# Patient Record
Sex: FEMALE | Race: WHITE | NOT HISPANIC OR LATINO | Employment: OTHER | URBAN - METROPOLITAN AREA
[De-identification: names, ages, dates, MRNs, and addresses within clinical notes are randomized per-mention and may not be internally consistent; named-entity substitution may affect disease eponyms.]

---

## 2018-08-21 ENCOUNTER — OFFICE VISIT (OUTPATIENT)
Dept: URGENT CARE | Facility: MEDICAL CENTER | Age: 73
End: 2018-08-21
Payer: MEDICARE

## 2018-08-21 VITALS
OXYGEN SATURATION: 98 % | SYSTOLIC BLOOD PRESSURE: 126 MMHG | HEIGHT: 66 IN | TEMPERATURE: 96.4 F | RESPIRATION RATE: 16 BRPM | WEIGHT: 142.6 LBS | HEART RATE: 84 BPM | DIASTOLIC BLOOD PRESSURE: 66 MMHG | BODY MASS INDEX: 22.92 KG/M2

## 2018-08-21 DIAGNOSIS — H11.32 NON-TRAUMATIC SUBCONJUNCTIVAL HEMORRHAGE OF LEFT EYE: Primary | ICD-10-CM

## 2018-08-21 PROCEDURE — 99204 OFFICE O/P NEW MOD 45 MIN: CPT | Performed by: FAMILY MEDICINE

## 2018-08-21 PROCEDURE — G0463 HOSPITAL OUTPT CLINIC VISIT: HCPCS | Performed by: FAMILY MEDICINE

## 2018-08-21 RX ORDER — CHOLECALCIFEROL (VITAMIN D3) 125 MCG
CAPSULE ORAL
COMMUNITY

## 2018-08-21 RX ORDER — FERROUS SULFATE 325(65) MG
325 TABLET ORAL
COMMUNITY

## 2018-08-21 RX ORDER — CYANOCOBALAMIN (VITAMIN B-12) 500 MCG
LOZENGE ORAL
COMMUNITY
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
## 2018-08-21 NOTE — PATIENT INSTRUCTIONS
Visual acuity bilaterally-20/20  There is no visual disturbance or eye pain  Patient given reassurance  However, I advised patient not to rub her left thigh  If there is any blood in the anterior chamber, she is to go to her ophthalmologist or eye care specialist   She expressed understanding  Subconjunctival Hemorrhage   WHAT YOU NEED TO KNOW:   A subconjunctival hemorrhage is when blood collects under the conjunctiva in your eye  The conjunctiva is the clear lining that covers the white part of your eye  The blood comes from broken blood vessels under the conjunctiva  DISCHARGE INSTRUCTIONS:   Care for your eye:   · Cold or warm compress:  Use a cold pack during the first 24 hours  Ask how often to apply it and for how long each time  After the first 24 hours, apply a warm pack on your eye  Do this 3 times each day for about 10 to 15 minutes each time  · Eyedrops: You may need artificial tears to keep your eye moist  Use the drops as directed  Follow up with your healthcare provider or eye specialist as directed:  Write down your questions so you remember to ask them during your visits  Contact your healthcare provider or eye specialist if:   · The redness in your eye has not gone away after 3 weeks  · You have another subconjunctival hemorrhage  · You have subconjunctival hemorrhages in both eyes  · You have questions or concerns about your condition or care  Return to the emergency department if:   · You have eye pain or sensitivity to light  · Your vision changes  · You have white or yellow discharge from your eye  © 2017 [address]  Information is for End User's use only and may not be sold, redistributed or otherwise used for commercial purposes  All illustrations and images included in CareNotes® are the copyrighted property of A D A M , Inc  or Leonard lOeary  The above information is an  only   It is not intended as medical advice for individual conditions or treatments  Talk to your doctor, nurse or pharmacist before following any medical regimen to see if it is safe and effective for you

## 2019-08-17 ENCOUNTER — OFFICE VISIT (OUTPATIENT)
Dept: URGENT CARE | Facility: CLINIC | Age: 74
End: 2019-08-17
Payer: MEDICARE

## 2019-08-17 VITALS
HEIGHT: 66 IN | BODY MASS INDEX: 23.3 KG/M2 | DIASTOLIC BLOOD PRESSURE: 64 MMHG | RESPIRATION RATE: 20 BRPM | WEIGHT: 145 LBS | OXYGEN SATURATION: 97 % | SYSTOLIC BLOOD PRESSURE: 128 MMHG | HEART RATE: 78 BPM | TEMPERATURE: 96.6 F

## 2019-08-17 DIAGNOSIS — S70.362A TICK BITE OF LEFT THIGH, INITIAL ENCOUNTER: Primary | ICD-10-CM
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
## 2019-08-17 PROCEDURE — G0463 HOSPITAL OUTPT CLINIC VISIT: HCPCS | Performed by: NURSE PRACTITIONER

## 2019-08-17 PROCEDURE — 99213 OFFICE O/P EST LOW 20 MIN: CPT | Performed by: NURSE PRACTITIONER

## 2019-08-17 RX ORDER — DOXYCYCLINE 100 MG/1
100 TABLET ORAL 2 TIMES DAILY
Qty: 28 TABLET | Refills: 0 | Status: SHIPPED | OUTPATIENT
Start: 2019-08-17 | End: 2019-08-18

## 2019-08-17 NOTE — PATIENT INSTRUCTIONS
Take meds as directed   Follow up with pcp  Take antibiotic one tablet twice a day as directed for 14 days  Bullseye rash to the left inner thigh present  We offer over-the-counter meds that can be purchased here at the office for your convenience   Cough and cold meds:   Mucinex for $14 00   Mucinex DM for $14 00   Delsym for $14 dollars   Cepacol Extra strength for $4 00,     Allergy medicine  Bendaryl for $4 00  Cetrizine (Zyrtec) for $4 00,     Pain relief  Ibuprofen (motrin) for $4 00  Acetaminophen (tylenol) for $4 00  Childrens tylenol and motrin for $4 00    Itch and Rash Relief  % Hydrocortisone Cream for $4 00          Tick Bite   WHAT YOU NEED TO KNOW:   Most tick bites are not dangerous, but ticks can pass disease or infection when they bite  Ticks need to be removed quickly  You may have redness, pain, itching, and swelling near the bite  Blisters may also develop  DISCHARGE INSTRUCTIONS:   Return to the emergency department if:   · You have trouble walking or moving your legs  · You have joint pain, muscle pain, or muscle weakness within 1 month of a tick bite  · You have a fever, chills, headache, or rash  Contact your healthcare provider if:   · You cannot remove the tick  · The tick's head is stuck in your skin  · You have questions or concerns about your condition or care  Medicines:   · Medicines  help decrease pain, redness, itching, and swelling  You may also need medicine to prevent or fight a bacterial infection  These medicines may be given as a cream, lotion, or pill  · Take your medicine as directed  Contact your healthcare provider if you think your medicine is not helping or if you have side effects  Tell him of her if you are allergic to any medicine  Keep a list of the medicines, vitamins, and herbs you take  Include the amounts, and when and why you take them  Bring the list or the pill bottles to follow-up visits   Carry your medicine list with you in case of an emergency  How to remove a tick:  Remove the tick as soon as possible to help prevent disease or infection  You are less likely to get sick from a tick bite if you remove the tick within 24 hours  Do not use petroleum jelly, nail polish, rubbing alcohol, or heat  These do not work and may be dangerous  Do the following to remove a tick:  · First, try a soapy cotton ball  Soak a cotton ball in liquid soap  Cover the tick with the cotton ball for 30 seconds  The tick may come off with the cotton ball when you pull it away  · Use tweezers if the soapy cotton ball does not work  Grasp the tick as close to your skin as possible  Pull the tick straight up and out  Do not touch the tick with your bare hands  · Do not twist or jerk the tick suddenly, because this may break off the tick's head or mouth parts  Do not leave any part of the tick in your skin  · Do not crush or squeeze the tick since its body may be infected with germs  Flush the tick down the toilet  · After the tick is removed, clean the area of the bite with rubbing alcohol  Then wash your hands with soap and water  Apply ice  on your bite for 15 to 20 minutes every hour or as directed  Use an ice pack, or put crushed ice in a plastic bag  Cover it with a towel before you apply it to your skin  Ice helps prevent tissue damage and decreases swelling and pain  Prevent a tick bite:  Ticks live in areas covered by brush and grass  They may even be found in your lawn if you live in certain areas  Outdoor pets can carry ticks inside the house  Ticks can grab onto you or your clothes when you walk by grass or brush  If you go into areas that contain many trees, tall grasses, and underbrush, do the following:  · Wear light colored pants and a long-sleeved shirt  Tuck your pants into your socks or boots  Tuck in your shirt  Wear sleeves that fit close to the skin at your wrists and neck   This will help prevent ticks from crawling through gaps in your clothing and onto your skin  Wear a hat in areas with trees  · Apply insect repellant on your skin  The insect repellant should contain DEET  Do not put insect repellant on skin that is cut, scratched, or irritated  Always use soap and water to wash the insect repellant off as soon as possible once you are indoors  Do not apply insect repellant on your child's face or hands  · Spray insect repellant onto your clothes  Use permethrin spray  This spray kills ticks that crawl on your clothing  Be sure to spray the tops of your boots, bottom of pant legs, and sleeve cuffs  As soon as possible, wash and dry clothing in hot water and high heat  · Check your clothing, hair, and skin for ticks  Shower within 2 hours of coming indoors  Carefully check the hairline, armpits, neck, and waist  Check your pets and children for ticks  Remove ticks from pets the same way as you remove them from people  · Decrease the risk for ticks in your yard  Ticks like to live in shady, moist areas  Sigmund Lawrence your lawn regularly to keep the grass short  Trim the grass around birdbaths and fences  Cut branches that are overgrown and take them out of the yard  Clear out leaf piles  Kavon Pummel firewood in a dry, terrell area  Follow up with your healthcare provider as directed:  Write down your questions so you remember to ask them during your visits  © 2017 [address] [employer] Information is for End User's use only and may not be sold, redistributed or otherwise used for commercial purposes  All illustrations and images included in CareNotes® are the copyrighted property of A D A M , [employer]  or Leonard Oleary  The above information is an  only  It is not intended as medical advice for individual conditions or treatments  Talk to your doctor, nurse or pharmacist before following any medical regimen to see if it is safe and effective for you

## 2019-08-17 NOTE — PROGRESS NOTES
NAME: Ana Laura Barbour is a 69 y o  female  : 1945    MRN: [id number]      Assessment and Plan   Tick bite of left thigh, initial encounter [S70 362A, W57  XXXA]  1  Tick bite of left thigh, initial encounter  amoxicillin (AMOXIL) 500 MG tablet    DISCONTINUED: doxycycline (ADOXA) 100 MG tablet       Elisa Gonzalez was seen today for insect bite  Diagnoses and all orders for this visit:    Tick bite of left thigh, initial encounter  -     Discontinue: doxycycline (ADOXA) 100 MG tablet; Take 1 tablet (100 mg total) by mouth 2 (two) times a day for 14 days  -     amoxicillin (AMOXIL) 500 MG tablet; Take 1 tablet (500 mg total) by mouth 3 (three) times a day for 14 days    medication changed due to pt unable to afford it  Patient Instructions   Patient Instructions   Take meds as directed   Follow up with pcp  Take antibiotic one tablet twice a day as directed for 14 days  Bullseye rash to the left inner thigh present  We offer over-the-counter meds that can be purchased here at the office for your convenience   Cough and cold meds:   Mucinex for $14 00   Mucinex DM for $14 00   Delsym for $14 dollars   Cepacol Extra strength for $4 00,     Allergy medicine  Bendaryl for $4 00  Cetrizine (Zyrtec) for $4 00,     Pain relief  Ibuprofen (motrin) for $4 00  Acetaminophen (tylenol) for $4 00  Childrens tylenol and motrin for $4 00    Itch and Rash Relief  % Hydrocortisone Cream for $4 00          Tick Bite   WHAT YOU NEED TO KNOW:   Most tick bites are not dangerous, but ticks can pass disease or infection when they bite  Ticks need to be removed quickly  You may have redness, pain, itching, and swelling near the bite  Blisters may also develop  DISCHARGE INSTRUCTIONS:   Return to the emergency department if:   · You have trouble walking or moving your legs  · You have joint pain, muscle pain, or muscle weakness within 1 month of a tick bite  · You have a fever, chills, headache, or rash    Contact your healthcare provider if:   · You cannot remove the tick  · The tick's head is stuck in your skin  · You have questions or concerns about your condition or care  Medicines:   · Medicines  help decrease pain, redness, itching, and swelling  You may also need medicine to prevent or fight a bacterial infection  These medicines may be given as a cream, lotion, or pill  · Take your medicine as directed  Contact your healthcare provider if you think your medicine is not helping or if you have side effects  Tell him of her if you are allergic to any medicine  Keep a list of the medicines, vitamins, and herbs you take  Include the amounts, and when and why you take them  Bring the list or the pill bottles to follow-up visits  Carry your medicine list with you in case of an emergency  How to remove a tick:  Remove the tick as soon as possible to help prevent disease or infection  You are less likely to get sick from a tick bite if you remove the tick within 24 hours  Do not use petroleum jelly, nail polish, rubbing alcohol, or heat  These do not work and may be dangerous  Do the following to remove a tick:  · First, try a soapy cotton ball  Soak a cotton ball in liquid soap  Cover the tick with the cotton ball for 30 seconds  The tick may come off with the cotton ball when you pull it away  · Use tweezers if the soapy cotton ball does not work  Grasp the tick as close to your skin as possible  Pull the tick straight up and out  Do not touch the tick with your bare hands  · Do not twist or jerk the tick suddenly, because this may break off the tick's head or mouth parts  Do not leave any part of the tick in your skin  · Do not crush or squeeze the tick since its body may be infected with germs  Flush the tick down the toilet  · After the tick is removed, clean the area of the bite with rubbing alcohol  Then wash your hands with soap and water    Apply ice  on your bite for 15 to 20 minutes every hour or as directed  Use an ice pack, or put crushed ice in a plastic bag  Cover it with a towel before you apply it to your skin  Ice helps prevent tissue damage and decreases swelling and pain  Prevent a tick bite:  Ticks live in areas covered by brush and grass  They may even be found in your lawn if you live in certain areas  Outdoor pets can carry ticks inside the house  Ticks can grab onto you or your clothes when you walk by grass or brush  If you go into areas that contain many trees, tall grasses, and underbrush, do the following:  · Wear light colored pants and a long-sleeved shirt  Tuck your pants into your socks or boots  Tuck in your shirt  Wear sleeves that fit close to the skin at your wrists and neck  This will help prevent ticks from crawling through gaps in your clothing and onto your skin  Wear a hat in areas with trees  · Apply insect repellant on your skin  The insect repellant should contain DEET  Do not put insect repellant on skin that is cut, scratched, or irritated  Always use soap and water to wash the insect repellant off as soon as possible once you are indoors  Do not apply insect repellant on your child's face or hands  · Spray insect repellant onto your clothes  Use permethrin spray  This spray kills ticks that crawl on your clothing  Be sure to spray the tops of your boots, bottom of pant legs, and sleeve cuffs  As soon as possible, wash and dry clothing in hot water and high heat  · Check your clothing, hair, and skin for ticks  Shower within 2 hours of coming indoors  Carefully check the hairline, armpits, neck, and waist  Check your pets and children for ticks  Remove ticks from pets the same way as you remove them from people  · Decrease the risk for ticks in your yard  Ticks like to live in shady, moist areas  Dashawn Apley your lawn regularly to keep the grass short  Trim the grass around birdbaths and fences  Cut branches that are overgrown and take them out of the yard   Clear out leaf piles  Myke gooden in a dry, terrell area  Follow up with your healthcare provider as directed:  Write down your questions so you remember to ask them during your visits  © 2017 [address] Information is for End User's use only and may not be sold, redistributed or otherwise used for commercial purposes  All illustrations and images included in CareNotes® are the copyrighted property of A D A M , Inc  or Leonard Oleary  The above information is an  only  It is not intended as medical advice for individual conditions or treatments  Talk to your doctor, nurse or pharmacist before following any medical regimen to see if it is safe and effective for you  Proceed to ER if symptoms worsen  Chief Complaint     Chief Complaint   Patient presents with    Insect Bite     Patient was bit by tick  She removed the tick but now has a bullseye with surrounding redness  History of Present Illness     Pt has had bullseye rash on the left inner thigh, present for 2 weeks, area is red and warm, itchy in nature  She has no symptoms of lyme disease at this time  She has no fever, no body aches, chills, headaches, change of vision or fatigue  She has no fevers  Doesn't recall being bit by a tick in the past      2 weeks ago she removed the tick ,fully engorged and has been caring for the area herself  Review of Systems   Review of Systems   Skin: Positive for wound (left inner thigh)          Bullseye rash present           Current Medications       Current Outpatient Medications:     amoxicillin (AMOXIL) 500 MG tablet, Take 1 tablet (500 mg total) by mouth 3 (three) times a day for 14 days, Disp: 42 tablet, Rfl: 0    Cholecalciferol (VITAMIN D3) 1000 units CAPS, Take by mouth, Disp: , Rfl:     ferrous sulfate 325 (65 Fe) mg tablet, Take 325 mg by mouth daily with breakfast, Disp: , Rfl:     COLLADO SEAL ROOT PO, Take 1 tablet by mouth daily, Disp: , Rfl:    Vitamin E 400 units TABS, Take by mouth, Disp: , Rfl:     Current Allergies     Allergies as of 08/17/2019    (No Known Allergies)              History reviewed  No pertinent past medical history  History reviewed  No pertinent surgical history  History reviewed  No pertinent family history  Medications have been verified  The following portions of the patient's history were reviewed and updated as appropriate: allergies, current medications, past family history, past medical history, past social history, past surgical history and problem list     Objective   /64   Pulse 78   Temp (!) 96 6 °F (35 9 °C) (Tympanic)   Resp 20   Ht 5' 6" (1 676 m)   Wt 65 8 kg (145 lb)   SpO2 97%   BMI 23 40 kg/m²      Physical Exam     Physical Exam   Constitutional: She appears well-developed and well-nourished  Cardiovascular: Normal rate, regular rhythm and normal heart sounds  Pulmonary/Chest: Effort normal and breath sounds normal    Skin: Skin is warm  Capillary refill takes less than 2 seconds  Rash noted              Demetrice Booty, CRNP

## 2019-08-18 ENCOUNTER — TELEPHONE (OUTPATIENT)
Dept: URGENT CARE | Facility: CLINIC | Age: 74
End: 2019-08-18

## 2019-08-18 RX ORDER — AMOXICILLIN 500 MG/1
500 TABLET, FILM COATED ORAL 3 TIMES DAILY
Qty: 42 TABLET | Refills: 0 | Status: SHIPPED | OUTPATIENT
Start: 2019-08-18 | End: 2019-09-01

## 2019-08-18 NOTE — TELEPHONE ENCOUNTER
Spoke with pharmacist about pt unable to afford doxycycline 100mg bid for 14 days and was changed to amoxicillin 500mg TID for 14 days     PRESTON Weber

## 2020-10-30 ENCOUNTER — OFFICE VISIT (OUTPATIENT)
Dept: URGENT CARE | Facility: CLINIC | Age: 75
End: 2020-10-30
Payer: COMMERCIAL

## 2020-10-30 VITALS
SYSTOLIC BLOOD PRESSURE: 132 MMHG | TEMPERATURE: 96.7 F | RESPIRATION RATE: 20 BRPM | DIASTOLIC BLOOD PRESSURE: 70 MMHG | HEART RATE: 89 BPM | OXYGEN SATURATION: 98 %

## 2020-10-30 DIAGNOSIS — Z20.822 EXPOSURE TO COVID-19 VIRUS: Primary | ICD-10-CM

## 2020-10-30 PROCEDURE — U0003 INFECTIOUS AGENT DETECTION BY NUCLEIC ACID (DNA OR RNA); SEVERE ACUTE RESPIRATORY SYNDROME CORONAVIRUS 2 (SARS-COV-2) (CORONAVIRUS DISEASE [COVID-19]), AMPLIFIED PROBE TECHNIQUE, MAKING USE OF HIGH THROUGHPUT TECHNOLOGIES AS DESCRIBED BY CMS-2020-01-R: HCPCS | Performed by: PHYSICIAN ASSISTANT

## 2020-10-30 PROCEDURE — G0463 HOSPITAL OUTPT CLINIC VISIT: HCPCS | Performed by: PHYSICIAN ASSISTANT

## 2020-10-30 PROCEDURE — 99212 OFFICE O/P EST SF 10 MIN: CPT | Performed by: PHYSICIAN ASSISTANT

## 2020-10-31 LAB — SARS-COV-2 RNA SPEC QL NAA+PROBE: NOT DETECTED

## 2022-01-01 RX ORDER — SODIUM CHLORIDE 9 MG/ML
20 INJECTION, SOLUTION INTRAVENOUS ONCE
Status: CANCELLED | OUTPATIENT
Start: 2022-09-20

## 2022-01-01 RX ORDER — SODIUM CHLORIDE 9 MG/ML
20 INJECTION, SOLUTION INTRAVENOUS ONCE
Status: CANCELLED | OUTPATIENT
Start: 2022-01-01

## 2022-03-29 ENCOUNTER — APPOINTMENT (EMERGENCY)
Dept: CT IMAGING | Facility: HOSPITAL | Age: 77
End: 2022-03-29
Payer: COMMERCIAL

## 2022-03-29 ENCOUNTER — APPOINTMENT (EMERGENCY)
Dept: RADIOLOGY | Facility: HOSPITAL | Age: 77
End: 2022-03-29
Payer: COMMERCIAL

## 2022-03-29 ENCOUNTER — HOSPITAL ENCOUNTER (OUTPATIENT)
Facility: HOSPITAL | Age: 77
Setting detail: OBSERVATION
Discharge: HOME | End: 2022-03-31
Attending: EMERGENCY MEDICINE | Admitting: INTERNAL MEDICINE
Payer: COMMERCIAL

## 2022-03-29 DIAGNOSIS — R79.89 ELEVATED D-DIMER: ICD-10-CM

## 2022-03-29 DIAGNOSIS — R79.89 ELEVATED BRAIN NATRIURETIC PEPTIDE (BNP) LEVEL: ICD-10-CM

## 2022-03-29 DIAGNOSIS — R07.89 OTHER CHEST PAIN: Primary | ICD-10-CM

## 2022-03-29 DIAGNOSIS — I31.3 PERICARDIAL EFFUSION: ICD-10-CM

## 2022-03-29 DIAGNOSIS — N39.0 UTI (URINARY TRACT INFECTION): ICD-10-CM

## 2022-03-29 DIAGNOSIS — R79.89 ELEVATED TSH: ICD-10-CM

## 2022-03-29 LAB
2HR DELTA HS TROPONIN: -1 NG/L
ALBUMIN SERPL BCP-MCNC: 3.3 G/DL (ref 3.5–5)
ALP SERPL-CCNC: 113 U/L (ref 46–116)
ALT SERPL W P-5'-P-CCNC: 33 U/L (ref 12–78)
ANION GAP SERPL CALCULATED.3IONS-SCNC: 10 MMOL/L (ref 4–13)
AST SERPL W P-5'-P-CCNC: 22 U/L (ref 5–45)
ATRIAL RATE: 67 BPM
ATRIAL RATE: 68 BPM
BACTERIA UR QL AUTO: ABNORMAL /HPF
BASOPHILS # BLD AUTO: 0.08 THOUSANDS/ΜL (ref 0–0.1)
BASOPHILS NFR BLD AUTO: 1 % (ref 0–1)
BILIRUB SERPL-MCNC: 0.2 MG/DL (ref 0.2–1)
BILIRUB UR QL STRIP: NEGATIVE
BUN SERPL-MCNC: 16 MG/DL (ref 5–25)
CALCIUM ALBUM COR SERPL-MCNC: 9.1 MG/DL (ref 8.3–10.1)
CALCIUM SERPL-MCNC: 8.5 MG/DL (ref 8.3–10.1)
CARDIAC TROPONIN I PNL SERPL HS: 3 NG/L
CARDIAC TROPONIN I PNL SERPL HS: 4 NG/L
CHLORIDE SERPL-SCNC: 108 MMOL/L (ref 100–108)
CHOLEST SERPL-MCNC: 119 MG/DL
CLARITY UR: ABNORMAL
CO2 SERPL-SCNC: 22 MMOL/L (ref 21–32)
COLOR UR: ABNORMAL
CREAT SERPL-MCNC: 0.97 MG/DL (ref 0.6–1.3)
D DIMER PPP FEU-MCNC: 0.93 UG/ML FEU
EOSINOPHIL # BLD AUTO: 0.59 THOUSAND/ΜL (ref 0–0.61)
EOSINOPHIL NFR BLD AUTO: 7 % (ref 0–6)
ERYTHROCYTE [DISTWIDTH] IN BLOOD BY AUTOMATED COUNT: 14.8 % (ref 11.6–15.1)
GFR SERPL CREATININE-BSD FRML MDRD: 57 ML/MIN/1.73SQ M
GLUCOSE SERPL-MCNC: 95 MG/DL (ref 65–140)
GLUCOSE UR STRIP-MCNC: NEGATIVE MG/DL
HCT VFR BLD AUTO: 30.8 % (ref 34.8–46.1)
HDLC SERPL-MCNC: 46 MG/DL
HGB BLD-MCNC: 10.2 G/DL (ref 11.5–15.4)
HGB UR QL STRIP.AUTO: NEGATIVE
IMM GRANULOCYTES # BLD AUTO: 0.03 THOUSAND/UL (ref 0–0.2)
IMM GRANULOCYTES NFR BLD AUTO: 0 % (ref 0–2)
KETONES UR STRIP-MCNC: NEGATIVE MG/DL
LDLC SERPL CALC-MCNC: 58 MG/DL (ref 0–100)
LEUKOCYTE ESTERASE UR QL STRIP: ABNORMAL
LYMPHOCYTES # BLD AUTO: 1.67 THOUSANDS/ΜL (ref 0.6–4.47)
LYMPHOCYTES NFR BLD AUTO: 21 % (ref 14–44)
MCH RBC QN AUTO: 31.5 PG (ref 26.8–34.3)
MCHC RBC AUTO-ENTMCNC: 33.1 G/DL (ref 31.4–37.4)
MCV RBC AUTO: 95 FL (ref 82–98)
MONOCYTES # BLD AUTO: 0.43 THOUSAND/ΜL (ref 0.17–1.22)
MONOCYTES NFR BLD AUTO: 5 % (ref 4–12)
NEUTROPHILS # BLD AUTO: 5.24 THOUSANDS/ΜL (ref 1.85–7.62)
NEUTS SEG NFR BLD AUTO: 66 % (ref 43–75)
NITRITE UR QL STRIP: POSITIVE
NON-SQ EPI CELLS URNS QL MICRO: ABNORMAL /HPF
NONHDLC SERPL-MCNC: 73 MG/DL
NRBC BLD AUTO-RTO: 0 /100 WBCS
NT-PROBNP SERPL-MCNC: 489 PG/ML
P AXIS: 49 DEGREES
P AXIS: 55 DEGREES
PH UR STRIP.AUTO: 6 [PH]
PLATELET # BLD AUTO: 305 THOUSANDS/UL (ref 149–390)
PMV BLD AUTO: 10.7 FL (ref 8.9–12.7)
POTASSIUM SERPL-SCNC: 3.4 MMOL/L (ref 3.5–5.3)
PR INTERVAL: 170 MS
PR INTERVAL: 184 MS
PROT SERPL-MCNC: 7.4 G/DL (ref 6.4–8.2)
PROT UR STRIP-MCNC: NEGATIVE MG/DL
QRS AXIS: 44 DEGREES
QRS AXIS: 59 DEGREES
QRSD INTERVAL: 80 MS
QRSD INTERVAL: 82 MS
QT INTERVAL: 382 MS
QT INTERVAL: 384 MS
QTC INTERVAL: 405 MS
QTC INTERVAL: 406 MS
RBC # BLD AUTO: 3.24 MILLION/UL (ref 3.81–5.12)
RBC #/AREA URNS AUTO: ABNORMAL /HPF
SODIUM SERPL-SCNC: 140 MMOL/L (ref 136–145)
SP GR UR STRIP.AUTO: 1.02 (ref 1–1.03)
T WAVE AXIS: 32 DEGREES
T WAVE AXIS: 34 DEGREES
TRIGL SERPL-MCNC: 74 MG/DL
TSH SERPL DL<=0.05 MIU/L-ACNC: 6.48 UIU/ML (ref 0.36–3.74)
UROBILINOGEN UR QL STRIP.AUTO: 0.2 E.U./DL
VENTRICULAR RATE: 67 BPM
VENTRICULAR RATE: 68 BPM
WBC # BLD AUTO: 8.04 THOUSAND/UL (ref 4.31–10.16)
WBC #/AREA URNS AUTO: ABNORMAL /HPF

## 2022-03-29 PROCEDURE — 80053 COMPREHEN METABOLIC PANEL: CPT | Performed by: EMERGENCY MEDICINE

## 2022-03-29 PROCEDURE — 93005 ELECTROCARDIOGRAM TRACING: CPT

## 2022-03-29 PROCEDURE — 85025 COMPLETE CBC W/AUTO DIFF WBC: CPT | Performed by: EMERGENCY MEDICINE

## 2022-03-29 PROCEDURE — 99285 EMERGENCY DEPT VISIT HI MDM: CPT

## 2022-03-29 PROCEDURE — 84439 ASSAY OF FREE THYROXINE: CPT | Performed by: PHYSICIAN ASSISTANT

## 2022-03-29 PROCEDURE — 80061 LIPID PANEL: CPT | Performed by: STUDENT IN AN ORGANIZED HEALTH CARE EDUCATION/TRAINING PROGRAM

## 2022-03-29 PROCEDURE — 81001 URINALYSIS AUTO W/SCOPE: CPT | Performed by: STUDENT IN AN ORGANIZED HEALTH CARE EDUCATION/TRAINING PROGRAM

## 2022-03-29 PROCEDURE — 99285 EMERGENCY DEPT VISIT HI MDM: CPT | Performed by: EMERGENCY MEDICINE

## 2022-03-29 PROCEDURE — 83880 ASSAY OF NATRIURETIC PEPTIDE: CPT | Performed by: STUDENT IN AN ORGANIZED HEALTH CARE EDUCATION/TRAINING PROGRAM

## 2022-03-29 PROCEDURE — G1004 CDSM NDSC: HCPCS

## 2022-03-29 PROCEDURE — 36415 COLL VENOUS BLD VENIPUNCTURE: CPT

## 2022-03-29 PROCEDURE — 71275 CT ANGIOGRAPHY CHEST: CPT

## 2022-03-29 PROCEDURE — 96365 THER/PROPH/DIAG IV INF INIT: CPT

## 2022-03-29 PROCEDURE — 84484 ASSAY OF TROPONIN QUANT: CPT | Performed by: EMERGENCY MEDICINE

## 2022-03-29 PROCEDURE — 85379 FIBRIN DEGRADATION QUANT: CPT | Performed by: STUDENT IN AN ORGANIZED HEALTH CARE EDUCATION/TRAINING PROGRAM

## 2022-03-29 PROCEDURE — 71046 X-RAY EXAM CHEST 2 VIEWS: CPT

## 2022-03-29 PROCEDURE — 84443 ASSAY THYROID STIM HORMONE: CPT | Performed by: STUDENT IN AN ORGANIZED HEALTH CARE EDUCATION/TRAINING PROGRAM

## 2022-03-29 PROCEDURE — 93010 ELECTROCARDIOGRAM REPORT: CPT | Performed by: INTERNAL MEDICINE

## 2022-03-29 RX ADMIN — IOHEXOL 85 ML: 350 INJECTION, SOLUTION INTRAVENOUS at 22:26

## 2022-03-29 RX ADMIN — CEFTRIAXONE SODIUM 1000 MG: 10 INJECTION, POWDER, FOR SOLUTION INTRAVENOUS at 22:19

## 2022-03-29 NOTE — LETTER
[address]  Dept: [phone]    April 1, 2022     Patient: Haris Reyes   YOB: 1945   Date of Visit: 3/29/2022       To Whom it May Concern:    Haris Reyes is under my professional care  She was seen in the hospital from 3/29/2022   to 04/01/22  She may return to work on 4/1/2022 without limitations  If you have any questions or concerns, please don't hesitate to call           Sincerely,          Grupo Foy MD

## 2022-03-29 NOTE — LETTER
[address] [place] [address] [address] [id number]  Dept: [phone]    March 31, 2022     Patient: Ritesh Teixeira   YOB: 1945   Date of Visit: 3/29/2022       To Whom it May Concern:    Ritesh Teixeira is under my professional care  She was seen in the hospital from 3/29/2022   to 03/31/22  She may return to work on 4/4/2022 without limitations  If you have any questions or concerns, please don't hesitate to call           Sincerely,          Kavin Madrigal MD

## 2022-03-29 NOTE — ED PROVIDER NOTES
History  Chief Complaint   Patient presents with    Chest Pain     pt c/o diffuse chest pain that feels as if an elephant is sitting on her chest   ongoing for 10 days but getting worse today  also c/o diarrhe and UTI sx but started taking azo and they are starting to resolve  does not follow regularly with a doctor  Tone Me is a very pleasant 78 y o  female who presents today for pressure like chest pain  No significant PMH but no follow up with PCP for general check ups or screening  She reports exertional pressure like chest pain with SOB that is improved with rest  8/10 severity  May last for hours  Unrelated to food intake, denies N/V, diaphoresis, headache, back pain or syncopal episode  Patient now reports having urinary frequency, urgency and dysuria x 10 days associated with diarrhea which has improved since using OTC daily AZO medications  Denies any fever  Reports history of flu like symptoms in January 2022 with covid contacts, not vaccinated and has not felt like herself since  Lost 20lbs in past 3 months  History provided by:  Patient   used: No    Chest Pain  Pain location:  Substernal area, L chest and R chest  Pain quality: pressure    Pain radiates to:  Does not radiate  Pain radiates to the back: no    Pain severity:  Severe  Onset quality:  Sudden  Timing:  Intermittent  Progression:  Worsening  Chronicity:  New  Context: stress    Context: not at rest    Relieved by: Tylenol    Worsened by:  Exertion  Associated symptoms: abdominal pain, fatigue, palpitations and shortness of breath    Associated symptoms: no altered mental status, no cough, no diaphoresis, no dizziness, no fever, no headache, no lower extremity edema, no nausea, no orthopnea, no syncope, not vomiting and no weakness    Abdominal pain:     Location:  Generalized    Duration:  10 days    Chronicity:  New  Risk factors: no diabetes mellitus, no hypertension, not obese, no prior DVT/PE and no smoking        Prior to Admission Medications   Prescriptions Last Dose Informant Patient Reported? Taking? Cholecalciferol (VITAMIN D3) 1000 units CAPS   Yes Yes   Sig: Take by mouth   COLLADO SEAL ROOT PO   Yes Yes   Sig: Take 1 tablet by mouth daily   Pyridoxine HCl (VITAMIN B-6 PO)  Self Yes Yes   Sig: Take 1 tablet by mouth   Vitamin E 400 units TABS   Yes Yes   Sig: Take by mouth   ferrous sulfate 325 (65 Fe) mg tablet   Yes Yes   Sig: Take 325 mg by mouth daily with breakfast      Facility-Administered Medications: None       History reviewed  No pertinent past medical history  History reviewed  No pertinent surgical history  History reviewed  No pertinent family history  I have reviewed and agree with the history as documented  E-Cigarette/Vaping     E-Cigarette/Vaping Substances     Social History     Tobacco Use    Smoking status: Never Smoker    Smokeless tobacco: Never Used   Substance Use Topics    Alcohol use: Not on file    Drug use: Not on file        Review of Systems   Constitutional: Positive for fatigue and unexpected weight change  Negative for activity change, appetite change, diaphoresis and fever  HENT: Positive for postnasal drip  Negative for congestion, ear pain, rhinorrhea, sneezing and sore throat  Eyes: Negative for visual disturbance (bilateral cataract)  Respiratory: Positive for shortness of breath  Negative for cough, chest tightness and wheezing  Cardiovascular: Positive for chest pain and palpitations  Negative for orthopnea, leg swelling and syncope  Gastrointestinal: Positive for abdominal pain and diarrhea  Negative for blood in stool, constipation, nausea and vomiting  Genitourinary: Positive for dysuria, frequency and urgency  Negative for hematuria  Musculoskeletal: Positive for arthralgias (left ankle related to fracture in younger age with persistent mild swelling to left foot)  Skin: Positive for pallor     Neurological: Negative for dizziness, syncope, weakness and headaches  Psychiatric/Behavioral: Positive for sleep disturbance  Negative for behavioral problems and confusion  All other systems reviewed and are negative  Physical Exam  ED Triage Vitals   Temperature Pulse Respirations Blood Pressure SpO2   03/29/22 1849 03/29/22 1849 03/29/22 1849 03/29/22 1849 03/29/22 1849   97 5 °F (36 4 °C) 71 16 123/57 99 %      Temp Source Heart Rate Source Patient Position - Orthostatic VS BP Location FiO2 (%)   03/29/22 1849 03/29/22 2103 -- 03/29/22 2103 --   Oral Monitor  Right arm       Pain Score       03/29/22 1849       8             Orthostatic Vital Signs  Vitals:    03/29/22 1849 03/29/22 2103   BP: 123/57 133/64   Pulse: 71 68       Physical Exam  Vitals reviewed  Constitutional:       General: She is not in acute distress  Appearance: She is well-developed and normal weight  She is not diaphoretic  HENT:      Head: Normocephalic and atraumatic  Eyes:      Conjunctiva/sclera: Conjunctivae normal    Neck:      Thyroid: No thyromegaly  Vascular: No JVD  Cardiovascular:      Rate and Rhythm: Normal rate and regular rhythm  Pulses:           Radial pulses are 2+ on the right side  Heart sounds: Normal heart sounds  No murmur heard  No gallop  No S3 or S4 sounds  Pulmonary:      Effort: Pulmonary effort is normal  No respiratory distress  Breath sounds: Normal breath sounds  No decreased breath sounds, wheezing, rhonchi or rales  Chest:      Chest wall: No tenderness  Abdominal:      Palpations: Abdomen is soft  There is no hepatomegaly or splenomegaly  Tenderness: There is abdominal tenderness (SPR)  There is no guarding  Musculoskeletal:      Cervical back: Neck supple  Right lower leg: No tenderness  No edema  Left lower leg: Tenderness (at ankle joint) present  No edema  Lymphadenopathy:      Cervical: No cervical adenopathy  Skin:     General: Skin is warm and dry  Coloration: Skin is pale  Neurological:      General: No focal deficit present  Mental Status: She is alert  Cranial Nerves: No cranial nerve deficit  ED Medications  Medications   ceftriaxone (ROCEPHIN) 1 g/50 mL in dextrose IVPB (1,000 mg Intravenous New Bag 3/29/22 2219)   iohexol (OMNIPAQUE) 350 MG/ML injection (SINGLE-DOSE) 85 mL (85 mL Intravenous Given 3/29/22 2226)       Diagnostic Studies  Results Reviewed     Procedure Component Value Units Date/Time    Lipid panel [[id number]]  (Abnormal) Collected: 03/29/22 1854    Lab Status: Final result Specimen: Blood from Arm, Left Updated: 03/29/22 2211     Cholesterol 119 mg/dL      Triglycerides 74 mg/dL      HDL, Direct 46 mg/dL      LDL Calculated 58 mg/dL      Non-HDL-Chol (CHOL-HDL) 73 mg/dl     NT-BNP PRO [[id number]]  (Abnormal) Collected: 03/29/22 1854    Lab Status: Final result Specimen: Blood from Arm, Left Updated: 03/29/22 2211     NT-proBNP 489 pg/mL     TSH [[id number]]  (Abnormal) Collected: 03/29/22 1854    Lab Status: Final result Specimen: Blood from Arm, Left Updated: 03/29/22 2211     TSH 3RD GENERATON 6 480 uIU/mL     Narrative:      Patients undergoing fluorescein dye angiography may retain small amounts of fluorescein in the body for 48-72 hours post procedure  Samples containing fluorescein can produce falsely depressed TSH values  If the patient had this procedure,a specimen should be resubmitted post fluorescein clearance        Urine Microscopic [[id number]]  (Abnormal) Collected: 03/29/22 2135    Lab Status: Final result Specimen: Urine, Clean Catch Updated: 03/29/22 2202     RBC, UA 0-1 /hpf      WBC, UA 4-10 /hpf      Epithelial Cells Occasional /hpf      Bacteria, UA Innumerable /hpf     HS Troponin I 2hr [[id number]]  (Normal) Collected: 03/29/22 2101    Lab Status: Final result Specimen: Blood from Arm, Left Updated: 03/29/22 2151     hs TnI 2hr 3 ng/L      Delta 2hr hsTnI -1 ng/L     UA w Reflex to Microscopic w Reflex to Culture [[id number]]  (Abnormal) Collected: 03/29/22 2135    Lab Status: Final result Specimen: Urine, Clean Catch Updated: 03/29/22 2147     Color, UA Light Yellow     Clarity, UA Slightly Cloudy     Specific [place], UA 1 020     pH, UA 6 0     Leukocytes, UA Small     Nitrite, UA Positive     Protein, UA Negative mg/dl      Glucose, UA Negative mg/dl      Ketones, UA Negative mg/dl      Urobilinogen, UA 0 2 E U /dl      Bilirubin, UA Negative     Blood, UA Negative    D-dimer, quantitative [[id number]]  (Abnormal) Collected: 03/29/22 2106    Lab Status: Final result Specimen: Blood from Arm, Right Updated: 03/29/22 2142     D-Dimer, Quant 0 93 ug/ml FEU     Narrative: In the evaluation for possible pulmonary embolism, in the appropriate (Well's Score of 4 or less) patient, the age adjusted d-dimer cutoff for this patient can be calculated as:    Age x 0 01 (in ug/mL) for Age-adjusted D-dimer exclusion threshold for a patient over 50 years      HS Troponin 0hr (reflex protocol) [[id number]]  (Normal) Collected: 03/29/22 1854    Lab Status: Final result Specimen: Blood from Arm, Left Updated: 03/29/22 1943     hs TnI 0hr 4 ng/L     Comprehensive metabolic panel [[id number]]  (Abnormal) Collected: 03/29/22 1854    Lab Status: Final result Specimen: Blood from Arm, Left Updated: 03/29/22 1937     Sodium 140 mmol/L      Potassium 3 4 mmol/L      Chloride 108 mmol/L      CO2 22 mmol/L      ANION GAP 10 mmol/L      BUN 16 mg/dL      Creatinine 0 97 mg/dL      Glucose 95 mg/dL      Calcium 8 5 mg/dL      Corrected Calcium 9 1 mg/dL      AST 22 U/L      ALT 33 U/L      Alkaline Phosphatase 113 U/L      Total Protein 7 4 g/dL      Albumin 3 3 g/dL      Total Bilirubin 0 20 mg/dL      eGFR 57 ml/min/1 73sq m     Narrative:      [place] guidelines for Chronic Kidney Disease (CKD):     Stage 1 with normal or high GFR (GFR > 90 mL/min/1 73 square meters)    Stage 2 Mild CKD (GFR = 60-89 mL/min/1 73 square meters)    Stage 3A Moderate CKD (GFR = 45-59 mL/min/1 73 square meters)    Stage 3B Moderate CKD (GFR = 30-44 mL/min/1 73 square meters)    Stage 4 Severe CKD (GFR = 15-29 mL/min/1 73 square meters)    Stage 5 End Stage CKD (GFR <15 mL/min/1 73 square meters)  Note: GFR calculation is accurate only with a steady state creatinine    CBC and differential [[id number]]  (Abnormal) Collected: 03/29/22 1854    Lab Status: Final result Specimen: Blood from Arm, Left Updated: 03/29/22 1919     WBC 8 04 Thousand/uL      RBC 3 24 Million/uL      Hemoglobin 10 2 g/dL      Hematocrit 30 8 %      MCV 95 fL      MCH 31 5 pg      MCHC 33 1 g/dL      RDW 14 8 %      MPV 10 7 fL      Platelets 792 Thousands/uL      nRBC 0 /100 WBCs      Neutrophils Relative 66 %      Immat GRANS % 0 %      Lymphocytes Relative 21 %      Monocytes Relative 5 %      Eosinophils Relative 7 %      Basophils Relative 1 %      Neutrophils Absolute 5 24 Thousands/µL      Immature Grans Absolute 0 03 Thousand/uL      Lymphocytes Absolute 1 67 Thousands/µL      Monocytes Absolute 0 43 Thousand/µL      Eosinophils Absolute 0 59 Thousand/µL      Basophils Absolute 0 08 Thousands/µL                  XR chest 2 views   ED Interpretation by Nikki Dillon MD (03/29 2004)   No infiltrate  No acute disease         CTA ED chest PE study    (Results Pending)         Procedures  ECG 12 Lead Documentation Only    Date/Time: 3/29/2022 7:42 PM  Performed by: Pham Mehta MD  Authorized by: Nikki Dillon MD     Patient location:  ED  Previous ECG:     Previous ECG:  Unavailable  Interpretation:     Interpretation: normal    Rate:     ECG rate:  67    ECG rate assessment: normal    Rhythm:     Rhythm: sinus rhythm    Ectopy:     Ectopy: none    QRS:     QRS axis:  Normal    QRS intervals:  Normal (QTc 405)  Conduction:     Conduction: normal    ST segments:     ST segments:  Normal  T waves:     T waves: normal ED Course  ED Course as of 22 2255   Tue Mar 29, 2022   2145 Son reports his GF who works with mother confirmed to have C diff infection in the past 2 weeks with diarrheal episodes while at work  Patient had contact with this person however diarrheal episodes stopped 2 days ago and no fever reported therefore suspected related to UTI however family informed if diarrheal episodes recur then testing may be necessary for C diff at that time, depending on presentation   Mother reports she takes daily 65mg iron supplements when informed of anemia  Therefore RBC indices in  normal limits may be skewed and patient will warrant further work up for anemia   EKG NSR x 2  Trop 4 with 2hr Delta Trop -1  HEART Score 3  Mild anemia Hb 10 (patient reports taking daily iron 65mg supplements) and mildly elevated Pro-  Plan for close Cardiology Follow up at the time of discharge  Ddimer elevated  Stat CT PE study ordered   UA positive for nitrites and WBC with innumerable bacteria noted on microscopy  1g IV Ceftriaxone ordered  Patient updated on findings and agreed with plan of management  August and Son (on telephone) updated simultaneously with patient               HEART Risk Score      Most Recent Value   Heart Score Risk Calculator    History 1 Filed at: 2022   ECG 0 Filed at: 2022   Age 2 Filed at: 2022   Risk Factors 1  [Brother  of Heart Disease in 50's] Filed at: 2022   Troponin 0 Filed at: 2022   HEART Score 4 Filed at: 2022              PERC Rule for PE      Most Recent Value   PERC Rule for PE    Age >=50 1 Filed at: 2022   HR >=100 0 Filed at: 2022   O2 Sat on room air < 95% 0 Filed at: 2022   History of PE or DVT 0 Filed at: 2022   Recent trauma or surgery 0 Filed at: 2022   Hemoptysis 0 Filed at: 2022   Exogenous estrogen 0 Filed at: 03/29/2022 2152   Unilateral leg swelling 0 Filed at: 03/29/2022 2152   PERC Rule for PE Results 1 Filed at: 03/29/2022 2152              SBIRT 22yo+      Most Recent Value   SBIRT (23 yo +)    In order to provide better care to our patients, we are screening all of our patients for alcohol and drug use  Would it be okay to ask you these screening questions? Yes Filed at: 03/29/2022 9766   Initial Alcohol Screen: US AUDIT-C     1  How often do you have a drink containing alcohol? 0 Filed at: 03/29/2022 1852   2  How many drinks containing alcohol do you have on a typical day you are drinking? 0 Filed at: 03/29/2022 1852   3b  FEMALE Any Age, or MALE 65+: How often do you have 4 or more drinks on one occassion? 0 Filed at: 03/29/2022 1852   Audit-C Score 0 Filed at: 03/29/2022 8361   JORDANA: How many times in the past year have you    Used an illegal drug or used a prescription medication for non-medical reasons?  Never Filed at: 03/29/2022 1852        NATHALY Risk Score      Most Recent Value   Age >= 72 1 Filed at: 03/29/2022 1958   Known CAD (stenosis >= 50%) 0 Filed at: 03/29/2022 1958   Recent (<=24 hrs) Service Angina 1 Filed at: 03/29/2022 1958   ST Deviation >= 0 5 mm 0 Filed at: 03/29/2022 1958   3+ CAD Risk Factors (FHx, HTN, HLP, DM, Smoker) 0 Filed at: 03/29/2022 1958   Aspirin Use Past 7 Days 0 Filed at: 03/29/2022 1958   Elevated Cardiac Markers 0 Filed at: 03/29/2022 1958   NATHALY Risk Score (Calculated) 2 Filed at: 03/29/2022 1958        Wells' Criteria for PE      Most Recent Value   Wells' Criteria for PE    Clinical signs and symptoms of DVT 0 Filed at: 03/29/2022 2153   PE is primary diagnosis or equally likely 0 Filed at: 03/29/2022 2153   HR >100 0 Filed at: 03/29/2022 2153   Immobilization at least 3 days or Surgery in the previous 4 weeks 0 Filed at: 03/29/2022 2153   Previous, objectively diagnosed PE or DVT 0 Filed at: 03/29/2022 2153   Hemoptysis 0 Filed at: 03/29/2022 2153   Malignancy with treatment within 6 months or palliative 0 Filed at: 03/29/2022 2153   Wells' Criteria Total 0 Filed at: 03/29/2022 2153            MDM  Number of Diagnoses or Management Options  Elevated brain natriuretic peptide (BNP) level: new and requires workup  Elevated d-dimer: new and requires workup  Elevated TSH: new and does not require workup  Other chest pain: new and requires workup  UTI (urinary tract infection): new and requires workup  Diagnosis management comments: 79yo female with history of pressure like chest pain and SOB x 10 days on exertion  Also complains of diarrhea and urinary frequency, urgency and dysuria x 10 days  EKG Normal Sinus Rhythm with no ST/T wave abnormalities  HEART Score of 4 with Trop 4 - Delta  -1  Elevated D-dimer  CT PE demonstrated ordered         Amount and/or Complexity of Data Reviewed  Clinical lab tests: ordered  Tests in the radiology section of CPT®: ordered  Independent visualization of images, tracings, or specimens: yes    Risk of Complications, Morbidity, and/or Mortality  Presenting problems: high  Diagnostic procedures: high  Management options: high    Patient Progress  Patient progress: stable      Disposition  Final diagnoses:   Other chest pain   Elevated d-dimer   UTI (urinary tract infection)   Elevated TSH   Elevated brain natriuretic peptide (BNP) level     Time reflects when diagnosis was documented in both MDM as applicable and the Disposition within this note     Time User Action Codes Description Comment    3/29/2022 10:23 PM Nadja Crawford Add [R07 89] Other chest pain     3/29/2022 10:23 PM Nadja Crawford Add [R79 89] Elevated d-dimer     3/29/2022 10:23 PM Nadja Crawford Add [N39 0] UTI (urinary tract infection)     3/29/2022 10:23 PM Nadja Crawford Add [R79 89] Elevated TSH     3/29/2022 10:24 PM Nadja Crawford Add [R79 89] Elevated brain natriuretic peptide (BNP) level       ED Disposition     None      Follow-up Information Follow up With Specialties Details Why Contact Info Additional [address] In 1 week for further evaluation of your abnormal thyroid studies  [address], [address] [facility], [address], [address] [place], [place], [address]          Patient's Medications   Discharge Prescriptions    No medications on file         PDMP Review     None           ED Provider  Attending physically available and evaluated Marybethjanis Alvarez  GUILLERMO managed the patient along with the ED Attending      Electronically Signed by         Ry Buckner MD  03/29/22 0352

## 2022-03-29 NOTE — LETTER
[address] [place] [address]  Dept: [phone]    March 31, 2022     Patient: Shanita Lugo   YOB: 1945   Date of Visit: 3/29/2022       To Whom it May Concern:    Shanita Lugo is under my professional care  She was seen in the hospital from 3/29/2022   to 03/31/22  She may return to work on 4/4/2022 without limitations  If you have any questions or concerns, please don't hesitate to call           Sincerely,          April Stone MD

## 2022-03-29 NOTE — ED ATTENDING ATTESTATION
3/29/2022  IErnie MD, saw and evaluated the patient  I have discussed the patient with the resident/non-physician practitioner and agree with the resident's/non-physician practitioner's findings, Plan of Care, and MDM as documented in the resident's/non-physician practitioner's note, except where noted  All available labs and Radiology studies were reviewed  I was present for key portions of any procedure(s) performed by the resident/non-physician practitioner and I was immediately available to provide assistance  At this point I agree with the current assessment done in the Emergency Department  I have conducted an independent evaluation of this patient a history and physical is as follows: Patient had URI in January  Did not test for covid but all of family had covid at that time  Has had some SOB since then  Over past 10 days patient has had increasing SOB and chest pressure  States that she is fine in the morning and then once she gets the pressure it is constant throughout the day but increases in intensity with exertion  Today with symptoms since 11 am   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

## 2022-03-30 ENCOUNTER — APPOINTMENT (OUTPATIENT)
Dept: NON INVASIVE DIAGNOSTICS | Facility: HOSPITAL | Age: 77
End: 2022-03-30
Payer: COMMERCIAL

## 2022-03-30 PROBLEM — D50.9 IRON DEFICIENCY ANEMIA: Status: ACTIVE | Noted: 2022-01-01

## 2022-03-30 LAB
ALBUMIN SERPL BCP-MCNC: 2.8 G/DL (ref 3.5–5)
ALP SERPL-CCNC: 105 U/L (ref 46–116)
ALT SERPL W P-5'-P-CCNC: 26 U/L (ref 12–78)
ANION GAP SERPL CALCULATED.3IONS-SCNC: 8 MMOL/L (ref 4–13)
AORTIC ROOT: 3 CM
AORTIC VALVE MEAN VELOCITY: 11.6 M/S
APICAL FOUR CHAMBER EJECTION FRACTION: 73 %
ASCENDING AORTA: 2.7 CM (ref 1.81–2.71)
AST SERPL W P-5'-P-CCNC: 19 U/L (ref 5–45)
ATRIAL RATE: 72 BPM
AV LVOT MEAN GRADIENT: 4 MMHG
AV LVOT PEAK GRADIENT: 7 MMHG
AV MEAN GRADIENT: 6 MMHG
AV PEAK GRADIENT: 12 MMHG
AV REGURGITATION PRESSURE HALF TIME: 463 MS
AV VELOCITY RATIO: 0.78
BILIRUB SERPL-MCNC: 0.22 MG/DL (ref 0.2–1)
BUN SERPL-MCNC: 12 MG/DL (ref 5–25)
CALCIUM ALBUM COR SERPL-MCNC: 9 MG/DL (ref 8.3–10.1)
CALCIUM SERPL-MCNC: 8 MG/DL (ref 8.3–10.1)
CHLORIDE SERPL-SCNC: 110 MMOL/L (ref 100–108)
CO2 SERPL-SCNC: 23 MMOL/L (ref 21–32)
CREAT SERPL-MCNC: 0.97 MG/DL (ref 0.6–1.3)
DOP CALC AO PEAK VEL: 1.71 M/S
DOP CALC AO VTI: 34.96 CM
DOP CALC LVOT PEAK VEL VTI: 26.86 CM
DOP CALC LVOT PEAK VEL: 1.34 M/S
E WAVE DECELERATION TIME: 195 MS
FLUAV RNA RESP QL NAA+PROBE: NEGATIVE
FLUBV RNA RESP QL NAA+PROBE: NEGATIVE
FRACTIONAL SHORTENING: 48 % (ref 28–44)
GFR SERPL CREATININE-BSD FRML MDRD: 57 ML/MIN/1.73SQ M
GLUCOSE SERPL-MCNC: 192 MG/DL (ref 65–140)
INTERVENTRICULAR SEPTUM IN DIASTOLE (PARASTERNAL SHORT AXIS VIEW): 1 CM
INTERVENTRICULAR SEPTUM: 1 CM (ref 0.49–0.91)
LAAS-AP4: 15.6 CM2
LEFT ATRIUM SIZE: 3.5 CM
LEFT INTERNAL DIMENSION IN SYSTOLE: 2.2 CM (ref 2.29–3.46)
LEFT VENTRICULAR INTERNAL DIMENSION IN DIASTOLE: 4.2 CM (ref 3.72–5.54)
LEFT VENTRICULAR POSTERIOR WALL IN END DIASTOLE: 1 CM (ref 0.47–0.9)
LEFT VENTRICULAR STROKE VOLUME: 61 ML
LVSV (TEICH): 61 ML
MV E'TISSUE VEL-SEP: 9 CM/S
MV PEAK A VEL: 0.71 M/S
MV PEAK E VEL: 72 CM/S
MV STENOSIS PRESSURE HALF TIME: 56 MS
MV VALVE AREA P 1/2 METHOD: 3.93 CM2
P AXIS: 57 DEGREES
PLATELET # BLD AUTO: 289 THOUSANDS/UL (ref 149–390)
PMV BLD AUTO: 10.1 FL (ref 8.9–12.7)
POTASSIUM SERPL-SCNC: 3.1 MMOL/L (ref 3.5–5.3)
PR INTERVAL: 174 MS
PROT SERPL-MCNC: 6.6 G/DL (ref 6.4–8.2)
QRS AXIS: 38 DEGREES
QRSD INTERVAL: 80 MS
QT INTERVAL: 390 MS
QTC INTERVAL: 427 MS
RIGHT VENTRICLE ID DIMENSION: 3.1 CM
RSV RNA RESP QL NAA+PROBE: NEGATIVE
SARS-COV-2 RNA RESP QL NAA+PROBE: NEGATIVE
SL CV AV DECELERATION TIME RETROGRADE: 1596 MS
SL CV AV PEAK GRADIENT RETROGRADE: 41 MMHG
SL CV PED ECHO LEFT VENTRICLE DIASTOLIC VOLUME (MOD BIPLANE) 2D: 77 ML
SL CV PED ECHO LEFT VENTRICLE SYSTOLIC VOLUME (MOD BIPLANE) 2D: 16 ML
SODIUM SERPL-SCNC: 141 MMOL/L (ref 136–145)
T WAVE AXIS: 42 DEGREES
T4 FREE SERPL-MCNC: 0.96 NG/DL (ref 0.76–1.46)
VENTRICULAR RATE: 72 BPM
Z-SCORE OF ASCENDING AORTA: 1.95
Z-SCORE OF INTERVENTRICULAR SEPTUM IN END DIASTOLE: 2.79
Z-SCORE OF LEFT VENTRICULAR DIMENSION IN END DIASTOLE: -0.78
Z-SCORE OF LEFT VENTRICULAR DIMENSION IN END SYSTOLE: -1.95
Z-SCORE OF LEFT VENTRICULAR POSTERIOR WALL IN END DIASTOLE: 2.9

## 2022-03-30 PROCEDURE — 80053 COMPREHEN METABOLIC PANEL: CPT | Performed by: NURSE PRACTITIONER

## 2022-03-30 PROCEDURE — 85049 AUTOMATED PLATELET COUNT: CPT | Performed by: PHYSICIAN ASSISTANT

## 2022-03-30 PROCEDURE — 93306 TTE W/DOPPLER COMPLETE: CPT | Performed by: INTERNAL MEDICINE

## 2022-03-30 PROCEDURE — 93458 L HRT ARTERY/VENTRICLE ANGIO: CPT | Performed by: INTERNAL MEDICINE

## 2022-03-30 PROCEDURE — 99152 MOD SED SAME PHYS/QHP 5/>YRS: CPT | Performed by: INTERNAL MEDICINE

## 2022-03-30 PROCEDURE — 93005 ELECTROCARDIOGRAM TRACING: CPT

## 2022-03-30 PROCEDURE — 99220 PR INITIAL OBSERVATION CARE/DAY 70 MINUTES: CPT | Performed by: INTERNAL MEDICINE

## 2022-03-30 PROCEDURE — 93306 TTE W/DOPPLER COMPLETE: CPT

## 2022-03-30 PROCEDURE — C1894 INTRO/SHEATH, NON-LASER: HCPCS | Performed by: INTERNAL MEDICINE

## 2022-03-30 PROCEDURE — C1769 GUIDE WIRE: HCPCS | Performed by: INTERNAL MEDICINE

## 2022-03-30 PROCEDURE — 93454 CORONARY ARTERY ANGIO S&I: CPT | Performed by: INTERNAL MEDICINE

## 2022-03-30 PROCEDURE — 99202 OFFICE O/P NEW SF 15 MIN: CPT | Performed by: INTERNAL MEDICINE

## 2022-03-30 PROCEDURE — 0241U HB NFCT DS VIR RESP RNA 4 TRGT: CPT | Performed by: PHYSICIAN ASSISTANT

## 2022-03-30 PROCEDURE — 93010 ELECTROCARDIOGRAM REPORT: CPT | Performed by: INTERNAL MEDICINE

## 2022-03-30 RX ORDER — SODIUM CHLORIDE 9 MG/ML
125 INJECTION, SOLUTION INTRAVENOUS CONTINUOUS
Status: DISPENSED | OUTPATIENT
Start: 2022-03-30 | End: 2022-03-30

## 2022-03-30 RX ORDER — SODIUM CHLORIDE 9 MG/ML
75 INJECTION, SOLUTION INTRAVENOUS CONTINUOUS
Status: DISCONTINUED | OUTPATIENT
Start: 2022-03-30 | End: 2022-03-30

## 2022-03-30 RX ORDER — FENTANYL CITRATE 50 UG/ML
INJECTION, SOLUTION INTRAMUSCULAR; INTRAVENOUS AS NEEDED
Status: DISCONTINUED | OUTPATIENT
Start: 2022-03-30 | End: 2022-03-31 | Stop reason: HOSPADM

## 2022-03-30 RX ORDER — MAGNESIUM HYDROXIDE/ALUMINUM HYDROXICE/SIMETHICONE 120; 1200; 1200 MG/30ML; MG/30ML; MG/30ML
30 SUSPENSION ORAL EVERY 6 HOURS PRN
Status: DISCONTINUED | OUTPATIENT
Start: 2022-03-30 | End: 2022-03-31 | Stop reason: HOSPADM

## 2022-03-30 RX ORDER — HEPARIN SODIUM 1000 [USP'U]/ML
INJECTION, SOLUTION INTRAVENOUS; SUBCUTANEOUS AS NEEDED
Status: DISCONTINUED | OUTPATIENT
Start: 2022-03-30 | End: 2022-03-31 | Stop reason: HOSPADM

## 2022-03-30 RX ORDER — AMOXICILLIN 500 MG/1
500 CAPSULE ORAL EVERY 8 HOURS SCHEDULED
Status: DISCONTINUED | OUTPATIENT
Start: 2022-03-30 | End: 2022-03-31 | Stop reason: HOSPADM

## 2022-03-30 RX ORDER — FERROUS SULFATE 325(65) MG
325 TABLET ORAL
Status: DISCONTINUED | OUTPATIENT
Start: 2022-03-30 | End: 2022-03-31 | Stop reason: HOSPADM

## 2022-03-30 RX ORDER — LIDOCAINE HYDROCHLORIDE 10 MG/ML
INJECTION, SOLUTION EPIDURAL; INFILTRATION; INTRACAUDAL; PERINEURAL AS NEEDED
Status: DISCONTINUED | OUTPATIENT
Start: 2022-03-30 | End: 2022-03-31 | Stop reason: HOSPADM

## 2022-03-30 RX ORDER — ONDANSETRON 2 MG/ML
4 INJECTION INTRAMUSCULAR; INTRAVENOUS EVERY 6 HOURS PRN
Status: DISCONTINUED | OUTPATIENT
Start: 2022-03-30 | End: 2022-03-31 | Stop reason: HOSPADM

## 2022-03-30 RX ORDER — NITROGLYCERIN 20 MG/100ML
INJECTION INTRAVENOUS AS NEEDED
Status: DISCONTINUED | OUTPATIENT
Start: 2022-03-30 | End: 2022-03-31 | Stop reason: HOSPADM

## 2022-03-30 RX ORDER — ASPIRIN 81 MG/1
324 TABLET, CHEWABLE ORAL ONCE
Status: COMPLETED | OUTPATIENT
Start: 2022-03-30 | End: 2022-03-30

## 2022-03-30 RX ORDER — VERAPAMIL HCL 2.5 MG/ML
AMPUL (ML) INTRAVENOUS AS NEEDED
Status: DISCONTINUED | OUTPATIENT
Start: 2022-03-30 | End: 2022-03-31 | Stop reason: HOSPADM

## 2022-03-30 RX ORDER — ASPIRIN 81 MG/1
81 TABLET, CHEWABLE ORAL DAILY
Status: DISCONTINUED | OUTPATIENT
Start: 2022-03-30 | End: 2022-03-31 | Stop reason: HOSPADM

## 2022-03-30 RX ORDER — ACETAMINOPHEN 325 MG/1
650 TABLET ORAL EVERY 6 HOURS PRN
Status: DISCONTINUED | OUTPATIENT
Start: 2022-03-30 | End: 2022-03-31 | Stop reason: HOSPADM

## 2022-03-30 RX ORDER — MIDAZOLAM HYDROCHLORIDE 2 MG/2ML
INJECTION, SOLUTION INTRAMUSCULAR; INTRAVENOUS AS NEEDED
Status: DISCONTINUED | OUTPATIENT
Start: 2022-03-30 | End: 2022-03-31 | Stop reason: HOSPADM

## 2022-03-30 RX ADMIN — Medication 325 MG: at 08:19

## 2022-03-30 RX ADMIN — ASPIRIN 81 MG CHEWABLE TABLET 324 MG: 81 TABLET CHEWABLE at 10:34

## 2022-03-30 RX ADMIN — SODIUM CHLORIDE 125 ML/HR: 0.9 INJECTION, SOLUTION INTRAVENOUS at 15:02

## 2022-03-30 RX ADMIN — AMOXICILLIN 500 MG: 500 CAPSULE ORAL at 15:02

## 2022-03-30 RX ADMIN — AMOXICILLIN 500 MG: 500 CAPSULE ORAL at 22:49

## 2022-03-30 RX ADMIN — SODIUM CHLORIDE 75 ML/HR: 0.9 INJECTION, SOLUTION INTRAVENOUS at 10:33

## 2022-03-30 RX ADMIN — ENOXAPARIN SODIUM 40 MG: 40 INJECTION SUBCUTANEOUS at 08:19

## 2022-03-30 NOTE — CONSULTS
Consult - Cardiology   Edson Tee 78 y o  female MRN: [id number]  Unit/Bed#: [place] 2 - Encounter: [id number]        Reason For Consult:  Precordial chest pain                ASSESSMENT:  Precordial chest pain   Palpitations   Urinary tract infection  Elevated D-dimer  Elevated TSH  Mild hypokalemia    Familial history of premature CAD   - Father  at 67 from MI, brother  at 61 from MI  PLAN/ DISCUSSION:     · Given precordial chest pain concerning for unstable angina in addition to familial history of premature CAD, cardiac catheterization is recommended  · Will provide pre and post IVF hydration  · Will provide aspirin 324 mg x 1 now  · Negative cardiac enzymes with high sensitivity troponin of 4 and 3    · In light of elevated D-dimer, CTA revealed no pulmonary embolus  · NT proBNP 489, patient examines euvolemic  · CTA revealed small pericardial effusion  · Echocardiogram ordered to assess cardiac structure and function  · TSH elevated, free T4 pending  · Monitor volume status with strict intake/output, daily weight  · Maintain potassium > 4, magnesium > 2  Repeat lab studies ordered  History Of Present Illness:  61-year-old female presented to [facility] with complaints of precordial chest pain described as a pressure as if an elephant was sitting on her chest  She endorsed associated shortness of breath  She denied dizziness, lightheadedness, syncope, pre- syncope, diaphoresis  She denied radiation of the pain  Per patient, this pain began yesterday at approximately 10 in the morning while she was at work  She had taken tylenol with some relief, but the pain continued through the day thus prompting her to seek medical attention  Per patient, this chest pain occurs mainly with moderate exertion such as going up stairs  This pain had been going on for approximately two weeks, however more recently, the pain occurred with minimal exertion such as walking  Patient endorsed associated shortness of breath as noted above  She denied lightheadedness, dizziness, diaphoresis  The pain subsides after approximately 15-20 minutes of rest   Patient endorses decreased exercise tolerance as she becomes more fatigued  She endorses intermittent palpitations at rest  She notes that she has been 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  Patient also reported urinary frequency, urgency and dysuria over the last 10 days as well  She has had associated diarrhea which has improved  She reports history of flu-like symptoms in January 2022 with [place] contacts  She is not vaccinated  Since this time, she has not felt like herself and has also lost 20 lbs  Upon assessment and evaluation, patient resting comfortably in bed  At this time, she denies chest pain, shortness of breath, dizziness, lightheadedness, syncope, pre-syncope  The above plan was reviewed with the patient  She is agreeable to the plan and all questions had been answered  Please see significant cardiac history and problems enumerated above  Patient does not routinely follow with a primary care physician or cardiologist       Past Medical History:        History reviewed  No pertinent past medical history  History reviewed  No pertinent surgical history  Allergy:        Allergies   Allergen Reactions    Codeine Other (See Comments)     Was told allergy       Medications:       Prior to Admission medications    Medication Sig Start Date End Date Taking?  Authorizing Provider   Cholecalciferol (VITAMIN D3) 1000 units CAPS Take by mouth   Yes Historical Provider, MD   ferrous sulfate 325 (65 Fe) mg tablet Take 325 mg by mouth daily with breakfast   Yes Historical Provider, MD COLLADO SEAL ROOT PO Take 1 tablet by mouth daily   Yes Historical Provider, MD   Pyridoxine HCl (VITAMIN B-6 PO) Take 1 tablet by mouth   Yes Historical Provider, MD   Vitamin E 400 units TABS Take by mouth Yes Historical Provider, MD       Family History:     History reviewed  No pertinent family history  Social History:       Social History     Socioeconomic History    Marital status: /Civil Union     Spouse name: None    Number of children: None    Years of education: None    Highest education level: None   Occupational History    None   Tobacco Use    Smoking status: Never Smoker    Smokeless tobacco: Never Used   Substance and Sexual Activity    Alcohol use: None    Drug use: None    Sexual activity: None   Other Topics Concern    None   Social History Narrative    None     Social Determinants of Health     Financial Resource Strain: Not on file   Food Insecurity: Not on file   Transportation Needs: Not on file   Physical Activity: Not on file   Stress: Not on file   Social Connections: Not on file   Intimate Partner Violence: Not on file   Housing Stability: Not on file       ROS:  Negative except otherwise aforementioned above  Remainder review of systems is negative    Exam:  General:  alert, oriented and in no distress, cooperative  Head: Normocephalic, atraumatic  Eyes:  EOMI  Pupils - equal, round, reactive to accomodation  No icterus  Normal Conjunctiva     Oropharynx: moist and normal-appearing mucosa  Neck: supple, symmetrical, trachea midline and no JVD  Heart: regular rate, regular rhythm, S1, S2   Respiratory effort / Chest Inspection: unlabored  Lungs:  normal air entry, lungs clear to auscultation and no rales, rhonchi or wheezing   Abdomen: flat, normal findings: bowel sounds normal and soft, non-tender  Lower Limbs:  no pitting edema  Pulses[de-identified]  RLE - DP: present 2+                 LLE - DP: present 2+  Musculoskeletal: ROM grossly normal    DATA:      ECG:                 Normal sinus rhythm  Normal ECG  When compared with ECG of 29-MAR-2022 18:48, (unconfirmed)  No significant change was found  Confirmed by Bhakti Costa ([id number]) on 3/29/2022 9:06:19 PM      Telemetry: NSR upon exam           Weights: Wt Readings from Last 3 Encounters:   03/29/22 54 4 kg (120 lb)   08/17/19 65 8 kg (145 lb)   08/21/18 64 7 kg (142 lb 9 6 oz)   , Body mass index is 19 37 kg/m²           Lab Studies:           Results from last 7 days   Lab Units 03/29/22  1854   TRIGLYCERIDES mg/dL 74   HDL mg/dL 46*     Results from last 7 days   Lab Units 03/30/22  0542 03/29/22  1854   WBC Thousand/uL  --  8 04   HEMOGLOBIN g/dL  --  10 2*   HEMATOCRIT %  --  30 8*   PLATELETS Thousands/uL 289 305   ,   Results from last 7 days   Lab Units 03/29/22  1854   POTASSIUM mmol/L 3 4*   CHLORIDE mmol/L 108   CO2 mmol/L 22   BUN mg/dL 16   CREATININE mg/dL 0 97   CALCIUM mg/dL 8 5   ALK PHOS U/L 113   ALT U/L 33   AST U/L 22

## 2022-03-30 NOTE — QUICK NOTE
Vitals:    03/30/22 1430 03/30/22 1500 03/30/22 1600 03/30/22 1700   BP: 112/55 127/60 106/55 116/59   BP Location: Left arm      Pulse: 66 72 83 65   Resp: 21      Temp: (!) 97 4 °F (36 3 °C)      TempSrc: Temporal      SpO2: 98% 96% 97%    Weight:       Height:         Patient seen and examined in the evening  She has had no recurrence of chest pain or other significant symptoms  Examination is benign  TR band pressure release is in process  Reviewed patient's echocardiogram which is significant for normal left ventricular and right ventricular size and function, no significant valvular stenosis or regurgitation, presence of trace to small circumferential pericardial effusion  Diagnosis:  1  Suspected noncardiac chest pain   2  Trace to small, hemodynamically insignificant pericardial effusion  3  Subclinical hypothyroidism       -- patient stable for discharge from cardiac perspective if labs are stable in the morning   -- encouraged to drink plenty of water for next few days  -- will need follow-up blood work to reassess thyroid function in a few months  -- may follow-up with primary care physician  Cardiology follow-up can be arranged as needed in the future  Cardiology will sign off and be available to follow as needed

## 2022-03-30 NOTE — PLAN OF CARE
Problem: Nutrition/Hydration-ADULT  Goal: Nutrient/Hydration intake appropriate for improving, restoring or maintaining nutritional needs  Description: Monitor and assess patient's nutrition/hydration status for malnutrition  Collaborate with interdisciplinary team and initiate plan and interventions as ordered  Monitor patient's weight and dietary intake as ordered or per policy  Utilize nutrition screening tool and intervene as necessary  Determine patient's food preferences and provide high-protein, high-caloric foods as appropriate       INTERVENTIONS:  - Monitor oral intake, urinary output, labs, and treatment plans  - Assess nutrition and hydration status and recommend course of action  - Evaluate amount of meals eaten  - Assist patient with eating if necessary   - Allow adequate time for meals  - Recommend/ encourage appropriate diets, oral nutritional supplements, and vitamin/mineral supplements  - Order, calculate, and assess calorie counts as needed  - Recommend, monitor, and adjust tube feedings and TPN/PPN based on assessed needs  - Assess need for intravenous fluids  - Provide specific nutrition/hydration education as appropriate  - Include patient/family/caregiver in decisions related to nutrition  Outcome: Progressing     Problem: DISCHARGE PLANNING - CARE MANAGEMENT  Goal: Discharge to post-acute care or home with appropriate resources  Description: INTERVENTIONS:  - Conduct assessment to determine patient/family and health care team treatment goals, and need for post-acute services based on payer coverage, community resources, and patient preferences, and barriers to discharge  - Address psychosocial, clinical, and financial barriers to discharge as identified in assessment in conjunction with the patient/family and health care team  - Arrange appropriate level of post-acute services according to patients   needs and preference and payer coverage in collaboration with the physician and health care team  - Communicate with and update the patient/family, physician, and health care team regarding progress on the discharge plan  - Arrange appropriate transportation to post-acute venues  Outcome: Progressing     Problem: CARDIOVASCULAR - ADULT  Goal: Maintains optimal cardiac output and hemodynamic stability  Description: INTERVENTIONS:  - Monitor I/O, vital signs and rhythm  - Monitor for S/S and trends of decreased cardiac output  - Administer and titrate ordered vasoactive medications to optimize hemodynamic stability  - Assess quality of pulses, skin color and temperature  - Assess for signs of decreased coronary artery perfusion  - Instruct patient to report change in severity of symptoms  Outcome: Progressing  Goal: Absence of cardiac dysrhythmias or at baseline rhythm  Description: INTERVENTIONS:  - Continuous cardiac monitoring, vital signs, obtain 12 lead EKG if ordered  - Administer antiarrhythmic and heart rate control medications as ordered  - Monitor electrolytes and administer replacement therapy as ordered  Outcome: Progressing     Problem: RESPIRATORY - ADULT  Goal: Achieves optimal ventilation and oxygenation  Description: INTERVENTIONS:  - Assess for changes in respiratory status  - Assess for changes in mentation and behavior  - Position to facilitate oxygenation and minimize respiratory effort  - Oxygen administered by appropriate delivery if ordered  - Initiate smoking cessation education as indicated  - Encourage broncho-pulmonary hygiene including cough, deep breathe, Incentive Spirometry  - Assess the need for suctioning and aspirate as needed  - Assess and instruct to report SOB or any respiratory difficulty  - Respiratory Therapy support as indicated  Outcome: Progressing     Problem: GENITOURINARY - ADULT  Goal: Maintains or returns to baseline urinary function  Description: INTERVENTIONS:  - Assess urinary function  - Encourage oral fluids to ensure adequate hydration if ordered  - Administer IV fluids as ordered to ensure adequate hydration  - Administer ordered medications as needed  - Offer frequent toileting  - Follow urinary retention protocol if ordered  Outcome: Progressing  Goal: Absence of urinary retention  Description: INTERVENTIONS:  - Assess patients ability to void and empty bladder  - Monitor I/O  - Bladder scan as needed  - Discuss with physician/AP medications to alleviate retention as needed  - Discuss catheterization for long term situations as appropriate  Outcome: Progressing     Problem: PAIN - ADULT  Goal: Verbalizes/displays adequate comfort level or baseline comfort level  Description: Interventions:  - Encourage patient to monitor pain and request assistance  - Assess pain using appropriate pain scale  - Administer analgesics based on type and severity of pain and evaluate response  - Implement non-pharmacological measures as appropriate and evaluate response  - Consider cultural and social influences on pain and pain management  - Notify physician/advanced practitioner if interventions unsuccessful or patient reports new pain  Outcome: Progressing     Problem: INFECTION - ADULT  Goal: Absence or prevention of progression during hospitalization  Description: INTERVENTIONS:  - Assess and monitor for signs and symptoms of infection  - Monitor lab/diagnostic results  - Monitor all insertion sites, i e  indwelling lines, tubes, and drains  - Monitor endotracheal if appropriate and nasal secretions for changes in amount and color  - [place] appropriate cooling/warming therapies per order  - Administer medications as ordered  - Instruct and encourage patient and family to use good hand hygiene technique  - Identify and instruct in appropriate isolation precautions for identified infection/condition  Outcome: Progressing

## 2022-03-30 NOTE — H&P
[facility]  H&P- Nuno Lacey 1945, 78 y o  female MRN: [id number]  Unit/Bed#: ED 09 Encounter: [id number]  Primary Care Provider: No primary care provider on file  Date and time admitted to hospital: 3/29/2022  6:40 PM    * Other chest pain  Assessment & Plan  Presents with chest pressure described as elephant sitting on my chest mainly on moderate exertion (going up stairs) x 10 days  Recently, with minimal exertion (walking)  Associated SOB, no nausea, lightheadedness, diaphoresis  Relieved by 15-20mins of rest  Also took Tylenol which helped  Very energetic and active at baseline, still working  · Vital signs stable  · Troponins negative  · BNP mildly elevated at 500, no overt signs of CHF/fluid overload  · ECG shows normal sinus rhythm, nonischemic  · D-dimer mildly elevated  · CTA shows no pulmonary embolism, ground-glass opacities bilateral bases, small pericardial effusion    Suspect possible stable angina  May also be related to pericardial effusion  Plan:  - trend troponins and ECGs  - obtain echocardiogram  - cardiology consult  Appreciate recs    Elevated d-dimer  Assessment & Plan  CTA negative for PE    Elevated TSH  Assessment & Plan  TSH 6 5  Will order free T4    UTI (urinary tract infection)  Assessment & Plan  UA positive for UTI  Patient with dysuria and lower abdominal pain  Started on ceftriaxone  Follow urine cultures    Iron deficiency anemia  Assessment & Plan  Continue iron supplementation      VTE Prophylaxis: Enoxaparin (Lovenox)  / sequential compression device   Code Status:  Level 1 full code  POLST: There is no POLST form on file for this patient (pre-hospital)  Discussion with family:  Patient and daughter at bedside    Anticipated Length of Stay:  Patient will be admitted on an Observation basis with an anticipated length of stay of  less 2 midnights     Justification for Hospital Stay:  ACS rule out    Total Time for Visit, including Counseling / Coordination of Care: 30 minutes  Greater than 50% of this total time spent on direct patient counseling and coordination of care  Chief Complaint:   Chest pain    History of Present Illness:    Raul Daniel is a 78 y o  female with PMH iron deficiency anemia who presents with chest pain x 10 days  Patient reports having a viral URI at the end of January, family tested positive for COVID however patient herself was not tested  Since then, she has not felt the same  As of 10 days ago, she developed severe crushing chest pressure on exertion described as elephant on my chest, with shortness of breath associated  She denies lightheadedness, nausea, diaphoresis  This is typically relieved by 15-20 minutes of rest   Patient does also report taking 2 Tylenol switch helped alleviate the pain  Her symptoms have worsened over the last few days prompting her to come into the ED  She does not regularly see doctors  On arrival, her vital signs are stable  Her troponin was negative, her ECG was nonischemic, she did have a mild elevation proBNP to 500  She also had an elevated D-dimer to 0 8 for which is CTA was ordered and demonstrated no pulmonary embolism, however did demonstrate a small pericardial effusion  She will be admitted for an echocardiogram and further workup of her chest pain and shortness of breath  Review of Systems:    Review of Systems   Constitutional: Negative for appetite change, chills, fatigue and fever  HENT: Negative for ear pain, sore throat and trouble swallowing  Eyes: Negative for visual disturbance  Respiratory: Positive for chest tightness (Pressure) and shortness of breath  Negative for cough and wheezing  Cardiovascular: Negative for chest pain, palpitations and leg swelling  Gastrointestinal: Negative for abdominal distention, abdominal pain, diarrhea, nausea and vomiting  Endocrine: Negative      Genitourinary: Negative for dysuria, flank pain and hematuria  Musculoskeletal: Negative for arthralgias, gait problem and myalgias  Skin: Negative for pallor  Allergic/Immunologic: Negative for immunocompromised state  Neurological: Negative for dizziness, syncope, light-headedness, numbness and headaches  Past Medical and Surgical History:     History reviewed  No pertinent past medical history  History reviewed  No pertinent surgical history  Meds/Allergies:    Prior to Admission medications    Medication Sig Start Date End Date Taking? Authorizing Provider   Cholecalciferol (VITAMIN D3) 1000 units CAPS Take by mouth   Yes Historical Provider, MD   ferrous sulfate 325 (65 Fe) mg tablet Take 325 mg by mouth daily with breakfast   Yes Historical Provider, MD   COLLADO SEAL ROOT PO Take 1 tablet by mouth daily   Yes Historical Provider, MD   Pyridoxine HCl (VITAMIN B-6 PO) Take 1 tablet by mouth   Yes Historical Provider, MD   Vitamin E 400 units TABS Take by mouth   Yes Historical Provider, MD     I have reviewed home medications with patient personally  Allergies:    Allergies   Allergen Reactions    Codeine Other (See Comments)     Was told allergy       Social History:     Marital Status: /Civil Union   Occupation:  Works in a [employer]  Patient Pre-hospital Living Situation:  Home  Patient Pre-hospital Level of Mobility:  Independent  Patient Pre-hospital Diet Restrictions:  None  Substance Use History:   Social History     Substance and Sexual Activity   Alcohol Use None     Social History     Tobacco Use   Smoking Status Never Smoker   Smokeless Tobacco Never Used     Social History     Substance and Sexual Activity   Drug Use Not on file       Family History:    non-contributory    Physical Exam:     Vitals:   Blood Pressure: 129/65 (03/29/22 2351)  Pulse: 72 (03/29/22 2351)  Temperature: 97 5 °F (36 4 °C) (03/29/22 1849)  Temp Source: Oral (03/29/22 1849)  Respirations: 17 (03/29/22 2351)  Weight - Scale: 54 4 kg (120 lb) (03/29/22 1849)  SpO2: 97 % (03/29/22 2351)    Physical Exam  Vitals and nursing note reviewed  Constitutional:       Appearance: Normal appearance  HENT:      Head: Normocephalic and atraumatic  Mouth/Throat:      Mouth: Mucous membranes are moist       Pharynx: Oropharynx is clear  No oropharyngeal exudate  Eyes:      Extraocular Movements: Extraocular movements intact  Cardiovascular:      Rate and Rhythm: Normal rate and regular rhythm  Pulses: Normal pulses  Heart sounds: Normal heart sounds  No murmur heard  No friction rub  No gallop  Pulmonary:      Effort: Pulmonary effort is normal  No respiratory distress  Breath sounds: Normal breath sounds  No stridor  No wheezing or rales  Abdominal:      General: Abdomen is flat  Bowel sounds are normal  There is no distension  Palpations: Abdomen is soft  Tenderness: There is no abdominal tenderness  Musculoskeletal:      Right lower leg: No edema  Left lower leg: No edema  Skin:     General: Skin is warm and dry  Neurological:      General: No focal deficit present  Mental Status: She is alert and oriented to person, place, and time  Additional Data:     Lab Results: I have personally reviewed pertinent reports  Results from last 7 days   Lab Units 03/29/22  1854   WBC Thousand/uL 8 04   HEMOGLOBIN g/dL 10 2*   HEMATOCRIT % 30 8*   PLATELETS Thousands/uL 305   NEUTROS PCT % 66   LYMPHS PCT % 21   MONOS PCT % 5   EOS PCT % 7*     Results from last 7 days   Lab Units 03/29/22  1854   SODIUM mmol/L 140   POTASSIUM mmol/L 3 4*   CHLORIDE mmol/L 108   CO2 mmol/L 22   BUN mg/dL 16   CREATININE mg/dL 0 97   ANION GAP mmol/L 10   CALCIUM mg/dL 8 5   ALBUMIN g/dL 3 3*   TOTAL BILIRUBIN mg/dL 0 20   ALK PHOS U/L 113   ALT U/L 33   AST U/L 22   GLUCOSE RANDOM mg/dL 95                       Imaging: I have personally reviewed pertinent reports        CTA ED chest PE study   Final Result by [place] Xiomy Brooks MD (03/29 3176)      No pulmonary embolus  Measured RV/LV ratio is within normal limits at less than 0 9  Groundglass changes in the lung bases bilaterally may be secondary to poor inspiration  Small pericardial effusion  Workstation performed: [id number]         XR chest 2 views   ED Interpretation by Blade Us MD (03/29 2004)   No infiltrate  No acute disease  EKG, Pathology, and Other Studies Reviewed on Admission:   · EKG:  Normal sinus rhythm, nonischemic    Allscripts / Epic Records Reviewed: Yes     ** Please Note: This note has been constructed using a voice recognition system   **

## 2022-03-30 NOTE — ASSESSMENT & PLAN NOTE
Presents with chest pressure described as elephant sitting on my chest mainly on moderate exertion (going up stairs) x 10 days  Recently, with minimal exertion (walking)  Associated SOB, no nausea, lightheadedness, diaphoresis  Relieved by 15-20mins of rest  Also took Tylenol which helped  Very energetic and active at baseline, still working  · Vital signs stable  · Troponins negative  · BNP mildly elevated at 500, no overt signs of CHF/fluid overload  · ECG shows normal sinus rhythm, nonischemic  · D-dimer mildly elevated  · CTA shows no pulmonary embolism, ground-glass opacities bilateral bases, small pericardial effusion    Suspect possible stable angina  May also be related to pericardial effusion  Plan:  - trend troponins and ECGs  - obtain echocardiogram  - cardiology consult    Appreciate recs

## 2022-03-30 NOTE — PROGRESS NOTES
Report given at bedside to Tri Boss  TR band intact to right radial access site  No bleeding or swellling  Hand warm with good cap refill  Pain free

## 2022-03-30 NOTE — ASSESSMENT & PLAN NOTE
UA positive for UTI  Patient with dysuria and lower abdominal pain  Started on ceftriaxone  Follow urine cultures

## 2022-03-30 NOTE — PLAN OF CARE
Problem: Nutrition/Hydration-ADULT  Goal: Nutrient/Hydration intake appropriate for improving, restoring or maintaining nutritional needs  Description: Monitor and assess patient's nutrition/hydration status for malnutrition  Collaborate with interdisciplinary team and initiate plan and interventions as ordered  Monitor patient's weight and dietary intake as ordered or per policy  Utilize nutrition screening tool and intervene as necessary  Determine patient's food preferences and provide high-protein, high-caloric foods as appropriate       INTERVENTIONS:  - Monitor oral intake, urinary output, labs, and treatment plans  - Assess nutrition and hydration status and recommend course of action  - Evaluate amount of meals eaten  - Assist patient with eating if necessary   - Allow adequate time for meals  - Recommend/ encourage appropriate diets, oral nutritional supplements, and vitamin/mineral supplements  - Order, calculate, and assess calorie counts as needed  - Recommend, monitor, and adjust tube feedings and TPN/PPN based on assessed needs  - Assess need for intravenous fluids  - Provide specific nutrition/hydration education as appropriate  - Include patient/family/caregiver in decisions related to nutrition  Outcome: Progressing     Problem: DISCHARGE PLANNING - CARE MANAGEMENT  Goal: Discharge to post-acute care or home with appropriate resources  Description: INTERVENTIONS:  - Conduct assessment to determine patient/family and health care team treatment goals, and need for post-acute services based on payer coverage, community resources, and patient preferences, and barriers to discharge  - Address psychosocial, clinical, and financial barriers to discharge as identified in assessment in conjunction with the patient/family and health care team  - Arrange appropriate level of post-acute services according to patients   needs and preference and payer coverage in collaboration with the physician and health care team  - Communicate with and update the patient/family, physician, and health care team regarding progress on the discharge plan  - Arrange appropriate transportation to post-acute venues  Outcome: Progressing     Problem: CARDIOVASCULAR - ADULT  Goal: Maintains optimal cardiac output and hemodynamic stability  Description: INTERVENTIONS:  - Monitor I/O, vital signs and rhythm  - Monitor for S/S and trends of decreased cardiac output  - Administer and titrate ordered vasoactive medications to optimize hemodynamic stability  - Assess quality of pulses, skin color and temperature  - Assess for signs of decreased coronary artery perfusion  - Instruct patient to report change in severity of symptoms  Outcome: Progressing  Goal: Absence of cardiac dysrhythmias or at baseline rhythm  Description: INTERVENTIONS:  - Continuous cardiac monitoring, vital signs, obtain 12 lead EKG if ordered  - Administer antiarrhythmic and heart rate control medications as ordered  - Monitor electrolytes and administer replacement therapy as ordered  Outcome: Progressing     Problem: RESPIRATORY - ADULT  Goal: Achieves optimal ventilation and oxygenation  Description: INTERVENTIONS:  - Assess for changes in respiratory status  - Assess for changes in mentation and behavior  - Position to facilitate oxygenation and minimize respiratory effort  - Oxygen administered by appropriate delivery if ordered  - Initiate smoking cessation education as indicated  - Encourage broncho-pulmonary hygiene including cough, deep breathe, Incentive Spirometry  - Assess the need for suctioning and aspirate as needed  - Assess and instruct to report SOB or any respiratory difficulty  - Respiratory Therapy support as indicated  Outcome: Progressing     Problem: PAIN - ADULT  Goal: Verbalizes/displays adequate comfort level or baseline comfort level  Description: Interventions:  - Encourage patient to monitor pain and request assistance  - Assess pain using appropriate pain scale  - Administer analgesics based on type and severity of pain and evaluate response  - Implement non-pharmacological measures as appropriate and evaluate response  - Consider cultural and social influences on pain and pain management  - Notify physician/advanced practitioner if interventions unsuccessful or patient reports new pain  Outcome: Progressing     Problem: PAIN - ADULT  Goal: Verbalizes/displays adequate comfort level or baseline comfort level  Description: Interventions:  - Encourage patient to monitor pain and request assistance  - Assess pain using appropriate pain scale  - Administer analgesics based on type and severity of pain and evaluate response  - Implement non-pharmacological measures as appropriate and evaluate response  - Consider cultural and social influences on pain and pain management  - Notify physician/advanced practitioner if interventions unsuccessful or patient reports new pain  Outcome: Progressing

## 2022-03-31 ENCOUNTER — TELEPHONE (OUTPATIENT)
Dept: FAMILY MEDICINE CLINIC | Facility: CLINIC | Age: 77
End: 2022-03-31

## 2022-03-31 VITALS
RESPIRATION RATE: 20 BRPM | HEIGHT: 66 IN | BODY MASS INDEX: 19.29 KG/M2 | OXYGEN SATURATION: 96 % | TEMPERATURE: 97.5 F | SYSTOLIC BLOOD PRESSURE: 117 MMHG | HEART RATE: 72 BPM | DIASTOLIC BLOOD PRESSURE: 60 MMHG | WEIGHT: 120 LBS

## 2022-03-31 PROCEDURE — 99217 PR OBSERVATION CARE DISCHARGE MANAGEMENT: CPT | Performed by: INTERNAL MEDICINE

## 2022-03-31 RX ADMIN — AMOXICILLIN 500 MG: 500 CAPSULE ORAL at 06:35

## 2022-03-31 RX ADMIN — ASPIRIN 81 MG CHEWABLE TABLET 81 MG: 81 TABLET CHEWABLE at 09:43

## 2022-03-31 RX ADMIN — ENOXAPARIN SODIUM 40 MG: 40 INJECTION SUBCUTANEOUS at 09:43

## 2022-03-31 RX ADMIN — Medication 325 MG: at 09:43

## 2022-03-31 NOTE — UTILIZATION REVIEW
Initial Clinical Review    Admission: Date/Time/Statement:   Admission Orders (From admission, onward)     Ordered        03/30/22 0225  Place in Observation  Once                      Orders Placed This Encounter   Procedures    Place in Observation     Standing Status:   Standing     Number of Occurrences:   1     Order Specific Question:   Level of Care     Answer:   Med Surg [16]     ED Arrival Information     Expected Arrival Acuity    - 3/29/2022 18:33 Emergent         Means of arrival Escorted by Service Admission type    [facility]         Arrival complaint    Chest pain, SOB, possible uti        Chief Complaint   Patient presents with    Chest Pain     pt c/o diffuse chest pain that feels as if an elephant is sitting on her chest   ongoing for 10 days but getting worse today  also c/o diarrhe and UTI sx but started taking azo and they are starting to resolve  does not follow regularly with a doctor  Initial Presentation: 78 y o  female presented to ED with c/o chest pain x 10 days  She developed severe crushing chest pressure on exertion described as elephant on my chest, with shortness of breath associated  Typically relieved by 15-20 minutes of rest  CTA shows no pulmonary embolism, ground-glass opacities bilateral bases, small pericardial effusion  UA positive for UTI  Plan: Observation for chest pain: trend troponins and ECGs, Echo, consult Cardiology, antibiotics for UTI  Cardiology: echocardiogram which is significant for normal left ventricular and right ventricular size and function, no significant valvular stenosis or regurgitation, presence of trace to small circumferential pericardial effusion  Suspected noncardiac chest pain         ED Triage Vitals   Temperature Pulse Respirations Blood Pressure SpO2   03/29/22 1849 03/29/22 1849 03/29/22 1849 03/29/22 1849 03/29/22 1849   97 5 °F (36 4 °C) 71 16 123/57 99 %      Temp Source Heart Rate Source Patient Position - Orthostatic VS BP Location FiO2 (%)   03/29/22 1849 03/29/22 2103 03/30/22 0416 03/29/22 2103 --   Oral Monitor Lying Right arm       Pain Score       03/29/22 1849       8          Wt Readings from Last 1 Encounters:   03/30/22 54 4 kg (120 lb)     Additional Vital Signs:   Date/Time Temp Pulse Resp BP MAP (mmHg) SpO2 Calculated FIO2 (%) - Nasal Cannula Nasal Cannula O2 Flow Rate (L/min) O2 Device   03/31/22 0700 97 5 °F (36 4 °C) 68 20 113/55 -- 95 % -- -- None (Room air)   03/31/22 0300 97 6 °F (36 4 °C) 70 20 120/58 84 96 % -- -- None (Room air)   03/30/22 2300 97 8 °F (36 6 °C) 67 20 116/56 79 97 % -- -- None (Room air)   03/30/22 2100 97 4 °F (36 3 °C) Abnormal  60 20 120/59 85 97 % -- -- None (Room air)   03/30/22 1900 97 8 °F (36 6 °C) 72 20 119/56 81 97 % -- -- None (Room air)   03/30/22 1800 -- 68 -- 117/58 -- 96 % -- -- --   03/30/22 1700 -- 65 -- 116/59 -- -- -- -- --   03/30/22 1600 -- 83 -- 106/55 -- 97 % -- -- --   03/30/22 1500 -- 72 -- 127/60 86 96 % -- -- --   03/30/22 1430 97 4 °F (36 3 °C) Abnormal  66 21 112/55 77 98 % -- -- None (Room air)   03/30/22 1417 98 °F (36 7 °C) 70 21 94/54 67 98 % -- -- --   03/30/22 1402 -- 66 21 97/56 74 97 % -- -- --   03/30/22 1347 -- 58 19 99/53 72 96 % -- -- None (Room air)   03/30/22 1345 -- 58 20 -- -- 96 % -- -- None (Room air)   03/30/22 1330 -- 62 19 -- -- 96 % -- -- None (Room air)   03/30/22 1314 98 2 °F (36 8 °C) 66 18 -- -- 97 % -- -- None (Room air)   03/30/22 12:28:08 -- -- -- -- -- -- 28 2 L/min Nasal cannula   03/30/22 11:29:05 -- 85 -- 118/64 82 97 % -- -- --   03/30/22 0930 -- 85 -- 118/64 -- -- -- -- --   03/30/22 0800 -- -- -- -- -- 98 % -- -- None (Room air)   03/30/22 07:20:03 98 7 °F (37 1 °C) 71 16 121/70 87 95 % -- -- None (Room air)   03/30/22 05:30:45 97 8 °F (36 6 °C) 78 18 124/71 89 97 % -- -- None (Room air)   03/30/22 0416 -- 66 -- 131/60 86 98 % -- -- None (Room air)   03/29/22 2351 -- 72 17 129/65 -- 97 % -- -- None (Room air)   03/29/22 2230 -- 72 18 146/62 89 97 % -- -- None (Room air)   03/29/22 2103 -- 68 18 133/64 91 97 % -- -- None (Room air)       Pertinent Labs/Diagnostic Test Results:     3/30Echo:  Interpretation Summary       Technically adequate transthoracic echocardiogram study      1  Normal left ventricular size and systolic and diastolic function  Ejection fraction is estimated as around 65%  2  Normal right ventricular size and systolic function  3  Normal left and right atrial cavity size  4  Slightly aneurysmal interatrial septum  No color-flow Doppler evidence of interatrial shunts  5  Aortic valve sclerosis, mild aortic valve regurgitation  6  Mitral valve sclerosis, trace mitral valve regurgitation  7  Trace tricuspid valve regurgitation  8  No obvious pulmonary hypertension  9  Trace to small, hemodynamically insignificant circumferential pericardial effusion  3/30 Cardiac Cath:  Impression    · Normal appearing epicardial vessels angiographically  · Patients chest discomfort not due to obstructive epicardial CAD      CTA ED chest PE study   Final Result by Elbert Bhatti MD (03/29 2255)      No pulmonary embolus  Measured RV/LV ratio is within normal limits at less than 0 9  Groundglass changes in the lung bases bilaterally may be secondary to poor inspiration  Small pericardial effusion  Workstation performed: [id number]         XR chest 2 views   ED Interpretation by Maikol Villalobos MD (03/29 2004)   No infiltrate  No acute disease  Final Result by Afsaneh Martin MD (40/85 6310)      No acute cardiopulmonary disease                    Workstation performed: [id number]         3/30 Repeat EKG:  Normal sinus rhythm  Low voltage QRS  Borderline ECG  No previous ECGs available    3/29 EKG:  Normal sinus rhythm  Low limb lead voltage  Otherwise normal ECG  No previous ECGs available  Results from last 7 days   Lab Units 03/30/22  0327   SARS-COV-2  Negative Results from last 7 days   Lab Units 03/30/22  0542 03/29/22  1854   WBC Thousand/uL  --  8 04   HEMOGLOBIN g/dL  --  10 2*   HEMATOCRIT %  --  30 8*   PLATELETS Thousands/uL 289 305   NEUTROS ABS Thousands/µL  --  5 24         Results from last 7 days   Lab Units 03/30/22  1044 03/29/22  1854   SODIUM mmol/L 141 140   POTASSIUM mmol/L 3 1* 3 4*   CHLORIDE mmol/L 110* 108   CO2 mmol/L 23 22   ANION GAP mmol/L 8 10   BUN mg/dL 12 16   CREATININE mg/dL 0 97 0 97   EGFR ml/min/1 73sq m 57 57   CALCIUM mg/dL 8 0* 8 5     Results from last 7 days   Lab Units 03/30/22  1044 03/29/22  1854   AST U/L 19 22   ALT U/L 26 33   ALK PHOS U/L 105 113   TOTAL PROTEIN g/dL 6 6 7 4   ALBUMIN g/dL 2 8* 3 3*   TOTAL BILIRUBIN mg/dL 0 22 0 20         Results from last 7 days   Lab Units 03/30/22  1044 03/29/22  1854   GLUCOSE RANDOM mg/dL 192* 95         Results from last 7 days   Lab Units 03/29/22  2101 03/29/22  1854   HS TNI 0HR ng/L  --  4   HS TNI 2HR ng/L 3  --    HSTNI D2 ng/L -1  --      Results from last 7 days   Lab Units 03/29/22  2106   D-DIMER QUANTITATIVE ug/ml FEU 0 93*         Results from last 7 days   Lab Units 03/29/22  1854   TSH 3RD GENERATON uIU/mL 6 480*         Results from last 7 days   Lab Units 03/29/22  1854   NT-PRO BNP pg/mL 489*         Results from last 7 days   Lab Units 03/29/22  2135   CLARITY UA  Slightly Cloudy   COLOR UA  Light Yellow   SPEC GRAV UA  1 020   PH UA  6 0   GLUCOSE UA mg/dl Negative   KETONES UA mg/dl Negative   BLOOD UA  Negative   PROTEIN UA mg/dl Negative   NITRITE UA  Positive*   BILIRUBIN UA  Negative   UROBILINOGEN UA E U /dl 0 2   LEUKOCYTES UA  Small*   WBC UA /hpf 4-10*   RBC UA /hpf 0-1*   BACTERIA UA /hpf Innumerable*   EPITHELIAL CELLS WET PREP /hpf Occasional     Results from last 7 days   Lab Units 03/30/22  0327   INFLUENZA A PCR  Negative   INFLUENZA B PCR  Negative   RSV PCR  Negative         ED Treatment:   Medication Administration from 03/29/2022 1832 to 03/30/2022 0525       Date/Time Order Dose Route Action Action by Comments     03/29/2022 2300 ceftriaxone (ROCEPHIN) 1 g/50 mL in dextrose IVPB 0 mg Intravenous Stopped Kenji Suárez RN      03/29/2022 2219 ceftriaxone (ROCEPHIN) 1 g/50 mL in dextrose IVPB 1,000 mg Intravenous New Bag Kenji Suárez RN      03/29/2022 2226 iohexol (OMNIPAQUE) 350 MG/ML injection (SINGLE-DOSE) 85 mL 85 mL Intravenous Given [employer]         History reviewed  No pertinent past medical history  Present on Admission:   UTI (urinary tract infection)   Other chest pain   Elevated TSH   Elevated brain natriuretic peptide (BNP) level   Elevated d-dimer      Admitting Diagnosis: Other chest pain [R07 89]  Pericardial effusion [I31 3]  UTI (urinary tract infection) [N39 0]  Chest pain [R07 9]  Elevated TSH [R79 89]  Elevated brain natriuretic peptide (BNP) level [R79 89]  Elevated d-dimer [R79 89]  Age/Sex: 78 y o  female  Admission Orders:  Scheduled Medications:  amoxicillin, 500 mg, Oral, Q8H [address]  aspirin, 81 mg, Oral, Daily  enoxaparin, 40 mg, Subcutaneous, Daily  ferrous sulfate, 325 mg, Oral, Daily With Breakfast      Continuous IV Infusions:     PRN Meds:  acetaminophen, 650 mg, Oral, Q6H PRN  aluminum-magnesium hydroxide-simethicone, 30 mL, Oral, Q6H PRN  ondansetron, 4 mg, Intravenous, Q6H PRN        IP CONSULT TO CARDIOLOGY    Network Utilization Review Department  ATTENTION: Please call with any questions or concerns to [phone] and carefully listen to the prompts so that you are directed to the right person  All voicemails are confidential   Agnes Bryan all requests for admission clinical reviews, approved or denied determinations and any other requests to dedicated fax number below belonging to the campus where the patient is receiving treatment   List of dedicated fax numbers for the Facilities:  [address] [place] [address] DENIALS (Administrative/Medical Necessity) [phone]   [address] [place] [address] (Maternity/NICU/Pediatrics) 261 [facility] [facility] 40 125 [facility]  [phone]   Chase Marcos [address] 150 [facility] [place] [address] [address] [place] [address] Remi Rosalia [address] [facility] [place] [address] [phone]

## 2022-03-31 NOTE — PLAN OF CARE
Problem: Nutrition/Hydration-ADULT  Goal: Nutrient/Hydration intake appropriate for improving, restoring or maintaining nutritional needs  Description: Monitor and assess patient's nutrition/hydration status for malnutrition  Collaborate with interdisciplinary team and initiate plan and interventions as ordered  Monitor patient's weight and dietary intake as ordered or per policy  Utilize nutrition screening tool and intervene as necessary  Determine patient's food preferences and provide high-protein, high-caloric foods as appropriate       INTERVENTIONS:  - Monitor oral intake, urinary output, labs, and treatment plans  - Assess nutrition and hydration status and recommend course of action  - Evaluate amount of meals eaten  - Assist patient with eating if necessary   - Allow adequate time for meals  - Recommend/ encourage appropriate diets, oral nutritional supplements, and vitamin/mineral supplements  - Order, calculate, and assess calorie counts as needed  - Recommend, monitor, and adjust tube feedings and TPN/PPN based on assessed needs  - Assess need for intravenous fluids  - Provide specific nutrition/hydration education as appropriate  - Include patient/family/caregiver in decisions related to nutrition  Outcome: Progressing     Problem: DISCHARGE PLANNING - CARE MANAGEMENT  Goal: Discharge to post-acute care or home with appropriate resources  Description: INTERVENTIONS:  - Conduct assessment to determine patient/family and health care team treatment goals, and need for post-acute services based on payer coverage, community resources, and patient preferences, and barriers to discharge  - Address psychosocial, clinical, and financial barriers to discharge as identified in assessment in conjunction with the patient/family and health care team  - Arrange appropriate level of post-acute services according to patients   needs and preference and payer coverage in collaboration with the physician and health care team  - Communicate with and update the patient/family, physician, and health care team regarding progress on the discharge plan  - Arrange appropriate transportation to post-acute venues  Outcome: Progressing     Problem: CARDIOVASCULAR - ADULT  Goal: Maintains optimal cardiac output and hemodynamic stability  Description: INTERVENTIONS:  - Monitor I/O, vital signs and rhythm  - Monitor for S/S and trends of decreased cardiac output  - Administer and titrate ordered vasoactive medications to optimize hemodynamic stability  - Assess quality of pulses, skin color and temperature  - Assess for signs of decreased coronary artery perfusion  - Instruct patient to report change in severity of symptoms  Outcome: Progressing  Goal: Absence of cardiac dysrhythmias or at baseline rhythm  Description: INTERVENTIONS:  - Continuous cardiac monitoring, vital signs, obtain 12 lead EKG if ordered  - Administer antiarrhythmic and heart rate control medications as ordered  - Monitor electrolytes and administer replacement therapy as ordered  Outcome: Progressing     Problem: RESPIRATORY - ADULT  Goal: Achieves optimal ventilation and oxygenation  Description: INTERVENTIONS:  - Assess for changes in respiratory status  - Assess for changes in mentation and behavior  - Position to facilitate oxygenation and minimize respiratory effort  - Oxygen administered by appropriate delivery if ordered  - Initiate smoking cessation education as indicated  - Encourage broncho-pulmonary hygiene including cough, deep breathe, Incentive Spirometry  - Assess the need for suctioning and aspirate as needed  - Assess and instruct to report SOB or any respiratory difficulty  - Respiratory Therapy support as indicated  Outcome: Progressing     Problem: GENITOURINARY - ADULT  Goal: Maintains or returns to baseline urinary function  Description: INTERVENTIONS:  - Assess urinary function  - Encourage oral fluids to ensure adequate hydration if ordered  - Administer IV fluids as ordered to ensure adequate hydration  - Administer ordered medications as needed  - Offer frequent toileting  - Follow urinary retention protocol if ordered  Outcome: Progressing  Goal: Absence of urinary retention  Description: INTERVENTIONS:  - Assess patients ability to void and empty bladder  - Monitor I/O  - Bladder scan as needed  - Discuss with physician/AP medications to alleviate retention as needed  - Discuss catheterization for long term situations as appropriate  Outcome: Progressing     Problem: PAIN - ADULT  Goal: Verbalizes/displays adequate comfort level or baseline comfort level  Description: Interventions:  - Encourage patient to monitor pain and request assistance  - Assess pain using appropriate pain scale  - Administer analgesics based on type and severity of pain and evaluate response  - Implement non-pharmacological measures as appropriate and evaluate response  - Consider cultural and social influences on pain and pain management  - Notify physician/advanced practitioner if interventions unsuccessful or patient reports new pain  Outcome: Progressing     Problem: INFECTION - ADULT  Goal: Absence or prevention of progression during hospitalization  Description: INTERVENTIONS:  - Assess and monitor for signs and symptoms of infection  - Monitor lab/diagnostic results  - Monitor all insertion sites, i e  indwelling lines, tubes, and drains  - Monitor endotracheal if appropriate and nasal secretions for changes in amount and color  - [place] appropriate cooling/warming therapies per order  - Administer medications as ordered  - Instruct and encourage patient and family to use good hand hygiene technique  - Identify and instruct in appropriate isolation precautions for identified infection/condition  Outcome: Progressing

## 2022-03-31 NOTE — CASE MANAGEMENT
Case Management Discharge Planning Note    Patient name Ritesh Teixeira  Location [address] [place] [address] /E4 MS [phone] MRN [id number]  : 1945 Date 3/31/2022       Current Admission Date: 3/29/2022  Current Admission Diagnosis:Other chest pain   Patient Active Problem List    Diagnosis Date Noted    Iron deficiency anemia 2022    UTI (urinary tract infection) 2022    Other chest pain 2022    Elevated TSH 2022    Elevated brain natriuretic peptide (BNP) level 2022    Elevated d-dimer 2022      LOS (days): 0  Geometric Mean LOS (GMLOS) (days):   Days to GMLOS:     OBJECTIVE:            Current admission status: Observation   Preferred Pharmacy:   [facility] [address]  Phone: [phone] Fax: [phone]    Primary Care Provider: No primary care provider on file  Primary Insurance: [facility] REP  Secondary Insurance:     DISCHARGE DETAILS:  Comments - Freedom of Choice: Per the medicare system the pt has a replacement plan with [employer]  rep eff 22, they usually do not have 2ndy insurance with a replacement plan

## 2022-03-31 NOTE — CASE MANAGEMENT
Case Management Discharge Planning Note    Patient name Yesica Ley  Location [address] [place] [address] /E4 MS [phone] MRN [id number]  : 1945 Date 3/31/2022       Current Admission Date: 3/29/2022  Current Admission Diagnosis:Other chest pain   Patient Active Problem List    Diagnosis Date Noted    Iron deficiency anemia 2022    UTI (urinary tract infection) 2022    Other chest pain 2022    Elevated TSH 2022    Elevated brain natriuretic peptide (BNP) level 2022    Elevated d-dimer 2022      LOS (days): 0  Geometric Mean LOS (GMLOS) (days):   Days to GMLOS:     OBJECTIVE:            Current admission status: Observation   Preferred Pharmacy:   [facility] [address] [place], [place] - [address] [place] [address]  Phone: [phone] Fax: [phone]    Primary Care Provider: No primary care provider on file  Primary Insurance: [employer] [facility] REP  Secondary Insurance:     DISCHARGE DETAILS:    Discharge planning discussed with[de-identified] PT  Freedom of Choice: Yes  Comments - Freedom of Choice: Pt was transfer from [facility] to  last evening  Pt is observation status and gave her the paper work today and copy put in bin to be scan  Pt is independent from home and the plan is to return home when medical cleared  It was reported in rounds that pt could be discharge today

## 2022-03-31 NOTE — DISCHARGE SUMMARY
Discharge Summary Bethel Lloyd, 1945, [id number]        Admission Date: 3/29/2022  Discharge Date:  3/31/2022    Discharge Diagnosis:   1  Noncardiac chest pain  2  Elevated D-dimer without evidence of pulmonary embolism  3  Subclinical hypothyroidism  4  Pyuria, possible urinary tract infection  5  Iron deficiency anemia  6  Apparent viral illness, January 2022     Consulting Physicians:  Dr Liliya Sanchez, cardiology    Procedures Performed:   Coronary angiography    HPI:  The patient is a 72-year-old woman who was in her usual state of excellent health until January of 2022  At that time she suffered a viral illness  She improved but said that she never felt quite as good as she had before  Nevertheless, she was doing pretty well until about 2 weeks before admission  Since that time the patient has had intermittent chest tightness which is associated with exercise  She feels somewhat short of breath when she gets this  Because of this she came to the emergency room  Initial EKG and troponin were negative  She was admitted for further care  Hospital Course: The patient was admitted to the hospital and monitored carefully on telemetry  Serial troponins were negative  The patient was evaluated by Cardiology  Because of the nature of her symptoms was decided to proceed directly with cardiac catheterization  This revealed normal coronary arteries  At the time of admission the patient had a D-dimer which was mildly elevated  She therefore had a CTA of the chest   This revealed no pulmonary embolism  There was bilateral ground-glass opacities in the lower lung fields  This was thought to be possibly related to shallow inspiration  It is also conceivable that this was related to her apparent viral illness in January  The patient had thyroid function studies and was noted to have a mildly elevated TSH with normal T4  This should be repeated in 3 to 6 months      The patient was noted to have pyuria  Apparently, she mentioned dysuria at some point but she did not mention this to me  She was given several doses of antibiotic therapy for possible urinary tract infection  She was asymptomatic at the time of discharge  The patient has a history of iron deficiency and has undergone previous GI evaluation  Her hemoglobin was 10 2  She remains on iron replacement  At the time of discharge the patient was feeling very well  Vital signs are stable  Lungs are clear  Cardiac exam revealed regular rhythm  The abdomen was soft and nontender  There was no edema  Disposition:  The patient was discharged home on March 31st   Diet and activity will be as tolerated  The patient was asked to establish a primary care relationship with the [facility]  I contacted them to appraised them of the situation  Discharge instructions/Information to patient and family:   See after visit summary for information provided to patient and family  Provisions for Follow-Up Care:  See after visit summary for information related to follow-up care and any pertinent home health orders  Planned Readmission: No    Discharge Statement   I spent 30 minutes discharging the patient  This time was spent on the day of discharge  I had direct contact with the patient on the day of discharge  Discharge Medications:  See after visit summary for reconciled discharge medications provided to patient and family

## 2022-03-31 NOTE — CASE MANAGEMENT
Case Management Discharge Planning Note    Patient name Maximus Mccord  Location [address] [place] [address] /E4 MS [phone] MRN [id number]  : 1945 Date 3/31/2022       Current Admission Date: 3/29/2022  Current Admission Diagnosis:Other chest pain   Patient Active Problem List    Diagnosis Date Noted    Iron deficiency anemia 2022    UTI (urinary tract infection) 2022    Other chest pain 2022    Elevated TSH 2022    Elevated brain natriuretic peptide (BNP) level 2022    Elevated d-dimer 2022      LOS (days): 0  Geometric Mean LOS (GMLOS) (days):   Days to GMLOS:     OBJECTIVE:            Current admission status: Observation   Preferred Pharmacy:   [facility] [address]  Phone: [phone] Fax: [phone]    Primary Care Provider: No primary care provider on file  Primary Insurance: [facility]  Secondary Insurance:     DISCHARGE DETAILS:    Comments - Freedom of Choice: Sent e-mail to [employer] and [employer] as pt stated her secondary insurance is not listed and she does not have [facility] [address]

## 2022-04-11 ENCOUNTER — APPOINTMENT (OUTPATIENT)
Dept: LAB | Facility: CLINIC | Age: 77
End: 2022-04-11
Payer: COMMERCIAL

## 2022-04-11 ENCOUNTER — OFFICE VISIT (OUTPATIENT)
Dept: FAMILY MEDICINE CLINIC | Facility: CLINIC | Age: 77
End: 2022-04-11
Payer: COMMERCIAL

## 2022-04-11 ENCOUNTER — APPOINTMENT (OUTPATIENT)
Dept: RADIOLOGY | Facility: CLINIC | Age: 77
End: 2022-04-11
Payer: COMMERCIAL

## 2022-04-11 VITALS
RESPIRATION RATE: 18 BRPM | DIASTOLIC BLOOD PRESSURE: 76 MMHG | HEIGHT: 64 IN | BODY MASS INDEX: 21.61 KG/M2 | SYSTOLIC BLOOD PRESSURE: 118 MMHG | HEART RATE: 84 BPM | TEMPERATURE: 98.5 F | OXYGEN SATURATION: 98 % | WEIGHT: 126.6 LBS

## 2022-04-11 DIAGNOSIS — G89.29 CHRONIC PAIN OF LEFT ANKLE: ICD-10-CM

## 2022-04-11 DIAGNOSIS — Z12.11 SCREENING FOR COLON CANCER: ICD-10-CM

## 2022-04-11 DIAGNOSIS — D50.9 IRON DEFICIENCY ANEMIA, UNSPECIFIED IRON DEFICIENCY ANEMIA TYPE: ICD-10-CM

## 2022-04-11 DIAGNOSIS — R35.89 POLYURIA: ICD-10-CM

## 2022-04-11 DIAGNOSIS — R19.7 DIARRHEA, UNSPECIFIED TYPE: ICD-10-CM

## 2022-04-11 DIAGNOSIS — R73.9 ELEVATED BLOOD SUGAR: ICD-10-CM

## 2022-04-11 DIAGNOSIS — E87.6 HYPOKALEMIA: ICD-10-CM

## 2022-04-11 DIAGNOSIS — R79.89 ELEVATED TSH: ICD-10-CM

## 2022-04-11 DIAGNOSIS — R07.9 CHEST PAIN, UNSPECIFIED TYPE: Primary | ICD-10-CM

## 2022-04-11 DIAGNOSIS — M25.572 CHRONIC PAIN OF LEFT ANKLE: ICD-10-CM

## 2022-04-11 DIAGNOSIS — Z12.31 SCREENING MAMMOGRAM FOR BREAST CANCER: ICD-10-CM

## 2022-04-11 LAB
ALBUMIN SERPL BCP-MCNC: 3.3 G/DL (ref 3.5–5)
ALP SERPL-CCNC: 124 U/L (ref 46–116)
ALT SERPL W P-5'-P-CCNC: 27 U/L (ref 12–78)
ANION GAP SERPL CALCULATED.3IONS-SCNC: 8 MMOL/L (ref 4–13)
AST SERPL W P-5'-P-CCNC: 29 U/L (ref 5–45)
BILIRUB SERPL-MCNC: 0.21 MG/DL (ref 0.2–1)
BUN SERPL-MCNC: 16 MG/DL (ref 5–25)
CALCIUM ALBUM COR SERPL-MCNC: 9.5 MG/DL (ref 8.3–10.1)
CALCIUM SERPL-MCNC: 8.9 MG/DL (ref 8.3–10.1)
CHLORIDE SERPL-SCNC: 110 MMOL/L (ref 100–108)
CO2 SERPL-SCNC: 23 MMOL/L (ref 21–32)
CREAT SERPL-MCNC: 0.84 MG/DL (ref 0.6–1.3)
EST. AVERAGE GLUCOSE BLD GHB EST-MCNC: 111 MG/DL
GFR SERPL CREATININE-BSD FRML MDRD: 68 ML/MIN/1.73SQ M
GLUCOSE P FAST SERPL-MCNC: 93 MG/DL (ref 65–99)
HBA1C MFR BLD: 5.5 %
POTASSIUM SERPL-SCNC: 3.8 MMOL/L (ref 3.5–5.3)
PROT SERPL-MCNC: 7.3 G/DL (ref 6.4–8.2)
SODIUM SERPL-SCNC: 141 MMOL/L (ref 136–145)

## 2022-04-11 PROCEDURE — 83036 HEMOGLOBIN GLYCOSYLATED A1C: CPT

## 2022-04-11 PROCEDURE — 80053 COMPREHEN METABOLIC PANEL: CPT

## 2022-04-11 PROCEDURE — 99204 OFFICE O/P NEW MOD 45 MIN: CPT | Performed by: FAMILY MEDICINE

## 2022-04-11 PROCEDURE — 36415 COLL VENOUS BLD VENIPUNCTURE: CPT

## 2022-04-11 PROCEDURE — 73610 X-RAY EXAM OF ANKLE: CPT

## 2022-04-11 RX ORDER — ACETAMINOPHEN 500 MG
500 TABLET ORAL EVERY 6 HOURS PRN
COMMUNITY

## 2022-04-11 RX ORDER — ASPIRIN 81 MG/1
81 TABLET ORAL DAILY
COMMUNITY
End: 2022-06-02

## 2022-04-11 NOTE — PROGRESS NOTES
Assessment/Plan:     1  Chest pain, unspecified type  Assessment & Plan:  Resolved; catheterization showed unremarkable coronary arteries, CTA negative for PE; f/u guidance given should sx return; possibly related to viral illness in January, high suspicion pt had COVID given CT findings but was never tested      2  Elevated TSH  Assessment & Plan:  Repeat thyroid studies in 2 months      3  Iron deficiency anemia, unspecified iron deficiency anemia type  Assessment & Plan:  Referred to GI although per pt chronic since children were born; c/w iron supplementation      4  Diarrhea, unspecified type  Assessment & Plan:  Chronic and irregular making infection less likely; referred to GI given age and no recent hx of colon cancer screening    Orders:  -     Ambulatory Referral to Gastroenterology; Future    5  Hypokalemia  Assessment & Plan:  Low in hospital; repeat labs; will supplement if persistently low    Orders:  -     Comprehensive metabolic panel; Future    6  Elevated blood sugar  -     Comprehensive metabolic panel; Future  -     HEMOGLOBIN A1C W/ EAG ESTIMATION; Future    7  Polyuria    8  Chronic pain of left ankle  Assessment & Plan:  Check x-rays; will refer to podiatry pending results since persistent and chronic    Orders:  -     XR ankle 3+ vw left; Future; Expected date: 04/11/2022    9  Screening mammogram for breast cancer  -     Mammo screening bilateral w 3d & cad; Future; Expected date: 04/11/2022    10  Screening for colon cancer        Subjective:      Patient ID: Ritesh Teixeira is a 78 y o  female  Patient is here to establish care and for hospital follow up patient admitted 3/30/22-3/31/22  Patient is still having bowel issues  She states she gets discomfort in lower bowels after she eats  Multiple BM per day sometimes up to 3 times a day  Sometimes loose stools  No blood in stool  Started in January when she was sick   Seemed to slow down but last 2 weeks seems to be getting more frequent  Patient denies any urinary symptoms  She is peeing more often than she used to  BS in hospital was 192  Patient states her left ankle has been causing her pain chronically for the last 1 5 years  Worsens throughout the day  Does dinorah  Did break ankle as teenager but no recent injury  Hospital Course:  "The patient was admitted to the hospital and monitored carefully on telemetry  Serial troponins were negative  The patient was evaluated by Cardiology  Because of the nature of her symptoms was decided to proceed directly with cardiac catheterization  This revealed normal coronary arteries  At the time of admission the patient had a D-dimer which was mildly elevated  She therefore had a CTA of the chest   This revealed no pulmonary embolism  There was bilateral ground-glass opacities in the lower lung fields  This was thought to be possibly related to shallow inspiration  It is also conceivable that this was related to her apparent viral illness in January      The patient had thyroid function studies and was noted to have a mildly elevated TSH with normal T4  This should be repeated in 3 to 6 months      The patient was noted to have pyuria  Apparently, she mentioned dysuria at some point but she did not mention this to me  She was given several doses of antibiotic therapy for possible urinary tract infection  She was asymptomatic at the time of discharge      The patient has a history of iron deficiency and has undergone previous GI evaluation  Her hemoglobin was 10 2  She remains on iron replacement      At the time of discharge the patient was feeling very well  Vital signs are stable  Lungs are clear  Cardiac exam revealed regular rhythm  The abdomen was soft and nontender    There was no edema "      The following portions of the patient's history were reviewed and updated as appropriate: allergies, current medications, past family history, past medical history, past social history, past surgical history, and problem list     Review of Systems   Constitutional: Negative for chills and fever  Respiratory: Negative for cough  Cardiovascular: Negative for palpitations  Gastrointestinal: Positive for abdominal pain and diarrhea  Negative for blood in stool  Objective:      /76 (BP Location: Right arm, Patient Position: Sitting, Cuff Size: Adult)   Pulse 84   Temp 98 5 °F (36 9 °C) (Temporal)   Resp 18   Ht 5' 4" (1 626 m)   Wt 57 4 kg (126 lb 9 6 oz)   SpO2 98%   BMI 21 73 kg/m²          Physical Exam  Vitals reviewed  Constitutional:       General: She is not in acute distress  Appearance: Normal appearance  She is not ill-appearing, toxic-appearing or diaphoretic  HENT:      Head: Normocephalic and atraumatic  Eyes:      General: No scleral icterus  Right eye: No discharge  Left eye: No discharge  Conjunctiva/sclera: Conjunctivae normal    Cardiovascular:      Rate and Rhythm: Normal rate and regular rhythm  Pulses: Normal pulses  Heart sounds: Normal heart sounds  No murmur heard  No gallop  Pulmonary:      Effort: Pulmonary effort is normal  No respiratory distress  Breath sounds: Normal breath sounds  No stridor  No wheezing, rhonchi or rales  Abdominal:      Palpations: Abdomen is soft  Tenderness: There is no abdominal tenderness  There is no guarding or rebound  Musculoskeletal:      Right lower leg: No edema  Left lower leg: No edema  Right ankle: Normal       Left ankle: Swelling present  No ecchymosis  No tenderness  Decreased range of motion  Neurological:      General: No focal deficit present  Mental Status: She is alert and oriented to person, place, and time  Psychiatric:         Mood and Affect: Mood normal          Behavior: Behavior normal          Thought Content:  Thought content normal          Judgment: Judgment normal

## 2022-04-11 NOTE — ASSESSMENT & PLAN NOTE
Chronic and irregular making infection less likely; referred to GI given age and no recent hx of colon cancer screening

## 2022-04-11 NOTE — ASSESSMENT & PLAN NOTE
Resolved; catheterization showed unremarkable coronary arteries, CTA negative for PE; f/u guidance given should sx return; possibly related to viral illness in January, high suspicion pt had COVID given CT findings but was never tested

## 2022-05-16 ENCOUNTER — OFFICE VISIT (OUTPATIENT)
Dept: FAMILY MEDICINE CLINIC | Facility: CLINIC | Age: 77
End: 2022-05-16
Payer: COMMERCIAL

## 2022-05-16 VITALS
DIASTOLIC BLOOD PRESSURE: 66 MMHG | SYSTOLIC BLOOD PRESSURE: 106 MMHG | OXYGEN SATURATION: 97 % | HEART RATE: 78 BPM | RESPIRATION RATE: 18 BRPM | BODY MASS INDEX: 19.74 KG/M2 | HEIGHT: 64 IN | WEIGHT: 115.6 LBS | TEMPERATURE: 98.5 F

## 2022-05-16 DIAGNOSIS — Z78.0 ASYMPTOMATIC POSTMENOPAUSAL STATE: ICD-10-CM

## 2022-05-16 DIAGNOSIS — D50.9 IRON DEFICIENCY ANEMIA, UNSPECIFIED IRON DEFICIENCY ANEMIA TYPE: ICD-10-CM

## 2022-05-16 DIAGNOSIS — R63.4 WEIGHT LOSS: ICD-10-CM

## 2022-05-16 DIAGNOSIS — F43.9 STRESS AT HOME: ICD-10-CM

## 2022-05-16 DIAGNOSIS — Z11.59 NEED FOR HEPATITIS C SCREENING TEST: ICD-10-CM

## 2022-05-16 DIAGNOSIS — Z00.00 MEDICARE ANNUAL WELLNESS VISIT, SUBSEQUENT: Primary | ICD-10-CM

## 2022-05-16 DIAGNOSIS — K52.9 CHRONIC DIARRHEA: ICD-10-CM

## 2022-05-16 DIAGNOSIS — R79.89 ABNORMAL TSH: ICD-10-CM

## 2022-05-16 PROCEDURE — G0439 PPPS, SUBSEQ VISIT: HCPCS | Performed by: FAMILY MEDICINE

## 2022-05-16 PROCEDURE — 99214 OFFICE O/P EST MOD 30 MIN: CPT | Performed by: FAMILY MEDICINE

## 2022-05-16 RX ORDER — DICYCLOMINE HYDROCHLORIDE 10 MG/1
10 CAPSULE ORAL 4 TIMES DAILY PRN
Qty: 30 CAPSULE | Refills: 0 | Status: SHIPPED | OUTPATIENT
Start: 2022-05-16 | End: 2022-06-06

## 2022-05-16 NOTE — PROGRESS NOTES
Assessment/Plan:     1  Medicare annual wellness visit, subsequent  Assessment & Plan:  Advised on diet modifications; active at work; recommended due vaccines but refused today; advised on living will; DEXA scan ordered; check labs; f/u with results      2  Need for hepatitis C screening test  -     Hepatitis C Antibody ([facility], [facility]); Future    3  Asymptomatic postmenopausal state  -     DXA bone density spine hip and pelvis; Future; Expected date: 05/16/2022    4  Chronic diarrhea  Assessment & Plan:  Check labs; was able to move up appointment; given pain and cramping will trial Bentyl temporarily; f/u with GI as scheduled    Orders:  -     CBC and differential; Future  -     Sedimentation rate, automated; Future  -     C-reactive protein; Future  -     Comprehensive metabolic panel; Future  -     Celiac Disease Antibody Profile; Future  -     TSH, 3rd generation; Future  -     T4, free; Future  -     Occult blood x 3, stool; Future  -     dicyclomine (BENTYL) 10 mg capsule; Take 1 capsule (10 mg total) by mouth as needed in the morning and 1 capsule (10 mg total) as needed at noon and 1 capsule (10 mg total) as needed in the evening and 1 capsule (10 mg total) as needed before bedtime (abdominal cramping)  5  Weight loss  Assessment & Plan:  Appears to be related to diet changes to try to manage GI symptoms; will check labs and referred to GI      6  Iron deficiency anemia, unspecified iron deficiency anemia type  Assessment & Plan:  Check labs and hemoccult; referred to GI    Orders:  -     CBC and differential; Future  -     Sedimentation rate, automated; Future  -     C-reactive protein; Future  -     Occult blood x 3, stool; Future    7  Stress at home  Assessment & Plan:  Recommended counseling but defers tx at this time; f/u guidance given should she change her mind or have worsening symptoms          Subjective:      Patient ID: Yobany Byrd is a 78 y o  female      Patient complains of pain with eating  She went gluten free for 2-3 weeks and that seemed to help some and pain decreased  Then tried dairy free and seems to be worse  Feels like symptoms are progressively worsening  Has not been eating normally due to fear of abdominal pain  Has been going on for about 4 months now  Has appointment with GI but not until mid June  Pt concerned as she is continuing to lose weight  Not UTD on colon cancer screening  Does have hx of anemia  Has had diarrhea 4 times today  Took imodium and helped  Does not help pain  Tried Nexium and that did not seem to help  No known blood in stools  Admits to stress as she is currently financially supporting her daughter and 2 children and mother-in-law  Currently working full time  Mother in law was supposed to move to an apartment but having difficulty finding affordable housing  The following portions of the patient's history were reviewed and updated as appropriate: allergies, current medications, past family history, past medical history, past social history, past surgical history, and problem list     Review of Systems   Constitutional: Negative for chills and fever  Respiratory: Negative for shortness of breath  Cardiovascular: Negative for chest pain  Gastrointestinal: Positive for abdominal pain and diarrhea  Psychiatric/Behavioral: The patient is nervous/anxious  Objective:      /66   Pulse 78   Temp 98 5 °F (36 9 °C) (Temporal)   Resp 18   Ht 5' 4" (1 626 m)   Wt 52 4 kg (115 lb 9 6 oz)   SpO2 97%   BMI 19 84 kg/m²          Physical Exam  Vitals reviewed  Constitutional:       General: She is not in acute distress  Appearance: Normal appearance  She is not ill-appearing, toxic-appearing or diaphoretic  HENT:      Head: Normocephalic and atraumatic  Eyes:      General: No scleral icterus  Right eye: No discharge  Left eye: No discharge        Conjunctiva/sclera: Conjunctivae normal    Cardiovascular: Rate and Rhythm: Normal rate and regular rhythm  Pulses: Normal pulses  Heart sounds: Normal heart sounds  No murmur heard  No gallop  Pulmonary:      Effort: Pulmonary effort is normal  No respiratory distress  Breath sounds: Normal breath sounds  No stridor  No wheezing, rhonchi or rales  Abdominal:      Palpations: Abdomen is soft  Tenderness: There is no guarding or rebound  Musculoskeletal:      Right lower leg: No edema  Left lower leg: No edema  Neurological:      General: No focal deficit present  Mental Status: She is alert and oriented to person, place, and time  Psychiatric:         Mood and Affect: Mood normal          Behavior: Behavior normal          Thought Content:  Thought content normal          Judgment: Judgment normal

## 2022-05-16 NOTE — ASSESSMENT & PLAN NOTE
Recommended counseling but defers tx at this time; f/u guidance given should she change her mind or have worsening symptoms

## 2022-05-16 NOTE — ASSESSMENT & PLAN NOTE
Appears to be related to diet changes to try to manage GI symptoms; will check labs and referred to GI

## 2022-05-16 NOTE — ASSESSMENT & PLAN NOTE
Check labs; was able to move up appointment; given pain and cramping will trial Bentyl temporarily; f/u with GI as scheduled

## 2022-05-16 NOTE — PROGRESS NOTES
Assessment and Plan:     Problem List Items Addressed This Visit        Digestive    Chronic diarrhea     Check labs; was able to move up appointment; given pain and cramping will trial Bentyl temporarily; f/u with GI as scheduled           Relevant Medications    dicyclomine (BENTYL) 10 mg capsule    Other Relevant Orders    CBC and differential    Sedimentation rate, automated    C-reactive protein    Comprehensive metabolic panel    Celiac Disease Antibody Profile    TSH, 3rd generation    T4, free    Occult blood x 3, stool       Other    Iron deficiency anemia     Check labs and hemoccult; referred to GI           Relevant Orders    CBC and differential    Sedimentation rate, automated    C-reactive protein    Occult blood x 3, stool    Weight loss     Appears to be related to diet changes to try to manage GI symptoms; will check labs and referred to GI           Stress at home     Recommended counseling but defers tx at this time; f/u guidance given should she change her mind or have worsening symptoms           Medicare annual wellness visit, subsequent - Primary     Advised on diet modifications; active at work; recommended due vaccines but refused today; advised on living will; DEXA scan ordered; check labs; f/u with results             Other Visit Diagnoses     Need for hepatitis C screening test        Relevant Orders    Hepatitis C Antibody ([facility])    Asymptomatic postmenopausal state        Relevant Orders    DXA bone density spine hip and pelvis          Depression Screening and Follow-up Plan: Patient was screened for depression during today's encounter  They screened negative with a PHQ-2 score of 1  Preventive health issues were discussed with patient, and age appropriate screening tests were ordered as noted in patient's After Visit Summary    Personalized health advice and appropriate referrals for health education or preventive services given if needed, as noted in patient's After Visit Summary  History of Present Illness:     Patient presents for Welcome to Medicare visit  Patient Care Team:  Za Ngo DO as PCP - General (Family Medicine)  Ivette De Leon DO as PCP - [address] [place] [address] (RTE)     Review of Systems:     Review of Systems   Constitutional: Negative for chills and fever  Gastrointestinal: Positive for abdominal pain and diarrhea  Negative for blood in stool  Problem List:     Patient Active Problem List   Diagnosis    UTI (urinary tract infection)    Chest pain    Elevated TSH    Elevated brain natriuretic peptide (BNP) level    Elevated d-dimer    Iron deficiency anemia    Diarrhea    Hypokalemia    Chronic pain of left ankle    Chronic diarrhea    Weight loss    Stress at home   [facility] annual wellness visit, subsequent      Past Medical and Surgical History:     History reviewed  No pertinent past medical history  Past Surgical History:   Procedure Laterality Date    CARDIAC CATHETERIZATION N/A 3/30/2022    Procedure: Cardiac catheterization;  Surgeon: Cody Cabrera DO;  Location: [facility]; Service: Cardiology    CARDIAC CATHETERIZATION N/A 3/30/2022    Procedure: Cardiac Coronary Angiogram;  Surgeon: Cody Cabrera DO;  Location: [facility];   Service: Cardiology      Family History:     Family History   Problem Relation Age of Onset    Coronary artery disease Father     Coronary artery disease Brother     Diabetes Brother     Kidney failure Brother     Uterine cancer Mother       Social History:     Social History     Socioeconomic History    Marital status: /Civil Union     Spouse name: None    Number of children: None    Years of education: None    Highest education level: None   Occupational History    None   Tobacco Use    Smoking status: Never Smoker    Smokeless tobacco: Never Used   Substance and Sexual Activity    Alcohol use: Not Currently    Drug use: Never    Sexual activity: None   Other Topics Concern    None   Social History Narrative    None     Social Determinants of Health     Financial Resource Strain: Not on file   Food Insecurity: Not on file   Transportation Needs: Not on file   Physical Activity: Not on file   Stress: Not on file   Social Connections: Not on file   Intimate Partner Violence: Not on file   Housing Stability: Not on file      Medications and Allergies:     Current Outpatient Medications   Medication Sig Dispense Refill    acetaminophen (TYLENOL) 500 mg tablet Take 500 mg by mouth every 6 (six) hours as needed for mild pain      aspirin (ECOTRIN LOW STRENGTH) 81 mg EC tablet Take 81 mg by mouth daily      Cholecalciferol (Vitamin D3) 50 MCG (2000 UT) TABS Take by mouth        cyanocobalamin (VITAMIN B-12) 500 MCG tablet Take 500 mcg by mouth daily      dicyclomine (BENTYL) 10 mg capsule Take 1 capsule (10 mg total) by mouth as needed in the morning and 1 capsule (10 mg total) as needed at noon and 1 capsule (10 mg total) as needed in the evening and 1 capsule (10 mg total) as needed before bedtime (abdominal cramping)  30 capsule 0    esomeprazole (NexIUM) 20 mg capsule Take 20 mg by mouth every morning before breakfast      ferrous sulfate 325 (65 Fe) mg tablet Take 325 mg by mouth daily with breakfast      COLLADO SEAL ROOT PO Take 900 mg by mouth daily Echinaceal/goldenseal blend       Omega-3 Fatty Acids (OMEGA 3 PO) Take 1,000 mg by mouth      Pyridoxine HCl (VITAMIN B-6 PO) Take 1 tablet by mouth      Vitamin E 400 units TABS Take by mouth       No current facility-administered medications for this visit  Allergies   Allergen Reactions    Codeine Other (See Comments)     Was told allergy    Elastic Bandages & [Zinc] Hives, Swelling and Rash      Immunizations: There is no immunization history on file for this patient     Health Maintenance:         Topic Date Due    Hepatitis C Screening  Never done    Breast Cancer Screening: Mammogram  Never done    Colorectal Cancer Screening  Never done         Topic Date Due    COVID-19 Vaccine (1) Never done    DTaP,Tdap,and Td Vaccines (1 - Tdap) Never done    Pneumococcal Vaccine: 65+ Years (1 - PCV) Never done      Medicare Screening Tests and Risk Assessments:     Grant Goyal is here for her Subsequent Wellness visit  Health Risk Assessment:   Patient rates overall health as fair  Patient feels that their physical health rating is much worse  Patient is satisfied with their life  Eyesight was rated as same  Hearing was rated as same  Patient feels that their emotional and mental health rating is slightly worse  Patients states they are often angry  Patient states they are always unusually tired/fatigued  Pain experienced in the last 7 days has been a lot  Patient's pain rating has been 8/10  Patient states that she has experienced weight loss or gain in last 6 months  Depression Screening:   PHQ-2 Score: 1      Fall Risk Screening: In the past year, patient has experienced: no history of falling in past year      Urinary Incontinence Screening:   Patient has not leaked urine accidently in the last six months  Home Safety:  Patient does not have trouble with stairs inside or outside of their home  Patient has working smoke alarms and has working carbon monoxide detector  Home safety hazards include: none  Nutrition:   Current diet is Regular  Medications:   Patient is currently taking over-the-counter supplements  OTC medications include: see medication list  Patient is able to manage medications  Activities of Daily Living (ADLs)/Instrumental Activities of Daily Living (IADLs):   Walk and transfer into and out of bed and chair?: Yes  Dress and groom yourself?: Yes    Bathe or shower yourself?: Yes    Feed yourself?  Yes  Do your laundry/housekeeping?: Yes  Manage your money, pay your bills and track your expenses?: Yes  Make your own meals?: Yes    Do your own shopping?: Yes    Previous Hospitalizations:   Any hospitalizations or ED visits within the last 12 months?: Yes    How many hospitalizations have you had in the last year?: 1-2    Advance Care Planning:   Living will: No    Durable POA for healthcare: No      Cognitive Screening:   Provider or family/friend/caregiver concerned regarding cognition?: Yes    PREVENTIVE SCREENINGS      Cardiovascular Screening:    General: Screening Current      Diabetes Screening:     General: Screening Current      Colorectal Cancer Screening:     General: Risks and Benefits Discussed    Due for: Colonoscopy - High Risk      Breast Cancer Screening:     General: Risks and Benefits Discussed    Due for: Mammogram        Cervical Cancer Screening:    General: Screening Not Indicated      Osteoporosis Screening:    General: Risks and Benefits Discussed    Due for: DXA Axial      Abdominal Aortic Aneurysm (AAA) Screening:        General: Screening Not Indicated      Lung Cancer Screening:     General: Screening Not Indicated      Hepatitis C Screening:    General: Risks and Benefits Discussed    Hep C Screening Accepted: Yes      Screening, Brief Intervention, and Referral to Treatment (SBIRT)    Screening  Typical number of drinks in a day: 0  Typical number of drinks in a week: 0  Interpretation: Low risk drinking behavior  Single Item Drug Screening:  How often have you used an illegal drug (including marijuana) or a prescription medication for non-medical reasons in the past year? never    Single Item Drug Screen Score: 0  Interpretation: Negative screen for possible drug use disorder    No exam data present     Physical Exam:     /66   Pulse 78   Temp 98 5 °F (36 9 °C) (Temporal)   Resp 18   Ht 5' 4" (1 626 m)   Wt 52 4 kg (115 lb 9 6 oz)   SpO2 97%   BMI 19 84 kg/m²     Physical Exam  Constitutional:       General: She is not in acute distress  Appearance: Normal appearance   She is not ill-appearing or toxic-appearing  HENT:      Head: Normocephalic and atraumatic  Cardiovascular:      Rate and Rhythm: Normal rate and regular rhythm  Pulses: Normal pulses  Heart sounds: Normal heart sounds  Pulmonary:      Effort: Pulmonary effort is normal       Breath sounds: Normal breath sounds  Abdominal:      Tenderness: There is no guarding or rebound  Neurological:      General: No focal deficit present  Mental Status: She is alert and oriented to person, place, and time  Psychiatric:         Mood and Affect: Mood normal          Behavior: Behavior normal          Thought Content:  Thought content normal          Judgment: Judgment normal           Anjana Higgins DO

## 2022-05-16 NOTE — ASSESSMENT & PLAN NOTE
Advised on diet modifications; active at work; recommended due vaccines but refused today; advised on living will; DEXA scan ordered; check labs; f/u with results

## 2022-05-16 NOTE — PATIENT INSTRUCTIONS
Medicare Preventive Visit Patient Instructions  Thank you for completing your Welcome to Medicare Visit or Medicare Annual Wellness Visit today  Your next wellness visit will be due in one year (5/17/2023)  The screening/preventive services that you may require over the next 5-10 years are detailed below  Some tests may not apply to you based off risk factors and/or age  Screening tests ordered at today's visit but not completed yet may show as past due  Also, please note that scanned in results may not display below  Preventive Screenings:  Service Recommendations Previous Testing/Comments   Colorectal Cancer Screening  * Colonoscopy    * Fecal Occult Blood Test (FOBT)/Fecal Immunochemical Test (FIT)  * Fecal DNA/Cologuard Test  * Flexible Sigmoidoscopy Age: 54-65 years old   Colonoscopy: every 10 years (may be performed more frequently if at higher risk)  OR  FOBT/FIT: every 1 year  OR  Cologuard: every 3 years  OR  Sigmoidoscopy: every 5 years  Screening may be recommended earlier than age 48 if at higher risk for colorectal cancer  Also, an individualized decision between you and your healthcare provider will decide whether screening between the ages of 74-80 would be appropriate  Colonoscopy: Not on file  FOBT/FIT: Not on file  Cologuard: Not on file  Sigmoidoscopy: Not on file          Breast Cancer Screening Age: 36 years old  Frequency: every 1-2 years  Not required if history of left and right mastectomy Mammogram: Not on file        Cervical Cancer Screening Between the ages of 21-29, pap smear recommended once every 3 years  Between the ages of 33-67, can perform pap smear with HPV co-testing every 5 years     Recommendations may differ for women with a history of total hysterectomy, cervical cancer, or abnormal pap smears in past  Pap Smear: Not on file    Screening Not Indicated   Hepatitis C Screening Once for adults born between [facility]  More frequently in patients at high risk for Hepatitis C Hep C Antibody: Not on file        Diabetes Screening 1-2 times per year if you're at risk for diabetes or have pre-diabetes Fasting glucose: 93 mg/dL   A1C: 5 5 %    Screening Current   Cholesterol Screening Once every 5 years if you don't have a lipid disorder  May order more often based on risk factors  Lipid panel: 03/29/2022    Screening Current     Other Preventive Screenings Covered by Medicare:  1  Abdominal Aortic Aneurysm (AAA) Screening: covered once if your at risk  You're considered to be at risk if you have a family history of AAA  2  Lung Cancer Screening: covers low dose CT scan once per year if you meet all of the following conditions: (1) Age 50-69; (2) No signs or symptoms of lung cancer; (3) Current smoker or have quit smoking within the last 15 years; (4) You have a tobacco smoking history of at least 30 pack years (packs per day multiplied by number of years you smoked); (5) You get a written order from a healthcare provider  3  Glaucoma Screening: covered annually if you're considered high risk: (1) You have diabetes OR (2) Family history of glaucoma OR (3)  aged 48 and older OR (3)  American aged 72 and older  3  Osteoporosis Screening: covered every 2 years if you meet one of the following conditions: (1) You're estrogen deficient and at risk for osteoporosis based off medical history and other findings; (2) Have a vertebral abnormality; (3) On glucocorticoid therapy for more than 3 months; (4) Have primary hyperparathyroidism; (5) On osteoporosis medications and need to assess response to drug therapy  · Last bone density test (DXA Scan): Not on file  5  HIV Screening: covered annually if you're between the age of 12-76  Also covered annually if you are younger than 13 and older than 72 with risk factors for HIV infection  For pregnant patients, it is covered up to 3 times per pregnancy      Immunizations:  Immunization Recommendations   Influenza Vaccine Annual influenza vaccination during flu season is recommended for all persons aged >= 6 months who do not have contraindications   Pneumococcal Vaccine (Prevnar and Pneumovax)  * Prevnar = PCV13  * Pneumovax = PPSV23   Adults 25-60 years old: 1-3 doses may be recommended based on certain risk factors  Adults 72 years old: Prevnar (PCV13) vaccine recommended followed by Pneumovax (PPSV23) vaccine  If already received PPSV23 since turning 65, then PCV13 recommended at least one year after PPSV23 dose  Hepatitis B Vaccine 3 dose series if at intermediate or high risk (ex: diabetes, end stage renal disease, liver disease)   Tetanus (Td) Vaccine - COST NOT COVERED BY MEDICARE PART B Following completion of primary series, a booster dose should be given every 10 years to maintain immunity against tetanus  Td may also be given as tetanus wound prophylaxis  Tdap Vaccine - COST NOT COVERED BY MEDICARE PART B Recommended at least once for all adults  For pregnant patients, recommended with each pregnancy  Shingles Vaccine (Shingrix) - COST NOT COVERED BY MEDICARE PART B  2 shot series recommended in those aged 48 and above     Health Maintenance Due:      Topic Date Due    Hepatitis C Screening  Never done    Breast Cancer Screening: Mammogram  Never done    Colorectal Cancer Screening  Never done     Immunizations Due:      Topic Date Due    COVID-19 Vaccine (1) Never done    DTaP,Tdap,and Td Vaccines (1 - Tdap) Never done    Pneumococcal Vaccine: 65+ Years (1 - PCV) Never done     Advance Directives   What are advance directives? Advance directives are legal documents that state your wishes and plans for medical care  These plans are made ahead of time in case you lose your ability to make decisions for yourself  Advance directives can apply to any medical decision, such as the treatments you want, and if you want to donate organs  What are the types of advance directives?   There are many types of advance directives, and each state has rules about how to use them  You may choose a combination of any of the following:  · Living will: This is a written record of the treatment you want  You can also choose which treatments you do not want, which to limit, and which to stop at a certain time  This includes surgery, medicine, IV fluid, and tube feedings  · Durable power of  for healthcare [facility]): This is a written record that states who you want to make healthcare choices for you when you are unable to make them for yourself  This person, called a proxy, is usually a family member or a friend  You may choose more than 1 proxy  · Do not resuscitate (DNR) order:  A DNR order is used in case your heart stops beating or you stop breathing  It is a request not to have certain forms of treatment, such as CPR  A DNR order may be included in other types of advance directives  · Medical directive: This covers the care that you want if you are in a coma, near death, or unable to make decisions for yourself  You can list the treatments you want for each condition  Treatment may include pain medicine, surgery, blood transfusions, dialysis, IV or tube feedings, and a ventilator (breathing machine)  · Values history: This document has questions about your views, beliefs, and how you feel and think about life  This information can help others choose the care that you would choose  Why are advance directives important? An advance directive helps you control your care  Although spoken wishes may be used, it is better to have your wishes written down  Spoken wishes can be misunderstood, or not followed  Treatments may be given even if you do not want them  An advance directive may make it easier for your family to make difficult choices about your care  © Copyright [employer] 2018 Information is for End User's use only and may not be sold, redistributed or otherwise used for commercial purposes   All illustrations and images included in CareNotes® are the copyrighted property of A [employer] A M [employer]  or Osvaldo Starr

## 2022-05-18 ENCOUNTER — TELEPHONE (OUTPATIENT)
Dept: FAMILY MEDICINE CLINIC | Facility: CLINIC | Age: 77
End: 2022-05-18

## 2022-05-18 NOTE — TELEPHONE ENCOUNTER
Pt called was seen Monday 5/16/22 and was given Bentyl for muscle and intestinal spasms when pt takes the pills she feel like her throat is closing and gets nauseous please advise

## 2022-05-19 ENCOUNTER — APPOINTMENT (OUTPATIENT)
Dept: LAB | Facility: CLINIC | Age: 77
End: 2022-05-19
Payer: COMMERCIAL

## 2022-05-19 DIAGNOSIS — K52.9 CHRONIC DIARRHEA: ICD-10-CM

## 2022-05-19 DIAGNOSIS — D50.9 IRON DEFICIENCY ANEMIA, UNSPECIFIED IRON DEFICIENCY ANEMIA TYPE: ICD-10-CM

## 2022-05-19 DIAGNOSIS — Z11.59 NEED FOR HEPATITIS C SCREENING TEST: ICD-10-CM

## 2022-05-19 LAB
ALBUMIN SERPL BCP-MCNC: 2.8 G/DL (ref 3.5–5)
ALP SERPL-CCNC: 165 U/L (ref 46–116)
ALT SERPL W P-5'-P-CCNC: 32 U/L (ref 12–78)
ANION GAP SERPL CALCULATED.3IONS-SCNC: 4 MMOL/L (ref 4–13)
AST SERPL W P-5'-P-CCNC: 44 U/L (ref 5–45)
BASOPHILS # BLD AUTO: 0.07 THOUSANDS/ΜL (ref 0–0.1)
BASOPHILS NFR BLD AUTO: 1 % (ref 0–1)
BILIRUB SERPL-MCNC: 0.61 MG/DL (ref 0.2–1)
BUN SERPL-MCNC: 14 MG/DL (ref 5–25)
CALCIUM ALBUM COR SERPL-MCNC: 9.8 MG/DL (ref 8.3–10.1)
CALCIUM SERPL-MCNC: 8.8 MG/DL (ref 8.3–10.1)
CHLORIDE SERPL-SCNC: 112 MMOL/L (ref 100–108)
CO2 SERPL-SCNC: 25 MMOL/L (ref 21–32)
CREAT SERPL-MCNC: 0.88 MG/DL (ref 0.6–1.3)
CRP SERPL QL: 7.4 MG/L
EOSINOPHIL # BLD AUTO: 0.29 THOUSAND/ΜL (ref 0–0.61)
EOSINOPHIL NFR BLD AUTO: 4 % (ref 0–6)
ERYTHROCYTE [DISTWIDTH] IN BLOOD BY AUTOMATED COUNT: 14.3 % (ref 11.6–15.1)
ERYTHROCYTE [SEDIMENTATION RATE] IN BLOOD: 11 MM/HOUR (ref 0–29)
GFR SERPL CREATININE-BSD FRML MDRD: 64 ML/MIN/1.73SQ M
GLUCOSE P FAST SERPL-MCNC: 114 MG/DL (ref 65–99)
HCT VFR BLD AUTO: 35.7 % (ref 34.8–46.1)
HCV AB SER QL: NORMAL
HGB BLD-MCNC: 11.4 G/DL (ref 11.5–15.4)
IMM GRANULOCYTES # BLD AUTO: 0.02 THOUSAND/UL (ref 0–0.2)
IMM GRANULOCYTES NFR BLD AUTO: 0 % (ref 0–2)
LYMPHOCYTES # BLD AUTO: 0.87 THOUSANDS/ΜL (ref 0.6–4.47)
LYMPHOCYTES NFR BLD AUTO: 12 % (ref 14–44)
MCH RBC QN AUTO: 30.2 PG (ref 26.8–34.3)
MCHC RBC AUTO-ENTMCNC: 31.9 G/DL (ref 31.4–37.4)
MCV RBC AUTO: 95 FL (ref 82–98)
MONOCYTES # BLD AUTO: 0.29 THOUSAND/ΜL (ref 0.17–1.22)
MONOCYTES NFR BLD AUTO: 4 % (ref 4–12)
NEUTROPHILS # BLD AUTO: 5.72 THOUSANDS/ΜL (ref 1.85–7.62)
NEUTS SEG NFR BLD AUTO: 79 % (ref 43–75)
NRBC BLD AUTO-RTO: 0 /100 WBCS
PLATELET # BLD AUTO: 332 THOUSANDS/UL (ref 149–390)
PMV BLD AUTO: 11.4 FL (ref 8.9–12.7)
POTASSIUM SERPL-SCNC: 3.6 MMOL/L (ref 3.5–5.3)
PROT SERPL-MCNC: 6.7 G/DL (ref 6.4–8.2)
RBC # BLD AUTO: 3.77 MILLION/UL (ref 3.81–5.12)
SODIUM SERPL-SCNC: 141 MMOL/L (ref 136–145)
T4 FREE SERPL-MCNC: 0.91 NG/DL (ref 0.76–1.46)
TSH SERPL DL<=0.05 MIU/L-ACNC: 5.76 UIU/ML (ref 0.45–4.5)
WBC # BLD AUTO: 7.26 THOUSAND/UL (ref 4.31–10.16)

## 2022-05-19 PROCEDURE — 86258 DGP ANTIBODY EACH IG CLASS: CPT

## 2022-05-19 PROCEDURE — 86140 C-REACTIVE PROTEIN: CPT

## 2022-05-19 PROCEDURE — 84443 ASSAY THYROID STIM HORMONE: CPT

## 2022-05-19 PROCEDURE — 84439 ASSAY OF FREE THYROXINE: CPT

## 2022-05-19 PROCEDURE — 36415 COLL VENOUS BLD VENIPUNCTURE: CPT

## 2022-05-19 PROCEDURE — 86803 HEPATITIS C AB TEST: CPT

## 2022-05-19 PROCEDURE — 85652 RBC SED RATE AUTOMATED: CPT

## 2022-05-19 PROCEDURE — 85025 COMPLETE CBC W/AUTO DIFF WBC: CPT

## 2022-05-19 PROCEDURE — 86364 TISS TRNSGLTMNASE EA IG CLAS: CPT

## 2022-05-19 PROCEDURE — 80053 COMPREHEN METABOLIC PANEL: CPT

## 2022-05-19 PROCEDURE — 86231 EMA EACH IG CLASS: CPT

## 2022-05-19 PROCEDURE — 82784 ASSAY IGA/IGD/IGG/IGM EACH: CPT

## 2022-05-20 LAB
ENDOMYSIUM IGA SER QL: NEGATIVE
GLIADIN PEPTIDE IGA SER-ACNC: 3 UNITS (ref 0–19)
GLIADIN PEPTIDE IGG SER-ACNC: 3 UNITS (ref 0–19)
IGA SERPL-MCNC: 234 MG/DL (ref 64–422)
TTG IGA SER-ACNC: <2 U/ML (ref 0–3)
TTG IGG SER-ACNC: 2 U/ML (ref 0–5)

## 2022-05-20 NOTE — PROGRESS NOTES
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, but ddx also includes IBD, EPI, occult malignancy, SIBO, functional pathology  CMP with low albumin at 2 8, elevated alk phos  CBC with anemia with hgb 11 4 but normal MCV, WBC, plt  ESR 11, TSH elevated but normal fT4  CRP elevated at 7  4  hep C ab normal  Celiac serologies negative  Fecal occult +      1  Diarrhea, unspecified type    2  Elevated TSH    3  Anemia, unspecified type    4  Hypoalbuminemia    5  Abdominal cramping    6  Weight loss    7  Nausea    8  Change in bowel habits    9  Fecal occult blood test positive    10  Elevated alkaline phosphatase level    11  Protein-calorie malnutrition, unspecified severity ([facility] [address] )        Orders Placed This Encounter   Procedures    Calprotectin,Fecal    Fecal fat, qualitative    H  pylori antigen, stool    Giardia, EIA, Ova/Parasite    Pancreatic elastase, fecal    US abdomen complete    Alkaline phosphatase, isoenzymes    Antimitochondrial antibody    Antinuclear Antibodies (BRISSA), IFA    Ferritin    Iron    Transferrin    Colonoscopy    EGD     EGD and colonoscopy  High protein diet  Blood work and stool tests  IBGard as needed  Blood work for elevated alk phos (including GGT, isoenzymes)  Check iron studies  Abdominal US  Imodium as needed  Can hold Nexium for now unless heartburn returns  ______________________________________________________________________    Referred by Dr Sue Joel for diarrhea    HPI:    Jeanette Betancur is a 78 y o  female who presents with complaint of diarrhea and weight loss  In January she had COVID but she did not know it  Since then she had GI issues and weight loss   30 lb weight loss since 1/2022  Poor energy  She is not able to do as much now as she was before  + Dry mouth and dry throat  + diarrhea with 4-5 BMs per day, usually the first is formed (2-3 AM) and then 30 minutes later it is soft and the rest is water  Cramping and before and after eating  She tried different foods  She tried Nexium without benefit  Tried gluten free for a few weeks but not much help (only a few days)  Tried dairy free and she felt worse  She tried bentyl with mild help in terms of cramping, but worse nausea  She was told to stop it  Nothing makes the cramps better or worse  + nausea without vomiting  Occasional heartburn  No dysphagia, odynophagia, BRBPR, melena  No prior colonoscopy  No prior EGD  + FH of celiac disease in grandson  Daughter with GI issues and acid reflux  No FH of colon cancer        REVIEW OF SYSTEMS:  10 point ROS reviewed and negative, except as above    Historical Information   History reviewed  No pertinent past medical history  Past Surgical History:   Procedure Laterality Date    CARDIAC CATHETERIZATION N/A 3/30/2022    Procedure: Cardiac catheterization;  Surgeon: Teresa Meza DO;  [facility]; Service: Cardiology    CARDIAC CATHETERIZATION N/A 3/30/2022    Procedure: Cardiac Coronary Angiogram;  Surgeon: Teresa Meza DO;  Location: [facility];   Service: Cardiology     Social History   Social History     Substance and Sexual Activity   Alcohol Use Not Currently     Social History     Substance and Sexual Activity   Drug Use Never     Social History     Tobacco Use   Smoking Status Never Smoker   Smokeless Tobacco Never Used     Family History   Problem Relation Age of Onset    Coronary artery disease Father     Coronary artery disease Brother     Diabetes Brother     Kidney failure Brother     Uterine cancer Mother        Meds/Allergies       Current Outpatient Medications:     acetaminophen (TYLENOL) 500 mg tablet    aspirin (ECOTRIN LOW STRENGTH) 81 mg EC tablet    Cholecalciferol (Vitamin D3) 50 MCG (2000 UT) TABS    cyanocobalamin (VITAMIN B-12) 500 MCG tablet    ferrous sulfate 325 (65 Fe) mg tablet    COLLADO SEAL ROOT PO    Omega-3 Fatty Acids (OMEGA 3 PO)    Pyridoxine HCl (VITAMIN B-6 PO)    Vitamin E 400 units TABS    dicyclomine (BENTYL) 10 mg capsule    esomeprazole (NexIUM) 20 mg capsule    Allergies   Allergen Reactions    Codeine Other (See Comments)     Was told allergy    Elastic Bandages & [Zinc] Hives, Swelling and Rash           Objective     Blood pressure 116/64, pulse 86, height 5' 4" (1 626 m), weight 52 4 kg (115 lb 9 6 oz), SpO2 99 %  Body mass index is 19 84 kg/m²  PHYSICAL EXAMINATION:    General Appearance:   Alert, cooperative, no distress   HEENT:  Normocephalic, atraumatic, anicteric  Neck supple, symmetrical, trachea midline  Lungs:   Equal chest rise and unlabored breathing, normal effort, no coughing  Cardiovascular:   No visualized JVD  Abdomen:   No abdominal distension  Skin:   No jaundice, rashes, or lesions  Musculoskeletal:   Normal range of motion visualized  Psych:  Normal affect and normal insight  Neuro:  Alert and appropriate  Lab Results:   No visits with results within 1 Day(s) from this visit     Latest known visit with results is:   Appointment on 05/21/2022   Component Date Value    OCCULT BLD, FECAL IMMUNO* 05/21/2022 Positive (A)       Lab Results   Component Value Date    WBC 7 26 05/19/2022    HGB 11 4 (L) 05/19/2022    HCT 35 7 05/19/2022    MCV 95 05/19/2022     05/19/2022       Lab Results   Component Value Date    SODIUM 141 05/19/2022    K 3 6 05/19/2022     (H) 05/19/2022    CO2 25 05/19/2022    AGAP 4 05/19/2022    BUN 14 05/19/2022    CREATININE 0 88 05/19/2022    GLUC 192 (H) 03/30/2022    GLUF 114 (H) 05/19/2022    CALCIUM 8 8 05/19/2022    AST 44 05/19/2022    ALT 32 05/19/2022    ALKPHOS 165 (H) 05/19/2022    TP 6 7 05/19/2022    TBILI 0 61 05/19/2022    EGFR 64 05/19/2022       Lab Results   Component Value Date    CRP 7 4 (H) 05/19/2022       Lab Results   Component Value Date    [id number] 5 760 (H) 05/19/2022       No results found for: IRON, TIBC, FERRITIN    Radiology Results:   No results found

## 2022-05-20 NOTE — ASSESSMENT & PLAN NOTE
Shanita Lugo is a 78 y o  female who presents with complaint of     CMP with low albumin at 2 8, elevated alk phos  CBC with anemia with hgb 11 4 but normal MCV, WBC, plt  ESR 11, TSH elevated but normal fT4  CRP elevated at 7  4  hep C ab normal    DDx includes IBS vs SIBO vs EPI vs food sensitivity vs medication effect vs functional pathology  No diagnosis found  No orders of the defined types were placed in this encounter  EGD and colonoscopy  Blood work and stool tests  Awaiting celiac results  Blood work for elevated alk phos (including GGT, isoenzymes)  Check iron studies   Abdominal US  Abdominal imaging to eval for mesenteric ischemia  Bentyl PRN but will icnrease the dose  Imodium as needed  Continue Nexium

## 2022-05-21 ENCOUNTER — APPOINTMENT (OUTPATIENT)
Dept: LAB | Facility: CLINIC | Age: 77
End: 2022-05-21
Payer: COMMERCIAL

## 2022-05-21 DIAGNOSIS — D50.9 IRON DEFICIENCY ANEMIA, UNSPECIFIED IRON DEFICIENCY ANEMIA TYPE: ICD-10-CM

## 2022-05-21 DIAGNOSIS — K52.9 CHRONIC DIARRHEA: ICD-10-CM

## 2022-05-21 LAB — HEMOCCULT STL QL IA: POSITIVE

## 2022-05-21 PROCEDURE — G0328 FECAL BLOOD SCRN IMMUNOASSAY: HCPCS

## 2022-05-23 ENCOUNTER — OFFICE VISIT (OUTPATIENT)
Dept: GASTROENTEROLOGY | Facility: MEDICAL CENTER | Age: 77
End: 2022-05-23
Payer: COMMERCIAL

## 2022-05-23 VITALS
WEIGHT: 115.6 LBS | HEART RATE: 86 BPM | DIASTOLIC BLOOD PRESSURE: 64 MMHG | OXYGEN SATURATION: 99 % | HEIGHT: 64 IN | BODY MASS INDEX: 19.74 KG/M2 | SYSTOLIC BLOOD PRESSURE: 116 MMHG

## 2022-05-23 DIAGNOSIS — E88.09 HYPOALBUMINEMIA: ICD-10-CM

## 2022-05-23 DIAGNOSIS — D64.9 ANEMIA, UNSPECIFIED TYPE: ICD-10-CM

## 2022-05-23 DIAGNOSIS — R10.9 ABDOMINAL CRAMPING: ICD-10-CM

## 2022-05-23 DIAGNOSIS — R11.0 NAUSEA: ICD-10-CM

## 2022-05-23 DIAGNOSIS — E46 PROTEIN-CALORIE MALNUTRITION, UNSPECIFIED SEVERITY (HCC): ICD-10-CM

## 2022-05-23 DIAGNOSIS — R79.89 ELEVATED TSH: ICD-10-CM

## 2022-05-23 DIAGNOSIS — R74.8 ELEVATED ALKALINE PHOSPHATASE LEVEL: ICD-10-CM

## 2022-05-23 DIAGNOSIS — R63.4 WEIGHT LOSS: ICD-10-CM

## 2022-05-23 DIAGNOSIS — R19.5 FECAL OCCULT BLOOD TEST POSITIVE: ICD-10-CM

## 2022-05-23 DIAGNOSIS — R19.4 CHANGE IN BOWEL HABITS: ICD-10-CM

## 2022-05-23 DIAGNOSIS — R19.7 DIARRHEA, UNSPECIFIED TYPE: Primary | ICD-10-CM

## 2022-05-23 PROCEDURE — 99204 OFFICE O/P NEW MOD 45 MIN: CPT | Performed by: INTERNAL MEDICINE

## 2022-05-23 NOTE — PATIENT INSTRUCTIONS
Today we discussed: We will get an endoscopy and colonoscopy  High protein diet  Blood work and stool tests  IBGard as needed  This is over the counter  Abdominal ultrasound  Imodium as needed  Can hold Nexium for now unless heartburn returns  Scheduled date of Colon/EGD (as of today): 08/15/2022  Physician performing: Dr Dinesh Ruggiero   Location of procedure:  [place]  Bowel prep reviewed with patient: Miralax/Mag Citrate  Instructions reviewed with patient by: MA  Clearances:  OTC Iron pt will stop for 7 days

## 2022-05-25 ENCOUNTER — OFFICE VISIT (OUTPATIENT)
Dept: PODIATRY | Facility: CLINIC | Age: 77
End: 2022-05-25
Payer: COMMERCIAL

## 2022-05-25 VITALS
DIASTOLIC BLOOD PRESSURE: 56 MMHG | HEIGHT: 64 IN | BODY MASS INDEX: 19.63 KG/M2 | WEIGHT: 115 LBS | SYSTOLIC BLOOD PRESSURE: 112 MMHG

## 2022-05-25 DIAGNOSIS — M25.572 CHRONIC PAIN OF LEFT ANKLE: ICD-10-CM

## 2022-05-25 DIAGNOSIS — G89.29 CHRONIC PAIN OF LEFT ANKLE: ICD-10-CM

## 2022-05-25 DIAGNOSIS — M25.472 EDEMA OF LEFT ANKLE: Primary | ICD-10-CM

## 2022-05-25 PROCEDURE — 99202 OFFICE O/P NEW SF 15 MIN: CPT | Performed by: PODIATRIST

## 2022-05-25 RX ORDER — METHYLPREDNISOLONE 4 MG/1
TABLET ORAL
Qty: 21 TABLET | Refills: 0 | Status: ON HOLD | OUTPATIENT
Start: 2022-05-25 | End: 2022-06-02 | Stop reason: CLARIF

## 2022-05-25 NOTE — PROGRESS NOTES
Assessment/Plan:    Explained to patient that x-rays of the left ankle were within normal limits  Her pain however is severe and edema is significant  Patient referred for an MRI to confirm that there is an orthopedic ankle issue rather than a systemic disorder that is leading to edema  Patient be rescheduled with results  She was placed on a Medrol Dosepak to provide short-term pain relief  No problem-specific Assessment & Plan notes found for this encounter  Diagnoses and all orders for this visit:    Edema of left ankle  -     methylPREDNISolone 4 MG tablet therapy pack; Use as directed on package    Chronic pain of left ankle  -     Ambulatory Referral to Podiatry  -     MRI ankle/heel left wo contrast; Future  -     methylPREDNISolone 4 MG tablet therapy pack; Use as directed on package          Subjective:      Patient ID: Ritesh Teixeira is a 78 y o  female  HPI     Patient, a 44-year-old female presents with pain and swelling in her left ankle  Pain has been an issue for approximately 1 year  No ankle trauma related  Patient has difficulty placing weight on the ankle and is unable to plantar flex the foot with any strength  Patient is mild lateral ankle edema right foot but it is not painful  Patient had recent x-rays read as negative for significant pathology  I personally reviewed a comprehensive metabolic panel dated 57/42/1988  Alkaline phosphatase elevated significantly at 165  I personally reviewed x-rays of the left ankle dated 04/11/22  It was negative for significant pathology  The following portions of the patient's history were reviewed and updated as appropriate: allergies, current medications, past family history, past medical history, past social history, past surgical history and problem list     Review of Systems   Cardiovascular:        History of cardiac disorder   Gastrointestinal:        Chronic gastritis   Musculoskeletal: Positive for gait problem  Objective:      /56   Ht 5' 4" (1 626 m)   Wt 52 2 kg (115 lb)   BMI 19 74 kg/m²          Physical Exam  Constitutional:       Appearance: Normal appearance  Cardiovascular:      Pulses: Normal pulses  Musculoskeletal:         General: Swelling and tenderness present  Comments: Left ankle is edematous compared to right  Sharp pain elicited with palpation of the anterior ankle as well as the lateral ankle ligaments  Patient unable to strongly plantar flex the left foot  There is mild lateral right ankle edema  Skin:     General: Skin is warm  Neurological:      General: No focal deficit present  Mental Status: She is oriented to person, place, and time

## 2022-05-29 ENCOUNTER — APPOINTMENT (EMERGENCY)
Dept: RADIOLOGY | Facility: HOSPITAL | Age: 77
DRG: 435 | End: 2022-05-29
Payer: COMMERCIAL

## 2022-05-29 ENCOUNTER — APPOINTMENT (EMERGENCY)
Dept: CT IMAGING | Facility: HOSPITAL | Age: 77
DRG: 435 | End: 2022-05-29
Payer: COMMERCIAL

## 2022-05-29 ENCOUNTER — HOSPITAL ENCOUNTER (INPATIENT)
Facility: HOSPITAL | Age: 77
LOS: 4 days | Discharge: HOME | DRG: 435 | End: 2022-06-02
Attending: EMERGENCY MEDICINE | Admitting: INTERNAL MEDICINE
Payer: COMMERCIAL

## 2022-05-29 DIAGNOSIS — R16.0 LIVER MASSES: ICD-10-CM

## 2022-05-29 DIAGNOSIS — I31.3 PERICARDIAL EFFUSION: ICD-10-CM

## 2022-05-29 DIAGNOSIS — K86.89 PANCREATIC MASS: Primary | ICD-10-CM

## 2022-05-29 DIAGNOSIS — R63.4 WEIGHT LOSS: ICD-10-CM

## 2022-05-29 DIAGNOSIS — R91.8 LUNG MASS: ICD-10-CM

## 2022-05-29 PROBLEM — I31.39 PERICARDIAL EFFUSION: Status: ACTIVE | Noted: 2022-01-01

## 2022-05-29 LAB
ALBUMIN SERPL BCP-MCNC: 2.8 G/DL (ref 3.5–5)
ALP SERPL-CCNC: 173 U/L (ref 46–116)
ALT SERPL W P-5'-P-CCNC: 48 U/L (ref 12–78)
ANION GAP SERPL CALCULATED.3IONS-SCNC: 3 MMOL/L (ref 4–13)
AST SERPL W P-5'-P-CCNC: 65 U/L (ref 5–45)
BASOPHILS # BLD AUTO: 0.06 THOUSANDS/ΜL (ref 0–0.1)
BASOPHILS NFR BLD AUTO: 1 % (ref 0–1)
BILIRUB SERPL-MCNC: 0.22 MG/DL (ref 0.2–1)
BUN SERPL-MCNC: 12 MG/DL (ref 5–25)
CALCIUM ALBUM COR SERPL-MCNC: 9.4 MG/DL (ref 8.3–10.1)
CALCIUM SERPL-MCNC: 8.4 MG/DL (ref 8.3–10.1)
CARDIAC TROPONIN I PNL SERPL HS: 11 NG/L
CHLORIDE SERPL-SCNC: 103 MMOL/L (ref 100–108)
CO2 SERPL-SCNC: 32 MMOL/L (ref 21–32)
CREAT SERPL-MCNC: 1.17 MG/DL (ref 0.6–1.3)
EOSINOPHIL # BLD AUTO: 0.25 THOUSAND/ΜL (ref 0–0.61)
EOSINOPHIL NFR BLD AUTO: 3 % (ref 0–6)
ERYTHROCYTE [DISTWIDTH] IN BLOOD BY AUTOMATED COUNT: 13.5 % (ref 11.6–15.1)
GFR SERPL CREATININE-BSD FRML MDRD: 45 ML/MIN/1.73SQ M
GLUCOSE SERPL-MCNC: 140 MG/DL (ref 65–140)
HCT VFR BLD AUTO: 33.8 % (ref 34.8–46.1)
HGB BLD-MCNC: 11 G/DL (ref 11.5–15.4)
IMM GRANULOCYTES # BLD AUTO: 0.02 THOUSAND/UL (ref 0–0.2)
IMM GRANULOCYTES NFR BLD AUTO: 0 % (ref 0–2)
LACTATE SERPL-SCNC: 0.6 MMOL/L (ref 0.5–2)
LIPASE SERPL-CCNC: 31 U/L (ref 73–393)
LYMPHOCYTES # BLD AUTO: 1.45 THOUSANDS/ΜL (ref 0.6–4.47)
LYMPHOCYTES NFR BLD AUTO: 16 % (ref 14–44)
MCH RBC QN AUTO: 31.1 PG (ref 26.8–34.3)
MCHC RBC AUTO-ENTMCNC: 32.5 G/DL (ref 31.4–37.4)
MCV RBC AUTO: 96 FL (ref 82–98)
MONOCYTES # BLD AUTO: 0.65 THOUSAND/ΜL (ref 0.17–1.22)
MONOCYTES NFR BLD AUTO: 7 % (ref 4–12)
NEUTROPHILS # BLD AUTO: 6.54 THOUSANDS/ΜL (ref 1.85–7.62)
NEUTS SEG NFR BLD AUTO: 73 % (ref 43–75)
NRBC BLD AUTO-RTO: 0 /100 WBCS
PLATELET # BLD AUTO: 292 THOUSANDS/UL (ref 149–390)
PMV BLD AUTO: 9.8 FL (ref 8.9–12.7)
POTASSIUM SERPL-SCNC: 3.7 MMOL/L (ref 3.5–5.3)
PROT SERPL-MCNC: 6.6 G/DL (ref 6.4–8.2)
RBC # BLD AUTO: 3.54 MILLION/UL (ref 3.81–5.12)
SODIUM SERPL-SCNC: 138 MMOL/L (ref 136–145)
WBC # BLD AUTO: 8.97 THOUSAND/UL (ref 4.31–10.16)

## 2022-05-29 PROCEDURE — 99285 EMERGENCY DEPT VISIT HI MDM: CPT

## 2022-05-29 PROCEDURE — 71045 X-RAY EXAM CHEST 1 VIEW: CPT

## 2022-05-29 PROCEDURE — 83690 ASSAY OF LIPASE: CPT

## 2022-05-29 PROCEDURE — 85025 COMPLETE CBC W/AUTO DIFF WBC: CPT

## 2022-05-29 PROCEDURE — 82105 ALPHA-FETOPROTEIN SERUM: CPT | Performed by: INTERNAL MEDICINE

## 2022-05-29 PROCEDURE — 93005 ELECTROCARDIOGRAM TRACING: CPT

## 2022-05-29 PROCEDURE — 99223 1ST HOSP IP/OBS HIGH 75: CPT | Performed by: INTERNAL MEDICINE

## 2022-05-29 PROCEDURE — 74176 CT ABD & PELVIS W/O CONTRAST: CPT

## 2022-05-29 PROCEDURE — 99285 EMERGENCY DEPT VISIT HI MDM: CPT | Performed by: EMERGENCY MEDICINE

## 2022-05-29 PROCEDURE — 80053 COMPREHEN METABOLIC PANEL: CPT

## 2022-05-29 PROCEDURE — 36415 COLL VENOUS BLD VENIPUNCTURE: CPT

## 2022-05-29 PROCEDURE — 83605 ASSAY OF LACTIC ACID: CPT

## 2022-05-29 PROCEDURE — 84484 ASSAY OF TROPONIN QUANT: CPT

## 2022-05-29 PROCEDURE — G1004 CDSM NDSC: HCPCS

## 2022-05-29 RX ORDER — OXYCODONE HYDROCHLORIDE 5 MG/1
2.5 TABLET ORAL EVERY 4 HOURS PRN
Status: DISCONTINUED | OUTPATIENT
Start: 2022-05-29 | End: 2022-06-02 | Stop reason: HOSPADM

## 2022-05-29 RX ORDER — SIMETHICONE 80 MG
80 TABLET,CHEWABLE ORAL 4 TIMES DAILY PRN
Status: DISCONTINUED | OUTPATIENT
Start: 2022-05-29 | End: 2022-06-02 | Stop reason: HOSPADM

## 2022-05-29 RX ORDER — ASPIRIN 81 MG/1
81 TABLET ORAL DAILY
Status: DISCONTINUED | OUTPATIENT
Start: 2022-05-30 | End: 2022-05-30

## 2022-05-29 RX ORDER — ACETAMINOPHEN 325 MG/1
650 TABLET ORAL EVERY 6 HOURS PRN
Status: DISCONTINUED | OUTPATIENT
Start: 2022-05-29 | End: 2022-06-02 | Stop reason: HOSPADM

## 2022-05-29 RX ORDER — CALCIUM CARBONATE 200(500)MG
1000 TABLET,CHEWABLE ORAL DAILY PRN
Status: DISCONTINUED | OUTPATIENT
Start: 2022-05-29 | End: 2022-06-02 | Stop reason: HOSPADM

## 2022-05-29 RX ORDER — HYDROMORPHONE HCL IN WATER/PF 6 MG/30 ML
0.2 PATIENT CONTROLLED ANALGESIA SYRINGE INTRAVENOUS EVERY 4 HOURS PRN
Status: DISCONTINUED | OUTPATIENT
Start: 2022-05-29 | End: 2022-06-02 | Stop reason: HOSPADM

## 2022-05-29 RX ORDER — ONDANSETRON 2 MG/ML
4 INJECTION INTRAMUSCULAR; INTRAVENOUS EVERY 6 HOURS PRN
Status: CANCELLED | OUTPATIENT
Start: 2022-05-29

## 2022-05-29 RX ORDER — MAGNESIUM HYDROXIDE/ALUMINUM HYDROXICE/SIMETHICONE 120; 1200; 1200 MG/30ML; MG/30ML; MG/30ML
30 SUSPENSION ORAL EVERY 6 HOURS PRN
Status: DISCONTINUED | OUTPATIENT
Start: 2022-05-29 | End: 2022-06-02 | Stop reason: HOSPADM

## 2022-05-29 RX ORDER — ENOXAPARIN SODIUM 100 MG/ML
40 INJECTION SUBCUTANEOUS DAILY
Status: DISCONTINUED | OUTPATIENT
Start: 2022-05-30 | End: 2022-06-02

## 2022-05-29 RX ORDER — AMOXICILLIN 250 MG
1 CAPSULE ORAL 2 TIMES DAILY
Status: DISCONTINUED | OUTPATIENT
Start: 2022-05-30 | End: 2022-06-02 | Stop reason: HOSPADM

## 2022-05-29 RX ORDER — ONDANSETRON 2 MG/ML
4 INJECTION INTRAMUSCULAR; INTRAVENOUS EVERY 4 HOURS PRN
Status: DISCONTINUED | OUTPATIENT
Start: 2022-05-29 | End: 2022-06-02 | Stop reason: HOSPADM

## 2022-05-29 RX ORDER — OXYCODONE HYDROCHLORIDE 5 MG/1
5 TABLET ORAL EVERY 4 HOURS PRN
Status: DISCONTINUED | OUTPATIENT
Start: 2022-05-29 | End: 2022-06-02 | Stop reason: HOSPADM

## 2022-05-29 RX ADMIN — OXYCODONE HYDROCHLORIDE 2.5 MG: 5 TABLET ORAL at 23:04

## 2022-05-30 ENCOUNTER — PREP FOR PROCEDURE (OUTPATIENT)
Dept: INTERVENTIONAL RADIOLOGY/VASCULAR | Facility: CLINIC | Age: 77
End: 2022-05-30

## 2022-05-30 DIAGNOSIS — R16.0 LIVER MASSES: Primary | ICD-10-CM

## 2022-05-30 PROBLEM — D64.9 ANEMIA: Status: ACTIVE | Noted: 2022-01-01

## 2022-05-30 LAB
AFP-TM SERPL-MCNC: 45.4 NG/ML (ref 0.5–8)
ALBUMIN SERPL BCP-MCNC: 2.5 G/DL (ref 3.5–5)
ALP SERPL-CCNC: 156 U/L (ref 46–116)
ALT SERPL W P-5'-P-CCNC: 45 U/L (ref 12–78)
ANION GAP SERPL CALCULATED.3IONS-SCNC: 5 MMOL/L (ref 4–13)
AST SERPL W P-5'-P-CCNC: 53 U/L (ref 5–45)
BILIRUB SERPL-MCNC: 0.23 MG/DL (ref 0.2–1)
BUN SERPL-MCNC: 12 MG/DL (ref 5–25)
CALCIUM ALBUM COR SERPL-MCNC: 9.1 MG/DL (ref 8.3–10.1)
CALCIUM SERPL-MCNC: 7.9 MG/DL (ref 8.3–10.1)
CEA SERPL-MCNC: 18.8 NG/ML (ref 0–3)
CHLORIDE SERPL-SCNC: 105 MMOL/L (ref 100–108)
CO2 SERPL-SCNC: 29 MMOL/L (ref 21–32)
CREAT SERPL-MCNC: 0.83 MG/DL (ref 0.6–1.3)
ERYTHROCYTE [DISTWIDTH] IN BLOOD BY AUTOMATED COUNT: 13.7 % (ref 11.6–15.1)
FERRITIN SERPL-MCNC: 159 NG/ML (ref 8–388)
GFR SERPL CREATININE-BSD FRML MDRD: 69 ML/MIN/1.73SQ M
GLUCOSE SERPL-MCNC: 103 MG/DL (ref 65–140)
HCT VFR BLD AUTO: 32.9 % (ref 34.8–46.1)
HGB BLD-MCNC: 10.5 G/DL (ref 11.5–15.4)
IRON SATN MFR SERPL: 14 % (ref 15–50)
IRON SERPL-MCNC: 26 UG/DL (ref 50–170)
MCH RBC QN AUTO: 30.6 PG (ref 26.8–34.3)
MCHC RBC AUTO-ENTMCNC: 31.9 G/DL (ref 31.4–37.4)
MCV RBC AUTO: 96 FL (ref 82–98)
PLATELET # BLD AUTO: 283 THOUSANDS/UL (ref 149–390)
PMV BLD AUTO: 11.1 FL (ref 8.9–12.7)
POTASSIUM SERPL-SCNC: 3.6 MMOL/L (ref 3.5–5.3)
PROT SERPL-MCNC: 6.1 G/DL (ref 6.4–8.2)
RBC # BLD AUTO: 3.43 MILLION/UL (ref 3.81–5.12)
SODIUM SERPL-SCNC: 139 MMOL/L (ref 136–145)
TIBC SERPL-MCNC: 186 UG/DL (ref 250–450)
WBC # BLD AUTO: 7.9 THOUSAND/UL (ref 4.31–10.16)

## 2022-05-30 PROCEDURE — 99447 NTRPROF PH1/NTRNET/EHR 11-20: CPT | Performed by: RADIOLOGY

## 2022-05-30 PROCEDURE — 99222 1ST HOSP IP/OBS MODERATE 55: CPT | Performed by: INTERNAL MEDICINE

## 2022-05-30 PROCEDURE — 99232 SBSQ HOSP IP/OBS MODERATE 35: CPT | Performed by: PHYSICIAN ASSISTANT

## 2022-05-30 PROCEDURE — 99223 1ST HOSP IP/OBS HIGH 75: CPT | Performed by: INTERNAL MEDICINE

## 2022-05-30 PROCEDURE — 82728 ASSAY OF FERRITIN: CPT | Performed by: INTERNAL MEDICINE

## 2022-05-30 PROCEDURE — 83540 ASSAY OF IRON: CPT | Performed by: INTERNAL MEDICINE

## 2022-05-30 PROCEDURE — 83550 IRON BINDING TEST: CPT | Performed by: INTERNAL MEDICINE

## 2022-05-30 PROCEDURE — 85027 COMPLETE CBC AUTOMATED: CPT | Performed by: INTERNAL MEDICINE

## 2022-05-30 PROCEDURE — 97163 PT EVAL HIGH COMPLEX 45 MIN: CPT

## 2022-05-30 PROCEDURE — 80053 COMPREHEN METABOLIC PANEL: CPT | Performed by: INTERNAL MEDICINE

## 2022-05-30 PROCEDURE — 86301 IMMUNOASSAY TUMOR CA 19-9: CPT | Performed by: INTERNAL MEDICINE

## 2022-05-30 PROCEDURE — 82378 CARCINOEMBRYONIC ANTIGEN: CPT | Performed by: INTERNAL MEDICINE

## 2022-05-30 RX ORDER — SODIUM CHLORIDE 9 MG/ML
75 INJECTION, SOLUTION INTRAVENOUS CONTINUOUS
Status: CANCELLED | OUTPATIENT
Start: 2022-05-30

## 2022-05-30 RX ADMIN — ASPIRIN 81 MG: 81 TABLET, COATED ORAL at 08:39

## 2022-05-30 RX ADMIN — ENOXAPARIN SODIUM 40 MG: 40 INJECTION SUBCUTANEOUS at 08:39

## 2022-05-30 RX ADMIN — OXYCODONE HYDROCHLORIDE 2.5 MG: 5 TABLET ORAL at 10:46

## 2022-05-30 NOTE — PLAN OF CARE
Problem: PHYSICAL THERAPY ADULT  Goal: Performs mobility at highest level of function for planned discharge setting  See evaluation for individualized goals  Description: Treatment/Interventions: Functional transfer training, LE strengthening/ROM, Elevations, Therapeutic exercise, Endurance training, Patient/family training, Equipment eval/education, Bed mobility, Gait training, Spoke to nursing, OT  Equipment Recommended: Ilya Reason (pt owns RW)       See flowsheet documentation for full assessment, interventions and recommendations  Note: Prognosis: Guarded  Problem List: Decreased strength, Decreased endurance, Impaired balance, Decreased mobility, Impaired judgement, Decreased safety awareness, Decreased skin integrity, Pain  Assessment: Pt is a 79 yo female admitted to [facility] 2* pancreatic mass, lung mass, liver mass, and reports of ABD pain and weight loss  Pt lives with  and is 's primary caregiver  Pts daugther lives next door and A family as needed  Pt reports being completely (I) PTA,works fulltime at local [employer],no use of personal DME PTA and reports no recent falls  Pt currently is not at functional mobility baseline, needs A for mobility,multiple lines, masimo monitoring,inc L lower ABD and L rib cage pain,ataxic and unsteady gait pattern,dec safety awareness  Pt demonstrates minimal deficits during functional mobility and gait including dec endurance, dec balance, dec BLE strength, ataxic and unsteady gait pattern, inc lower ABD pain and L rib cage and needs minAx1 for gait without use of DME, transfers and BM  Pt would cont to benefit from skilled inpt PT services to maximize functional independence  Barriers to Discharge: Inaccessible home environment (4 [address], pt is 's primary caregiver  )        PT Discharge Recommendation: Home with home health rehabilitation          See flowsheet documentation for full assessment

## 2022-05-30 NOTE — CONSULTS
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   Normal coronary arteries by cardiac catheterization, March 2022   Protein calorie malnutrition   Anemia   Family history of premature CAD    PLAN:  Patient has small moderate pericardial effusion based on CT scan  CT findings are generally more prominent than found on echocardiogram and patient has known history of a small pericardial effusion in March 2022  It is unlikely that the patient developed tamponade physiology with slow progression of pericardial effusion and based on physical examination, it is likely that the patient is not in tamponade  Can check 2D echocardiogram to assess cardiac structure and function, but if the patient's pericardial effusion is malignant, mortality is exceedingly high within next few weeks to 1-2 months  She is currently hemodynamically stable  Can give 1 L +/- of IV fluid overnight  Goals of care discussion is essential in this situation  Guarded prognosis    Signed: Shaji Godfrey DO, [facility], FACP      History of Present Illness:  Malena Roberts is a 78 y o  female without significant past medical history presented with left upper quadrant discomfort that was found to be sharp and rated moderate intensity  the patient has been having 30 lb weight loss with decreased appetite  She was found to be Hemoccult positive and planned for EGD Will and colonoscopy    Due to the abdominal discomfort with recent onset, she presented to [facility] for evaluation was found to have a 4 8 x 4 7 cm lesion at the pancreatic head which was large in size with metastatic disease to the liver  She also has enlarging pericardial effusion for which we have been consulted  She denies any chest pain, pressure, tightness or squeezing  Denies significant shortness of breath, lower extremity swelling, orthopnea or paroxysmal nocturnal dyspnea  She has no personal history of cardiovascular disease of which she is aware  Denies chest pain, pressure, tightness or squeezing  Denies lightheadedness, dizziness or palpitations  Review of Systems:  Review of Systems   Constitutional: Negative for decreased appetite, fever, weight gain and weight loss  HENT: Negative for congestion and sore throat  Eyes: Negative for visual disturbance  Cardiovascular: Negative for chest pain, dyspnea on exertion, leg swelling, near-syncope and palpitations  Respiratory: Negative for cough and shortness of breath  Hematologic/Lymphatic: Negative for bleeding problem  Skin: Negative for rash  Musculoskeletal: Negative for myalgias and neck pain  Gastrointestinal: Negative for abdominal pain and nausea  Neurological: Negative for light-headedness and weakness  Psychiatric/Behavioral: Negative for depression  History reviewed  No pertinent past medical history  Past Surgical History:   Procedure Laterality Date    CARDIAC CATHETERIZATION N/A 3/30/2022    Procedure: Cardiac catheterization;  Surgeon: Farhad Del Rio DO;  [facility]: [facility]; Service: Cardiology    CARDIAC CATHETERIZATION N/A 3/30/2022    Procedure: Cardiac Coronary Angiogram;  Surgeon: Farhad Del Rio DO;  [facility]: [facility];   Service: Cardiology       Social History     Socioeconomic History    Marital status: /Civil Union     Spouse name: None    Number of children: None    Years of education: None    Highest education level: None   Occupational History    None   Tobacco Use    Smoking status: Never Smoker    Smokeless tobacco: Never Used   Vaping Use    Vaping Use: Never used   Substance and Sexual Activity    Alcohol use: Not Currently    Drug use: Never    Sexual activity: None   Other Topics Concern    None   Social History Narrative    None     Social Determinants of Health     Financial Resource Strain: Not on file   Food Insecurity: Not on file   Transportation Needs: Not on file   Physical Activity: Not on file   Stress: Not on file   Social Connections: Not on file   Intimate Partner Violence: Not on file   Housing Stability: Not on file       Family History   Problem Relation Age of Onset    Coronary artery disease Father     Coronary artery disease Brother     Diabetes Brother     Kidney failure Brother     Uterine cancer Mother        Allergies   Allergen Reactions    Bentyl [Dicyclomine] Throat Swelling    Codeine Other (See Comments)     Was told allergy    Elastic Bandages & [Zinc] Hives, Swelling and Rash       No current facility-administered medications on file prior to encounter  Current Outpatient Medications on File Prior to Encounter   Medication Sig    acetaminophen (TYLENOL) 500 mg tablet Take 500 mg by mouth every 6 (six) hours as needed for mild pain    aspirin (ECOTRIN LOW STRENGTH) 81 mg EC tablet Take 81 mg by mouth daily    Cholecalciferol (Vitamin D3) 50 MCG (2000 UT) TABS Take by mouth      cyanocobalamin (VITAMIN B-12) 500 MCG tablet Take 500 mcg by mouth daily    dicyclomine (BENTYL) 10 mg capsule Take 1 capsule (10 mg total) by mouth as needed in the morning and 1 capsule (10 mg total) as needed at noon and 1 capsule (10 mg total) as needed in the evening and 1 capsule (10 mg total) as needed before bedtime (abdominal cramping)   (Patient not taking: No sig reported)    esomeprazole (NexIUM) 20 mg capsule Take 20 mg by mouth every morning before breakfast (Patient not taking: No sig reported)    ferrous sulfate 325 (65 Fe) mg tablet Take 325 mg by mouth daily with breakfast    COLLADO SEAL ROOT PO Take 900 mg by mouth daily Echinaceal/goldenseal blend     methylPREDNISolone 4 MG tablet therapy pack Use as directed on package    Omega-3 Fatty Acids (OMEGA 3 PO) Take 1,000 mg by mouth    Pyridoxine HCl (VITAMIN B-6 PO) Take 1 tablet by mouth    Vitamin E 400 units TABS Take by mouth        Current Facility-Administered Medications   Medication Dose Route Frequency Provider Last Rate    acetaminophen  650 mg Oral Q6H PRN Van Soliz, DO      aluminum-magnesium hydroxide-simethicone  30 mL Oral Q6H PRN Diallo Dave, DO      aspirin  81 mg Oral Daily Van Carrasco, DO      calcium carbonate  1,000 mg Oral Daily PRN Diallo Dave, DO      enoxaparin  40 mg Subcutaneous Daily Van Soliz, DO      HYDROmorphone  0 2 mg Intravenous Q4H PRN Diallo Dave, DO      naloxone  0 04 mg Intravenous Q1MIN PRN Diallo Dave, DO      ondansetron  4 mg Intravenous Q4H PRN Diallo Dave, DO      oxyCODONE  2 5 mg Oral Q4H PRN Diallo Dave, DO      oxyCODONE  5 mg Oral Q4H PRN Diallo Dave, DO      senna-docusate sodium  1 tablet Oral BID Van Soliz, DO      simethicone  80 mg Oral 4x Daily PRN Diallo Dave, DO              Vitals:    05/29/22 2240 05/29/22 2308 05/30/22 0600 05/30/22 0849   BP:  124/64  106/53   BP Location:  Right arm     Pulse:  78  89   Resp:  15     Temp: 97 7 °F (36 5 °C) 97 7 °F (36 5 °C)  98 5 °F (36 9 °C)   TempSrc: Oral Oral     SpO2:  96%  96%   Weight:   54 3 kg (119 lb 11 4 oz)    Height:           No intake/output data recorded      No intake or output data in the 24 hours ending 05/30/22 1034    Weight change:     PHYSICAL EXAMINATION:  Gen: Awake, Alert, NAD, frail-appearing  Head/eyes: AT/NC, pupils equal and round, Anicteric  ENT: mmm  Neck: Supple, +4-5 cm JVP, trachea midline  Resp: CTA bilaterally no w/r/r  CV:  Normal heart tones, RRR +S1, S2, No m/r/g  Abd: Soft, NT/ND + BS  Ext: no LE edema bilaterally  Neuro: Follows commands, moves all extermities  Psych: Appropriate affect, normal mood, pleasant attitude, non-combative  Skin: warm; no rash, erythema or venous stasis changes on exposed skin    Lab Results:  Results from last 7 days   Lab Units 05/30/22  0442   WBC Thousand/uL 7 90   HEMOGLOBIN g/dL 10 5*   HEMATOCRIT % 32 9*   PLATELETS Thousands/uL 283     Results from last 7 days   Lab Units 05/30/22  0442   POTASSIUM mmol/L 3 6   CHLORIDE mmol/L 105   CO2 mmol/L 29   BUN mg/dL 12   CREATININE mg/dL 0 83   CALCIUM mg/dL 7 9*   ALK PHOS U/L 156*   ALT U/L 45   AST U/L 53*     No results found for: TROPONINT      Results from last 7 days   Lab Units 05/29/22  2045   HS TNI 0HR ng/L 11                   No results found for this or any previous visit  No results found for this or any previous visit  No results found for this or any previous visit  No results found for this or any previous visit  CXR: Results for orders placed during the hospital encounter of 03/29/22    XR chest 2 views    Narrative  CHEST    INDICATION:   Chest Pain  COMPARISON:  None    EXAM PERFORMED/VIEWS:  XR CHEST PA & LATERAL  Images: 2    FINDINGS:    Cardiomediastinal silhouette appears unremarkable  No focal airspace consolidation or effusions  No pneumothorax  Degenerative changes are seen in the thoracic spine  Overlying EKG wire leads  Impression  No acute cardiopulmonary disease  Workstation performed: [id number]    No results found for this or any previous visit  ECG:  Sinus rhythm, normal ECG    This note was completed in part utilizing [employer] Software    Grammatical errors, random word insertions, spelling mistakes, and incomplete sentences may be an occasional consequence of this system secondary to software limitations, ambient noise, and hardware issues  If you have any questions or concerns about the content, text, or information contained within the body of this dictation, please contact the provider for clarification

## 2022-05-30 NOTE — PHYSICAL THERAPY NOTE
Physical Therapy Evaluation:    2 forms of pt ID verified:name,birthdate and pt ID [email or web address]    Patient's Name: Ervin Meeks    Admitting Diagnosis  Lung mass [R91 8]  Abdominal pain [R10 9]  Weight loss [R63 4]  Liver masses [R16 0]  Pancreatic mass [K86 89]    Problem List  Patient Active Problem List   Diagnosis    UTI (urinary tract infection)    Chest pain    Elevated TSH    Elevated brain natriuretic peptide (BNP) level    Elevated d-dimer    Iron deficiency anemia    Diarrhea    Hypokalemia    Chronic pain of left ankle    Chronic diarrhea    Weight loss    Stress at home    Medicare annual wellness visit, subsequent    Protein-calorie malnutrition, unspecified severity ([facility] [address] )    Pancreatic mass    Pericardial effusion    Anemia       Past Medical History  History reviewed  No pertinent past medical history  Past Surgical History  Past Surgical History:   Procedure Laterality Date    CARDIAC CATHETERIZATION N/A 3/30/2022    Procedure: Cardiac catheterization;  Surgeon: Hua Plunkett DO;  Location: [facility]; Service: Cardiology    CARDIAC CATHETERIZATION N/A 3/30/2022    Procedure: Cardiac Coronary Angiogram;  Surgeon: Hua Plunkett DO;  Location: [facility]; Service: Cardiology        05/30/22 1025   PT Last Visit   PT Visit Date 05/30/22   Note Type   Note type Evaluation   Pain Assessment   Pain Assessment Tool 0-10   Pain Score 8   Pain Location/Orientation Orientation: Left;Orientation: Lower; Location: Abdomen; Location: Rib Cage   Pain Radiating Towards [facility] Pain Intervention(s) Repositioned; Ambulation/increased activity; Elevated; Emotional support; Rest   Restrictions/Precautions   Other Precautions Pain; Fall Risk;Telemetry;Multiple lines   Home Living   Type of [address] [place] [address] One level;Stairs to enter with rails; Able to live on main level with bedroom/bathroom  (4 BISI)   Home Equipment Cane;Walker  (pt reports owning SPC x5, RW, rollator)   Additional Comments pt lives with  in [address] [place] [address] BISI with use of HR,owns personal DME but was not using DME PTA for mobility  Pt is 's primary caregiver  Pts daughter lives next door and A as needed  Pt reports no recent falls and reports working fulltime at a [employer]  Prior Function   Level of [place] Independent with ADLs and functional mobility  (per pt PTA)   Lives With Spouse; Other (Comment)  (pt is 's primary caregiver)   Receives Help From Family  (pts daughter lives next door)   [id number] [employer] in the last 6 months 0   Vocational Full time employment   Comments works at [employer]   Additional Pertinent History pancreatic mass, lung mass, reports ABD pain, weight loss, liver mass  Family/Caregiver Present No   Cognition   Overall Cognitive Status WFL   Arousal/Participation Cooperative   Orientation Level Oriented X4   Following Commands Follows one step commands without difficulty   Comments pt has poor insight and dec safety awareness with declining to use RW for mobility during gait assessment  Pt has ataxic and unsteady gait and would benefit from use of RW during ambulation  Subjective   Subjective Pt supine in bed resting comfortably  Pt willing and agreeable to work with PT and to participate in therapy intervention  "I can get up and walk  It's just that my left ribs hurt"  RLE Assessment   RLE Assessment   (4/5 grossly throughout)   LLE Assessment   LLE Assessment   (3(+)/5 grossly throughout, L ankle PF 3/5; pt reports weakness in L ankle area and reports seeing a podiatrist recently for care of L ankle )   Vision-Basic Assessment   Current Vision No visual deficits   Coordination   Movements are Fluid and Coordinated 0   Coordination and Movement Description ataxic and unsteady movements and gait pattern, LOB x2 during upright mobility needing min Ax1 to recover   Dec BLE step length and wide JESSE   Sensation WFL   Light Touch   RLE Light Touch Grossly intact   LLE Light Touch Grossly intact   Bed Mobility   Supine to Sit 4  Minimal assistance   Additional items Assist x 1;HOB elevated; Bedrails; Increased time required;Verbal cues   Transfers   Sit to Stand 4  Minimal assistance   Additional items Assist x 1;Bedrails; Increased time required;Verbal cues   Stand to Sit 4  Minimal assistance   Additional items Assist x 1; Armrests; Increased time required;Verbal cues  (instruction for control descent and for proper placement of BLE and B hands prior to transfer)   Ambulation/Elevation   Gait pattern L Foot drag; Antalgic; Forward Flexion; Wide JESSE; Decreased L stance;Decreased R stance; Inconsistent tino; Foward flexed; Short stride; Ataxia; Step to   Gait Assistance 4  Minimal assist   Additional items Assist x 1;Verbal cues   Assistive Device None  (pt declines recommendation for use of RW during gait assessment )   Distance 40 feet without use of DME  LOB x2 needing min Ax1 to recover  Pt currently declines recommendation for use of RW during upright mobility  Unsteady and ataxic gait pattern  Balance   Static Sitting Fair   Dynamic Sitting Poor +   Static Standing Poor +   Dynamic Standing Poor +   Ambulatory Poor +   Endurance Deficit   Endurance Deficit Yes   Endurance Deficit Description pain,fatigues quickly, dec activity tolerance  Activity Tolerance   Activity Tolerance Patient limited by fatigue;Patient limited by pain;Treatment limited secondary to medical complications (Comment)  (poor->fair)   Nurse Made Aware yes   Assessment   Prognosis Guarded   Problem List Decreased strength;Decreased endurance; Impaired balance;Decreased mobility; Impaired judgement;Decreased safety awareness;Decreased skin integrity;Pain   Assessment Pt is a 79 yo female admitted to [facility]* pancreatic mass, lung mass, liver mass, and reports of ABD pain and weight loss  Pt lives with  and is 's primary caregiver  Pts daugther lives next door and A family as needed  Pt reports being completely (I) PTA,works fulltime at local [employer],no use of personal DME PTA and reports no recent falls  Pt currently is not at functional mobility baseline, needs A for mobility,multiple lines, masimo monitoring,inc L lower ABD and L rib cage pain,ataxic and unsteady gait pattern,dec safety awareness  Pt demonstrates minimal deficits during functional mobility and gait including dec endurance, dec balance, dec BLE strength, ataxic and unsteady gait pattern, inc lower ABD pain and L rib cage and needs minAx1 for gait without use of DME, transfers and BM  Pt would cont to benefit from skilled inpt PT services to maximize functional independence     Barriers to Discharge Inaccessible home environment  (4 BISI, pt is 's primary caregiver )   Goals   Patient Goals to make sure  is doing ok   STG Expiration Date 06/09/22   Short Term Goal #1 in 7-10 days:(1) Pt will be able to ambulate greater than 150 feet with use of appropriate DME on various surfaces needing S to mod (I) level of A without LOB in order to A pt to return to PLOF, (2) activity tolerance:45 mins/45mins, (3) pt will be able to perform sit to stand transfers needing mod (I) level of A to and from various surfaces consistently in order to return to PLOF, (4) pt will be able to perform BM needing mod (I) level of A to A pt to return to PLOF, (5) (I) with BLE therapeutic ex HEP in various positions to A pt to inc balance,strength,mobility,endurance and to A to dec pain, (6) inc balance 1/2 grade in order to dec fall risk, (7) pt will be able to go up and down 4 steps needing S level of A in order to navigate BISI as able and as needed prior to D/C, (8) cont to provide pt and pt family education for safe D/C planning, (9) inc BLE strength 1/2 to 1 full grade in order to A pt to inc balance,strength,mobility,endurance and to A to dec pain   PT Treatment Day 0   Plan Treatment/Interventions Functional transfer training;LE strengthening/ROM; Elevations; Therapeutic exercise; Endurance training;Patient/family training;Equipment eval/education; Bed mobility;Gait training;Spoke to nursing;OT   PT Frequency 3-5x/wk   Recommendation   PT Discharge Recommendation Home with home health rehabilitation   Equipment Recommended Walker  (pt owns RW)   AM-PAC Basic Mobility Inpatient   Turning in Bed Without Bedrails 3   Lying on Back to Sitting on Edge of Flat Bed 3   Moving Bed to Chair 3   Standing Up From Chair 3   Walk in Room 3   Climb 3-5 Stairs 3   Basic Mobility Inpatient Raw Score 18   Basic Mobility Standardized Score 41 05   Highest Level Of Mobility   JH-HLM Goal 6: Walk 10 steps or more   JH-HLM Achieved 6: Walk 10 steps or more           @Neisha Arellano, PT, DPT@

## 2022-05-30 NOTE — ASSESSMENT & PLAN NOTE
Presenting with complaint of abdominal pain  Was also being followed by GI as an outpt for ongoing diarrhea, nausea, weight loss for the past few months  She was scheduled undergo EGD and colonoscopy as an outpatient however presented to the ER with worsening abdominal pain  Ct abdomen/pelvis:  Large mass in pancreatic head concerning for malignancy, multiple hepatic masses suspicious for metastasis, suspected retroperitoneal lymphadenopathy concerning for metastasis, new small pulmonary nodules at lung bases suspicious for metastasis, small to moderate pericardial effusion increased, malignant effusion not excluded  Further staging may include CT chest or PET-CT  Found to have pancreatic mass concerning for new metastatic disease   Also with potential malignant pericardial effusion   CEA elevated at 18 8, CA 19-9 pending    MRI of the abdomen and MRCP for further characterization of mass pending    PLAN: according to specialists consulted  Interventional radiology consulted for biopsy for definitive tissue diagnosis -unfortunately patient was given aspirin -supposed to be held for 5 days prior to a biopsy, IR recommends discharge with outpatient biopsy of liver to be scheduled    Seen and evaluated by GI: outpatient EGD and colonoscopy are planned in the outpatient setting    Seen and evaluated by Oncology/Hematology: patient needs tissue diagnosis from biopsy  Appears this will have to occur as an outpatient as patient was given dose of aspirin here  Will need outpatient follow-up with Oncology/Hematology in 1 week status post discharge    Also will need genetic counseling and testing if diagnosis of pancreatic cancer has proven    Cardiology consult pending

## 2022-05-30 NOTE — ASSESSMENT & PLAN NOTE
Patient with pancreatic mass concerning for new metastatic disease with liver metastasis, potential malignant pericardial effusion  Check CEA, CA 19-9, MRI of the abdomen and MRCP for further characterization of mass  IR consulted for biopsy for definitive tissue diagnosis  Oncology//GI consultations in the morning  Continue Lovenox for DVT prophylaxis    CT findings with  Large mass in the right upper quadrant likely arising from the pancreatic head concerning for malignancy  This could be further assessed with dedicated MRI of the abdomen with and without IV contrast   2  Multiple hepatic masses suspicious for metastasis  3  Suspected retroperitoneal lymphadenopathy concerning for metastasis  4  New small pulmonary nodules at the lung bases suspicious for metastasis  Further staging may include chest CT or FDG PET/CT

## 2022-05-30 NOTE — ASSESSMENT & PLAN NOTE
Malnutrition Findings:   Adult Malnutrition type: Acute illness  Adult Degree of Malnutrition: Other severe protein calorie malnutrition  Malnutrition Characteristics: Muscle loss, Weight loss  360 Statement: Severe muscle mass depletion as evidenced by prominent clavicles  Pt also with 5 7% wt loss noted x 30 days which is clinically significant, treated with ensure pudding BID  BMI Findings: Body mass index is 20 55 kg/m²     Nutrition consultation while admitted  Has had weight loss persistent and currently being workup up

## 2022-05-30 NOTE — PROGRESS NOTES
[facility]  Progress Note - Angelina Alvarez 1945, 78 y o  female MRN: [id number]  Unit/Bed#: E5 -01 Encounter: [id number]  Primary Care Provider: Muna Clark DO   Date and time admitted to hospital: 5/29/2022  8:08 PM    * Pancreatic mass  Assessment & Plan  Presenting with complaint of abdominal pain  Was also being followed by GI as an outpt for ongoing diarrhea, nausea, weight loss for the past few months  She was scheduled undergo EGD and colonoscopy as an outpatient however presented to the ER with worsening abdominal pain  Ct abdomen/pelvis:  Large mass in pancreatic head concerning for malignancy, multiple hepatic masses suspicious for metastasis, suspected retroperitoneal lymphadenopathy concerning for metastasis, new small pulmonary nodules at lung bases suspicious for metastasis, small to moderate pericardial effusion increased, malignant effusion not excluded  Further staging may include CT chest or PET-CT  Found to have pancreatic mass concerning for new metastatic disease   Also with potential malignant pericardial effusion   CEA elevated at 18 8, CA 19-9 pending    MRI of the abdomen and MRCP for further characterization of mass pending    PLAN: according to specialists consulted  Interventional radiology consulted for biopsy for definitive tissue diagnosis -unfortunately patient was given aspirin -supposed to be held for 5 days prior to a biopsy, IR recommends discharge with outpatient biopsy of liver to be scheduled    Seen and evaluated by GI: outpatient EGD and colonoscopy are planned in the outpatient setting    Seen and evaluated by Oncology/Hematology: patient needs tissue diagnosis from biopsy  Appears this will have to occur as an outpatient as patient was given dose of aspirin here  Will need outpatient follow-up with Oncology/Hematology in 1 week status post discharge    Also will need genetic counseling and testing if diagnosis of pancreatic cancer has proven    Cardiology consult pending    Pericardial effusion  Assessment & Plan  Noted to have small to moderate pericardial effusion on CT imaging  No evidence of tamponade physiology noted on imaging  Echocardiogram pending  Currently undergoing malignant workup  Cardiology consult pending    Anemia  Assessment & Plan  Results from last 7 days   Lab Units 05/30/22  0442 05/29/22  2045   HEMOGLOBIN g/dL 10 5* 11 0*   With anemia and noted to have heme-positive stool as an outpatient  Likely secondary to malignancy  Seen and evaluated by GI who plans to do EGD and colonoscopy in the outpatient setting    Protein-calorie malnutrition, unspecified severity ([facility] [address] )  Assessment & Plan  Malnutrition Findings:   Adult Malnutrition type: Acute illness  Adult Degree of Malnutrition: Other severe protein calorie malnutrition  Malnutrition Characteristics: Muscle loss, Weight loss  360 Statement: Severe muscle mass depletion as evidenced by prominent clavicles  Pt also with 5 7% wt loss noted x 30 days which is clinically significant, treated with ensure pudding BID  BMI Findings: Body mass index is 20 55 kg/m²  Nutrition consultation while admitted  Has had weight loss persistent and currently being workup up      VTE Pharmacologic Prophylaxis:   Pharmacologic: Enoxaparin (Lovenox)  Mechanical VTE Prophylaxis in Place: Yes    Discharge Plan: With need for continued inpatient stay for echocardiogram, MRI, and Cardiology consult  Suspect discharge once inpatient workup completed    Discussions with Specialists or Other Care Team Provider:  Nursing, Dr Luke Gonzalez    Education and Discussions with Family / Patient:  Patient    Time Spent for Care: 30 minutes  More than 50% of total time spent on counseling and coordination of care as described above  Current Length of Stay: 1 day(s)  Current Patient Status: Inpatient   Code Status: Level 1 - Full Code    Subjective:   Patient resting in bed    She reports this diagnosis of pancreatic mass is all new  She is upset and tearful on exam  She was being worked up as an outpt for diarrhea/weight loss/nausea  Currently with some mild abdominal pain  Objective:     Vitals:   Temp (24hrs), Av °F (36 7 °C), Min:97 7 °F (36 5 °C), Max:98 5 °F (36 9 °C)    Temp:  [97 7 °F (36 5 °C)-98 5 °F (36 9 °C)] 98 5 °F (36 9 °C)  HR:  [70-89] 89  Resp:  [15-20] 15  BP: (106-124)/(53-67) 106/53  SpO2:  [96 %-100 %] 96 %  Body mass index is 20 55 kg/m²  Input and Output Summary (last 24 hours):     No intake or output data in the 24 hours ending 22 [id number]    Physical Exam:     Physical Exam  Vitals and nursing note reviewed  Constitutional:       General: She is not in acute distress  Appearance: Normal appearance  She is normal weight  She is not ill-appearing, toxic-appearing or diaphoretic  HENT:      Head: Normocephalic and atraumatic  Eyes:      General: No scleral icterus  Cardiovascular:      Rate and Rhythm: Normal rate and regular rhythm  Pulmonary:      Effort: Pulmonary effort is normal  No respiratory distress  Breath sounds: Normal breath sounds  No stridor  No wheezing or rhonchi  Abdominal:      General: Bowel sounds are normal  There is no distension  Palpations: Abdomen is soft  There is no mass  Tenderness: There is no abdominal tenderness  Hernia: No hernia is present  Musculoskeletal:         General: No swelling  Skin:     General: Skin is warm and dry  Neurological:      Mental Status: She is alert and oriented to person, place, and time  Mental status is at baseline     Psychiatric:         Mood and Affect: Mood normal          Behavior: Behavior normal          Additional Data:     Labs:    Results from last 7 days   Lab Units 22  0442 22  2045   WBC Thousand/uL 7 90 8 97   HEMOGLOBIN g/dL 10 5* 11 0*   HEMATOCRIT % 32 9* 33 8*   PLATELETS Thousands/uL 283 292   NEUTROS PCT %  --  73   LYMPHS PCT %  --  16   MONOS PCT %  --  7   EOS PCT %  --  3     Results from last 7 days   Lab Units 05/30/22  0442   POTASSIUM mmol/L 3 6   CHLORIDE mmol/L 105   CO2 mmol/L 29   BUN mg/dL 12   CREATININE mg/dL 0 83   CALCIUM mg/dL 7 9*   ALK PHOS U/L 156*   ALT U/L 45   AST U/L 53*           * I Have Reviewed All Lab Data Listed Above  * Additional Pertinent Lab Tests Reviewed: Hal [facility] Admission Reviewed    Imaging:    Imaging Reports Reviewed Today Include:   Imaging Personally Reviewed by Myself Includes:      Recent Cultures (last 7 days):           Last 24 Hours Medication List:   Current Facility-Administered Medications   Medication Dose Route Frequency Provider Last Rate    acetaminophen  650 mg Oral Q6H PRN Van Soliz, DO      aluminum-magnesium hydroxide-simethicone  30 mL Oral Q6H PRN Luciano Chick, DO      calcium carbonate  1,000 mg Oral Daily PRN Luciano Chick, DO      enoxaparin  40 mg Subcutaneous Daily Van Soliz, DO      HYDROmorphone  0 2 mg Intravenous Q4H PRN Luciano Chick, DO      naloxone  0 04 mg Intravenous Q1MIN PRN Luciano Chick, DO      ondansetron  4 mg Intravenous Q4H PRN Luciano Chick, DO      oxyCODONE  2 5 mg Oral Q4H PRN Luciano Chick, DO      oxyCODONE  5 mg Oral Q4H PRN Luciano Chick, DO      senna-docusate sodium  1 tablet Oral BID Van Soliz, DO      simethicone  80 mg Oral 4x Daily PRN Luciano Chick, DO          Today, Patient Was Seen By: Jude éPrez PA-C    ** Please Note: This note has been constructed using a voice recognition system   **

## 2022-05-30 NOTE — CONSULTS
Consultation - [facility]  Ciarra Dunn 78 y o  female MRN: [id number]  Unit/Bed#: E5 -01 Encounter: [id number]        ASSESSMENT/PLAN:  Pancreatic mass  Liver mass    Patient was recently seen in the office complaining of abdominal pain and diarrhea associated with weight loss for the past year  She was ordered an endoscopy and colonoscopy however she presented to the hospital with worsening abdominal pain  CT scan concerning for pancreatic mass with lesions in the liver, lungs and lymph nodes all concerning for metastasis  IR was consult for possible biopsy and MRCP was ordered for further characterization is a the lesions  If necessary can consider endoscopic ultrasound as an outpatient if further tissue diagnosis is needed  Consults    Reason for Consult / Principal Problem: Pancreatic mass    HPI: Ciarra Dunn is a 78y o  year old female presented to the hospital yesterday with abdominal pain  She has had generalized abdominal pain for the past year associated with diarrhea  She was recently seen in our office  She was also complaining of a 30 lb weight loss in the last year with fatigue  She was having 4-5 bowel movements per day initially formed becoming looser as the day progressed  Abdominal cramping associated with eating  Stool studies were ordered as well as a endoscopy and colonoscopy  Unfortunately none of these were performed  She underwent CT scan on admission showing a large mass likely arising from the pancreatic head concerning for malignancy, multiple hepatic masses suspicious for metastasis with retroperitoneal lymphadenopathy, small nodules at the lung bases, small to moderate pericardial effusion all concerning for metastasis  Review of Systems: as per HPI  Review of Systems   All other systems reviewed and are negative  Historical Information   History reviewed  No pertinent past medical history    Past Surgical History:   Procedure Laterality Date    CARDIAC CATHETERIZATION N/A 3/30/2022    Procedure: Cardiac catheterization;  Surgeon: Magali Dougherty DO;  Location: [address] [facility] CATH LAB; Service: Cardiology    CARDIAC CATHETERIZATION N/A 3/30/2022    Procedure: Cardiac Coronary Angiogram;  Surgeon: Maglai Dougherty DO;  Location: [facility];   Service: Cardiology     Social History   Social History     Substance and Sexual Activity   Alcohol Use Not Currently     Social History     Substance and Sexual Activity   Drug Use Never     Social History     Tobacco Use   Smoking Status Never Smoker   Smokeless Tobacco Never Used     Family History   Problem Relation Age of Onset    Coronary artery disease Father     Coronary artery disease Brother     Diabetes Brother     Kidney failure Brother     Uterine cancer Mother        Meds/Allergies     Medications Prior to Admission   Medication    acetaminophen (TYLENOL) 500 mg tablet    aspirin (ECOTRIN LOW STRENGTH) 81 mg EC tablet    Cholecalciferol (Vitamin D3) 50 MCG (2000 UT) TABS    cyanocobalamin (VITAMIN B-12) 500 MCG tablet    dicyclomine (BENTYL) 10 mg capsule    esomeprazole (NexIUM) 20 mg capsule    ferrous sulfate 325 (65 Fe) mg tablet    COLLADO SEAL ROOT PO    methylPREDNISolone 4 MG tablet therapy pack    Omega-3 Fatty Acids (OMEGA 3 PO)    Pyridoxine HCl (VITAMIN B-6 PO)    Vitamin E 400 units TABS     Current Facility-Administered Medications   Medication Dose Route Frequency    acetaminophen (TYLENOL) tablet 650 mg  650 mg Oral Q6H PRN    aluminum-magnesium hydroxide-simethicone (MYLANTA) oral suspension 30 mL  30 mL Oral Q6H PRN    aspirin (ECOTRIN LOW STRENGTH) EC tablet 81 mg  81 mg Oral Daily    calcium carbonate (TUMS) chewable tablet 1,000 mg  1,000 mg Oral Daily PRN    enoxaparin (LOVENOX) subcutaneous injection 40 mg  40 mg Subcutaneous Daily    HYDROmorphone HCl (DILAUDID) injection 0 2 mg  0 2 mg Intravenous Q4H PRN    naloxone (NARCAN) 0 04 mg/mL syringe 0 04 mg  0 04 mg Intravenous Q1MIN PRN    ondansetron (ZOFRAN) injection 4 mg  4 mg Intravenous Q4H PRN    oxyCODONE (ROXICODONE) IR tablet 2 5 mg  2 5 mg Oral Q4H PRN    oxyCODONE (ROXICODONE) IR tablet 5 mg  5 mg Oral Q4H PRN    senna-docusate sodium (SENOKOT S) 8 6-50 mg per tablet 1 tablet  1 tablet Oral BID    simethicone (MYLICON) chewable tablet 80 mg  80 mg Oral 4x Daily PRN       Allergies   Allergen Reactions    Bentyl [Dicyclomine] Throat Swelling    Codeine Other (See Comments)     Was told allergy    Elastic Bandages & [Zinc] Hives, Swelling and Rash       Objective     Blood pressure 106/53, pulse 89, temperature 98 5 °F (36 9 °C), resp  rate 15, height 5' 4" (1 626 m), weight 54 3 kg (119 lb 11 4 oz), SpO2 96 %  No intake or output data in the 24 hours ending 05/30/22 1049    PHYSICAL EXAM     Physical Exam  Constitutional:       Appearance: Normal appearance  She is well-developed  HENT:      Head: Normocephalic and atraumatic  Eyes:      Conjunctiva/sclera: Conjunctivae normal    Cardiovascular:      Rate and Rhythm: Normal rate and regular rhythm  Pulmonary:      Effort: Pulmonary effort is normal       Breath sounds: Normal breath sounds  Abdominal:      General: Bowel sounds are normal  There is no distension  Palpations: Abdomen is soft  Tenderness: There is no abdominal tenderness  Musculoskeletal:         General: Normal range of motion  Cervical back: Normal range of motion  Skin:     General: Skin is warm and dry  Neurological:      Mental Status: She is alert and oriented to person, place, and time     Psychiatric:         Mood and Affect: Mood normal          Behavior: Behavior normal          Lab Results:   CBC:   Lab Results   Component Value Date    WBC 7 90 05/30/2022    HGB 10 5 (L) 05/30/2022    HCT 32 9 (L) 05/30/2022    MCV 96 05/30/2022     05/30/2022    MCH 30 6 05/30/2022    MCHC 31 9 05/30/2022    RDW 13 7 05/30/2022 MPV 11 1 05/30/2022    NRBC 0 05/29/2022   ,   CMP:   Lab Results   Component Value Date    K 3 6 05/30/2022     05/30/2022    CO2 29 05/30/2022    BUN 12 05/30/2022    CREATININE 0 83 05/30/2022    CALCIUM 7 9 (L) 05/30/2022    AST 53 (H) 05/30/2022    ALT 45 05/30/2022    ALKPHOS 156 (H) 05/30/2022    EGFR 69 05/30/2022   ,   Lipase:   Lab Results   Component Value Date    LIPASE 31 (L) 05/29/2022   ,  Imaging Studies: I have personally reviewed pertinent reports  CT abd/pelvis:    1  Large mass in the right upper quadrant likely arising from the pancreatic head concerning for malignancy  This could be further assessed with dedicated MRI of the abdomen with and without IV contrast   2  Multiple hepatic masses suspicious for metastasis  3  Suspected retroperitoneal lymphadenopathy concerning for metastasis  4  New small pulmonary nodules at the lung bases suspicious for metastasis  Further staging may include chest CT or FDG PET/CT  5  Small to moderate-sized pericardial effusion, increased, malignant effusion not excluded

## 2022-05-30 NOTE — ED ATTENDING ATTESTATION
5/29/2022  IDawit MD, saw and evaluated the patient  I have discussed the patient with the resident/non-physician practitioner and agree with the resident's/non-physician practitioner's findings, Plan of Care, and MDM as documented in the resident's/non-physician practitioner's note, except where noted  All available labs and Radiology studies were reviewed  I was present for key portions of any procedure(s) performed by the resident/non-physician practitioner and I was immediately available to provide assistance  At this point I agree with the current assessment done in the Emergency Department  I have conducted an independent evaluation of this patient a history and physical is as follows:  Patient with abdominal pain, diarrhea, about 30 lb lost weight in past year, comes with worsening mid abdominal pain, no fever, nausea, vomiting  On exam, no acute distress, VSS, abdomen soft, mild diffuse tenderness; no increased work of breathing, pulses are equal, no peripheral edema, no calf tenderness or swelling  Differential diagnosis; nonspecific persistent abdominal pain, weight loss, rule out cancer, pancreatitis, will check labs including CBC, CMP, lipase, lactic acid for abdominal pain, get CT scan abdomen pelvis w/o contrast (due to shortage of contrast)  ED Course    ER workup results reviewed, CT scan concerning for possible pancreatic cancer with liver Mets, retropharyngeal lymphadenopathy, pericardial effusion slightly increased from previous study in March  Will admit for further evaluation and management        Critical Care Time  Procedures

## 2022-05-30 NOTE — CONSULTS
Pt appears to be eating well, but is having 4-5 loose stools daily, so will add supplement of ensure pudding BID pt agreeable to try, typically does not like supplements but does want to gain some wt back, so was willing to try this  Will continue to follow per protocol, thank you for consult

## 2022-05-30 NOTE — ASSESSMENT & PLAN NOTE
Noted to have small to moderate pericardial effusion on CT imaging  No evidence of tamponade physiology noted on imaging  Echocardiogram pending  Currently undergoing malignant workup  Cardiology consult pending

## 2022-05-30 NOTE — ASSESSMENT & PLAN NOTE
Malnutrition Findings:                             BMI Findings: Body mass index is 20 43 kg/m²     Nutrition consultation while admitted  Has had weight loss persistent for which he is at outpatient GI evaluation pending

## 2022-05-30 NOTE — ED PROVIDER NOTES
History  Chief Complaint   Patient presents with    Abdominal Pain     Pt reports pain in LUQ abd that started suddenly this evening  Pain radiates into left shoulder  No injury reported  Pain with deep breath     78 y o F w/ no prior PMH presents due to sudden onset LUQ pain with radiation into her left shoulder  She has been having generalized abdominal pain for the past year with frequent episodes of diarrhea and persistently pale stools  She has also had a 30-40 lb weight loss in this time  She saw GI who planned for outpt colonoscopy  Today she came to the ED because of this sudden onset and worsening pain  It initially started in her mid back and then wrapped towards her left side and epigastric region  Pain is worse with inpsiration and has led her to take shallow breaths  Associated with a bloating sensation  She has been nauseous w/o vomiting  She denies f/c, palpitations, urinary symptoms, or other subjective symptoms  Prior to Admission Medications   Prescriptions Last Dose Informant Patient Reported? Taking?    Cholecalciferol (Vitamin D3) 50 MCG (2000 UT) TABS   Yes No   Sig: Take by mouth     COLLADO SEAL ROOT PO   Yes No   Sig: Take 900 mg by mouth daily Echinaceal/goldenseal blend    Omega-3 Fatty Acids (OMEGA 3 PO)   Yes No   Sig: Take 1,000 mg by mouth   Pyridoxine HCl (VITAMIN B-6 PO)  Self Yes No   Sig: Take 1 tablet by mouth   Vitamin E 400 units TABS   Yes No   Sig: Take by mouth   acetaminophen (TYLENOL) 500 mg tablet   Yes No   Sig: Take 500 mg by mouth every 6 (six) hours as needed for mild pain   aspirin (ECOTRIN LOW STRENGTH) 81 mg EC tablet   Yes No   Sig: Take 81 mg by mouth daily   cyanocobalamin (VITAMIN B-12) 500 MCG tablet   Yes No   Sig: Take 500 mcg by mouth daily   dicyclomine (BENTYL) 10 mg capsule   No No   Sig: Take 1 capsule (10 mg total) by mouth as needed in the morning and 1 capsule (10 mg total) as needed at noon and 1 capsule (10 mg total) as needed in the evening and 1 capsule (10 mg total) as needed before bedtime (abdominal cramping)  Patient not taking: No sig reported   esomeprazole (NexIUM) 20 mg capsule   Yes No   Sig: Take 20 mg by mouth every morning before breakfast   Patient not taking: No sig reported   ferrous sulfate 325 (65 Fe) mg tablet   Yes No   Sig: Take 325 mg by mouth daily with breakfast   methylPREDNISolone 4 MG tablet therapy pack   No No   Sig: Use as directed on package      Facility-Administered Medications: None       History reviewed  No pertinent past medical history  Past Surgical History:   Procedure Laterality Date    CARDIAC CATHETERIZATION N/A 3/30/2022    Procedure: Cardiac catheterization;  Surgeon: Maude Mcghee DO;  Location: [facility]; Service: Cardiology    CARDIAC CATHETERIZATION N/A 3/30/2022    Procedure: Cardiac Coronary Angiogram;  Surgeon: Maude Mcghee DO;  Location: [facility]; Service: Cardiology       Family History   Problem Relation Age of Onset    Coronary artery disease Father     Coronary artery disease Brother     Diabetes Brother     Kidney failure Brother     Uterine cancer Mother      I have reviewed and agree with the history as documented  E-Cigarette/Vaping    E-Cigarette Use Never User      E-Cigarette/Vaping Substances     Social History     Tobacco Use    Smoking status: Never Smoker    Smokeless tobacco: Never Used   Vaping Use    Vaping Use: Never used   Substance Use Topics    Alcohol use: Not Currently    Drug use: Never        Review of Systems   All other systems reviewed and are negative        Physical Exam  ED Triage Vitals [05/29/22 2005]   Temperature Pulse Respirations Blood Pressure SpO2   98 2 °F (36 8 °C) 86 18 111/67 100 %      Temp Source Heart Rate Source Patient Position - Orthostatic VS BP Location FiO2 (%)   Oral Monitor Sitting Right arm --      Pain Score       10 - Worst Possible Pain             Orthostatic Vital Signs  Vitals: 05/29/22 2050 05/29/22 2100 05/29/22 2236 05/29/22 2308   BP: 108/61 107/61 119/63 124/64   Pulse: 70 76 80 78   Patient Position - Orthostatic VS: Lying Lying Lying Lying       Physical Exam  Vitals and nursing note reviewed  Constitutional:       General: She is not in acute distress  Comments: Cachectic appearing   HENT:      Head: Normocephalic and atraumatic  Right Ear: External ear normal       Left Ear: External ear normal       Nose: Nose normal       Mouth/Throat:      Mouth: Mucous membranes are moist    Eyes:      Conjunctiva/sclera: Conjunctivae normal    Cardiovascular:      Rate and Rhythm: Normal rate and regular rhythm  Heart sounds: No murmur heard  Pulmonary:      Effort: Pulmonary effort is normal  No respiratory distress  Breath sounds: Normal breath sounds  Abdominal:      Palpations: Abdomen is soft  Tenderness: There is abdominal tenderness (diffuse, greater on left side  )  Musculoskeletal:      Cervical back: Neck supple  Right lower leg: No edema  Left lower leg: No edema  Skin:     General: Skin is warm and dry  Neurological:      General: No focal deficit present  Mental Status: She is alert     Psychiatric:         Mood and Affect: Mood normal          ED Medications  Medications   aspirin (ECOTRIN LOW STRENGTH) EC tablet 81 mg (has no administration in time range)   acetaminophen (TYLENOL) tablet 650 mg (has no administration in time range)   aluminum-magnesium hydroxide-simethicone (MYLANTA) oral suspension 30 mL (has no administration in time range)   calcium carbonate (TUMS) chewable tablet 1,000 mg (has no administration in time range)   simethicone (MYLICON) chewable tablet 80 mg (has no administration in time range)   enoxaparin (LOVENOX) subcutaneous injection 40 mg (has no administration in time range)   oxyCODONE (ROXICODONE) IR tablet 2 5 mg (2 5 mg Oral Given 5/29/22 2304)   oxyCODONE (ROXICODONE) IR tablet 5 mg (has no administration in time range)   HYDROmorphone HCl (DILAUDID) injection 0 2 mg (has no administration in time range)   naloxone (NARCAN) 0 04 mg/mL syringe 0 04 mg (has no administration in time range)   ondansetron (ZOFRAN) injection 4 mg (has no administration in time range)   senna-docusate sodium (SENOKOT S) 8 6-50 mg per tablet 1 tablet (has no administration in time range)       Diagnostic Studies  Results Reviewed     Procedure Component Value Units Date/Time    AFP tumor marker [[id number]] Collected: 05/29/22 2045    Lab Status:  In process Specimen: Blood from Arm, Left Updated: 05/29/22 2320    Comprehensive metabolic panel [[id number]]  (Abnormal) Collected: 05/29/22 2045    Lab Status: Final result Specimen: Blood from Arm, Left Updated: 05/29/22 2117     Sodium 138 mmol/L      Potassium 3 7 mmol/L      Chloride 103 mmol/L      CO2 32 mmol/L      ANION GAP 3 mmol/L      BUN 12 mg/dL      Creatinine 1 17 mg/dL      Glucose 140 mg/dL      Calcium 8 4 mg/dL      Corrected Calcium 9 4 mg/dL      AST 65 U/L      ALT 48 U/L      Alkaline Phosphatase 173 U/L      Total Protein 6 6 g/dL      Albumin 2 8 g/dL      Total Bilirubin 0 22 mg/dL      eGFR 45 ml/min/1 73sq m     Narrative:      [place] guidelines for Chronic Kidney Disease (CKD):     Stage 1 with normal or high GFR (GFR > 90 mL/min/1 73 square meters)    Stage 2 Mild CKD (GFR = 60-89 mL/min/1 73 square meters)    Stage 3A Moderate CKD (GFR = 45-59 mL/min/1 73 square meters)    Stage 3B Moderate CKD (GFR = 30-44 mL/min/1 73 square meters)    Stage 4 Severe CKD (GFR = 15-29 mL/min/1 73 square meters)    Stage 5 End Stage CKD (GFR <15 mL/min/1 73 square meters)  Note: GFR calculation is accurate only with a steady state creatinine    Lipase [[id number]]  (Abnormal) Collected: 05/29/22 2045    Lab Status: Final result Specimen: Blood from Arm, Left Updated: 05/29/22 2117     Lipase 31 u/L     HS Troponin 0hr (reflex protocol) [[id number]]  (Normal) Collected: 05/29/22 2045    Lab Status: Final result Specimen: Blood from Arm, Left Updated: 05/29/22 2113     hs TnI 0hr 11 ng/L     Lactic acid [[id number]]  (Normal) Collected: 05/29/22 2045    Lab Status: Final result Specimen: Blood from Arm, Left Updated: 05/29/22 2111     LACTIC ACID 0 6 mmol/L     Narrative:      Result may be elevated if tourniquet was used during collection  CBC and differential [[id number]]  (Abnormal) Collected: 05/29/22 2045    Lab Status: Final result Specimen: Blood from Arm, Left Updated: 05/29/22 2052     WBC 8 97 Thousand/uL      RBC 3 54 Million/uL      Hemoglobin 11 0 g/dL      Hematocrit 33 8 %      MCV 96 fL      MCH 31 1 pg      MCHC 32 5 g/dL      RDW 13 5 %      MPV 9 8 fL      Platelets 962 Thousands/uL      nRBC 0 /100 WBCs      Neutrophils Relative 73 %      Immat GRANS % 0 %      Lymphocytes Relative 16 %      Monocytes Relative 7 %      Eosinophils Relative 3 %      Basophils Relative 1 %      Neutrophils Absolute 6 54 Thousands/µL      Immature Grans Absolute 0 02 Thousand/uL      Lymphocytes Absolute 1 45 Thousands/µL      Monocytes Absolute 0 65 Thousand/µL      Eosinophils Absolute 0 25 Thousand/µL      Basophils Absolute 0 06 Thousands/µL                  CT abdomen pelvis wo contrast   Final Result by Shanna Grady MD (05/29 2148)   Exam is limited without IV and oral contrast particularly for soft tissue and bowel evaluation  1   Large mass in the right upper quadrant likely arising from the pancreatic head concerning for malignancy  This could be further assessed with dedicated MRI of the abdomen with and without IV contrast    2  Multiple hepatic masses suspicious for metastasis  3  Suspected retroperitoneal lymphadenopathy concerning for metastasis  4  New small pulmonary nodules at the lung bases suspicious for metastasis  Further staging may include chest CT or FDG PET/CT     5  Small to moderate-sized pericardial effusion, increased, malignant effusion not excluded  I personally discussed this study with Hillary Main on 5/29/2022 at 9:46 PM                                   Workstation performed: [id number]         XR chest 1 view portable    (Results Pending)   MRI inpatient order    (Results Pending)         Procedures  Procedures      ED Course                             SBIRT 20yo+    Flowsheet Row Most Recent Value   SBIRT (25 yo +)    In order to provide better care to our patients, we are screening all of our patients for alcohol and drug use  Would it be okay to ask you these screening questions? Yes Filed at: 05/29/2022 2011   Initial Alcohol Screen: US AUDIT-C     1  How often do you have a drink containing alcohol? 0 Filed at: 05/29/2022 2011   2  How many drinks containing alcohol do you have on a typical day you are drinking? 0 Filed at: 05/29/2022 2011   3b  FEMALE Any Age, or MALE 65+: How often do you have 4 or more drinks on one occassion? 0 Filed at: 05/29/2022 2011   Audit-C Score 0 Filed at: 05/29/2022 2011   JORDANA: How many times in the past year have you    Used an illegal drug or used a prescription medication for non-medical reasons? Never Filed at: 05/29/2022 2011                MDM  Number of Diagnoses or Management Options  Liver masses  Lung mass  Pancreatic mass  Diagnosis management comments: 78 y o F w/ abdominal pain, weight loss and pale stools for 1 year  Concerning for potential pancreatic process, cancer, GB disease, among others  CP concerning for PNA, PTX, ACS, PE, among others  Will obtain ACS w/u, abdominal labs and CTAP  Patient does not want pain/nausea medication at this time  CT concerning for metastatic cancer  Discussed this with patient and daughter  Recommend admission for expedited evaluation  Patient agreeable with plan and admitted for further evaluation         Disposition  Final diagnoses:   Pancreatic mass   Liver masses   Lung mass     Time reflects when diagnosis was documented in both MDM as applicable and the Disposition within this note     Time User Action Codes Description Comment    5/29/2022 10:09 PM Branson Mt Add [K86 89] Pancreatic mass     5/29/2022 10:09 PM Migel Mt Add [R16 0] Liver masses     5/29/2022 10:09 PM Branson Mt Add [R91 8] Lung mass     5/29/2022 10:27 PM Earmon Oas Add [R63 4] Weight loss       ED Disposition     ED Disposition   Admit    Condition   Stable    Date/Time   Sun May 29, 2022 10:09 PM    Comment   Case was discussed with GEORGINA and the patient's admission status was agreed to be Admission Status: inpatient status to the service of Dr Zay Huddleston   Follow-up Information    None         Current Discharge Medication List      CONTINUE these medications which have NOT CHANGED    Details   acetaminophen (TYLENOL) 500 mg tablet Take 500 mg by mouth every 6 (six) hours as needed for mild pain      aspirin (ECOTRIN LOW STRENGTH) 81 mg EC tablet Take 81 mg by mouth daily      Cholecalciferol (Vitamin D3) 50 MCG (2000 UT) TABS Take by mouth        cyanocobalamin (VITAMIN B-12) 500 MCG tablet Take 500 mcg by mouth daily      dicyclomine (BENTYL) 10 mg capsule Take 1 capsule (10 mg total) by mouth as needed in the morning and 1 capsule (10 mg total) as needed at noon and 1 capsule (10 mg total) as needed in the evening and 1 capsule (10 mg total) as needed before bedtime (abdominal cramping)    Qty: 30 capsule, Refills: 0    Associated Diagnoses: Chronic diarrhea      esomeprazole (NexIUM) 20 mg capsule Take 20 mg by mouth every morning before breakfast      ferrous sulfate 325 (65 Fe) mg tablet Take 325 mg by mouth daily with breakfast      COLLADO SEAL ROOT PO Take 900 mg by mouth daily Echinaceal/goldenseal blend       methylPREDNISolone 4 MG tablet therapy pack Use as directed on package  Qty: 21 tablet, Refills: 0    Associated Diagnoses: Chronic pain of left ankle; Edema of left ankle      Omega-3 Fatty Acids (OMEGA 3 PO) Take 1,000 mg by mouth      Pyridoxine HCl (VITAMIN B-6 PO) Take 1 tablet by mouth      Vitamin E 400 units TABS Take by mouth           No discharge procedures on file  PDMP Review     None           ED Provider  Attending physically available and evaluated Michael Weinstein I managed the patient along with the ED Attending      Electronically Signed by         Genevie Burkitt, MD  05/30/22 5297

## 2022-05-30 NOTE — MALNUTRITION/BMI
This medical record reflects one or more clinical indicators suggestive of malnutrition and/or morbid obesity  Malnutrition Findings:   Adult Malnutrition type: Acute illness  Adult Degree of Malnutrition: Other severe protein calorie malnutrition  Malnutrition Characteristics: Muscle loss, Weight loss                  360 Statement: Severe muscle mass depletion as evidenced by prominent clavicles  Pt also with 5 7% wt loss noted x 30 days which is clinically significant, treated with ensure pudding BID  BMI Findings: Body mass index is 20 55 kg/m²  See Nutrition note dated 5/30/22 for additional details  Completed nutrition assessment is viewable in the nutrition documentation

## 2022-05-30 NOTE — CONSULTS
Consultation - Medical Oncology   Nuno Lacey 78 y o  female MRN: [id number]  Unit/Bed#: E5 -01 Encounter: [id number]    Date of service:  05/30/2022  Referring physician:  [facility]  Reason for Consult:  Suspected cancer head of pancreas   HPI: Nuno Lacey is a 78y o  year old female   She has started to lose weight since January 2022 and has lost 30 lb  Decreased appetite  Heme-positive stool  She was going to have EGD and colonoscopy in August 2022  This hospitalization because of pain left upper abdomen  Workup has shown mass in the head of the pancreas 4 8 x 4 7 cm, multiple liver lesions likely metastatic, lung nodule and pericardial effusion  Chest x-ray report is pending  Patient will be going for MRI of the abdomen  She will need tissue diagnosis  Questionable history of COVID in January 2022  Patient states she was not tested for it and somebody told her from the blood tests that she had COVID  Patient has generalized weakness and tiredness    ROS:  05/30/22 Reviewed 12 systems: See symptoms in HPI  Presently no other neurological, cardiac, pulmonary, GI and  symptoms other than listed in HPI  Other symptoms are in HPI  No  fever, chills, active bleeding, bone pains, skin rash,  night sweats, arthritic symptoms,   numbness, claudication and gait problem  No frequent infections  Not unusually sensitive to heat or cold  No swelling of the ankles  No swollen glands  Patient is anxious       Physical Exam:  Vitals:    05/29/22 2240 05/29/22 2308 05/30/22 0600 05/30/22 0849   BP:  124/64  106/53   BP Location:  Right arm     Pulse:  78  89   Resp:  15     Temp: 97 7 °F (36 5 °C) 97 7 °F (36 5 °C)  98 5 °F (36 9 °C)   TempSrc: Oral Oral     SpO2:  96%  96%   Weight:   54 3 kg (119 lb 11 4 oz)    Height:         Alert, oriented, not in distress, no icterus, no oral thrush, no palpable neck mass, clear lung fields, regular heart rate, abdomen  soft and non tender, no palpable abdominal mass, no ascites, no edema of ankles, no calf tenderness, no focal neurological deficit, no skin rash, no palpable lymphadenopathy in the neck and axillary areas,  no clubbing  Patient is anxious  Performance status 2  Generalized weakness  Decrease in muscle mass and subcutaneous fat  Historical Information   History reviewed  No pertinent past medical history  Past Surgical History:   Procedure Laterality Date    CARDIAC CATHETERIZATION N/A 3/30/2022    Procedure: Cardiac catheterization;  Surgeon: Maude Mcghee DO;  Location: [facility]; Service: Cardiology    CARDIAC CATHETERIZATION N/A 3/30/2022    Procedure: Cardiac Coronary Angiogram;  Surgeon: Maude Mcghee DO;  Location: [facility];   Service: Cardiology     Social History   Social History     Substance and Sexual Activity   Alcohol Use Not Currently     Social History     Substance and Sexual Activity   Drug Use Never     Social History     Tobacco Use   Smoking Status Never Smoker   Smokeless Tobacco Never Used     Family History:   Family History   Problem Relation Age of Onset    Coronary artery disease Father     Coronary artery disease Brother     Diabetes Brother     Kidney failure Brother     Uterine cancer Mother          Current Facility-Administered Medications:     acetaminophen (TYLENOL) tablet 650 mg, 650 mg, Oral, Q6H PRN, Van Soliz DO    aluminum-magnesium hydroxide-simethicone (MYLANTA) oral suspension 30 mL, 30 mL, Oral, Q6H PRN, Van Soliz DO    aspirin (ECOTRIN LOW STRENGTH) EC tablet 81 mg, 81 mg, Oral, Daily, Van Soliz DO, 81 mg at 05/30/22 0839    calcium carbonate (TUMS) chewable tablet 1,000 mg, 1,000 mg, Oral, Daily PRN, Van Soliz DO    enoxaparin (LOVENOX) subcutaneous injection 40 mg, 40 mg, Subcutaneous, Daily, Van Soliz DO, 40 mg at 05/30/22 0839    HYDROmorphone HCl (DILAUDID) injection 0 2 mg, 0 2 mg, Intravenous, Q4H PRN, [facility] CHARISSA Soliz DO    naloxone (NARCAN) 0 04 mg/mL syringe 0 04 mg, 0 04 mg, Intravenous, Q1MIN PRN, Van Soliz DO    ondansetron (ZOFRAN) injection 4 mg, 4 mg, Intravenous, Q4H PRN, Van Soliz DO    oxyCODONE (ROXICODONE) IR tablet 2 5 mg, 2 5 mg, Oral, Q4H PRN, Van Soliz DO, 2 5 mg at 05/29/22 2304    oxyCODONE (ROXICODONE) IR tablet 5 mg, 5 mg, Oral, Q4H PRN, Van Soliz DO    senna-docusate sodium (SENOKOT S) 8 6-50 mg per tablet 1 tablet, 1 tablet, Oral, BID, Van Soliz DO    simethicone (MYLICON) chewable tablet 80 mg, 80 mg, Oral, 4x Daily PRN, David Pyo, DO    Allergies   Allergen Reactions    Bentyl [Dicyclomine] Throat Swelling    Codeine Other (See Comments)     Was told allergy    Elastic Bandages & [Zinc] Hives, Swelling and Rash         Lab Results: I have reviewed all pertinent labs    LABS:  Results for orders placed or performed during the hospital encounter of 05/29/22   CBC and differential   Result Value Ref Range    WBC 8 97 4 31 - 10 16 Thousand/uL    RBC 3 54 (L) 3 81 - 5 12 Million/uL    Hemoglobin 11 0 (L) 11 5 - 15 4 g/dL    Hematocrit 33 8 (L) 34 8 - 46 1 %    MCV 96 82 - 98 fL    MCH 31 1 26 8 - 34 3 pg    MCHC 32 5 31 4 - 37 4 g/dL    RDW 13 5 11 6 - 15 1 %    MPV 9 8 8 9 - 12 7 fL    Platelets 357 189 - 730 Thousands/uL    nRBC 0 /100 WBCs    Neutrophils Relative 73 43 - 75 %    Immat GRANS % 0 0 - 2 %    Lymphocytes Relative 16 14 - 44 %    Monocytes Relative 7 4 - 12 %    Eosinophils Relative 3 0 - 6 %    Basophils Relative 1 0 - 1 %    Neutrophils Absolute 6 54 1 85 - 7 62 Thousands/µL    Immature Grans Absolute 0 02 0 00 - 0 20 Thousand/uL    Lymphocytes Absolute 1 45 0 60 - 4 47 Thousands/µL    Monocytes Absolute 0 65 0 17 - 1 22 Thousand/µL    Eosinophils Absolute 0 25 0 00 - 0 61 Thousand/µL    Basophils Absolute 0 06 0 00 - 0 10 Thousands/µL   Comprehensive metabolic panel   Result Value Ref Range    Sodium 138 136 - 145 mmol/L    Potassium 3 7 3 5 - 5 3 mmol/L Chloride 103 100 - 108 mmol/L    CO2 32 21 - 32 mmol/L    ANION GAP 3 (L) 4 - 13 mmol/L    BUN 12 5 - 25 mg/dL    Creatinine 1 17 0 60 - 1 30 mg/dL    Glucose 140 65 - 140 mg/dL    Calcium 8 4 8 3 - 10 1 mg/dL    Corrected Calcium 9 4 8 3 - 10 1 mg/dL    AST 65 (H) 5 - 45 U/L    ALT 48 12 - 78 U/L    Alkaline Phosphatase 173 (H) 46 - 116 U/L    Total Protein 6 6 6 4 - 8 2 g/dL    Albumin 2 8 (L) 3 5 - 5 0 g/dL    Total Bilirubin 0 22 0 20 - 1 00 mg/dL    eGFR 45 ml/min/1 73sq m   Lipase   Result Value Ref Range    Lipase 31 (L) 73 - 393 u/L   HS Troponin 0hr (reflex protocol)   Result Value Ref Range    hs TnI 0hr 11 "Refer to ACS Flowchart"- see link ng/L   Lactic acid   Result Value Ref Range    LACTIC ACID 0 6 0 5 - 2 0 mmol/L   Comprehensive metabolic panel   Result Value Ref Range    Sodium 139 136 - 145 mmol/L    Potassium 3 6 3 5 - 5 3 mmol/L    Chloride 105 100 - 108 mmol/L    CO2 29 21 - 32 mmol/L    ANION GAP 5 4 - 13 mmol/L    BUN 12 5 - 25 mg/dL    Creatinine 0 83 0 60 - 1 30 mg/dL    Glucose 103 65 - 140 mg/dL    Calcium 7 9 (L) 8 3 - 10 1 mg/dL    Corrected Calcium 9 1 8 3 - 10 1 mg/dL    AST 53 (H) 5 - 45 U/L    ALT 45 12 - 78 U/L    Alkaline Phosphatase 156 (H) 46 - 116 U/L    Total Protein 6 1 (L) 6 4 - 8 2 g/dL    Albumin 2 5 (L) 3 5 - 5 0 g/dL    Total Bilirubin 0 23 0 20 - 1 00 mg/dL    eGFR 69 ml/min/1 73sq m   CBC (With Platelets)   Result Value Ref Range    WBC 7 90 4 31 - 10 16 Thousand/uL    RBC 3 43 (L) 3 81 - 5 12 Million/uL    Hemoglobin 10 5 (L) 11 5 - 15 4 g/dL    Hematocrit 32 9 (L) 34 8 - 46 1 %    MCV 96 82 - 98 fL    MCH 30 6 26 8 - 34 3 pg    MCHC 31 9 31 4 - 37 4 g/dL    RDW 13 7 11 6 - 15 1 %    Platelets 361 335 - 076 Thousands/uL    MPV 11 1 8 9 - 12 7 fL         Imaging Studies: I have personally reviewed pertinent reports  OSSEOUS STRUCTURES:  No acute fracture or destructive osseous lesion  Old healed fractures of the right superior and inferior pubic rami    Scattered degenerative spurring of the visualized spine      IMPRESSION:  Exam is limited without IV and oral contrast particularly for soft tissue and bowel evaluation       1   Large mass in the right upper quadrant likely arising from the pancreatic head concerning for malignancy  This could be further assessed with dedicated MRI of the abdomen with and without IV contrast   2  Multiple hepatic masses suspicious for metastasis  3  Suspected retroperitoneal lymphadenopathy concerning for metastasis  4  New small pulmonary nodules at the lung bases suspicious for metastasis  Further staging may include chest CT or FDG PET/CT  5  Small to moderate-sized pericardial effusion, increased, malignant effusion not excluded         I personally discussed this study with Jaylin Henderson on 5/29/2022 at 9:46 PM                                 Workstation performed: [id number]          Imaging    CT abdomen pelvis wo contrast (Order: [id number]) - 5/29/2022    Pathology, and Other Studies: I have personally reviewed pertinent reports  Tissue diagnosis is pending    Assessment and Plan:  See diagnoses, orders instructions below  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, lung nodule and pericardial effusion  Patient   needs tissue diagnosis that could come from a large liver lesion  Ordered tumor markers CA 19-9 and CEA  Alpha fetoprotein had been ordered  Anemia probably secondary to malignancy  Patient had heme-positive stool on outpatient  GI is on board and they were planning EGD and colonoscopy  Protein calorie deficiency  Cardiology consultation for pericardial effusion  Outpatient follow-up in our [place] office 1 week post discharge  She will need genetic counseling and testing was diagnosis of pancreas is proven  All discussed in detail  Questions answered    Patient was not expecting all this   Patient will continue to follow with  primary physician and other consultants post discharge  Patient voiced understanding and agrees     This note has been generated by voice recognition softener  Therefore there maybe spelling, grammar, and/or syntax errors  Please contact if questions arise  Counseling / Coordination of Care    Jovani Phipps   Provided counseling and support

## 2022-05-30 NOTE — ASSESSMENT & PLAN NOTE
Results from last 7 days   Lab Units 05/30/22  0442 05/29/22  2045   HEMOGLOBIN g/dL 10 5* 11 0*   With anemia and noted to have heme-positive stool as an outpatient  Likely secondary to malignancy  Seen and evaluated by GI who plans to do EGD and colonoscopy in the outpatient setting

## 2022-05-30 NOTE — H&P
[address] [facility]  H&P Haywood Carson 1945, 78 y o  female MRN: [id number]  Unit/Bed#: ED 10 Encounter: [id number]  Primary Care Provider: Belinda De La Cruz DO   Date and time admitted to hospital: 5/29/2022  8:08 PM    Assessment and Plan  * Pancreatic mass  Assessment & Plan  Patient with pancreatic mass concerning for new metastatic disease with liver metastasis, potential malignant pericardial effusion  Check CEA, CA 19-9, MRI of the abdomen and MRCP for further characterization of mass  IR consulted for biopsy for definitive tissue diagnosis  Oncology//GI consultations in the morning  Continue Lovenox for DVT prophylaxis    CT findings with  Large mass in the right upper quadrant likely arising from the pancreatic head concerning for malignancy  This could be further assessed with dedicated MRI of the abdomen with and without IV contrast   2  Multiple hepatic masses suspicious for metastasis  3  Suspected retroperitoneal lymphadenopathy concerning for metastasis  4  New small pulmonary nodules at the lung bases suspicious for metastasis  Further staging may include chest CT or FDG PET/CT  Pericardial effusion  Assessment & Plan  No evidence of tamponade physiology  Obtain echocardiogram  Likely malignant effusion    Protein-calorie malnutrition, unspecified severity ([address] )  Assessment & Plan  Malnutrition Findings:                             BMI Findings: Body mass index is 20 43 kg/m²     Nutrition consultation while admitted  Has had weight loss persistent for which he is at outpatient GI evaluation pending          Code Status:  Full code    VTE Prophylaxis: Enoxaparin (Lovenox)  / sequential compression device     POLST: There is no POLST form on file for this patient (pre-hospital)  Discussion with family:  Daughter at bedside    Anticipated Length of Stay:  Patient will be admitted on an Inpatient basis with an anticipated length of stay of  greater than 2 midnights  Justification for Hospital Stay: Pancreatic mass expedited cancer workup    Total Time for Visit, including Counseling / Coordination of Care: 45 minutes  Greater than 50% of this total time spent on direct patient counseling and coordination of care  Chief Complaint:     Chief Complaint   Patient presents with    Abdominal Pain     Pt reports pain in LUQ abd that started suddenly this evening  Pain radiates into left shoulder  No injury reported  Pain with deep breath       History of Present Illness:    Racquel De Dios is a 78 y o  female who presents with left upper quadrant abdominal pain which was sharp and noted to be 5/10  She denies any chest pain, no difficulty with breathing  She was undergoing weight loss evaluation by the gastroenterology service, and was plan for colonoscopy and upper endoscopy  She had been prescribed Bentyl but had not taken that medication  She does have a history of reflux disease and takes Nexium as needed  She is on multiple vitamins, she takes no other chronic home medications  She has had progressive weight loss over the past few weeks to months  She denies any exposure to any industrial chemicals  Her main symptoms have been diarrhea, she did have 30 lb weight loss since January of 2022 as well as poor energy  She had recovered from COVID-19 in January, but has otherwise been asymptomatic  The patient has no other prior history of colonoscopy, no prior history of EGD    Review of Systems:    A complete and comprehensive 14 point organ system review was performed and all other systems are negative other than stated above in the HPI    Past Medical and Surgical History:     History reviewed  No pertinent past medical history     Reviewed and noncontributory      Past Surgical History:   Procedure Laterality Date    CARDIAC CATHETERIZATION N/A 3/30/2022    Procedure: Cardiac catheterization;  Surgeon: Magali Dougherty DO;  Location: [facility];  Service: Cardiology    CARDIAC CATHETERIZATION N/A 3/30/2022    Procedure: Cardiac Coronary Angiogram;  Surgeon: Trino Mena DO;  Location: [facility]; Service: Cardiology       Meds/Allergies:    Prior to Admission medications    Medication Sig Start Date End Date Taking? Authorizing Provider   acetaminophen (TYLENOL) 500 mg tablet Take 500 mg by mouth every 6 (six) hours as needed for mild pain    Historical Provider, MD   aspirin (ECOTRIN LOW STRENGTH) 81 mg EC tablet Take 81 mg by mouth daily    Historical Provider, MD   Cholecalciferol (Vitamin D3) 50 MCG (2000 UT) TABS Take by mouth      Historical Provider, MD   cyanocobalamin (VITAMIN B-12) 500 MCG tablet Take 500 mcg by mouth daily    Historical Provider, MD   dicyclomine (BENTYL) 10 mg capsule Take 1 capsule (10 mg total) by mouth as needed in the morning and 1 capsule (10 mg total) as needed at noon and 1 capsule (10 mg total) as needed in the evening and 1 capsule (10 mg total) as needed before bedtime (abdominal cramping)  Patient not taking: No sig reported 5/16/22   Debbie Herzog DO   esomeprazole (NexIUM) 20 mg capsule Take 20 mg by mouth every morning before breakfast  Patient not taking: No sig reported    Historical Provider, MD   ferrous sulfate 325 (65 Fe) mg tablet Take 325 mg by mouth daily with breakfast    Historical Provider, MD COLLADO SEAL ROOT PO Take 900 mg by mouth daily Echinaceal/goldenseal blend     Historical Provider, MD   methylPREDNISolone 4 MG tablet therapy pack Use as directed on package 5/25/22   Randy Fierro DPM   Omega-3 Fatty Acids (OMEGA 3 PO) Take 1,000 mg by mouth    Historical Provider, MD   Pyridoxine HCl (VITAMIN B-6 PO) Take 1 tablet by mouth    Historical Provider, MD   Vitamin E 400 units TABS Take by mouth    Historical Provider, MD     I have reviewed home medications with patient personally  Allergies:    Allergies   Allergen Reactions    Bentyl [Dicyclomine] Throat Swelling    Codeine Other (See Comments)     Was told allergy    Elastic Bandages & [Zinc] Hives, Swelling and Rash       Social History:     Marital Status: /Civil Union   Occupation:  Retired    Substance Use History:   Social History     Substance and Sexual Activity   Alcohol Use Not Currently     Social History     Tobacco Use   Smoking Status Never Smoker   Smokeless Tobacco Never Used     Social History     Substance and Sexual Activity   Drug Use Never       Family History:    Family History   Problem Relation Age of Onset    Coronary artery disease Father     Coronary artery disease Brother     Diabetes Brother     Kidney failure Brother     Uterine cancer Mother        Physical Exam:     Vitals:   Blood Pressure: 107/61 (05/29/22 2100)  Pulse: 76 (05/29/22 2100)  Temperature: 98 2 °F (36 8 °C) (05/29/22 2005)  Temp Source: Oral (05/29/22 2005)  Respirations: 20 (05/29/22 2100)  Height: 5' 4" (162 6 cm) (05/29/22 2005)  Weight - Scale: 54 kg (119 lb 0 8 oz) (05/29/22 2005)  SpO2: 97 % (05/29/22 2100)      General:  Appears thin, frail  HEENT: atraumatic, PERRLA, moist mucosa, normal pharynx, normal tonsils and adenoids, normal tongue, no fluid in sinuses  Neck: Trachea midline, no carotid bruit, no masses  Respiratory: normal chest wall expansion, CTA B, no r/r/w, no rubs  Cardiovascular: RRR, no m/r/g, Normal S1 and S2  Abdomen: Soft, non-tender, non-distended, normal bowel sounds in all quadrants, no hepatosplenomegaly, no tympany  Rectal: deferred  Musculoskeletal: normal ROM in upper and lower extremities  Integumentary: warm, dry, and pink, with no rash, purpura, or petechia  Heme/Lymph: no lymphadenopathy, no bruises  Neurological: Cranial Nerves II-XII grossly intact  Psychiatric: cooperative with normal mood, affect, and cognition    Additional Data:     Lab Results: I have personally reviewed pertinent reports        Results from last 7 days   Lab Units 05/29/22 2045   WBC Thousand/uL 8  97   HEMOGLOBIN g/dL 11 0*   HEMATOCRIT % 33 8*   PLATELETS Thousands/uL 292   NEUTROS PCT % 73   LYMPHS PCT % 16   MONOS PCT % 7   EOS PCT % 3     Results from last 7 days   Lab Units 05/29/22 2045   SODIUM mmol/L 138   POTASSIUM mmol/L 3 7   CHLORIDE mmol/L 103   CO2 mmol/L 32   BUN mg/dL 12   CREATININE mg/dL 1 17   ANION GAP mmol/L 3*   CALCIUM mg/dL 8 4   ALBUMIN g/dL 2 8*   TOTAL BILIRUBIN mg/dL 0 22   ALK PHOS U/L 173*   ALT U/L 48   AST U/L 65*   GLUCOSE RANDOM mg/dL 140                 Results from last 7 days   Lab Units 05/29/22  2045   LACTIC ACID mmol/L 0 6     Results from last 7 days   Lab Units 05/29/22 2045   HS TNI 0HR ng/L 11       Imaging: I have personally reviewed pertinent reports  CT abdomen pelvis wo contrast   Final Result by Steph Chacon MD (05/29 2148)   Exam is limited without IV and oral contrast particularly for soft tissue and bowel evaluation  1   Large mass in the right upper quadrant likely arising from the pancreatic head concerning for malignancy  This could be further assessed with dedicated MRI of the abdomen with and without IV contrast    2  Multiple hepatic masses suspicious for metastasis  3  Suspected retroperitoneal lymphadenopathy concerning for metastasis  4  New small pulmonary nodules at the lung bases suspicious for metastasis  Further staging may include chest CT or FDG PET/CT  5  Small to moderate-sized pericardial effusion, increased, malignant effusion not excluded  I personally discussed this study with Adelina Pan on 5/29/2022 at 9:46 PM                                   Workstation performed: [id number]         XR chest 1 view portable    (Results Pending)       EKG, Pathology, and Other Studies Reviewed on Admission:   CT imaging noted for concern for new pancreatic mass, pulmonary nodule      Family is aware of all findings  Allscripts / Epic Records Reviewed: Yes     ** Please Note: This note was completed in part utilizing M-Modal Fluency Direct Software  Grammatical errors, random word insertions, spelling mistakes, and incomplete sentences may be an occasional consequence of this system secondary to software limitations, ambient noise, and hardware issues  If you have any questions or concerns about the content, text, or information contained within the body of this dictation, please contact the provider for clarification  **

## 2022-05-30 NOTE — TELEMEDICINE
e-Consult (IPC)  - Interventional Radiology  Arabella Martinez 78 y o  female MRN: [id number]  Unit/Bed#: E5 -01 Encounter: [id number]          Interventional Radiology has been consulted to evaluate Arabella Martinez    We were consulted by GEORGINA concerning this patient with evidence of newly diagnosed malignancy  IP Consult to IR  Consult performed by: Shemar Meraz MD  Consult ordered by: Nano Ruth DO        05/30/22    Assessment/Recommendation:   79-year-old female with no prior history of malignancy presents with abdominal pain  CT scan demonstrated pancreatic mass and multiple liver masses  A biopsy has been requested  Unfortunately, the patient was given aspirin which is a five-day hold for biopsy  Recommend discharge with outpatient biopsy of the liver to be scheduled  Admitting physician aware  Total time spent in review of data, discussion with requesting provider and rendering advice was 20 minutes  Thank you for allowing Interventional Radiology to participate in the care of Arabella Martinez  Please don't hesitate to call or TigerText us with any questions       Shemar Meraz MD

## 2022-05-31 PROBLEM — E43 SEVERE PROTEIN-CALORIE MALNUTRITION (HCC): Status: ACTIVE | Noted: 2022-01-01

## 2022-05-31 LAB
ALBUMIN SERPL BCP-MCNC: 2.4 G/DL (ref 3.5–5)
ALP SERPL-CCNC: 157 U/L (ref 46–116)
ALT SERPL W P-5'-P-CCNC: 43 U/L (ref 12–78)
ANION GAP SERPL CALCULATED.3IONS-SCNC: 7 MMOL/L (ref 4–13)
AST SERPL W P-5'-P-CCNC: 56 U/L (ref 5–45)
ATRIAL RATE: 76 BPM
BILIRUB SERPL-MCNC: 0.24 MG/DL (ref 0.2–1)
BUN SERPL-MCNC: 9 MG/DL (ref 5–25)
CALCIUM ALBUM COR SERPL-MCNC: 9.1 MG/DL (ref 8.3–10.1)
CALCIUM SERPL-MCNC: 7.8 MG/DL (ref 8.3–10.1)
CANCER AG19-9 SERPL-ACNC: 1371 U/ML (ref 0–35)
CHLORIDE SERPL-SCNC: 104 MMOL/L (ref 100–108)
CO2 SERPL-SCNC: 28 MMOL/L (ref 21–32)
CREAT SERPL-MCNC: 0.87 MG/DL (ref 0.6–1.3)
ERYTHROCYTE [DISTWIDTH] IN BLOOD BY AUTOMATED COUNT: 13.7 % (ref 11.6–15.1)
GFR SERPL CREATININE-BSD FRML MDRD: 65 ML/MIN/1.73SQ M
GLUCOSE SERPL-MCNC: 102 MG/DL (ref 65–140)
HCT VFR BLD AUTO: 33.7 % (ref 34.8–46.1)
HGB BLD-MCNC: 10.9 G/DL (ref 11.5–15.4)
MCH RBC QN AUTO: 31.1 PG (ref 26.8–34.3)
MCHC RBC AUTO-ENTMCNC: 32.3 G/DL (ref 31.4–37.4)
MCV RBC AUTO: 96 FL (ref 82–98)
P AXIS: 72 DEGREES
PLATELET # BLD AUTO: 252 THOUSANDS/UL (ref 149–390)
PMV BLD AUTO: 10.2 FL (ref 8.9–12.7)
POTASSIUM SERPL-SCNC: 3.8 MMOL/L (ref 3.5–5.3)
PR INTERVAL: 164 MS
PROT SERPL-MCNC: 5.8 G/DL (ref 6.4–8.2)
QRS AXIS: 76 DEGREES
QRSD INTERVAL: 78 MS
QT INTERVAL: 374 MS
QTC INTERVAL: 420 MS
RBC # BLD AUTO: 3.5 MILLION/UL (ref 3.81–5.12)
RETICS # AUTO: NORMAL 10*3/UL (ref 14097–95744)
RETICS # CALC: 1.14 % (ref 0.37–1.87)
SODIUM SERPL-SCNC: 139 MMOL/L (ref 136–145)
T WAVE AXIS: 42 DEGREES
VENTRICULAR RATE: 76 BPM
WBC # BLD AUTO: 7.11 THOUSAND/UL (ref 4.31–10.16)

## 2022-05-31 PROCEDURE — 93010 ELECTROCARDIOGRAM REPORT: CPT | Performed by: INTERNAL MEDICINE

## 2022-05-31 PROCEDURE — 85045 AUTOMATED RETICULOCYTE COUNT: CPT | Performed by: INTERNAL MEDICINE

## 2022-05-31 PROCEDURE — 97166 OT EVAL MOD COMPLEX 45 MIN: CPT

## 2022-05-31 PROCEDURE — 85027 COMPLETE CBC AUTOMATED: CPT | Performed by: PHYSICIAN ASSISTANT

## 2022-05-31 PROCEDURE — 80053 COMPREHEN METABOLIC PANEL: CPT | Performed by: PHYSICIAN ASSISTANT

## 2022-05-31 PROCEDURE — 99232 SBSQ HOSP IP/OBS MODERATE 35: CPT | Performed by: INTERNAL MEDICINE

## 2022-05-31 RX ADMIN — ENOXAPARIN SODIUM 40 MG: 40 INJECTION SUBCUTANEOUS at 08:36

## 2022-05-31 NOTE — OCCUPATIONAL THERAPY NOTE
Occupational Therapy Evaluation     Patient Name: Nuno REZA Date: 5/31/2022  Problem List  Principal Problem:    Pancreatic mass  Active Problems:    Protein-calorie malnutrition, unspecified severity (HCC)    Pericardial effusion    Anemia    Severe protein-calorie malnutrition ([address] )    Past Medical History  History reviewed  No pertinent past medical history  Past Surgical History  Past Surgical History:   Procedure Laterality Date    CARDIAC CATHETERIZATION N/A 3/30/2022    Procedure: Cardiac catheterization;  Surgeon: Natali Boo DO;  Location: [facility]; Service: Cardiology    CARDIAC CATHETERIZATION N/A 3/30/2022    Procedure: Cardiac Coronary Angiogram;  Surgeon: Natali Boo DO;  Location: [facility]; Service: Cardiology           05/31/22 1011   OT Last Visit   OT Visit Date 05/31/22   Note Type   Note type Evaluation   Restrictions/Precautions   Weight Bearing Precautions Per Order No   Other Precautions Fall Risk;Pain   Pain Assessment   Pain Assessment Tool 0-10   Pain Score 4   Pain Location/Orientation Location: Rib Cage;Orientation: Left   Patient's Stated Pain Goal No pain   Hospital Pain Intervention(s) Repositioned; Ambulation/increased activity; Emotional support; Rest   Multiple Pain Sites No   Home Living   Type of [address] One level;Stairs to enter with rails  (4 BISI)   Bathroom Shower/Tub Walk-in shower  (w/ small lip)   Bathroom Toilet Standard   Bathroom Equipment Grab bars in shower   Bathroom Accessibility Accessible   Home Equipment Walker;Cane  (RW, Rollator)   Additional Comments Pt lives with spouse in a one level home with 4 BISI  Pt is spouse's caregiver, however reports does not provide physical assist  Pt mostly assists spouse w/ IADLs  Dtr lives next door     Prior Function   Level of [place] Independent with ADLs and functional mobility   Lives With Spouse   Receives Help From Family   ADL Assistance Independent   IADLs Independent   Falls in the last 6 months 0   Vocational Full time employment   Comments At baseline, pt was I w/ ADLs, IADLs, and functional transfers/mobility w/o use of AD  (+)   FT employed  Denies falls PTA  Lifestyle   Autonomy At baseline, pt was I w/ ADLs, IADLs, and functional transfers/mobility w/o use of AD  (+)   FT employed  Denies falls PTA  Reciprocal Relationships Spouse, dtr   Service to Others FT employed- [employer]   Psychosocial   Psychosocial (WDL) WDL   ADL   Where Assessed Chair   Eating Assistance 7  Independent   Grooming Assistance 6  Modified Independent   UB Bathing Assistance 6  Modified Independent   LB Bathing Assistance 5  Supervision/Setup   UB Dressing Assistance 6  Modified independent   LB Dressing Assistance 5  Supervision/Setup   Toileting Assistance  5  Supervision/Setup   Functional Assistance 5  Supervision/Setup   Bed Mobility   Supine to Sit 6  Modified independent   Additional items HOB elevated; Bedrails; Increased time required;Verbal cues   Sit to Supine Unable to assess   Additional Comments Pt seated EOB at end of session  Call bell and phone within reach  All needs met and pt reports no further questions for OT at this time  Transfers   Sit to Stand 5  Supervision   Additional items Armrests; Increased time required;Verbal cues   Stand to Sit 5  Supervision   Additional items Increased time required;Verbal cues   Functional Mobility   Functional Mobility 5  Supervision   Additional Comments w/o use of AD; No LOBs noted   Balance   Static Sitting Good   Dynamic Sitting Fair +   Static Standing Fair   Dynamic Standing Fair -   Ambulatory Fair -   Activity Tolerance   Activity Tolerance Patient tolerated treatment well;Patient limited by pain   Medical Staff [address], RN   Nurse Made Aware yes   RUE Assessment   RUE Assessment WFL   RUE Strength   RUE Overall Strength Within Functional Limits - able to perform ADL tasks with strength  (4/5 throughout)   LUE Assessment   LUE Assessment WFL   LUE Strength   LUE Overall Strength Within Functional Limits - able to perform ADL tasks with strength  (4/5 throughout)   Hand Function   Gross Motor Coordination Functional   Fine Motor Coordination Functional   Sensation   Light Touch No apparent deficits   Proprioception   Proprioception No apparent deficits   Vision-Basic Assessment   Current Vision Wears glasses all the time   Vision - Complex Assessment   Ocular Range of Motion [facility]   Acuity Able to read clock/calendar on wall without difficulty; Able to read employee name badge without difficulty   Perception   Inattention/Neglect Appears intact   Cognition   Overall Cognitive Status [facility]   Arousal/Participation Alert; Cooperative   Attention Within functional limits   Orientation Level Oriented X4   Memory Within functional limits   Following Commands Follows all commands and directions without difficulty   Comments Pleasant and cooperative   Assessment   Prognosis Good   Assessment Pt is a 78 y o  female seen for OT evaluation s/p adm to [facility] on 5/29/2022 w/ sudden onset of LUQ pain with radiation into her left shoulder and admitted w/ Pancreatic mass, Pericardial effusion, Anemia, Protein-calorie malnutrition  Pt w/ no pertinent PMH  Pt with active OT orders and activity orders for Ambulate  Pt lives with spouse in a one level home with 4 BISI  Pt is spouse's caregiver, however reports does not provide physical assist  Pt mostly assists spouse w/ IADLs  Dtr lives next door  At baseline, pt was I w/ ADLs, IADLs, and functional transfers/mobility w/o use of AD  (+)   FT employed  Denies falls PTA   Upon evaluation, pt currently functioning at a Mod I-Supervision level for overall ADLs, Mod I for bed mobility, Supervision for transfers, and Supervision for functional mobility 2* the following deficits impacting occupational performance: decreased balance, decreased tolerance and increased pain and personal factors of: fall risk, steps to enter environment, limited home support and difficulty performing IADLS   These impairments, however, do not limit pts ability to safely engage in all baseline areas of occupation  Pt w/ limited ADL deficits and denies concerns regarding returning home and performing ADLs  Based on the aforementioned OT evaluation, functional performance deficits, and assessments, pt has been identified as a Moderate complexity evaluation  No further acute OT needs identified at this time  Recommend continued mobilization with hospital staff while in the hospital to increase pts endurance and strength upon D/C  From OT standpoint, recommend D/C home with social support when medically cleared  D/C pt from OT caseload at this time  Goals   Patient Goals To feel better and go home   Plan   OT Frequency Eval only  (D/C OT)   Recommendation   OT Discharge Recommendation No rehabilitation needs   OT - OK to Discharge Yes  (when medically cleared)   Additional Comments  The patient's raw score on the AM-PAC Daily Activity inpatient short form is 22, standardized score is 47 1, greater than 39 4  Patients at this level are likely to benefit from discharge to home  Please refer to the recommendation of the Occupational Therapist for safe discharge planning  AM-PAC Daily Activity Inpatient   Lower Body Dressing 3   Bathing 3   Toileting 4   Upper Body Dressing 4   Grooming 4   Eating 4   Daily Activity Raw Score 22   Daily Activity Standardized Score (Calc for Raw Score >=11) 47  1   AM-[facility] Applied Cognition Inpatient   Following a Speech/Presentation 4   Understanding Ordinary Conversation 4   Taking Medications 4   Remembering Where Things Are Placed or Put Away 4   Remembering List of 4-5 Errands 4   Taking Care of Complicated Tasks 4   Applied Cognition Raw Score 24   Applied Cognition Standardized Score 62 21       Manav Alanis OTR/L

## 2022-05-31 NOTE — UTILIZATION REVIEW
Initial Clinical Review    Admission: Date/Time/Statement:   Admission Orders (From admission, onward)     Ordered        05/29/22 2210  Inpatient Admission  Once                      Orders Placed This Encounter   Procedures    Inpatient Admission     Standing Status:   Standing     Number of Occurrences:   1     Order Specific Question:   Level of Care     Answer:   Med Surg [16]     Order Specific Question:   Estimated length of stay     Answer:   More than 2 Midnights     Order Specific Question:   Certification     Answer:   I certify that inpatient services are medically necessary for this patient for a duration of greater than two midnights  See H&P and MD Progress Notes for additional information about the patient's course of treatment  ED Arrival Information     Expected   -    Arrival   5/29/2022 20:02    Acuity   Urgent            Means of arrival   Walk-In    Escorted by   Self    Service   Hospitalist    Admission type   Urgent            Arrival complaint   flank pain, shoulder pain           Chief Complaint   Patient presents with    Abdominal Pain     Pt reports pain in LUQ abd that started suddenly this evening  Pain radiates into left shoulder  No injury reported  Pain with deep breath       Initial Presentation: 78 y o  female presents to ED from home with LUQ  Abdominal pain, sharp, 5/10  Seeing  Gi for weight loss and plan was for  Colonoscopy/upper endoscopy  Given bentyl but has not taken any  Has  Had progressive weight loss for past  Few weeks to months  Main complaint is diarrhea, 30  Lb weight loss since  1/22, poor  Energy  Did have  COVID  1/22  Ct scan shows large mass in RUQ  , concern for malignancy  Admit  Ip with Pancreatic mass and plan is  IR consult for biopsy,  GI/oncology consults, monitor labs  Date:    5/30        Day 2:   GI consult  Likely metastatic  Pancreatic cancer  Wait further diagnostics  Oncology consult  Needs  Further diagnostics, labs  Wait additional studies  Cardiology consult  Small/moderate pericardial effusion on CT scan  Wait  2  DE  Not likely in tamponade  Upset/tearful  Work up in progress for diarrhea/weight loss/nausea  High suspicion for newly diagnosed pancreatic mass with liver, lung nodule  CEA   18 8  IR  Recommendation is  OP biopsy and patient given aspirin and  Need aspirin held for 5 days  ED Triage Vitals [05/29/22 2005]   Temperature Pulse Respirations Blood Pressure SpO2   98 2 °F (36 8 °C) 86 18 111/67 100 %      Temp Source Heart Rate Source Patient Position - Orthostatic VS BP Location FiO2 (%)   Oral Monitor Sitting Right arm --      Pain Score       10 - Worst Possible Pain          Wt Readings from Last 1 Encounters:   05/31/22 50 3 kg (110 lb 14 3 oz)     Additional Vital Signs:   97 8 °F (36 6 °C) 71 -- 115/66 82 96 % -- --    05/31/22 0100 98 3 °F (36 8 °C) 86 18 108/56 -- 98 % -- --   05/30/22 15:47:26 98 5 °F (36 9 °C) 70 18 121/59 80 98 % -- --   05/30/22 08:49:01 98 5 °F (36 9 °C) 89 -- 106/53 71 96 % -- --   05/29/22 23:08:12 97 7 °F (36 5 °C) 78 15 124/64 84 96 % -- Lying   05/29/22 2240 97 7 °F (36 5 °C) -- -- -- -- -- -- --   05/29/22 2236 -- 80 18 119/63 -- 98 % None (Room air) Lying   05/29/22 2100 -- 76 20 107/61 73 97 % None (Room air) Lying   05/29/22 2050 -- 70 20 108/61 71 98 % None (Room air) Lying   05/29/22 2034 -- -- -- -- -- -- None (Room air) --   05/29/22 2005 98 2 °F (36 8 °C) 86 18 111/67 -- 100 % None (Room air) Sitting       Pertinent Labs/Diagnostic Test Results:   CT abdomen pelvis wo contrast   Final Result by Lotus Mixon MD (05/29 2148)   Exam is limited without IV and oral contrast particularly for soft tissue and bowel evaluation  1   Large mass in the right upper quadrant likely arising from the pancreatic head concerning for malignancy    This could be further assessed with dedicated MRI of the abdomen with and without IV contrast    2  Multiple hepatic masses suspicious for metastasis  3  Suspected retroperitoneal lymphadenopathy concerning for metastasis  4  New small pulmonary nodules at the lung bases suspicious for metastasis  Further staging may include chest CT or FDG PET/CT  5  Small to moderate-sized pericardial effusion, increased, malignant effusion not excluded  I personally discussed this study with Ross Melendez on 5/29/2022 at 9:46 PM                                   Workstation performed: [id number]         XR chest 1 view portable   Final Result by Abel Aldridge MD (05/30 1102)      No acute cardiopulmonary disease                    Workstation performed: [id number]         MRI abdomen w wo contrast and mrcp    (Results Pending)         Results from last 7 days   Lab Units 05/31/22  0558 05/30/22  0442 05/29/22  2045   WBC Thousand/uL 7 11 7 90 8 97   HEMOGLOBIN g/dL 10 9* 10 5* 11 0*   HEMATOCRIT % 33 7* 32 9* 33 8*   PLATELETS Thousands/uL 252 283 292   NEUTROS ABS Thousands/µL  --   --  6 54     Results from last 7 days   Lab Units 05/31/22  0558   RETIC CT ABS  39,900   RETIC CT PCT % 1 14     Results from last 7 days   Lab Units 05/31/22 0558 05/30/22  0442 05/29/22  2045   SODIUM mmol/L 139 139 138   POTASSIUM mmol/L 3 8 3 6 3 7   CHLORIDE mmol/L 104 105 103   CO2 mmol/L 28 29 32   ANION GAP mmol/L 7 5 3*   BUN mg/dL 9 12 12   CREATININE mg/dL 0 87 0 83 1 17   EGFR ml/min/1 73sq m 65 69 45   CALCIUM mg/dL 7 8* 7 9* 8 4     Results from last 7 days   Lab Units 05/31/22 0558 05/30/22 0442 05/29/22  2045   AST U/L 56* 53* 65*   ALT U/L 43 45 48   ALK PHOS U/L 157* 156* 173*   TOTAL PROTEIN g/dL 5 8* 6 1* 6 6   ALBUMIN g/dL 2 4* 2 5* 2 8*   TOTAL BILIRUBIN mg/dL 0 24 0 23 0 22         Results from last 7 days   Lab Units 05/31/22  0558 05/30/22  0442 05/29/22  2045   GLUCOSE RANDOM mg/dL 102 103 140           Results from last 7 days   Lab Units 05/29/22  2045   HS TNI 0HR ng/L 11                     Results from last 7 days   Lab Units 05/29/22  2045   LACTIC ACID mmol/L 0 6                 Results from last 7 days   Lab Units 05/30/22  0442   FERRITIN ng/mL 159         Results from last 7 days   Lab Units 05/29/22  2045   LIPASE u/L 31*         Results from last 7 days   Lab Units 05/30/22  0442   CEA ng/mL 18 8*                 History reviewed  No pertinent past medical history  Present on Admission:   Protein-calorie malnutrition, unspecified severity ([facility] [address] )   Pancreatic mass   Pericardial effusion   Anemia      Admitting Diagnosis: Lung mass [R91 8]  Abdominal pain [R10 9]  Weight loss [R63 4]  Liver masses [R16 0]  Pancreatic mass [K86 89]  Age/Sex: 78 y o  female  Admission Orders:  Scheduled Medications:  enoxaparin, 40 mg, Subcutaneous, Daily  senna-docusate sodium, 1 tablet, Oral, BID      Continuous IV Infusions:     PRN Meds:  acetaminophen, 650 mg, Oral, Q6H PRN  aluminum-magnesium hydroxide-simethicone, 30 mL, Oral, Q6H PRN  calcium carbonate, 1,000 mg, Oral, Daily PRN  HYDROmorphone, 0 2 mg, Intravenous, Q4H PRN  naloxone, 0 04 mg, Intravenous, Q1MIN PRN  ondansetron, 4 mg, Intravenous, Q4H PRN  oxyCODONE, 2 5 mg, Oral, Q4H PRN  oxyCODONE, 5 mg, Oral, Q4H PRN  simethicone, 80 mg, Oral, 4x Daily PRN        IP CONSULT TO GASTROENTEROLOGY  IP CONSULT TO ONCOLOGY  INPATIENT CONSULT TO IR  IP CONSULT TO NUTRITION SERVICES  IP CONSULT TO CARDIOLOGY    Network Utilization Review Department  ATTENTION: Please call with any questions or concerns to [phone] and carefully listen to the prompts so that you are directed to the right person  All voicemails are confidential   [place] all requests for admission clinical reviews, approved or denied determinations and any other requests to dedicated fax number below belonging to the campus where the patient is receiving treatment   List of dedicated fax numbers for the Facilities:  FACILITY NAME [facility]AR[facility] [address] DENIALS (Administrative/Medical Necessity) [phone] [address] (Maternity/NICU/Pediatrics) 261 [facility],[address] 40 125 [facility]  [phone]   [address] [facility] [place] [address] [address] [place] [address] [address] [facility] [address] [facility] [phone]

## 2022-05-31 NOTE — PLAN OF CARE
Problem: PAIN - ADULT  Goal: Verbalizes/displays adequate comfort level or baseline comfort level  Description: Interventions:  - Encourage patient to monitor pain and request assistance  - Assess pain using appropriate pain scale  - Administer analgesics based on type and severity of pain and evaluate response  - Implement non-pharmacological measures as appropriate and evaluate response  - Consider cultural and social influences on pain and pain management  - Notify physician/advanced practitioner if interventions unsuccessful or patient reports new pain  Outcome: Progressing     Problem: INFECTION - ADULT  Goal: Absence or prevention of progression during hospitalization  Description: INTERVENTIONS:  - Assess and monitor for signs and symptoms of infection  - Monitor lab/diagnostic results  - Monitor all insertion sites, i e  indwelling lines, tubes, and drains  - Monitor endotracheal if appropriate and nasal secretions for changes in amount and color  - [place] appropriate cooling/warming therapies per order  - Administer medications as ordered  - Instruct and encourage patient and family to use good hand hygiene technique  - Identify and instruct in appropriate isolation precautions for identified infection/condition  Outcome: Progressing     Problem: SAFETY ADULT  Goal: Patient will remain free of falls  Description: INTERVENTIONS:  - Educate patient/family on patient safety including physical limitations  - Instruct patient to call for assistance with activity   - Consult OT/PT to assist with strengthening/mobility   - Keep Call bell within reach  - Keep bed low and locked with side rails adjusted as appropriate  - Keep care items and personal belongings within reach  - Initiate and maintain comfort rounds  - Make Fall Risk Sign visible to staff  - Apply yellow socks and bracelet for high fall risk patients  - Consider moving patient to room near nurses station  Outcome: Progressing  Goal: Maintain or return to baseline ADL function  Description: INTERVENTIONS:  -  Assess patient's ability to carry out ADLs; assess patient's baseline for ADL function and identify physical deficits which impact ability to perform ADLs (bathing, care of mouth/teeth, toileting, grooming, dressing, etc )  - Assess/evaluate cause of self-care deficits   - Assess range of motion  - Assess patient's mobility; develop plan if impaired  - Assess patient's need for assistive devices and provide as appropriate  - Encourage maximum independence but intervene and supervise when necessary  - Involve family in performance of ADLs  - Assess for home care needs following discharge   - Consider OT consult to assist with ADL evaluation and planning for discharge  - Provide patient education as appropriate  Outcome: Progressing     Problem: DISCHARGE PLANNING  Goal: Discharge to home or other facility with appropriate resources  Description: INTERVENTIONS:  - Identify barriers to discharge w/patient and caregiver  - Arrange for needed discharge resources and transportation as appropriate  - Identify discharge learning needs (meds, wound care, etc )  - Arrange for interpretive services to assist at discharge as needed  - Refer to Case Management Department for coordinating discharge planning if the patient needs post-hospital services based on physician/advanced practitioner order or complex needs related to functional status, cognitive ability, or social support system  Outcome: Progressing     Problem: Knowledge Deficit  Goal: Patient/family/caregiver demonstrates understanding of disease process, treatment plan, medications, and discharge instructions  Description: Complete learning assessment and assess knowledge base    Interventions:  - Provide teaching at level of understanding  - Provide teaching via preferred learning methods  Outcome: Progressing     Problem: Potential for Falls  Goal: Patient will remain free of falls  Description: INTERVENTIONS:  - Educate patient/family on patient safety including physical limitations  - Instruct patient to call for assistance with activity   - Consult OT/PT to assist with strengthening/mobility   - Keep Call bell within reach  - Keep bed low and locked with side rails adjusted as appropriate  - Keep care items and personal belongings within reach  - Initiate and maintain comfort rounds  - Make Fall Risk Sign visible to staff  - Apply yellow socks and bracelet for high fall risk patients  - Consider moving patient to room near nurses station  Outcome: Progressing     Problem: Nutrition/Hydration-ADULT  Goal: Nutrient/Hydration intake appropriate for improving, restoring or maintaining nutritional needs  Description: Monitor and assess patient's nutrition/hydration status for malnutrition  Collaborate with interdisciplinary team and initiate plan and interventions as ordered  Monitor patient's weight and dietary intake as ordered or per policy  Utilize nutrition screening tool and intervene as necessary  Determine patient's food preferences and provide high-protein, high-caloric foods as appropriate       INTERVENTIONS:  - Monitor oral intake, urinary output, labs, and treatment plans  - Assess nutrition and hydration status and recommend course of action  - Evaluate amount of meals eaten  - Assist patient with eating if necessary   - Allow adequate time for meals  - Recommend/ encourage appropriate diets, oral nutritional supplements, and vitamin/mineral supplements  - Order, calculate, and assess calorie counts as needed  - Recommend, monitor, and adjust tube feedings and TPN/PPN based on assessed needs  - Assess need for intravenous fluids  - Provide specific nutrition/hydration education as appropriate  - Include patient/family/caregiver in decisions related to nutrition  Outcome: Progressing

## 2022-05-31 NOTE — PROGRESS NOTES
[facility]  Progress Note - Nuno Lacey 1945, 78 y o  female MRN: [id number]  Unit/Bed#: E5 -01 Encounter: [id number]  Primary Care Provider: Kirstin Ng DO   Date and time admitted to hospital: 5/29/2022  8:08 PM    * Pancreatic mass  Assessment & Plan  · Presenting with complaint of abdominal pain  Was followed by GI as an outpt for ongoing diarrhea, nausea, weight loss for the past few months  She was scheduled undergo EGD and colonoscopy as an outpatient however presented to the ER with worsening abdominal pain  · Ct abdomen/pelvis:  Large mass in pancreatic head concerning for malignancy, multiple hepatic masses suspicious for metastasis, suspected retroperitoneal lymphadenopathy concerning for metastasis, new small pulmonary nodules at lung bases suspicious for metastasis, small to moderate pericardial effusion increased, malignant effusion not excluded  Further staging may include CT chest or PET-CT  · High suspicions for newly diagnosed metastatic pancreatic mass with liver lesions, lung nodule and pericardial effusion  · CEA elevated at 18 8, CA 19-9 pending   · MRI of the abdomen and MRCP for further characterization of mass pending to be done as IP    PLAN: according to specialists consulted  Interventional radiology consulted for biopsy for definitive tissue diagnosis -unfortunately patient was given aspirin -supposed to be held for 5 days prior to a biopsy, IR recommends discharge with outpatient biopsy of liver to be scheduled    Seen and evaluated by GI: outpatient EGD and colonoscopy are planned in the outpatient setting  MRCP pending as IP    Seen and evaluated by Oncology/Hematology: patient needs tissue diagnosis from biopsy  Appears this will have to occur as an outpatient as patient was given dose of aspirin here  Will need outpatient follow-up with Oncology/Hematology in 1 week status post discharge    Also will need genetic counseling and testing if diagnosis of pancreatic cancer has proven    Cardiology consult :  Patient has moderate pericardial effusion on CT scan  She has a small pericardial effusion in March 2022  Echo pending  If it is a malignant pericardial infusion, she has high mortality in the next 1-2 months      Severe protein-calorie malnutrition (HCC)  Assessment & Plan  Malnutrition Findings:   Adult Malnutrition type: Acute illness  Adult Degree of Malnutrition: Other severe protein calorie malnutrition  Malnutrition Characteristics: Muscle loss, Weight loss                 360 Statement: Severe muscle mass depletion as evidenced by prominent clavicles  Pt also with 5 7% wt loss noted x 30 days which is clinically significant, treated with ensure pudding BID  BMI Findings: Body mass index is 19 03 kg/m²  patient is complaining of weight loss and os putting her on low-fat diet    I explained to her the reason she was placed on low-fat diet is not to upset her abdomen  She says she tried dietary restrictions at home and nothing improved her abdominal discomfort and nausea  Agreed to put her on a regular diet and she will avoid high fat food        Anemia  Assessment & Plan  Results from last 7 days   Lab Units 05/31/22  0558 05/30/22  0442 05/29/22  2045   HEMOGLOBIN g/dL 10 9* 10 5* 11 0*   · With anemia and noted to have heme-positive stool as an outpatient  · Likely secondary to malignancy  · Seen and evaluated by GI who plans to do EGD and colonoscopy in the outpatient setting  · No signs of active bleeding    Pericardial effusion  Assessment & Plan  · Noted to have moderate pericardial effusion on CT imaging  · No evidence of tamponade physiology noted on imaging  · Echocardiogram pending  · Currently undergoing malignant workup  · If the pericardial effusion is malignant, high rate more tablet in the next 1-2 months    Protein-calorie malnutrition, unspecified severity ([facility] [address] )  Assessment & Plan  Malnutrition Findings:   Adult Malnutrition type: Acute illness  Adult Degree of Malnutrition: Other severe protein calorie malnutrition  Malnutrition Characteristics: Muscle loss, Weight loss  360 Statement: Severe muscle mass depletion as evidenced by prominent clavicles  Pt also with 5 7% wt loss noted x 30 days which is clinically significant, treated with ensure pudding BID  BMI Findings: Body mass index is 19 03 kg/m²  Nutrition consultation while admitted  Has had weight loss persistent and currently being workup up        VTE Pharmacologic Prophylaxis: VTE Score: 6 High Risk (Score >/= 5) - Pharmacological DVT Prophylaxis Ordered: enoxaparin (Lovenox)  Sequential Compression Devices Ordered  Patient Centered Rounds: I performed bedside rounds with nursing staff today  Discussions with Specialists or Other Care Team Provider:  Nursing, case management    Education and Discussions with Family / Patient: Updated  () at bedside  Time Spent for Care: 30 minutes  More than 50% of total time spent on counseling and coordination of care as described above  Current Length of Stay: 2 day(s)  Current Patient Status: Inpatient   Certification Statement: The patient will continue to require additional inpatient hospital stay due to Pancreatic mass  Discharge Plan: Anticipate discharge in 48-72 hrs to home  Code Status: Level 1 - Full Code    Subjective:   Patient was seen evaluated bedside  She is quite concerned about her current situation  She would like her diet to be changed to regular diet since she continues to lose weight  Objective:     Vitals:   Temp (24hrs), Av 2 °F (36 8 °C), Min:97 8 °F (36 6 °C), Max:98 5 °F (36 9 °C)    Temp:  [97 8 °F (36 6 °C)-98 5 °F (36 9 °C)] 97 8 °F (36 6 °C)  HR:  [70-86] 71  Resp:  [18] 18  BP: (108-121)/(56-66) 115/66  SpO2:  [96 %-98 %] 96 %  Body mass index is 19 03 kg/m²       Input and Output Summary (last 24 hours):   No intake or output data in the 24 hours ending 05/31/22 1424    Physical Exam:   Physical Exam  Constitutional:       General: She is not in acute distress  HENT:      Head: Atraumatic  Cardiovascular:      Rate and Rhythm: Normal rate and regular rhythm  Heart sounds: No murmur heard  No friction rub  No gallop  Pulmonary:      Effort: Pulmonary effort is normal  No respiratory distress  Breath sounds: Normal breath sounds  No wheezing  Abdominal:      General: Bowel sounds are normal  There is no distension  Palpations: Abdomen is soft  Comments: Mild tenderness on palpation   Musculoskeletal:      Cervical back: Neck supple  Skin:     General: Skin is warm and dry  Neurological:      General: No focal deficit present  Mental Status: She is alert  Psychiatric:      Comments: Sad          Additional Data:     Labs:  Results from last 7 days   Lab Units 05/31/22  0558 05/30/22  0442 05/29/22  2045   WBC Thousand/uL 7 11   < > 8 97   HEMOGLOBIN g/dL 10 9*   < > 11 0*   HEMATOCRIT % 33 7*   < > 33 8*   PLATELETS Thousands/uL 252   < > 292   NEUTROS PCT %  --   --  73   LYMPHS PCT %  --   --  16   MONOS PCT %  --   --  7   EOS PCT %  --   --  3    < > = values in this interval not displayed       Results from last 7 days   Lab Units 05/31/22  0558   SODIUM mmol/L 139   POTASSIUM mmol/L 3 8   CHLORIDE mmol/L 104   CO2 mmol/L 28   BUN mg/dL 9   CREATININE mg/dL 0 87   ANION GAP mmol/L 7   CALCIUM mg/dL 7 8*   ALBUMIN g/dL 2 4*   TOTAL BILIRUBIN mg/dL 0 24   ALK PHOS U/L 157*   ALT U/L 43   AST U/L 56*   GLUCOSE RANDOM mg/dL 102                 Results from last 7 days   Lab Units 05/29/22  2045   LACTIC ACID mmol/L 0 6       Lines/Drains:  Invasive Devices  Report    Peripheral Intravenous Line  Duration           Peripheral IV 05/29/22 Left Antecubital 1 day                      Imaging:   CT abdomen pelvis wo contrast   Final Result by Bernard Wick MD (05/29 2148)   Exam is limited without IV and oral contrast particularly for soft tissue and bowel evaluation  1   Large mass in the right upper quadrant likely arising from the pancreatic head concerning for malignancy  This could be further assessed with dedicated MRI of the abdomen with and without IV contrast    2  Multiple hepatic masses suspicious for metastasis  3  Suspected retroperitoneal lymphadenopathy concerning for metastasis  4  New small pulmonary nodules at the lung bases suspicious for metastasis  Further staging may include chest CT or FDG PET/CT  5  Small to moderate-sized pericardial effusion, increased, malignant effusion not excluded  I personally discussed this study with Hillary Guillensk on 5/29/2022 at 9:46 PM                                   Workstation performed: [id number]         XR chest 1 view portable   Final Result by Hai Irving MD (05/30 1102)      No acute cardiopulmonary disease                    Workstation performed: [id number]         MRI inpatient order    (Results Pending)       Recent Cultures (last 7 days):         Last 24 Hours Medication List:   Current Facility-Administered Medications   Medication Dose Route Frequency Provider Last Rate    acetaminophen  650 mg Oral Q6H PRN Van Soliz, DO      aluminum-magnesium hydroxide-simethicone  30 mL Oral Q6H PRN Alejandrina Romain, DO      calcium carbonate  1,000 mg Oral Daily PRN Alejandrina Romain, DO      enoxaparin  40 mg Subcutaneous Daily Van Soliz, DO      HYDROmorphone  0 2 mg Intravenous Q4H PRN Alejandrina Romain, DO      naloxone  0 04 mg Intravenous Q1MIN PRN Alejandrina Romain, DO      ondansetron  4 mg Intravenous Q4H PRN Alejandrina Romain, DO      oxyCODONE  2 5 mg Oral Q4H PRN Alejandrina Romain, DO      oxyCODONE  5 mg Oral Q4H PRN Alejandrina Romain, DO      senna-docusate sodium  1 tablet Oral BID Van Soliz, DO      simethicone  80 mg Oral 4x Daily PRN Alejandrina Romain, DO          Today, Patient Was Seen By: González Alston MD    **Please Note: This note may have been constructed using a voice recognition system  **

## 2022-05-31 NOTE — ASSESSMENT & PLAN NOTE
Malnutrition Findings:   Adult Malnutrition type: Acute illness  Adult Degree of Malnutrition: Other severe protein calorie malnutrition  Malnutrition Characteristics: Muscle loss, Weight loss                 360 Statement: Severe muscle mass depletion as evidenced by prominent clavicles  Pt also with 5 7% wt loss noted x 30 days which is clinically significant, treated with ensure pudding BID  BMI Findings: Body mass index is 19 03 kg/m²  patient is complaining of weight loss and os putting her on low-fat diet    I explained to her the reason she was placed on low-fat diet is not to upset her abdomen  She says she tried dietary restrictions at home and nothing improved her abdominal discomfort and nausea  Agreed to put her on a regular diet and she will avoid high fat food

## 2022-05-31 NOTE — ASSESSMENT & PLAN NOTE
· Noted to have moderate pericardial effusion on CT imaging  · No evidence of tamponade physiology noted on imaging  · Echocardiogram pending  · Currently undergoing malignant workup  · If the pericardial effusion is malignant, high rate more tablet in the next 1-2 months

## 2022-05-31 NOTE — ASSESSMENT & PLAN NOTE
Results from last 7 days   Lab Units 05/31/22  0558 05/30/22  0442 05/29/22 2045   HEMOGLOBIN g/dL 10 9* 10 5* 11 0*   · With anemia and noted to have heme-positive stool as an outpatient  · Likely secondary to malignancy  · Seen and evaluated by GI who plans to do EGD and colonoscopy in the outpatient setting  · No signs of active bleeding

## 2022-05-31 NOTE — ASSESSMENT & PLAN NOTE
Malnutrition Findings:   Adult Malnutrition type: Acute illness  Adult Degree of Malnutrition: Other severe protein calorie malnutrition  Malnutrition Characteristics: Muscle loss, Weight loss  360 Statement: Severe muscle mass depletion as evidenced by prominent clavicles  Pt also with 5 7% wt loss noted x 30 days which is clinically significant, treated with ensure pudding BID  BMI Findings: Body mass index is 19 03 kg/m²     Nutrition consultation while admitted  Has had weight loss persistent and currently being workup up

## 2022-05-31 NOTE — ASSESSMENT & PLAN NOTE
· Presenting with complaint of abdominal pain  Was followed by GI as an outpt for ongoing diarrhea, nausea, weight loss for the past few months  She was scheduled undergo EGD and colonoscopy as an outpatient however presented to the ER with worsening abdominal pain  · Ct abdomen/pelvis:  Large mass in pancreatic head concerning for malignancy, multiple hepatic masses suspicious for metastasis, suspected retroperitoneal lymphadenopathy concerning for metastasis, new small pulmonary nodules at lung bases suspicious for metastasis, small to moderate pericardial effusion increased, malignant effusion not excluded  Further staging may include CT chest or PET-CT  · High suspicions for newly diagnosed metastatic pancreatic mass with liver lesions, lung nodule and pericardial effusion  · CEA elevated at 18 8, CA 19-9 pending   · MRI of the abdomen and MRCP for further characterization of mass pending to be done as IP    PLAN: according to specialists consulted  Interventional radiology consulted for biopsy for definitive tissue diagnosis -unfortunately patient was given aspirin -supposed to be held for 5 days prior to a biopsy, IR recommends discharge with outpatient biopsy of liver to be scheduled    Seen and evaluated by GI: outpatient EGD and colonoscopy are planned in the outpatient setting  MRCP pending as IP    Seen and evaluated by Oncology/Hematology: patient needs tissue diagnosis from biopsy  Appears this will have to occur as an outpatient as patient was given dose of aspirin here  Will need outpatient follow-up with Oncology/Hematology in 1 week status post discharge  Also will need genetic counseling and testing if diagnosis of pancreatic cancer has proven    Cardiology consult :  Patient has moderate pericardial effusion on CT scan  She has a small pericardial effusion in March 2022  Echo pending    If it is a malignant pericardial infusion, she has high mortality in the next 1-2 months

## 2022-06-01 ENCOUNTER — APPOINTMENT (INPATIENT)
Dept: MRI IMAGING | Facility: HOSPITAL | Age: 77
DRG: 435 | End: 2022-06-01
Payer: COMMERCIAL

## 2022-06-01 ENCOUNTER — APPOINTMENT (INPATIENT)
Dept: NON INVASIVE DIAGNOSTICS | Facility: HOSPITAL | Age: 77
DRG: 435 | End: 2022-06-01
Payer: COMMERCIAL

## 2022-06-01 LAB
ANION GAP SERPL CALCULATED.3IONS-SCNC: 7 MMOL/L (ref 4–13)
APICAL FOUR CHAMBER EJECTION FRACTION: 63 %
AV REGURGITATION PRESSURE HALF TIME: 532 MS
BASOPHILS # BLD AUTO: 0.06 THOUSANDS/ΜL (ref 0–0.1)
BASOPHILS NFR BLD AUTO: 1 % (ref 0–1)
BUN SERPL-MCNC: 12 MG/DL (ref 5–25)
CALCIUM SERPL-MCNC: 8.2 MG/DL (ref 8.3–10.1)
CHLORIDE SERPL-SCNC: 105 MMOL/L (ref 100–108)
CO2 SERPL-SCNC: 28 MMOL/L (ref 21–32)
CREAT SERPL-MCNC: 0.84 MG/DL (ref 0.6–1.3)
EOSINOPHIL # BLD AUTO: 0.33 THOUSAND/ΜL (ref 0–0.61)
EOSINOPHIL NFR BLD AUTO: 5 % (ref 0–6)
ERYTHROCYTE [DISTWIDTH] IN BLOOD BY AUTOMATED COUNT: 13.7 % (ref 11.6–15.1)
GFR SERPL CREATININE-BSD FRML MDRD: 68 ML/MIN/1.73SQ M
GLOBAL LONGITUIDAL STRAIN: -17 %
GLUCOSE SERPL-MCNC: 107 MG/DL (ref 65–140)
HCT VFR BLD AUTO: 32.5 % (ref 34.8–46.1)
HGB BLD-MCNC: 10.5 G/DL (ref 11.5–15.4)
IMM GRANULOCYTES # BLD AUTO: 0.03 THOUSAND/UL (ref 0–0.2)
IMM GRANULOCYTES NFR BLD AUTO: 0 % (ref 0–2)
LYMPHOCYTES # BLD AUTO: 0.85 THOUSANDS/ΜL (ref 0.6–4.47)
LYMPHOCYTES NFR BLD AUTO: 12 % (ref 14–44)
MAGNESIUM SERPL-MCNC: 2 MG/DL (ref 1.6–2.6)
MCH RBC QN AUTO: 30.9 PG (ref 26.8–34.3)
MCHC RBC AUTO-ENTMCNC: 32.3 G/DL (ref 31.4–37.4)
MCV RBC AUTO: 96 FL (ref 82–98)
MONOCYTES # BLD AUTO: 0.34 THOUSAND/ΜL (ref 0.17–1.22)
MONOCYTES NFR BLD AUTO: 5 % (ref 4–12)
MV E'TISSUE VEL-SEP: 10 CM/S
NEUTROPHILS # BLD AUTO: 5.25 THOUSANDS/ΜL (ref 1.85–7.62)
NEUTS SEG NFR BLD AUTO: 77 % (ref 43–75)
NRBC BLD AUTO-RTO: 0 /100 WBCS
PLATELET # BLD AUTO: 269 THOUSANDS/UL (ref 149–390)
PMV BLD AUTO: 10.4 FL (ref 8.9–12.7)
POTASSIUM SERPL-SCNC: 3.8 MMOL/L (ref 3.5–5.3)
RA PRESSURE ESTIMATED: 3 MMHG
RBC # BLD AUTO: 3.4 MILLION/UL (ref 3.81–5.12)
SL CV AV DECELERATION TIME RETROGRADE: 1836 MS
SL CV AV PEAK GRADIENT RETROGRADE: 42 MMHG
SL CV LV EF: 63
SODIUM SERPL-SCNC: 140 MMOL/L (ref 136–145)
WBC # BLD AUTO: 6.86 THOUSAND/UL (ref 4.31–10.16)

## 2022-06-01 PROCEDURE — G1004 CDSM NDSC: HCPCS

## 2022-06-01 PROCEDURE — 99232 SBSQ HOSP IP/OBS MODERATE 35: CPT | Performed by: INTERNAL MEDICINE

## 2022-06-01 PROCEDURE — 93308 TTE F-UP OR LMTD: CPT | Performed by: INTERNAL MEDICINE

## 2022-06-01 PROCEDURE — A9585 GADOBUTROL INJECTION: HCPCS | Performed by: INTERNAL MEDICINE

## 2022-06-01 PROCEDURE — 93356 MYOCRD STRAIN IMG SPCKL TRCK: CPT | Performed by: INTERNAL MEDICINE

## 2022-06-01 PROCEDURE — 83735 ASSAY OF MAGNESIUM: CPT | Performed by: INTERNAL MEDICINE

## 2022-06-01 PROCEDURE — 80048 BASIC METABOLIC PNL TOTAL CA: CPT | Performed by: INTERNAL MEDICINE

## 2022-06-01 PROCEDURE — 93325 DOPPLER ECHO COLOR FLOW MAPG: CPT | Performed by: INTERNAL MEDICINE

## 2022-06-01 PROCEDURE — 93308 TTE F-UP OR LMTD: CPT

## 2022-06-01 PROCEDURE — 93325 DOPPLER ECHO COLOR FLOW MAPG: CPT

## 2022-06-01 PROCEDURE — 93321 DOPPLER ECHO F-UP/LMTD STD: CPT | Performed by: INTERNAL MEDICINE

## 2022-06-01 PROCEDURE — 85025 COMPLETE CBC W/AUTO DIFF WBC: CPT | Performed by: INTERNAL MEDICINE

## 2022-06-01 PROCEDURE — 93321 DOPPLER ECHO F-UP/LMTD STD: CPT

## 2022-06-01 PROCEDURE — 74183 MRI ABD W/O CNTR FLWD CNTR: CPT

## 2022-06-01 RX ADMIN — GADOBUTROL 5 ML: 604.72 INJECTION INTRAVENOUS at 19:42

## 2022-06-01 RX ADMIN — ACETAMINOPHEN 650 MG: 325 TABLET, FILM COATED ORAL at 09:37

## 2022-06-01 RX ADMIN — ENOXAPARIN SODIUM 40 MG: 40 INJECTION SUBCUTANEOUS at 09:31

## 2022-06-01 NOTE — ASSESSMENT & PLAN NOTE
· Presenting with complaint of abdominal pain  Was followed by GI as an outpt for ongoing diarrhea, nausea, weight loss  She was scheduled to undergo EGD and colonoscopy as an outpatient however presented to the ER with worsening abdominal pain  · Ct abdomen/pelvis:  Large mass in pancreatic head concerning for malignancy, multiple hepatic masses suspicious for metastasis, suspected retroperitoneal lymphadenopathy concerning for metastasis, new small pulmonary nodules at lung bases suspicious for metastasis, small to moderate pericardial effusion increased, malignant effusion not excluded  · High suspicions for newly diagnosed metastatic pancreatic mass with liver lesions, lung nodule and pericardial effusion  · CEA elevated at 18 8, CA 19-9 1371  · MRI of the abdomen and MRCP for further characterization of mass pending to be done as IP    PLAN: according to specialists consulted  Interventional radiology consulted for biopsy for definitive tissue diagnosis -unfortunately patient was given aspirin -supposed to be held for 5 days prior to a biopsy, IR recommends discharge with outpatient biopsy of liver to be scheduled    Seen and evaluated by GI: outpatient EGD and colonoscopy are planned  MRCP pending as IP    Seen and evaluated by Oncology/Hematology: patient needs tissue diagnosis from biopsy  Appears this will have to occur as an outpatient as patient was given dose of aspirin here  Will need outpatient follow-up with Oncology/Hematology in 1 week status post discharge  Also will need genetic counseling and testing if diagnosis of pancreatic cancer has proven    Cardiology consult :  Patient has moderate pericardial effusion on CT scan  She has a small pericardial effusion in March 2022 on echo  Repeat echo on this admission showed trace-small pericardial effusion, no evidence of tamponade, no significant change from previous echo    If it is a malignant pericardial effusion, she has high mortality in the next 1-2 months

## 2022-06-01 NOTE — ASSESSMENT & PLAN NOTE
Malnutrition Findings:   Adult Malnutrition type: Acute illness  Adult Degree of Malnutrition: Other severe protein calorie malnutrition  Malnutrition Characteristics: Muscle loss, Weight loss                 360 Statement: Severe muscle mass depletion as evidenced by prominent clavicles  Pt also with 5 7% wt loss noted x 30 days which is clinically significant, treated with ensure pudding BID  BMI Findings: Body mass index is 18 88 kg/m²  patient is complaining of weight loss and os putting her on low-fat diet    I explained to her the reason she was placed on low-fat diet is not to upset her abdomen  She says she tried dietary restrictions at home and nothing improved her abdominal discomfort and nausea  Agreed to put her on a regular diet and she will avoid high fat food

## 2022-06-01 NOTE — PROGRESS NOTES
[facility]  Progress Note - Shanita Lugo 1945, 78 y o  female MRN: [id number]  Unit/Bed#: E5 -01 Encounter: [id number]  Primary Care Provider: Claire Bobo DO   Date and time admitted to hospital: 5/29/2022  8:08 PM    * Pancreatic mass  Assessment & Plan  · Presenting with complaint of abdominal pain  Was followed by GI as an outpt for ongoing diarrhea, nausea, weight loss  She was scheduled to undergo EGD and colonoscopy as an outpatient however presented to the ER with worsening abdominal pain  · Ct abdomen/pelvis:  Large mass in pancreatic head concerning for malignancy, multiple hepatic masses suspicious for metastasis, suspected retroperitoneal lymphadenopathy concerning for metastasis, new small pulmonary nodules at lung bases suspicious for metastasis, small to moderate pericardial effusion increased, malignant effusion not excluded  · High suspicions for newly diagnosed metastatic pancreatic mass with liver lesions, lung nodule and pericardial effusion  · CEA elevated at 18 8, CA 19-9 1371  · MRI of the abdomen and MRCP for further characterization of mass pending to be done as IP    PLAN: according to specialists consulted  Interventional radiology consulted for biopsy for definitive tissue diagnosis -unfortunately patient was given aspirin -supposed to be held for 5 days prior to a biopsy, IR recommends discharge with outpatient biopsy of liver to be scheduled    Seen and evaluated by GI: outpatient EGD and colonoscopy are planned  MRCP pending as IP    Seen and evaluated by Oncology/Hematology: patient needs tissue diagnosis from biopsy  Appears this will have to occur as an outpatient as patient was given dose of aspirin here  Will need outpatient follow-up with Oncology/Hematology in 1 week status post discharge    Also will need genetic counseling and testing if diagnosis of pancreatic cancer has proven    Cardiology consult :  Patient has moderate pericardial effusion on CT scan  She has a small pericardial effusion in March 2022 on echo  Repeat echo on this admission showed trace-small pericardial effusion, no evidence of tamponade, no significant change from previous echo  If it is a malignant pericardial effusion, she has high mortality in the next 1-2 months      Severe protein-calorie malnutrition (HCC)  Assessment & Plan  Malnutrition Findings:   Adult Malnutrition type: Acute illness  Adult Degree of Malnutrition: Other severe protein calorie malnutrition  Malnutrition Characteristics: Muscle loss, Weight loss                 360 Statement: Severe muscle mass depletion as evidenced by prominent clavicles  Pt also with 5 7% wt loss noted x 30 days which is clinically significant, treated with ensure pudding BID  BMI Findings: Body mass index is 18 88 kg/m²  patient is complaining of weight loss and os putting her on low-fat diet  I explained to her the reason she was placed on low-fat diet is not to upset her abdomen  She says she tried dietary restrictions at home and nothing improved her abdominal discomfort and nausea  Agreed to put her on a regular diet and she will avoid high fat food        Anemia  Assessment & Plan  Results from last 7 days   Lab Units 06/01/22  0555 05/31/22  0558 05/30/22  0442 05/29/22 2045   HEMOGLOBIN g/dL 10 5* 10 9* 10 5* 11 0*   · With anemia and noted to have heme-positive stool as an outpatient  · Likely secondary to malignancy  · Seen and evaluated by GI who plans to do EGD and colonoscopy in the outpatient setting  · No signs of active bleeding    Pericardial effusion  Assessment & Plan  · Noted to have moderate pericardial effusion on CT imaging  · No evidence of tamponade physiology noted on imaging  · Echocardiogram showed trace/small pericardial effusion without evidence of tamponade    When compared to echo done 03/30/2022 no significant change  · Currently undergoing malignant workup  · If the pericardial effusion is malignant, high rate more tablet in the next 1-2 months    Protein-calorie malnutrition, unspecified severity (HCC)  Assessment & Plan  Malnutrition Findings:   Adult Malnutrition type: Acute illness  Adult Degree of Malnutrition: Other severe protein calorie malnutrition  Malnutrition Characteristics: Muscle loss, Weight loss  360 Statement: Severe muscle mass depletion as evidenced by prominent clavicles  Pt also with 5 7% wt loss noted x 30 days which is clinically significant, treated with ensure pudding BID  BMI Findings: Body mass index is 18 88 kg/m²  Nutrition consultation while admitted  Has had weight loss persistent and currently being workup up          VTE Pharmacologic Prophylaxis: VTE Score: 6 High Risk (Score >/= 5) - Pharmacological DVT Prophylaxis Ordered: enoxaparin (Lovenox)  Sequential Compression Devices Ordered  Patient Centered Rounds: I performed bedside rounds with nursing staff today  Discussions with Specialists or Other Care Team Provider:  Nursing, case management    Education and Discussions with Family / Patient: Updated  (daughter and mother) at bedside  Time Spent for Care: 30 minutes  More than 50% of total time spent on counseling and coordination of care as described above  Current Length of Stay: 3 day(s)  Current Patient Status: Inpatient   Certification Statement: The patient will continue to require additional inpatient hospital stay due to Pancreatic mass  Discharge Plan: Anticipate discharge in 24-48 hrs to home  Code Status: Level 1 - Full Code    Subjective:   Patient was seen and evaluated bedside    She is disappointed that still did not get the MRI done    Objective:     Vitals:   Temp (24hrs), Av °F (36 7 °C), Min:98 °F (36 7 °C), Max:98 °F (36 7 °C)    Temp:  [98 °F (36 7 °C)] 98 °F (36 7 °C)  HR:  [71-85] 71  Resp:  [18] 18  BP: ()/(48-59) 96/52  SpO2:  [96 %-97 %] 97 %  Body mass index is 18 88 kg/m²  Input and Output Summary (last 24 hours):   No intake or output data in the 24 hours ending 06/01/22 1711    Physical Exam:   Physical Exam  Constitutional:       General: She is not in acute distress  HENT:      Head: Atraumatic  Cardiovascular:      Rate and Rhythm: Normal rate and regular rhythm  Heart sounds: No murmur heard  No friction rub  No gallop  Pulmonary:      Effort: Pulmonary effort is normal  No respiratory distress  Breath sounds: Normal breath sounds  No wheezing  Abdominal:      General: Bowel sounds are normal  There is no distension  Palpations: Abdomen is soft  Tenderness: There is abdominal tenderness  Musculoskeletal:         General: No swelling  Cervical back: Neck supple  Skin:     General: Skin is warm and dry  Neurological:      General: No focal deficit present  Mental Status: She is alert     Psychiatric:         Mood and Affect: Mood normal         Additional Data:     Labs:  Results from last 7 days   Lab Units 06/01/22  0555   WBC Thousand/uL 6 86   HEMOGLOBIN g/dL 10 5*   HEMATOCRIT % 32 5*   PLATELETS Thousands/uL 269   NEUTROS PCT % 77*   LYMPHS PCT % 12*   MONOS PCT % 5   EOS PCT % 5     Results from last 7 days   Lab Units 06/01/22  0555 05/31/22  0558   SODIUM mmol/L 140 139   POTASSIUM mmol/L 3 8 3 8   CHLORIDE mmol/L 105 104   CO2 mmol/L 28 28   BUN mg/dL 12 9   CREATININE mg/dL 0 84 0 87   ANION GAP mmol/L 7 7   CALCIUM mg/dL 8 2* 7 8*   ALBUMIN g/dL  --  2 4*   TOTAL BILIRUBIN mg/dL  --  0 24   ALK PHOS U/L  --  157*   ALT U/L  --  43   AST U/L  --  56*   GLUCOSE RANDOM mg/dL 107 102                 Results from last 7 days   Lab Units 05/29/22  2045   LACTIC ACID mmol/L 0 6       Lines/Drains:  Invasive Devices  Report    Peripheral Intravenous Line  Duration           Peripheral IV 05/29/22 Left Antecubital 2 days                      Imaging:   CT abdomen pelvis wo contrast   Final Result by Rod Whitaker, MD (05/29 2148)   Exam is limited without IV and oral contrast particularly for soft tissue and bowel evaluation  1   Large mass in the right upper quadrant likely arising from the pancreatic head concerning for malignancy  This could be further assessed with dedicated MRI of the abdomen with and without IV contrast    2  Multiple hepatic masses suspicious for metastasis  3  Suspected retroperitoneal lymphadenopathy concerning for metastasis  4  New small pulmonary nodules at the lung bases suspicious for metastasis  Further staging may include chest CT or FDG PET/CT  5  Small to moderate-sized pericardial effusion, increased, malignant effusion not excluded  I personally discussed this study with Andrade Han on 5/29/2022 at 9:46 PM                                   Workstation performed: [id number]         XR chest 1 view portable   Final Result by Danielle Patrick MD (05/30 1102)      No acute cardiopulmonary disease                    Workstation performed: [id number]         MRI abdomen w wo contrast and mrcp    (Results Pending)       Recent Cultures (last 7 days):         Last 24 Hours Medication List:   Current Facility-Administered Medications   Medication Dose Route Frequency Provider Last Rate    acetaminophen  650 mg Oral Q6H PRN Van Soliz, DO      aluminum-magnesium hydroxide-simethicone  30 mL Oral Q6H PRN Cleave Bohr, DO      calcium carbonate  1,000 mg Oral Daily PRN Cleave Korinar, DO      enoxaparin  40 mg Subcutaneous Daily Vna Soliz, DO      HYDROmorphone  0 2 mg Intravenous Q4H PRN Cleave Korinar, DO      naloxone  0 04 mg Intravenous Q1MIN PRN Cleave Korinar, DO      ondansetron  4 mg Intravenous Q4H PRN Cleave Bohr, DO      oxyCODONE  2 5 mg Oral Q4H PRN Cleave Bohr, DO      oxyCODONE  5 mg Oral Q4H PRN Cleave Korinar, DO      senna-docusate sodium  1 tablet Oral BID Van Soliz, DO      simethicone  80 mg Oral 4x Daily PRN Cleave Korinar, DO Today, Patient Was Seen By: Ilda De Leon MD    **Please Note: This note may have been constructed using a voice recognition system  **

## 2022-06-01 NOTE — ASSESSMENT & PLAN NOTE
· Noted to have moderate pericardial effusion on CT imaging  · No evidence of tamponade physiology noted on imaging  · Echocardiogram showed trace/small pericardial effusion without evidence of tamponade    When compared to echo done 03/30/2022 no significant change  · Currently undergoing malignant workup  · If the pericardial effusion is malignant, high rate more tablet in the next 1-2 months

## 2022-06-01 NOTE — ASSESSMENT & PLAN NOTE
Malnutrition Findings:   Adult Malnutrition type: Acute illness  Adult Degree of Malnutrition: Other severe protein calorie malnutrition  Malnutrition Characteristics: Muscle loss, Weight loss  360 Statement: Severe muscle mass depletion as evidenced by prominent clavicles  Pt also with 5 7% wt loss noted x 30 days which is clinically significant, treated with ensure pudding BID  BMI Findings: Body mass index is 18 88 kg/m²     Nutrition consultation while admitted  Has had weight loss persistent and currently being workup up

## 2022-06-01 NOTE — ASSESSMENT & PLAN NOTE
Results from last 7 days   Lab Units 06/01/22  0555 05/31/22  0558 05/30/22  0442 05/29/22 2045   HEMOGLOBIN g/dL 10 5* 10 9* 10 5* 11 0*   · With anemia and noted to have heme-positive stool as an outpatient  · Likely secondary to malignancy  · Seen and evaluated by GI who plans to do EGD and colonoscopy in the outpatient setting  · No signs of active bleeding

## 2022-06-01 NOTE — PLAN OF CARE
Problem: PAIN - ADULT  Goal: Verbalizes/displays adequate comfort level or baseline comfort level  Description: Interventions:  - Encourage patient to monitor pain and request assistance  - Assess pain using appropriate pain scale  - Administer analgesics based on type and severity of pain and evaluate response  - Implement non-pharmacological measures as appropriate and evaluate response  - Consider cultural and social influences on pain and pain management  - Notify physician/advanced practitioner if interventions unsuccessful or patient reports new pain  Outcome: Progressing     Problem: INFECTION - ADULT  Goal: Absence or prevention of progression during hospitalization  Description: INTERVENTIONS:  - Assess and monitor for signs and symptoms of infection  - Monitor lab/diagnostic results  - Monitor all insertion sites, i e  indwelling lines, tubes, and drains  - Monitor endotracheal if appropriate and nasal secretions for changes in amount and color  - [place] appropriate cooling/warming therapies per order  - Administer medications as ordered  - Instruct and encourage patient and family to use good hand hygiene technique  - Identify and instruct in appropriate isolation precautions for identified infection/condition  Outcome: Progressing     Problem: SAFETY ADULT  Goal: Patient will remain free of falls  Description: INTERVENTIONS:  - Educate patient/family on patient safety including physical limitations  - Instruct patient to call for assistance with activity   - Consult OT/PT to assist with strengthening/mobility   - Keep Call bell within reach  - Keep bed low and locked with side rails adjusted as appropriate  - Keep care items and personal belongings within reach  - Initiate and maintain comfort rounds  - Make Fall Risk Sign visible to staff  - Apply yellow socks and bracelet for high fall risk patients  - Consider moving patient to room near nurses station  Outcome: Progressing  Goal: Maintain or return to baseline ADL function  Description: INTERVENTIONS:  -  Assess patient's ability to carry out ADLs; assess patient's baseline for ADL function and identify physical deficits which impact ability to perform ADLs (bathing, care of mouth/teeth, toileting, grooming, dressing, etc )  - Assess/evaluate cause of self-care deficits   - Assess range of motion  - Assess patient's mobility; develop plan if impaired  - Assess patient's need for assistive devices and provide as appropriate  - Encourage maximum independence but intervene and supervise when necessary  - Involve family in performance of ADLs  - Assess for home care needs following discharge   - Consider OT consult to assist with ADL evaluation and planning for discharge  - Provide patient education as appropriate  Outcome: Progressing     Problem: DISCHARGE PLANNING  Goal: Discharge to home or other facility with appropriate resources  Description: INTERVENTIONS:  - Identify barriers to discharge w/patient and caregiver  - Arrange for needed discharge resources and transportation as appropriate  - Identify discharge learning needs (meds, wound care, etc )  - Arrange for interpretive services to assist at discharge as needed  - Refer to Case Management Department for coordinating discharge planning if the patient needs post-hospital services based on physician/advanced practitioner order or complex needs related to functional status, cognitive ability, or social support system  Outcome: Progressing     Problem: Knowledge Deficit  Goal: Patient/family/caregiver demonstrates understanding of disease process, treatment plan, medications, and discharge instructions  Description: Complete learning assessment and assess knowledge base    Interventions:  - Provide teaching at level of understanding  - Provide teaching via preferred learning methods  Outcome: Progressing     Problem: Potential for Falls  Goal: Patient will remain free of falls  Description: INTERVENTIONS:  - Educate patient/family on patient safety including physical limitations  - Instruct patient to call for assistance with activity   - Consult OT/PT to assist with strengthening/mobility   - Keep Call bell within reach  - Keep bed low and locked with side rails adjusted as appropriate  - Keep care items and personal belongings within reach  - Initiate and maintain comfort rounds  - Make Fall Risk Sign visible to staff  - Apply yellow socks and bracelet for high fall risk patients  - Consider moving patient to room near nurses station  Outcome: Progressing     Problem: Nutrition/Hydration-ADULT  Goal: Nutrient/Hydration intake appropriate for improving, restoring or maintaining nutritional needs  Description: Monitor and assess patient's nutrition/hydration status for malnutrition  Collaborate with interdisciplinary team and initiate plan and interventions as ordered  Monitor patient's weight and dietary intake as ordered or per policy  Utilize nutrition screening tool and intervene as necessary  Determine patient's food preferences and provide high-protein, high-caloric foods as appropriate       INTERVENTIONS:  - Monitor oral intake, urinary output, labs, and treatment plans  - Assess nutrition and hydration status and recommend course of action  - Evaluate amount of meals eaten  - Assist patient with eating if necessary   - Allow adequate time for meals  - Recommend/ encourage appropriate diets, oral nutritional supplements, and vitamin/mineral supplements  - Order, calculate, and assess calorie counts as needed  - Recommend, monitor, and adjust tube feedings and TPN/PPN based on assessed needs  - Assess need for intravenous fluids  - Provide specific nutrition/hydration education as appropriate  - Include patient/family/caregiver in decisions related to nutrition  Outcome: Progressing

## 2022-06-02 VITALS
OXYGEN SATURATION: 96 % | BODY MASS INDEX: 18.93 KG/M2 | HEIGHT: 64 IN | WEIGHT: 110.89 LBS | TEMPERATURE: 98 F | HEART RATE: 83 BPM | SYSTOLIC BLOOD PRESSURE: 93 MMHG | DIASTOLIC BLOOD PRESSURE: 60 MMHG | RESPIRATION RATE: 17 BRPM

## 2022-06-02 LAB
ALBUMIN SERPL BCP-MCNC: 2.2 G/DL (ref 3.5–5)
ALP SERPL-CCNC: 153 U/L (ref 46–116)
ALT SERPL W P-5'-P-CCNC: 37 U/L (ref 12–78)
ANION GAP SERPL CALCULATED.3IONS-SCNC: 7 MMOL/L (ref 4–13)
AST SERPL W P-5'-P-CCNC: 48 U/L (ref 5–45)
BASOPHILS # BLD AUTO: 0.07 THOUSANDS/ΜL (ref 0–0.1)
BASOPHILS NFR BLD AUTO: 1 % (ref 0–1)
BILIRUB SERPL-MCNC: 0.11 MG/DL (ref 0.2–1)
BUN SERPL-MCNC: 14 MG/DL (ref 5–25)
CALCIUM ALBUM COR SERPL-MCNC: 9.5 MG/DL (ref 8.3–10.1)
CALCIUM SERPL-MCNC: 8.1 MG/DL (ref 8.3–10.1)
CHLORIDE SERPL-SCNC: 104 MMOL/L (ref 100–108)
CO2 SERPL-SCNC: 29 MMOL/L (ref 21–32)
CREAT SERPL-MCNC: 0.92 MG/DL (ref 0.6–1.3)
EOSINOPHIL # BLD AUTO: 0.4 THOUSAND/ΜL (ref 0–0.61)
EOSINOPHIL NFR BLD AUTO: 6 % (ref 0–6)
ERYTHROCYTE [DISTWIDTH] IN BLOOD BY AUTOMATED COUNT: 13.4 % (ref 11.6–15.1)
GFR SERPL CREATININE-BSD FRML MDRD: 61 ML/MIN/1.73SQ M
GLUCOSE SERPL-MCNC: 101 MG/DL (ref 65–140)
HCT VFR BLD AUTO: 30.2 % (ref 34.8–46.1)
HGB BLD-MCNC: 9.7 G/DL (ref 11.5–15.4)
IMM GRANULOCYTES # BLD AUTO: 0.03 THOUSAND/UL (ref 0–0.2)
IMM GRANULOCYTES NFR BLD AUTO: 0 % (ref 0–2)
LYMPHOCYTES # BLD AUTO: 1.41 THOUSANDS/ΜL (ref 0.6–4.47)
LYMPHOCYTES NFR BLD AUTO: 20 % (ref 14–44)
MCH RBC QN AUTO: 30.3 PG (ref 26.8–34.3)
MCHC RBC AUTO-ENTMCNC: 32.1 G/DL (ref 31.4–37.4)
MCV RBC AUTO: 94 FL (ref 82–98)
MONOCYTES # BLD AUTO: 0.41 THOUSAND/ΜL (ref 0.17–1.22)
MONOCYTES NFR BLD AUTO: 6 % (ref 4–12)
NEUTROPHILS # BLD AUTO: 4.73 THOUSANDS/ΜL (ref 1.85–7.62)
NEUTS SEG NFR BLD AUTO: 67 % (ref 43–75)
NRBC BLD AUTO-RTO: 0 /100 WBCS
PLATELET # BLD AUTO: 261 THOUSANDS/UL (ref 149–390)
PMV BLD AUTO: 10.7 FL (ref 8.9–12.7)
POTASSIUM SERPL-SCNC: 3.9 MMOL/L (ref 3.5–5.3)
PROT SERPL-MCNC: 5.8 G/DL (ref 6.4–8.2)
RBC # BLD AUTO: 3.2 MILLION/UL (ref 3.81–5.12)
SODIUM SERPL-SCNC: 140 MMOL/L (ref 136–145)
WBC # BLD AUTO: 7.05 THOUSAND/UL (ref 4.31–10.16)

## 2022-06-02 PROCEDURE — 99232 SBSQ HOSP IP/OBS MODERATE 35: CPT | Performed by: INTERNAL MEDICINE

## 2022-06-02 PROCEDURE — NC001 PR NO CHARGE: Performed by: RADIOLOGY

## 2022-06-02 PROCEDURE — 80053 COMPREHEN METABOLIC PANEL: CPT | Performed by: INTERNAL MEDICINE

## 2022-06-02 PROCEDURE — 99239 HOSP IP/OBS DSCHRG MGMT >30: CPT | Performed by: INTERNAL MEDICINE

## 2022-06-02 PROCEDURE — 85025 COMPLETE CBC W/AUTO DIFF WBC: CPT | Performed by: INTERNAL MEDICINE

## 2022-06-02 RX ORDER — OXYCODONE HYDROCHLORIDE 5 MG/1
2.5 TABLET ORAL EVERY 6 HOURS PRN
Qty: 15 TABLET | Refills: 0 | Status: SHIPPED | OUTPATIENT
Start: 2022-06-02 | End: 2022-07-29

## 2022-06-02 RX ADMIN — ENOXAPARIN SODIUM 40 MG: 40 INJECTION SUBCUTANEOUS at 08:24

## 2022-06-02 NOTE — PROGRESS NOTES
CARDIOLOGY INPATIENT FOLLOW-UP PROGRESS NOTE  *-*-*-*-*-*-*-*-*-*-*-*-*-*-*-*-*-*-*-*-*-*-*-*-*-*-*-*-*-*-*-*-*-*-*-*-*-*-*-*-*-*-*-*-*-*-*-*-*-*-*-*-*-*-  [id number] DATE: 06/02/22 10:55 AM   AUTHOR: Hannah Ramirez MD  PATIENT: Aleksandra Rios   1945    [id number]   61 y o    female  INPATIENT HOSPITALIST PHYSICIAN: Selina Mccauley DO  DATE OF ADMISSION: 5/29/2022  8:08 PM; LENGTH OF STAY: 4 days  *-*-*-*-*-*-*-*-*-*-*-*-*-*-*-*-*-*-*-*-*-*-*-*-*-*-*-*-*-*-*-*-*-*-*-*-*-*-*-*-*-*-*-*-*-*-*-*-*-*-*-*-*-*-    CARDIOLOGY ASSESSMENT:  1  Trace to small, hemodynamically insignificant pericardial effusion  2  Pancreatic mass 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 with meta stasis to liver, retroperitoneal lymph node and lung  3  Severe protein calorie malnutrition  4  Anemia  5  Family history of premature CAD    *-*-*-*-*-*-*-*-*-*-*-*-*-*-*-*-*-*-*-*-*-*-*-*-*-*-*-*-*-*-*-*-*-*-*-*-*-*-*-*-*-*-*-*-*-*-*-*-*-*-*-*-*-*-    CURRENT CARDIAC MEDICATIONS:  Not on cardiac specific medications    PERTINENT LABS AND OTHER INVESTIGATIONS:  Normal renal function and electrolytes  Decrease calcium at 8 1, elevated AST at 48, elevated alk-phos at 1:53 a m , decreased albumin and total protein  Decreased iron stores, elevated C-reactive protein  Markedly elevated elevated CA 19 9 at 1371, elevated carcinoembryonic antigen at 18 8, elevated AFP tumor marker at 45  4   t  ECHOCARDIOGRAM AND OTHER CARDIAC TESTS RESULTS:  Limited echocardiogram yesterday significant for normal left ventricular size and systolic function, EF around 85%, normal diastolic function and global longitudinal strain, normal right ventricular size and function, normal left and right atrial size, trace aortic valve regurgitation and mitral valve regurgitation, trace tricuspid and pulmonic valve regurgitation, no obvious pulmonary hypertension, trace to small pericardial effusion without evidence of hemodynamic compromise    He is had such should    *-*-*-*-*-*-*-*-*-*-*-*-*-*-*-*-*-*-*-*-*-*-*-*-*-*-*-*-*-*-*-*-*-*-*-*-*-*-*-*-*-*-*-*-*-*-*-*-*-*-*-*-*-*-    Patient has been overall stable from cardiac perspective  Personally reviewed the echocardiogram performed yesterday and her pericardial effusion is trace to small only  It does not appear to be a malignant effusion by its characteristics  PLAN:  -- no further cardiac testing is advised at this time  -- patient may undergo planned biopsy of the suspected malignant mass  Aspirin can be held as needed  -- encouraged patient to stay well hydrated and increased protein intake diet  Cardiology will sign off and be available to follow as needed  *-*-*-*-*-*-*-*-*-*-*-*-*-*-*-*-*-*-*-*-*-*-*-*-*-*-*-*-*-*-*-*-*-*-*-*-*-*-*-*-*-*-*-*-*-*-*-*-*-*-*-*-*-*-  INTERVAL CHANGES / HISTORY OF PRESENT ILLNESS:   No acute events in the recent days  Patient reports some abdominal discomfort and ongoing diarrhea with 2-3 bowel movements a day  Denies any nausea vomiting  Is wondering about the next step in further evaluation and management of her pancreatic mass  Is able to ambulate without any dizziness lightheadedness shortness of breath      *-*-*-*-*-*-*-*-*-*-*-*-*-*-*-*-*-*-*-*-*-*-*-*-*-*-*-*-*-*-*-*-*-*-*-*-*-*-*-*-*-*-*-*-*-*-*-*-*-*-*-*-*-*    PERTINENT REVIEW OF SYMPTOMS:  As Noted above    *-*-*-*-*-*-*-*-*-*-*-*-*-*-*-*-*-*-*-*-*-*-*-*-*-*-*-*-*-*-*-*-*-*-*-*-*-*-*-*-*-*-*-*-*-*-*-*-*-*-*-*-*-*-  VITAL SIGNS:  Vitals:    22 1425 22 2313 22 0547 22 0800   BP: 96/52 106/60  111/54   Pulse: 71 73  77   Resp:  18     Temp: 98 °F (36 7 °C)   97 9 °F (36 6 °C)   TempSrc:       SpO2: 97% 95%  97%   Weight:   50 3 kg (110 lb 14 3 oz)    Height:          Temp (24hrs), Av °F (36 7 °C), Min:97 9 °F (36 6 °C), Max:98 °F (36 7 °C)  Current: Temperature: 97 9 °F (36 6 °C)    Weight    22 0600 22 0558 22 0600   Weight: 54 kg (119 lb 0 8 oz) 54 3 kg (119 lb 11 4 oz) 50 3 kg (110 lb 14 3 oz) 50 kg (110 lb 3 7 oz)    06/01/22 0935 06/02/22 0547   Weight: 49 9 kg (110 lb) 50 3 kg (110 lb 14 3 oz)      Body mass index is 19 03 kg/m²  Wt Readings from Last 3 Encounters:   06/02/22 50 3 kg (110 lb 14 3 oz)   05/30/22 52 2 kg (115 lb)   05/25/22 52 2 kg (115 lb)    No intake or output data in the 24 hours ending 06/02/22 1055       *-*-*-*-*-*-*-*-*-*-*-*-*-*-*-*-*-*-*-*-*-*-*-*-*-*-*-*-*-*-*-*-*-*-*-*-*-*-*-*-*-*-*-*-*-*-*-*-*-*-*-*-*-*-  PHYSICAL EXAM:  General Appearance:    Alert, cooperative, in no respiratory distress, appears stated age, thinly built   Head, Eyes, ENT:     protein calorie malnutrition with loss of muscle mass, moist mucous mebranes  Neck:   Supple, no carotid bruit or JVD   Back:     Symmetric, no curvature  Lungs:     Respirations unlabored  Clear to auscultation bilaterally,    Chest wall:    No tenderness or deformity   Heart:    Regular rate and rhythm, S1 and S2 normal, no murmur, rub  or gallop  Abdomen:     Soft, mildly tender diffusely   Extremities:   Extremities warm, no cyanosis or edema    Skin:   Skin color, texture, turgor normal, no rashes or lesions     *-*-*-*-*-*-*-*-*-*-*-*-*-*-*-*-*-*-*-*-*-*-*-*-*-*-*-*-*-*-*-*-*-*-*-*-*-*-*-*-*-*-*-*-*-*-*-*-*-*-*-*-*-*-  TELEMETRY, LAST ECG:  Telemetry reviewed      Not on telemetry Results for orders placed or performed during the hospital encounter of 05/29/22   ECG 12 lead   Result Value    Ventricular Rate 76    Atrial Rate 76    IA Interval 164    QRSD Interval 78    QT Interval 374    QTC Interval 420    P Axis 72    QRS Axis 76    T Wave Axis 42    Narrative    Normal sinus rhythm  Normal ECG  When compared with ECG of 30-MAR-2022 06:23,  No significant change was found  Confirmed by Megan Cisneros ([id number]) on 5/31/2022 11:43:58 AM      *-*-*-*-*-*-*-*-*-*-*-*-*-*-*-*-*-*-*-*-*-*-*-*-*-*-*-*-*-*-*-*-*-*-*-*-*-*-*-*-*-*-*-*-*-*-*-*-*-*-*-*-*-*-      CURRENT SCHEDULED MEDICATIONS:    Current Facility-Administered Medications:     acetaminophen (TYLENOL) tablet 650 mg, 650 mg, Oral, Q6H PRN, Wilfredo Banda DO, 650 mg at 06/01/22 9043    aluminum-magnesium hydroxide-simethicone (MYLANTA) oral suspension 30 mL, 30 mL, Oral, Q6H PRN, Van Soliz DO    calcium carbonate (TUMS) chewable tablet 1,000 mg, 1,000 mg, Oral, Daily PRN, Van Soliz DO    enoxaparin (LOVENOX) subcutaneous injection 40 mg, 40 mg, Subcutaneous, Daily, Van Soliz DO, 40 mg at 06/02/22 0824    HYDROmorphone HCl (DILAUDID) injection 0 2 mg, 0 2 mg, Intravenous, Q4H PRN, Van Soliz DO    naloxone (NARCAN) 0 04 mg/mL syringe 0 04 mg, 0 04 mg, Intravenous, Q1MIN PRN, Van Soliz DO    ondansetron (ZOFRAN) injection 4 mg, 4 mg, Intravenous, Q4H PRN, Van Soliz DO    oxyCODONE (ROXICODONE) IR tablet 2 5 mg, 2 5 mg, Oral, Q4H PRN, Van Soliz DO, 2 5 mg at 05/30/22 1046    oxyCODONE (ROXICODONE) IR tablet 5 mg, 5 mg, Oral, Q4H PRN, Van Soliz DO    senna-docusate sodium (SENOKOT S) 8 6-50 mg per tablet 1 tablet, 1 tablet, Oral, BID, Van Soliz DO    simethicone (MYLICON) chewable tablet 80 mg, 80 mg, Oral, 4x Daily PRN, Wilfredo Banda DO ALLERGIES:  Allergies   Allergen Reactions    Bentyl [Dicyclomine] Throat Swelling    Codeine Other (See Comments)     Was told allergy    Elastic Bandages & [Zinc] Hives, Swelling and Rash        *-*-*-*-*-*-*-*-*-*-*-*-*-*-*-*-*-*-*-*-*-*-*-*-*-*-*-*-*-*-*-*-*-*-*-*-*-*-*-*-*-*-*-*-*-*-*-*-*-*-*-*-*-*  LABORATORY DATA:  I have personally reviewed pertinent labs      CMP:  Results from last 7 days   Lab Units 06/02/22  0437 06/01/22  0555 05/31/22  0558 05/30/22  0442   SODIUM mmol/L 140 140 139 139   POTASSIUM mmol/L 3 9 3 8 3 8 3 6   CHLORIDE mmol/L 104 105 104 105   CO2 mmol/L 29 28 28 29   BUN mg/dL 14 12 9 12   CREATININE mg/dL 0 92 0 84 0 87 0 83   CALCIUM mg/dL 8 1* 8 2* 7 8* 7 9*   ALK PHOS U/L 153*  --  157* 156*   ALT U/L 37  --  43 45   AST U/L 48*  --  56* 53*        Results from last 7 days   Lab Units 06/01/22  0555   MAGNESIUM mg/dL 2 0        Cardiac Profile:       Invalid input(s): CK, CKMBP             CBC:   Results from last 7 days   Lab Units 06/02/22  0437 06/01/22  0555 05/31/22  0558   WBC Thousand/uL 7 05 6 86 7 11   HEMOGLOBIN g/dL 9 7* 10 5* 10 9*   HEMATOCRIT % 30 2* 32 5* 33 7*   PLATELETS Thousands/uL 261 269 252     PT/INR: No results found for: PT, INR, Microbiology:          *-*-*-*-*-*-*-*-*-*-*-*-*-*-*-*-*-*-*-*-*-*-*-*-*-*-*-*-*-*-*-*-*-*-*-*-*-*-*-*-*-*-*-*-*-*-*-*-*-*-*-*-*-*-  IMAGING STUDIES REPORTS: Imaging studies results reviewed    No valid procedures specified  XR chest 2 views    Result Date: 3/30/2022  Impression No acute cardiopulmonary disease  Workstation performed: [id number]       *-*-*-*-*-*-*-*-*-*-*-*-*-*-*-*-*-*-*-*-*-*-*-*-*-*-*-*-*-*-*-*-*-*-*-*-*-*-*-*-*-*-*-*-*-*-*-*-*-*-*-*-*-*-    No results found for this or any previous visit  No results found for this or any previous visit  No results found for this or any previous visit  No results found for this or any previous visit         *-*-*-*-*-*-*-*-*-*-*-*-*-*-*-*-*-*-*-*-*-*-*-*-*-*-*-*-*-*-*-*-*-*-*-*-*-*-*-*-*-*-*-*-*-*-*-*-*-*-*-*-*-*-  SIGNATURES:   [unfilled]   Mabel Arriola MD

## 2022-06-02 NOTE — DISCHARGE SUMMARY
[address] [facility]  Discharge- [facility] 1945, 78 y o  female MRN: [id number]  Unit/Bed#: E5 -01 Encounter: [id number]  Primary Care Provider: Lisa Pearl DO   Date and time admitted to hospital: 5/29/2022  8:08 PM    Admitting Provider:  Monique Damian DO  Discharge Provider:  Ashkan Almodovar DO  Admission Date: 5/29/2022       Discharge Date: 06/02/22   LOS: 4  Primary Care Physician at Discharge: Lisa Pearl [phone]    DISCHARGE DIAGNOSES  * Pancreatic mass  Assessment & Plan  79-year-old female with worsening abdominal pain weight loss found to have pancreatic mass with liver metastasis  · Seen by GI  MRI ordered and further characterized lesions  · Discussed with IR, patient needs to be off aspirin x5 days for biopsy  · To lack of availability for inpatient biopsy patient will have expedited outpatient liver biopsy  · Information for IR and oncology follow-up given to patient    Anemia  Assessment & Plan  · Seen by GI, can have endoscopy as outpatient    Results from last 7 days   Lab Units 06/02/22  0437 06/01/22  0555 05/31/22  0558 05/30/22  0442 05/29/22  2045   HEMOGLOBIN g/dL 9 7* 10 5* 10 9* 10 5* 11 0*       Pericardial effusion  Assessment & Plan  · Seen on CT imaging and further characterized on echocardiogram with trace pericardial effusion  · Seen by cardiology, no further workup warranted    Protein-calorie malnutrition, unspecified severity ([facility] [address] )  Assessment & Plan  Malnutrition Findings:   Adult Malnutrition type: Acute illness  Adult Degree of Malnutrition: Other severe protein calorie malnutrition  Malnutrition Characteristics: Muscle loss, Weight loss  360 Statement: Severe muscle mass depletion as evidenced by prominent clavicles  Pt also with 5 7% wt loss noted x 30 days which is clinically significant, treated with ensure pudding BID  BMI Findings: Body mass index is 19 03 kg/m²       REASON FOR ADMISSION  Abdominal Pain (Pt reports pain in LUQ abd that started suddenly this evening  Pain radiates into left shoulder  No injury reported  Pain with deep breath)    HOSPITAL COURSE:  Cecil Guzman is a relatively healthy 72-year-old female presented hospital with worsening abdominal pain and weight loss  In the ED she was found have pancreatic mass with liver metastasis  She was seen by GI and underwent MRI for further characterization  Biopsy cannot be scheduled for this week therefore she will have expedited biopsy next week with IR and will have prompt follow-up with medical oncology  Of note she was incidentally found to have pericardial effusion of unclear significance and was seen by cardiology for this as well  She will be given a limited prescription of oxycodone to take as needed for pain  The case was discussed with son at bedside on day of discharge  Please see problem list listed above  CONSULTING PROVIDERS   IP CONSULT TO GASTROENTEROLOGY  IP CONSULT TO ONCOLOGY  INPATIENT CONSULT TO IR  IP CONSULT TO NUTRITION SERVICES  IP CONSULT TO CARDIOLOGY  INPATIENT CONSULT TO IR    PROCEDURES PERFORMED  Echo follow up/limited w/ contrast if indicated    Result Date: 6/1/2022  Impression: Preserved biventricular function with normal chamber sizes and no wall motion abnormalities  Aortic valve sclerosis with trace insufficiency and no significant valve disease  Trace-small pericardial effusion without echocardiographic evidence for hemodynamic compromise  When compared to 3-30-22 study, there are no significant changes  RADIOLOGY RESULTS  CT abdomen pelvis wo contrast    Result Date: 5/29/2022  Impression: Exam is limited without IV and oral contrast particularly for soft tissue and bowel evaluation  1   Large mass in the right upper quadrant likely arising from the pancreatic head concerning for malignancy    This could be further assessed with dedicated MRI of the abdomen with and without IV contrast  2  Multiple hepatic masses suspicious for metastasis  3  Suspected retroperitoneal lymphadenopathy concerning for metastasis  4  New small pulmonary nodules at the lung bases suspicious for metastasis  Further staging may include chest CT or FDG PET/CT  5  Small to moderate-sized pericardial effusion, increased, malignant effusion not excluded  I personally discussed this study with Laural Romance on 5/29/2022 at 9:46 PM   Workstation performed: [id number]     XR chest 1 view portable    Result Date: 5/30/2022  Impression: No acute cardiopulmonary disease  Workstation performed: [id number]     MRI abdomen w wo contrast and mrcp    Result Date: 6/2/2022  Impression: 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, retroperitoneal lymphadenopathy, and probable small lung and bone metastasis   Workstation performed: [id number]       LABS  Results from last 7 days   Lab Units 06/02/22 0437 06/01/22  0555 05/31/22  0558 05/30/22 0442 05/29/22  2045   WBC Thousand/uL 7 05 6 86 7 11 7 90 8 97   HEMOGLOBIN g/dL 9 7* 10 5* 10 9* 10 5* 11 0*   HEMATOCRIT % 30 2* 32 5* 33 7* 32 9* 33 8*   MCV fL 94 96 96 96 96   PLATELETS Thousands/uL 261 269 252 283 292     Results from last 7 days   Lab Units 06/02/22 0437 06/01/22  0555 05/31/22  0558 05/30/22 0442 05/29/22  2045   SODIUM mmol/L 140 140 139 139 138   POTASSIUM mmol/L 3 9 3 8 3 8 3 6 3 7   CHLORIDE mmol/L 104 105 104 105 103   CO2 mmol/L 29 28 28 29 32   BUN mg/dL 14 12 9 12 12   CREATININE mg/dL 0 92 0 84 0 87 0 83 1 17   CALCIUM mg/dL 8 1* 8 2* 7 8* 7 9* 8 4   ALBUMIN g/dL 2 2*  --  2 4* 2 5* 2 8*   TOTAL BILIRUBIN mg/dL 0 11*  --  0 24 0 23 0 22   ALK PHOS U/L 153*  --  157* 156* 173*   ALT U/L 37  --  43 45 48   AST U/L 48*  --  56* 53* 65*   EGFR ml/min/1 73sq m 61 68 65 69 45   GLUCOSE RANDOM mg/dL 101 107 102 103 140         Results from last 7 days   Lab Units 05/29/22  2045   HS TNI 0HR ng/L 11           Results from last 7 days   Lab Units 05/29/22 2045   LACTIC ACID mmol/L 0 6         DISCHARGE DAY VISIT AND PHYSICAL EXAM:  Subjective:  Patient seen examined throughout the day  Anxious about having biopsy  No other complaints  Vitals:   Blood Pressure: 93/60 (06/02/22 1459)  Pulse: 83 (06/02/22 1459)  Temperature: 98 °F (36 7 °C) (06/02/22 1459)  Temp Source: Temporal (05/31/22 0100)  Respirations: 17 (06/02/22 1459)  Height: 5' 4" (162 6 cm) (06/01/22 0935)  Weight - Scale: 50 3 kg (110 lb 14 3 oz) (06/02/22 0547)  SpO2: 96 % (06/02/22 1459)    Physical Exam  Vitals reviewed  Constitutional:       General: She is not in acute distress  Appearance: Normal appearance  HENT:      Head: Atraumatic  Eyes:      General: No scleral icterus  Cardiovascular:      Rate and Rhythm: Regular rhythm  Heart sounds: Normal heart sounds  Pulmonary:      Breath sounds: Normal breath sounds  No wheezing  Abdominal:      General: Bowel sounds are normal       Palpations: Abdomen is soft  Tenderness: There is no guarding or rebound  Musculoskeletal:         General: No swelling  Skin:     General: Skin is warm  Neurological:      Mental Status: She is alert and oriented to person, place, and time  Psychiatric:         Mood and Affect: Mood normal        Planned Re-admission:  No  Discharge Disposition: Home/Self Care  Facility:     Test Results Pending at Discharge:   Incidental findings:  Pancreatic mass  Outpatient biopsy be arranged    Medications:  · Discharge Medication List: See after visit summary for reconciled discharge medications  Diet restrictions:  Regular diet   Activity restrictions: No strenuous activity  Discharge Condition: stable    Outpatient Follow-Up and Discharge Instructions  See after visit summary section titled Discharge Instructions for information provided to patient and family  Code Status: Level 1 - Full Code  Discharge Statement   I spent 35 minutes discharging the patient  This time was spent on the day of discharge  Greater than 50% of total time was spent with the patient and / or family counseling and / or coordination of care  ** Please Note: This note has been constructed using a voice recognition system   **

## 2022-06-02 NOTE — ASSESSMENT & PLAN NOTE
72-year-old female with worsening abdominal pain weight loss found to have pancreatic mass with liver metastasis  · Seen by GI  MRI ordered and further characterized lesions  · Discussed with IR, patient needs to be off aspirin x5 days for biopsy      · To lack of availability for inpatient biopsy patient will have expedited outpatient liver biopsy  · Information for IR and oncology follow-up given to patient

## 2022-06-02 NOTE — CASE MANAGEMENT
Case Management Assessment & Discharge Planning Note    Patient name Mahogany Lopez  Location [place] [address] [id number] [id number] MRN [id number]  : 1945 Date 2022       Current Admission Date: 2022  Current Admission Diagnosis:Pancreatic mass   Patient Active Problem List    Diagnosis Date Noted    Severe protein-calorie malnutrition ([facility] [address] ) 2022    Anemia 2022    Pancreatic mass 2022    Pericardial effusion 2022    Protein-calorie malnutrition, unspecified severity ([facility] [address] ) 2022    Chronic diarrhea 2022    Weight loss 2022    Stress at home 2022    Medicare annual wellness visit, subsequent 2022    Diarrhea 2022    Hypokalemia 2022    Chronic pain of left ankle 2022    Iron deficiency anemia 2022    UTI (urinary tract infection) 2022    Chest pain 2022    Elevated TSH 2022    Elevated brain natriuretic peptide (BNP) level 2022    Elevated d-dimer 2022      LOS (days): 4  Geometric Mean LOS (GMLOS) (days): 4 80  Days to GMLOS:1     OBJECTIVE:    Risk of Unplanned Readmission Score: 15 8         Current admission status: Inpatient  Referral Reason:  (Discharge Planning)    Preferred Pharmacy:   [facility] [address]  Phone: [phone] Fax: [phone]    Primary Care Provider: Anjana Higgins DO    Primary Insurance: [facility]  Secondary Insurance:     ASSESSMENT:  Clay Hassan Proxies    There are no active Health Care Proxies on file  Readmission Root Cause  30 Day Readmission: No    Patient Information  Admitted from[de-identified] Home  Mental Status: Alert  During Assessment patient was accompanied by:  Son  Assessment information provided by[de-identified] Patient, Son  Primary Caregiver: Self  Support Systems: Self, Spouse/significant other, Daughter, Son, Family members  [place] of Residence: [address] [place] [address] do you live in?: [address] [facility] [address] entry access options   Select all that apply : Stairs  Number of steps to enter home : 3  Do the steps have railings?: Yes  Type of Current Residence: [facility]  In the last 12 months, was there a time when you were not able to pay the mortgage or rent on time?: No  In the last 12 months, how many places have you lived?: 1  In the last 12 months, was there a time when you did not have a steady place to sleep or slept in a shelter (including now)?: No  Homeless/housing insecurity resource given?: N/A  Living Arrangements: Lives w/ Extended Family, Lives w/ Spouse/significant other  Is patient a ?: No    Activities of Daily Living Prior to Admission  Functional Status: Independent  Completes ADLs independently?: Yes  Ambulates independently?: Yes  Does patient use assisted devices?: No  Does patient currently own DME?: No  Does patient have a history of Outpatient Therapy (PT/OT)?: No  Does the patient have a history of Short-Term Rehab?: No  Does patient have a history of HHC?: No  Does patient currently have [email or web address] ?: No    Patient Information Continued  Income Source: Employed  Does patient have prescription coverage?: Yes  Within the past 12 months, you worried that your food would run out before you got the money to buy more : Never true  Within the past 12 months, the food you bought just didn't last and you didn't have money to get more : Never true  Food insecurity resource given?: N/A  Does patient receive dialysis treatments?: No  Does patient have a history of substance abuse?: No  Does patient have a history of Mental Health Diagnosis?: No    Means of Transportation  Means of Transport to Appts[de-identified] Drives Self  In the past 12 months, has lack of transportation kept you from medical appointments or from getting medications?: No  In the past 12 months, has lack of transportation kept you from meetings, work, or from getting things needed for daily living?: No  Was application for public transport provided?: N/A        DISCHARGE DETAILS:    Discharge planning discussed with[de-identified] Patient w/ Silver Landeros (Son) [phone] (M) present at bedside  [place] of Choice: Yes  Comments - Freedom of Choice: Pt states her preference for all information related to her new Dx-to make the goos decisions to move forward  Pt states her preference to d/c to home and start Tx asap  Pt agreeable to CM contacting Oncology CM Director for additional support   Discharge plan in progress-     CM contacted family/caregiver?: No- see comments (No Caregiver-Pt cares for self )  Were Treatment Team discharge recommendations reviewed with patient/caregiver?: Yes  Did patient/caregiver verbalize understanding of patient care needs?: Yes  Were patient/caregiver advised of the risks associated with not following Treatment Team discharge recommendations?: Yes    Contacts  Patient Contacts: Silver Landeros (Danielito)   [phone] (M)  Relationship to Patient[de-identified] Family  Contact Method: Phone, In Person  Phone Number: Silver Landeros (Danielito)   [phone] Maira Yun)  Reason/Outcome: Continuity of Care, Emergency Contact, Discharge Planning

## 2022-06-02 NOTE — ASSESSMENT & PLAN NOTE
· Seen by GI, can have endoscopy as outpatient    Results from last 7 days   Lab Units 06/02/22  0437 06/01/22  0555 05/31/22  0558 05/30/22  0442 05/29/22  2045   HEMOGLOBIN g/dL 9 7* 10 5* 10 9* 10 5* 11 0*

## 2022-06-02 NOTE — PLAN OF CARE
Problem: PAIN - ADULT  Goal: Verbalizes/displays adequate comfort level or baseline comfort level  Description: Interventions:  - Encourage patient to monitor pain and request assistance  - Assess pain using appropriate pain scale  - Administer analgesics based on type and severity of pain and evaluate response  - Implement non-pharmacological measures as appropriate and evaluate response  - Consider cultural and social influences on pain and pain management  - Notify physician/advanced practitioner if interventions unsuccessful or patient reports new pain  Outcome: Progressing     Problem: SAFETY ADULT  Goal: Patient will remain free of falls  Description: INTERVENTIONS:  - Educate patient/family on patient safety including physical limitations  - Instruct patient to call for assistance with activity   - Consult OT/PT to assist with strengthening/mobility   - Keep Call bell within reach  - Keep bed low and locked with side rails adjusted as appropriate  - Keep care items and personal belongings within reach  - Initiate and maintain comfort rounds  - Make Fall Risk Sign visible to staff  - Apply yellow socks and bracelet for high fall risk patients  - Consider moving patient to room near nurses station  Outcome: Progressing  Goal: Maintain or return to baseline ADL function  Description: INTERVENTIONS:  -  Assess patient's ability to carry out ADLs; assess patient's baseline for ADL function and identify physical deficits which impact ability to perform ADLs (bathing, care of mouth/teeth, toileting, grooming, dressing, etc )  - Assess/evaluate cause of self-care deficits   - Assess range of motion  - Assess patient's mobility; develop plan if impaired  - Assess patient's need for assistive devices and provide as appropriate  - Encourage maximum independence but intervene and supervise when necessary  - Involve family in performance of ADLs  - Assess for home care needs following discharge   - Consider OT consult to assist with ADL evaluation and planning for discharge  - Provide patient education as appropriate  Outcome: Progressing     Problem: DISCHARGE PLANNING  Goal: Discharge to home or other facility with appropriate resources  Description: INTERVENTIONS:  - Identify barriers to discharge w/patient and caregiver  - Arrange for needed discharge resources and transportation as appropriate  - Identify discharge learning needs (meds, wound care, etc )  - Arrange for interpretive services to assist at discharge as needed  - Refer to Case Management Department for coordinating discharge planning if the patient needs post-hospital services based on physician/advanced practitioner order or complex needs related to functional status, cognitive ability, or social support system  Outcome: Progressing     Problem: Potential for Falls  Goal: Patient will remain free of falls  Description: INTERVENTIONS:  - Educate patient/family on patient safety including physical limitations  - Instruct patient to call for assistance with activity   - Consult OT/PT to assist with strengthening/mobility   - Keep Call bell within reach  - Keep bed low and locked with side rails adjusted as appropriate  - Keep care items and personal belongings within reach  - Initiate and maintain comfort rounds  - Make Fall Risk Sign visible to staff  - Apply yellow socks and bracelet for high fall risk patients  - Consider moving patient to room near nurses station  Outcome: Progressing     Problem: Nutrition/Hydration-ADULT  Goal: Nutrient/Hydration intake appropriate for improving, restoring or maintaining nutritional needs  Description: Monitor and assess patient's nutrition/hydration status for malnutrition  Collaborate with interdisciplinary team and initiate plan and interventions as ordered  Monitor patient's weight and dietary intake as ordered or per policy  Utilize nutrition screening tool and intervene as necessary   Determine patient's food preferences and provide high-protein, high-caloric foods as appropriate       INTERVENTIONS:  - Monitor oral intake, urinary output, labs, and treatment plans  - Assess nutrition and hydration status and recommend course of action  - Evaluate amount of meals eaten  - Assist patient with eating if necessary   - Allow adequate time for meals  - Recommend/ encourage appropriate diets, oral nutritional supplements, and vitamin/mineral supplements  - Order, calculate, and assess calorie counts as needed  - Recommend, monitor, and adjust tube feedings and TPN/PPN based on assessed needs  - Assess need for intravenous fluids  - Provide specific nutrition/hydration education as appropriate  - Include patient/family/caregiver in decisions related to nutrition  Outcome: Progressing

## 2022-06-02 NOTE — ASSESSMENT & PLAN NOTE
Malnutrition Findings:   Adult Malnutrition type: Acute illness  Adult Degree of Malnutrition: Other severe protein calorie malnutrition  Malnutrition Characteristics: Muscle loss, Weight loss  360 Statement: Severe muscle mass depletion as evidenced by prominent clavicles  Pt also with 5 7% wt loss noted x 30 days which is clinically significant, treated with ensure pudding BID  BMI Findings: Body mass index is 19 03 kg/m²

## 2022-06-02 NOTE — TELEMEDICINE
INTERPROFESSIONAL (ELECTRONIC OR PHONE) CONSULTATION - Interventional Radiology  Tone Delgado 78 y o  female MRN: [id number]  Unit/Bed#: E5 -01 Encounter: [id number]    IR has been consulted regarding the care of Tone Delgado  We were consulted by the primary team concerning this patient with this liver masses, suspected pancreatic cancer origin    IP Consult to IR  Consult performed by: Tanis Koyanagi, MD  Consult ordered by: Hernan Meyer DO        06/02/22      Assessment/Recommendation:   Prior IR consult reviewed  Outpatient orders are outpatient orders are in place for a biopsy  Unfortunately it is very unlikely that the IR schedule will accommodate tomorrow    If patient no longer requires hospital admission recommend continuing to hold anti-platelet agents and short-term interval scheduling    I have reached out to our Outpatient schedulers to expedite outpatient scheduling for planned liver biopsy      Total time spent in review of data, discussion with requesting provider and rendering advice was 16 minutes     Thank you for allowing Interventional Radiology to participate in the care of Tone Delgado  Please don't hesitate to call or TigerText us with any questions  Tanis Koyanagi, MD      Past Medical History:  N/a    Past Surgical History:  Past Surgical History:   Procedure Laterality Date    CARDIAC CATHETERIZATION N/A 3/30/2022    Procedure: Cardiac catheterization;  Surgeon: Emi Kaiser DO;  [facility]; Service: Cardiology    CARDIAC CATHETERIZATION N/A 3/30/2022    Procedure: Cardiac Coronary Angiogram;  Surgeon: Emi Kaiser DO;  Location: [facility];   Service: Cardiology       Social History:  Social History     Substance and Sexual Activity   Alcohol Use Not Currently     Social History     Substance and Sexual Activity   Drug Use Never     Social History     Tobacco Use   Smoking Status Never Smoker   Smokeless Tobacco Never Used Family History:  Family History   Problem Relation Age of Onset    Coronary artery disease Father     Coronary artery disease Brother     Diabetes Brother     Kidney failure Brother     Uterine cancer Mother        Allergies: Allergies   Allergen Reactions    Bentyl [Dicyclomine] Throat Swelling    Codeine Other (See Comments)     Was told allergy    Elastic Bandages & [Zinc] Hives, Swelling and Rash       Medications:  No medications prior to admission  Current Facility-Administered Medications   Medication Dose Route Frequency    acetaminophen (TYLENOL) tablet 650 mg  650 mg Oral Q6H PRN    aluminum-magnesium hydroxide-simethicone (MYLANTA) oral suspension 30 mL  30 mL Oral Q6H PRN    calcium carbonate (TUMS) chewable tablet 1,000 mg  1,000 mg Oral Daily PRN    HYDROmorphone HCl (DILAUDID) injection 0 2 mg  0 2 mg Intravenous Q4H PRN    naloxone (NARCAN) 0 04 mg/mL syringe 0 04 mg  0 04 mg Intravenous Q1MIN PRN    ondansetron (ZOFRAN) injection 4 mg  4 mg Intravenous Q4H PRN    oxyCODONE (ROXICODONE) IR tablet 2 5 mg  2 5 mg Oral Q4H PRN    oxyCODONE (ROXICODONE) IR tablet 5 mg  5 mg Oral Q4H PRN    senna-docusate sodium (SENOKOT S) 8 6-50 mg per tablet 1 tablet  1 tablet Oral BID    simethicone (MYLICON) chewable tablet 80 mg  80 mg Oral 4x Daily PRN       Vitals:  BP 93/60   Pulse 83   Temp 98 °F (36 7 °C)   Resp 17   Ht 5' 4" (1 626 m)   Wt 50 3 kg (110 lb 14 3 oz)   SpO2 96%   BMI 19 03 kg/m²   Body mass index is 19 03 kg/m²    Weight (last 2 days) before discharge     Date/Time Weight    06/02/22 0547 50 3 (110 89)    06/01/22 0935 49 9 (110)    06/01/22 0600 50 (110 23)    05/31/22 0558 50 3 (110 89)          I/Os:  No intake or output data in the 24 hours ending 06/02/22 1945    Lab Results and Cultures:   CBC with diff:   Lab Results   Component Value Date    WBC 7 05 06/02/2022    HGB 9 7 (L) 06/02/2022    HCT 30 2 (L) 06/02/2022    MCV 94 06/02/2022     06/02/2022    MCH 30 3 06/02/2022    MCHC 32 1 06/02/2022    RDW 13 4 06/02/2022    MPV 10 7 06/02/2022    NRBC 0 06/02/2022      BMP/CMP:  Lab Results   Component Value Date    K 3 9 06/02/2022     06/02/2022    CO2 29 06/02/2022    BUN 14 06/02/2022    CREATININE 0 92 06/02/2022    CALCIUM 8 1 (L) 06/02/2022    AST 48 (H) 06/02/2022    ALT 37 06/02/2022    ALKPHOS 153 (H) 06/02/2022    EGFR 61 06/02/2022   ,     Coags: No results found for: PT, PTT, INR,          HgbA1c:   Lab Results   Component Value Date    HGBA1C 5 5 04/11/2022       Blood Culture: No results found for: BLOODCX,   Urinalysis:   Lab Results   Component Value Date    COLORU Light Yellow 03/29/2022    CLARITYU Slightly Cloudy 03/29/2022    SPECGRAV 1 020 03/29/2022    PHUR 6 0 03/29/2022    LEUKOCYTESUR Small (A) 03/29/2022    NITRITE Positive (A) 03/29/2022    GLUCOSEU Negative 03/29/2022    KETONESU Negative 03/29/2022    BILIRUBINUR Negative 03/29/2022    BLOODU Negative 03/29/2022   ,   Urine Culture: No results found for: URINECX,   Wound Culure:  No results found for: WOUNDCULT    Imaging Studies: I have personally reviewed pertinent reports

## 2022-06-02 NOTE — ASSESSMENT & PLAN NOTE
· Seen on CT imaging and further characterized on echocardiogram with trace pericardial effusion  · Seen by cardiology, no further workup warranted

## 2022-06-03 ENCOUNTER — TELEPHONE (OUTPATIENT)
Dept: CARDIOLOGY CLINIC | Facility: CLINIC | Age: 77
End: 2022-06-03

## 2022-06-03 ENCOUNTER — TELEPHONE (OUTPATIENT)
Dept: INTERVENTIONAL RADIOLOGY/VASCULAR | Facility: HOSPITAL | Age: 77
End: 2022-06-03

## 2022-06-03 ENCOUNTER — TELEPHONE (OUTPATIENT)
Dept: HEMATOLOGY ONCOLOGY | Facility: CLINIC | Age: 77
End: 2022-06-03

## 2022-06-03 NOTE — TELEPHONE ENCOUNTER
CALLED PT TO OFFER POST CATH APPT SHE IS HAVING A BIOPSY AND WANTS TO DEAL WITH THAT FIRST SHE WILL CALL US TO SCHEDULE

## 2022-06-03 NOTE — TELEPHONE ENCOUNTER
New Patient Intake Form   Patient Details:    Nuno Lacey  1945    Appointment Information   Who is calling to schedule? Patient   If not self, what is the caller's name? Please put name of RBC nurse as well  DID YOU CONFIRM INSURANCE WITH PATIENT? yes   Referring provider Terrence Soto, DO   What is the diagnosis? Pancreatic mass     Is there a confirmed tissue diagnosis? No   Is there a biopsy ordered or pending? Please specify dates   Patient is scheduled for biopsy on 06/06     Is patient aware of diagnosis? Yes   Have you had any imaging or labs done? If yes, where? (If imaging done outside of [facility], please remind patient to bring a disk ) Yes     06/01     If imaging done at outside facility, did you instruct patient to obtain discs and bring to visit? n/a   Have you been seen by another Oncologist/Hematologist?  If so, who and where? no   Are the records in [facility] or Care Everywhere? yes   Does the patient have records at another facility/hospital? no   If yes, Name of facility, city and state where facility is located  Did you instruct patient to have records faxed to [facility] and provide rightx number? n/a   Preferred [place]   Is the patient willing to be seen by another provider? (This is for breast patients only) n/a     Did you send new patient paperwork?   Email or mail? no   Miscellaneous Information: appt made for 06/21

## 2022-06-06 ENCOUNTER — HOSPITAL ENCOUNTER (OUTPATIENT)
Dept: INTERVENTIONAL RADIOLOGY/VASCULAR | Facility: HOSPITAL | Age: 77
Discharge: HOME | End: 2022-06-06
Attending: RADIOLOGY | Admitting: RADIOLOGY
Payer: COMMERCIAL

## 2022-06-06 ENCOUNTER — TRANSITIONAL CARE MANAGEMENT (OUTPATIENT)
Dept: FAMILY MEDICINE CLINIC | Facility: CLINIC | Age: 77
End: 2022-06-06

## 2022-06-06 VITALS
DIASTOLIC BLOOD PRESSURE: 60 MMHG | TEMPERATURE: 99 F | SYSTOLIC BLOOD PRESSURE: 125 MMHG | OXYGEN SATURATION: 100 % | BODY MASS INDEX: 18.95 KG/M2 | WEIGHT: 111 LBS | HEART RATE: 61 BPM | HEIGHT: 64 IN | RESPIRATION RATE: 16 BRPM

## 2022-06-06 DIAGNOSIS — R16.0 LIVER MASSES: ICD-10-CM

## 2022-06-06 PROCEDURE — 76942 ECHO GUIDE FOR BIOPSY: CPT

## 2022-06-06 PROCEDURE — 88307 TISSUE EXAM BY PATHOLOGIST: CPT | Performed by: PATHOLOGY

## 2022-06-06 PROCEDURE — 47000 NEEDLE BIOPSY OF LIVER PERQ: CPT | Performed by: RADIOLOGY

## 2022-06-06 PROCEDURE — 99153 MOD SED SAME PHYS/QHP EA: CPT

## 2022-06-06 PROCEDURE — 88342 IMHCHEM/IMCYTCHM 1ST ANTB: CPT | Performed by: PATHOLOGY

## 2022-06-06 PROCEDURE — 47000 NEEDLE BIOPSY OF LIVER PERQ: CPT

## 2022-06-06 PROCEDURE — 88341 IMHCHEM/IMCYTCHM EA ADD ANTB: CPT | Performed by: PATHOLOGY

## 2022-06-06 PROCEDURE — 76942 ECHO GUIDE FOR BIOPSY: CPT | Performed by: RADIOLOGY

## 2022-06-06 PROCEDURE — 99152 MOD SED SAME PHYS/QHP 5/>YRS: CPT | Performed by: RADIOLOGY

## 2022-06-06 PROCEDURE — 99152 MOD SED SAME PHYS/QHP 5/>YRS: CPT

## 2022-06-06 RX ORDER — LIDOCAINE WITH 8.4% SOD BICARB 0.9%(10ML)
SYRINGE (ML) INJECTION CODE/TRAUMA/SEDATION MEDICATION
Status: COMPLETED | OUTPATIENT
Start: 2022-06-06 | End: 2022-06-06

## 2022-06-06 RX ORDER — SODIUM CHLORIDE 9 MG/ML
75 INJECTION, SOLUTION INTRAVENOUS CONTINUOUS
Status: DISCONTINUED | OUTPATIENT
Start: 2022-06-06 | End: 2022-06-10 | Stop reason: HOSPADM

## 2022-06-06 RX ORDER — FENTANYL CITRATE 50 UG/ML
INJECTION, SOLUTION INTRAMUSCULAR; INTRAVENOUS CODE/TRAUMA/SEDATION MEDICATION
Status: COMPLETED | OUTPATIENT
Start: 2022-06-06 | End: 2022-06-06

## 2022-06-06 RX ORDER — MIDAZOLAM HYDROCHLORIDE 2 MG/2ML
INJECTION, SOLUTION INTRAMUSCULAR; INTRAVENOUS CODE/TRAUMA/SEDATION MEDICATION
Status: COMPLETED | OUTPATIENT
Start: 2022-06-06 | End: 2022-06-06

## 2022-06-06 RX ADMIN — FENTANYL CITRATE 50 MCG: 50 INJECTION, SOLUTION INTRAMUSCULAR; INTRAVENOUS at 08:28

## 2022-06-06 RX ADMIN — FENTANYL CITRATE 25 MCG: 50 INJECTION, SOLUTION INTRAMUSCULAR; INTRAVENOUS at 08:34

## 2022-06-06 RX ADMIN — Medication 10 ML: at 08:34

## 2022-06-06 RX ADMIN — MIDAZOLAM HYDROCHLORIDE 1 MG: 1 INJECTION, SOLUTION INTRAMUSCULAR; INTRAVENOUS at 08:28

## 2022-06-06 RX ADMIN — SODIUM CHLORIDE 75 ML/HR: 0.9 INJECTION, SOLUTION INTRAVENOUS at 07:42

## 2022-06-06 RX ADMIN — MIDAZOLAM HYDROCHLORIDE 0.5 MG: 1 INJECTION, SOLUTION INTRAMUSCULAR; INTRAVENOUS at 08:34

## 2022-06-06 NOTE — LETTER
[address] [facility] [address]  Dept: [phone]    June 6, 2022     Patient: Cecil Guzman   YOB: 1945   Date of Visit: 6/6/2022       To Whom it May Concern:    Cecil Guzman is under my professional care  She was seen in the hospital from 6/6/2022   to 06/06/22  She {Return to work on 6/8/2022  If you have any questions or concerns, please don't hesitate to call           Sincerely,          Charley Olguin RN

## 2022-06-06 NOTE — PROCEDURES
Interventional Radiology Procedure Note    PATIENT NAME: Tone Delgado  : 1945  MRN: [id number]     Pre-op Diagnosis:   1  Liver masses      2  Biopsy of right lobe of liver, segment 5/6    Post-op Diagnosis:   1  Liver masses      2  Same    Procedure:  Biopsy of right lobe of liver, segment 5/6    Surgeon:   John Mackenzie MD  Assistants:     No qualified resident was available, Resident is only observing    Estimated Blood Loss: Minimal     Findings:  White cores that crumbled in formalin consistent with necrosis    Post biopsy scan shows nothing to suggest bleeding    Specimens:  Core biopsy x4    Complications:  None     Anesthesia: conscious sedation and local    John Mackenzie MD     Date: 2022  Time: 8:48 AM

## 2022-06-06 NOTE — DISCHARGE INSTRUCTIONS
[facility]  Interventional Radiology  Dr Gaila Habermann  [phone]     Percutaneous Liver Biopsy   WHAT YOU NEED TO KNOW:   A PLB is a procedure to remove a sample of tissue from your liver  The sample can be sent to a lab and tested for liver disease, cancer, or infection  After the procedure you may have pain and bruising at the biopsy site  You may also have pain in your right shoulder  These symptoms should get better in 48 to 72 hours  DISCHARGE INSTRUCTIONS:     [address] and Aamir [facility] La [facility] patients,  Contact Interventional Radiology at [phone] PATIENTS: Contact Interventional Radiology at [phone]   [facility] PATIENTS: Contact Interventional Radiology at [phone] if:    Fever greater than 101 or chills  You have severe pain in your abdomen  Your abdomen is larger than usual and feels hard  Your neck is more swollen and you have trouble swallowing  You feel weak or dizzy  Your heart is beating faster than usual    Your pain does not get better after you take pain medicine  Your wound is red, swollen, or draining pus  You have nausea or are vomiting  Your skin is itchy, swollen, or you have a rash  You have questions or concerns about your condition or care  Medicines:   Acetaminophen decreases pain and fever  It is available without a doctor's order  Acetaminophen can cause liver damage if not taken correctly  Take your home medicine as directed  Resume your normal diet  Small sips of flat soda will help with mild nausea  Self-care:   Rest as directed  Do not play sports, exercise, or lift anything heavier than 5 pounds for up to 1 week  Apply firm, steady pressure if bleeding occurs  A small amount of bleeding from your wound is possible  Apply pressure with a clean gauze or towel for 5 to 10 minutes  Call 911 if bleeding becomes heavy or does not stop      Ask your healthcare provider when to take your blood thinner or antiplatelet medicine  You may need to wait 24 to 72 hours to take your medicine  This will prevent bleeding  Follow up with your healthcare provider as directed: Write down your questions so you remember to ask them during your visits

## 2022-06-06 NOTE — H&P
Interventional Radiology  History and Physical 6/6/2022     Tone Delgado   1945   [id number]    Assessment/Plan:  Plan ultrasound-guided core biopsy of segment 5 6  Subcostal approach  Problem List Items Addressed This Visit    None     Visit Diagnoses     Liver masses        Relevant Orders    IR biopsy liver mass             Subjective:     Patient ID: Tone Delgado is a 78 y o  female  History of Present Illness  Presented with pain  Found to have pancreatic mass and multiple liver masses  Review of Systems      History reviewed  No pertinent past medical history  Past Surgical History:   Procedure Laterality Date    CARDIAC CATHETERIZATION N/A 3/30/2022    Procedure: Cardiac catheterization;  Surgeon: Emi Kaiser DO;  [facility]; Service: Cardiology    CARDIAC CATHETERIZATION N/A 3/30/2022    Procedure: Cardiac Coronary Angiogram;  Surgeon: Emi Kaiser DO;  Location: [facility];   Service: Cardiology        Social History     Tobacco Use   Smoking Status Never Smoker   Smokeless Tobacco Never Used        Social History     Substance and Sexual Activity   Alcohol Use Not Currently        Social History     Substance and Sexual Activity   Drug Use Never        Allergies   Allergen Reactions    Bentyl [Dicyclomine] Throat Swelling    Codeine Other (See Comments)     Was told allergy    Elastic Bandages & [Zinc] Hives, Swelling and Rash       Current Outpatient Medications   Medication Sig Dispense Refill    acetaminophen (TYLENOL) 500 mg tablet Take 500 mg by mouth every 6 (six) hours as needed for mild pain      Cholecalciferol (Vitamin D3) 50 MCG (2000 UT) TABS Take by mouth        cyanocobalamin (VITAMIN B-12) 500 MCG tablet Take 500 mcg by mouth daily      esomeprazole (NexIUM) 20 mg capsule Take 20 mg by mouth every morning before breakfast      ferrous sulfate 325 (65 Fe) mg tablet Take 325 mg by mouth daily with breakfast      COLLADO SEAL ROOT PO Take 900 mg by mouth daily Echinaceal/goldenseal blend       Omega-3 Fatty Acids (OMEGA 3 PO) Take 1,000 mg by mouth      Pyridoxine HCl (VITAMIN B-6 PO) Take 1 tablet by mouth      Vitamin E 400 units TABS Take by mouth      oxyCODONE (ROXICODONE) 5 immediate release tablet Take 0 5 tablets (2 5 mg total) by mouth every 6 (six) hours as needed for moderate pain Max Daily Amount: 10 mg 15 tablet 0     Current Facility-Administered Medications   Medication Dose Route Frequency Provider Last Rate Last Admin    sodium chloride 0 9 % infusion  75 mL/hr Intravenous Continuous Elliot Gifford MD 75 mL/hr at 06/06/22 0742 75 mL/hr at 06/06/22 0742          Objective:    Vitals:    06/06/22 0730   BP: 128/60   Pulse: 79   Resp: 16   Temp: 98 8 °F (37 1 °C)   TempSrc: Temporal   SpO2: 99%   Weight: 50 3 kg (111 lb)   Height: 5' 4" (1 626 m)        Physical Exam    Not notably jaundiced  No distress  Skin over the right side is clean, dry, and intact  Regular rate and rhythm    No results found for: BNP   Lab Results   Component Value Date    WBC 7 05 06/02/2022    HGB 9 7 (L) 06/02/2022    HCT 30 2 (L) 06/02/2022    MCV 94 06/02/2022     06/02/2022     No results found for: INR, PROTIME  No results found for: PTT      I have personally reviewed pertinent imaging and laboratory results  Code Status: Prior  Advance Directive and Living Will:      Power of :    POLST:      This text is generated with voice recognition software  There may be translation, syntax,  or grammatical errors  If you have any questions, please contact the dictating provider

## 2022-06-10 ENCOUNTER — DOCUMENTATION (OUTPATIENT)
Dept: HEMATOLOGY ONCOLOGY | Facility: CLINIC | Age: 77
End: 2022-06-10

## 2022-06-10 ENCOUNTER — TELEPHONE (OUTPATIENT)
Dept: GASTROENTEROLOGY | Facility: MEDICAL CENTER | Age: 77
End: 2022-06-10

## 2022-06-10 ENCOUNTER — PATIENT OUTREACH (OUTPATIENT)
Dept: HEMATOLOGY ONCOLOGY | Facility: CLINIC | Age: 77
End: 2022-06-10

## 2022-06-10 DIAGNOSIS — C25.9 PANCREATIC ADENOCARCINOMA (HCC): ICD-10-CM

## 2022-06-10 DIAGNOSIS — D01.5: Primary | ICD-10-CM

## 2022-06-10 DIAGNOSIS — C25.9 PANCREATIC CARCINOMA (HCC): Primary | ICD-10-CM

## 2022-06-10 NOTE — TELEPHONE ENCOUNTER
Hi  These results never came to me because the CT scan and biopsy happened after my visit (when she was in the hospital) and neither was available for my visit      I did call her and inform her of the results

## 2022-06-10 NOTE — TELEPHONE ENCOUNTER
Hi,    Can we move up her endoscopy and colonoscopy, ideally sometime this month? I am happy to squeeze her in anytime      Thank you

## 2022-06-10 NOTE — PROGRESS NOTES
Phone outreach to the pt, called to introduce myself and explain my role, we rvw'd future appts, completed the general assessment  I went over the referrals that I was placing for her (SW, Pall care, Genetics, and IR for port placement)  I d/w her what SW and pall care offered pts and how they could help her, I briefly went over genetic testing and that she would be outreached by a genetics counselor, I dw/ her about the port, what it is and how it works ( she was already familiar w a port since she had a close friend who had one)  Pt does report pain/discomfort of the abdomen  I explained that pall care could assist w this in addition to her c/o diarrhea  Pt reports having diarrhea since Feb, she seen a gastroenterologist, rec colonoscopy and EGD, she is sched in August  Pt reports some improvement from Feb and has slowed down, about 3 times a day from 5-6 times a day  I did instruct the pt that she can take Immodium for the diarrhea but she said that now it is okay for her  When speaking to her the pt did sound like she was getting emotional since hearing the news of her cancer diagnosis today  She said that she wants to know all the details now so that she can start planning things for herself if she needs to  She has two grandsons that she is thinking about and worried for, one of them is a Type I diabetic  I did tell her that we were here for her the whole way and that if she needs anything even to talk she can reach out to me or Ayaz Latin  I provided her w both of our contact information  Cont to provide emotional support for the pt  She thanked me for the call  Email sent to the pt w info on the [facility] as well as MYchart instructions

## 2022-06-10 NOTE — PROGRESS NOTES
Left message for pt that a CT chest has been scheduled for 6/18 11:30am [facility], instructed her to call me with any questions

## 2022-06-10 NOTE — PROGRESS NOTES
TT sent to Dr Marissa Lamas to ask if this pt had been notified of the bx results  Waiting for reply before proceeding w making outreach

## 2022-06-12 PROBLEM — D3A.8 PRIMARY PANCREATIC NEUROENDOCRINE TUMOR: Chronic | Status: ACTIVE | Noted: 2022-06-12

## 2022-06-12 NOTE — H&P (VIEW-ONLY)
Hematology/Oncology Outpatient Office Note    Date of Service: 2022    [address] [facility]  [phone]    Reason for Consultation:   Chief Complaint   Patient presents with    Follow-up       Cancer Stage at diagnosis: IV    Referral Physician: Dimitri Cortes DO    Primary Care Physician:  Marnie Greer DO     Nickname: Lasha Burden: eldest son  GF: Lucas Mcfarlane: daughter    Original ECO    Today's ECO    Goals and Barriers:  Current Goal: Minimize effects of disease burden, extend life  Barriers to accomplishing this: low energy, lower abd pain  She still does the laundry and cooks but can't quite do the yard-work    Patient's Capacity to Self Care:  Patient is able to self care    Code Status: not addressed today    Advanced directives: not addressed today    ASSESSMENT & PLAN      Diagnosis ICD-10-CM Associated Orders   1  Primary pancreatic neuroendocrine tumor  D3A 8    2  Pancreatic mass  K86 89 Ambulatory Referral to Hematology / Oncology         This is a 78 y o  c PMHx notable for absence of co-morbidities, being seen in consultation for 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      The patient's liver biopsy had the characterization of a mixed-adenocarcinoma and NET pathology  Patient is MMR-proficient  As a result, I would favor treating this as a metastatic adenocarcinoma  (primarily gland formation)  The tumor cells stain for CA19 9, CDX2 with weak focal CK7 expression  The tumor cells are negative for CK20, GATA3, GCDFP15, TTF1  DPC4 is intact  Synaptophysin and chromogranin show focal staining  Ki67 interpretation is limited due to loss of tumor on deeper sectioning  Areas of the tumor show gland formation along with focal staining for neuroendocrine markers   The immunopanel results raise a differential of an upper luminal GI primary versus a pancreatobiliary primary  Discussion of decision making   Oncology history updated, accordingly, during this visit   Goals of care/patient communication  o I discussed with the patient the clinical course leading up to their cancer diagnosis  I reviewed relevant office notes, imaging reports and pathology result as well   o I told the patient that this is a case of incurable disease and what this means  We discussed that the goal of anti-cancer therapy is to provide best quality of life, extend overall survival, and progression free survival as shown in clinical trials  We also discussed that there might be a point when the cancer will no longer respond to this anti-neoplastic therapy  As a result, we also discussed the role of the palliative care team being introduced early in the treatment course  We have made this referral  o I explained the risks/benefits of the proposed cancer therapy: Dickinson-Abraxane and after discussion including understanding risks of possible life-threatening complications and therapy-related malignancy development, informed consent for blood products and treatment has been signed and obtained   TNM/Staging At Diagnosis  o [id number]  Cancer Staging: IV   Disease Features/Tumor Markers/Genetics  o Tumor Marker: n/a  o Notable Path Features: 6/6/22 Liver (core needle biopsy): Carcinoma with neuroendocrine differentiation   Treatment: Dickinson-Abraxane   Other Supportive care:    Treatment Team Members  o Surgeon  o Rad Onc  o Palliative: referral made   Labs   Diagnostics  6/1/22 MRI abd/pelv: Large pancreatic head mass (5 6 x 4 9 cm) most consistent with primary adenocarcinoma with liver metastasis, retroperitoneal lymphadenopathy, and probable small lung and bone metastasis    6/18/22 CT Chest w/o c: no official report yet but per my read there are several lung nodules seen*      Discussion of decision making    I personally reviewed the following lab results, the image studies, pathology, other specialty/physicians consult notes and recommendations, and outside medical records from [facility]  I had a lengthy discussion with the patient and shared the work-up findings  We discussed the diagnosis and management plan as below  I spent 48 minutes reviewing the records (labs, clinician notes, outside records, medical history, ordering medicine/tests/procedures, interpreting the imaging/labs previously done) and coordination of care as well as direct time with the patient today, of which greater than 50% of the time was spent in counseling and coordination of care with the patient/family  · Plan/Labs  · F/u IR for port placement  · Palliative care referral re-sent for Stage IV disease  · Comprehensive NGS to be sent off from liver biopsy tissue (request being worked on today)  · Infusion chairs ordered for Rusty Velázquez q28 days, which we have consented the patient for today  Infusion chairs to be scheduled along with preceding labs  · Dietitian referral for weight loss placed today  · Bone Scan to assess for osseous mets  · Supportive care plan for [place] q12 weeks needed if osseous mets found and to be addressed after bone scan completed  Pt to take calcium, Vit D supplements daily ( at next visit)  · Restaging CT CAP w/c in 3 months         Follow Up: q4 weeks (starting in 2 weeks) while on systemic chemotherapy    All questions were answered to the patient's satisfaction during this encounter  The patient knows the contact information for our office and knows to reach out for any relevant concerns related to this encounter  They are to call for any temperature 100 4 or higher, new symptoms including but not restricted to shaking chills, decreased appetite, nausea, vomiting, diarrhea, increased fatigue, shortness of breath or chest pain, confusion, and not feeling the strength to come to the clinic   For all other listed problems and medical diagnosis in their chart - they are managed by PCP and/or other specialists, which the patient acknowledges  Thank you very much for your consultation and making us a part of this patient's care  We are continuing to follow closely with you  Please do not hesitate to reach out to me with any additional questions or concerns  Bharati Arias MD  Hematology & Medical Oncology Staff Physician             Disclaimer: This document was prepared using [employer] technology  If a word or phrase is confusing, or does not make sense, this is likely due to recognition error which was not discovered during this clinician's review  If you believe an error has occurred, please contact me through [address] [place] line for hong? cation  ONCOLOGY HISTORY OF PRESENT ILLNESS        Oncology History   Primary pancreatic neuroendocrine tumor   6/6/2022 -  Cancer Staged    Staging form: Anus, AJCC 8th Edition  - Clinical stage from 6/6/2022: Stage IV (cT3, cN1a, pM1) - Signed by Roni Simmons MD on 6/12/2022  Stage prefix: Initial diagnosis  Sites of metastasis: Liver  Source of metastatic specimen: Bone, Aortic Lymph Nodes       6/12/2022 Initial Diagnosis    Primary pancreatic neuroendocrine tumor           SUBJECTIVE  (INTERVAL HISTORY)      Clotting History None   Bleeding History None   Cancer History Pancreatic   Family Cancer History Father (prostate), mother (uterine)   H/O Blood/Plt Transfusion None   Tobacco/etoh/drug abuse No nicotine use, etoh abuse, or rec drug use       Cancer Screening history No C-scope in the past,   Pap smear (30 years ago)  Mammogram (20 years ago, scheduled to have one this year)   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  She had a lot of second hand smoke in her life   New medications in the last month:  Pain: LLQ and RLQ abd since 2/2022 and this radiates across the lower abdomen       She had 5 watery BM a day 2/2022 and this ended mid 6/2022    I have reviewed the relevant past medical, surgical, social and family history  I have also reviewed allergies and medications for this patient  Review of Systems    Baseline weight: 145 lbs    She has lost 39 lbs unintentionally since 2/2022  She has had fatigue, intermittent LH  She does have moderate generalized weakness and cannot lift 30-40 lbs like she once was able to (can barely do 20 lbs now)  She has frequent nausea throughout the week  She denies F/C, SOB, CP, rash, itching, hematuria, melena, hematochezia, HA     WILSON going up flight of steps  She used to be called the "energizer bunny"  A 10-point review of system was performed, pertinent positive and negative were detailed as above  Otherwise, the 10-point review of system was negative  No past medical history on file  Past Surgical History:   Procedure Laterality Date    CARDIAC CATHETERIZATION N/A 3/30/2022    Procedure: Cardiac catheterization;  Surgeon: Sean Abdalla DO;  Location: [facility]; Service: Cardiology    CARDIAC CATHETERIZATION N/A 3/30/2022    Procedure: Cardiac Coronary Angiogram;  Surgeon: Sean Abdalla DO;  Location: [facility];   Service: Cardiology    IR BIOPSY LIVER MASS  6/6/2022       Family History   Problem Relation Age of Onset    Coronary artery disease Father     Coronary artery disease Brother     Diabetes Brother     Kidney failure Brother     Uterine cancer Mother        Social History     Socioeconomic History    Marital status: /Civil Union     Spouse name: Not on file    Number of children: Not on file    Years of education: Not on file    Highest education level: Not on file   Occupational History    Not on file   Tobacco Use    Smoking status: Never Smoker    Smokeless tobacco: Never Used   Vaping Use    Vaping Use: Never used   Substance and Sexual Activity    Alcohol use: Not Currently    Drug use: Never    Sexual activity: Not on file   Other Topics Concern    Not on file Social History Narrative    Not on file     Social Determinants of Health     Financial Resource Strain: Not on file   Food Insecurity: No Food Insecurity    Worried About Running Out of Food in the Last Year: Never true    Ihsan of Food in the Last Year: Never true   Transportation Needs: No Transportation Needs    Lack of Transportation (Medical): No    Lack of Transportation (Non-Medical):  No   Physical Activity: Not on file   Stress: Not on file   Social Connections: Not on file   Intimate Partner Violence: Not on file   Housing Stability: Low Risk     Unable to Pay for Housing in the Last Year: No    Number of Places Lived in the Last Year: 1    Unstable Housing in the Last Year: No       Allergies   Allergen Reactions    Bentyl [Dicyclomine] Throat Swelling    Codeine Other (See Comments)     Was told allergy    Elastic Bandages & [Zinc] Hives, Swelling and Rash       Current Outpatient Medications   Medication Sig Dispense Refill    acetaminophen (TYLENOL) 500 mg tablet Take 500 mg by mouth every 6 (six) hours as needed for mild pain      Cholecalciferol (Vitamin D3) 50 MCG (2000 UT) TABS Take by mouth        cyanocobalamin (VITAMIN B-12) 500 MCG tablet Take 500 mcg by mouth daily      ferrous sulfate 325 (65 Fe) mg tablet Take 325 mg by mouth daily with breakfast      COLLADO SEAL ROOT PO Take 900 mg by mouth daily Echinaceal/goldenseal blend       Omega-3 Fatty Acids (OMEGA 3 PO) Take 1,000 mg by mouth      ondansetron (Zofran ODT) 4 mg disintegrating tablet Take 1 tablet (4 mg total) by mouth every 8 (eight) hours as needed for nausea or vomiting 20 tablet 0    oxyCODONE (ROXICODONE) 5 immediate release tablet Take 0 5 tablets (2 5 mg total) by mouth every 6 (six) hours as needed for moderate pain Max Daily Amount: 10 mg 15 tablet 0    Pyridoxine HCl (VITAMIN B-6 PO) Take 1 tablet by mouth      Vitamin E 400 units TABS Take by mouth       No current facility-administered medications for this visit  (Not in a hospital admission)      Objective:     24 Hour Vitals Assessment:     Vitals:    06/21/22 1003   BP: 104/58   Pulse: 84   Resp: 18   Temp: (!) 96 7 °F (35 9 °C)   SpO2: 99%       PHYSICIAN EXAM:    General: Appearance: alert, cooperative, no distress  Cachetic appearing  HEENT: Normocephalic, atraumatic  No scleral icterus  conjunctivae clear  EOMI  Chest: No tenderness to palpation  No open wound noted  Lungs: Clear to auscultation bilaterally, Respirations unlabored  Cardiac: Regular rate and rhythm, +S1and S2  Abdomen: Soft, non-tender, non-distended  Bowel sounds are normal   Extremities:  No edema, cyanosis, clubbing  Skin: Skin color, turgor are normal  No rashes  Lymphatics: no palpable supra-cervical, axillary, or inguinal adenopathy  Neurologic: Awake, Alert, and oriented, no gross focal deficits noted b/l  DATA REVIEW:    Pathology Result:    Final Diagnosis   Date Value Ref Range Status   06/06/2022   Final    A  Liver (core needle biopsy):  - Carcinoma with neuroendocrine differentiation    Comment:  - The tumor cells stain for CA19 9, CDX2 with weak focal CK7 expression  The tumor cells are negative for CK20, GATA3, GCDFP15, TTF1  DPC4 is intact  Synaptophysin and chromogranin show focal staining  Ki67 interpretation is limited due to loss of tumor on deeper sectioning  Areas of the tumor show gland formation along with focal staining for neuroendocrine markers  This may represent a mixed adenocarcinoma-neuroendocrine carcinoma  Final characterization is deferred to the excisional specimen  - The immunopanel results raise a differential of an upper luminal GI primary versus a pancreatobiliary primary  The patient is noted on MRI to have a pancreatic mass  - Gabino Chong and Sharif Welch were notified of the diagnosis via Epic messaging on 6/8/22 at 8:32 am   Both were notified of the amended case on 6/9/22 at 7:00 am    - MMR IHC has been ordered and results will be indicated in an addendum  Interpretation performed at [facility], [address], [place], [address] [facility]            Image Results:   Image result are reviewed and documented in Hematology/Oncology history    MRI ankle/heel left wo contrast  Narrative: MRI ANKLE/HEEL LEFT WO CONTRAST    INDICATION:   M25 572: Pain in left ankle and joints of left foot  G89 29: Other chronic pain  COMPARISON:  4/11/2022    TECHNIQUE:   MR sequences were obtained of the ankle including: Localizers, coronal STIR, coronal T1, axial T2 fat sat, axial PD, sagittal T2 fat sat, sagittal T1 sequences were obtained  Gadolinium was not used  FINDINGS:    Subcutaneous Tissues: Normal     Joint Effusion: Small tibiotalar and subtalar effusions  TENDONS:  Achilles tendon: Normal   However, there is edema of Kager's fat pad, likely reactive  Peroneus brevis and longus: Normal   Posterior tibialis, flexor digitorum longus, flexor hallucis longus: Normal   Anterior tibialis, extensor digitorum longus, extensor hallucis longus:  Normal     LIGAMENTS:  Lateral collateral ligament complex:  Normal   Distal tibiofibular syndesmosis:  Normal   Medial collateral ligament complex:  Normal     Plantar Fascia:  Normal     Articular Surfaces:  Mild midfoot arthrosis  Focal moderate osteoarthritis of the middle subtalar joint  Sinus Tarsi:  Mild edema within the sinus tarsi  Tarsal Tunnel: Unremarkable  Bones:  Mild edema talus and calcaneus surrounding the sinus tarsi  No acute fracture or marrow infiltrative process  Suggestion of pes planovalgus  Musculature:  Normal   Impression: Findings suggestive of hindfoot impingement with sinus tarsi syndrome  Moderate middle subtalar osteoarthritis  Probable pes planovalgus  Edema of Kager's fat pad is likely reactive  Workstation performed: [id number]      LABS:  Lab data are reviewed and documented in HemOn history         Lab Results   Component Value Date    HGB 9 7 (L) 06/02/2022    HCT 30 2 (L) 06/02/2022    MCV 94 06/02/2022     06/02/2022    WBC 7 05 06/02/2022    NRBC 0 06/02/2022     Lab Results   Component Value Date    K 3 9 06/02/2022     06/02/2022    CO2 29 06/02/2022    BUN 14 06/02/2022    CREATININE 0 92 06/02/2022    GLUF 114 (H) 05/19/2022    CALCIUM 8 1 (L) 06/02/2022    CORRECTEDCA 9 5 06/02/2022    AST 48 (H) 06/02/2022    ALT 37 06/02/2022    ALKPHOS 153 (H) 06/02/2022    EGFR 61 06/02/2022       Lab Results   Component Value Date    IRON 26 (L) 05/30/2022    TIBC 186 (L) 05/30/2022    FERRITIN 159 05/30/2022       No results found for: [id number]    No results for input(s): WBC, CREAT in the last 72 hours      Invalid input(s):  PLT    By:  Sultana Alves MD, 6/21/2022, 10:30 AM

## 2022-06-12 NOTE — PROGRESS NOTES
Hematology/Oncology Outpatient Office Note    Date of Service: 2022    [address] [facility] Letty Dianaire  [facility]  [phone]    Reason for Consultation:   Chief Complaint   Patient presents with    Follow-up       Cancer Stage at diagnosis: IV    Referral Physician: Joanna Arreaga DO    Primary Care Physician:  Jordyn Cervantes DO     Nickname: Siva Lesia: eldest son  GF: Hi Sanchess: daughter    Original ECO    Today's ECO    Goals and Barriers:  Current Goal: Minimize effects of disease burden, extend life  Barriers to accomplishing this: low energy, lower abd pain  She still does the laundry and cooks but can't quite do the yard-work    Patient's Capacity to Self Care:  Patient is able to self care    Code Status: not addressed today    Advanced directives: not addressed today    ASSESSMENT & PLAN      Diagnosis ICD-10-CM Associated Orders   1  Primary pancreatic neuroendocrine tumor  D3A 8    2  Pancreatic mass  K86 89 Ambulatory Referral to Hematology / Oncology         This is a 78 y o  c PMHx notable for absence of co-morbidities, being seen in consultation for 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      The patient's liver biopsy had the characterization of a mixed-adenocarcinoma and NET pathology  Patient is MMR-proficient  As a result, I would favor treating this as a metastatic adenocarcinoma  (primarily gland formation)  The tumor cells stain for CA19 9, CDX2 with weak focal CK7 expression  The tumor cells are negative for CK20, GATA3, GCDFP15, TTF1  DPC4 is intact  Synaptophysin and chromogranin show focal staining  Ki67 interpretation is limited due to loss of tumor on deeper sectioning  Areas of the tumor show gland formation along with focal staining for neuroendocrine markers   The immunopanel results raise a differential of an upper luminal GI primary versus a pancreatobiliary primary  Discussion of decision making   Oncology history updated, accordingly, during this visit   Goals of care/patient communication  o I discussed with the patient the clinical course leading up to their cancer diagnosis  I reviewed relevant office notes, imaging reports and pathology result as well   o I told the patient that this is a case of incurable disease and what this means  We discussed that the goal of anti-cancer therapy is to provide best quality of life, extend overall survival, and progression free survival as shown in clinical trials  We also discussed that there might be a point when the cancer will no longer respond to this anti-neoplastic therapy  As a result, we also discussed the role of the palliative care team being introduced early in the treatment course  We have made this referral  o I explained the risks/benefits of the proposed cancer therapy: [place]-Abraxane and after discussion including understanding risks of possible life-threatening complications and therapy-related malignancy development, informed consent for blood products and treatment has been signed and obtained   TNM/Staging At Diagnosis  o [id number]  Cancer Staging: IV   Disease Features/Tumor Markers/Genetics  o Tumor Marker: n/a  o Notable Path Features: 6/6/22 Liver (core needle biopsy): Carcinoma with neuroendocrine differentiation   Treatment: [place]-Abraxane   Other Supportive care:    Treatment Team Members  o Surgeon  o Rad Onc  o Palliative: referral made   Labs   Diagnostics  6/1/22 MRI abd/pelv: Large pancreatic head mass (5 6 x 4 9 cm) most consistent with primary adenocarcinoma with liver metastasis, retroperitoneal lymphadenopathy, and probable small lung and bone metastasis    6/18/22 CT Chest w/o c: no official report yet but per my read there are several lung nodules seen*      Discussion of decision making    I personally reviewed the following lab results, the image studies, pathology, other specialty/physicians consult notes and recommendations, and outside medical records from [facility]  I had a lengthy discussion with the patient and shared the work-up findings  We discussed the diagnosis and management plan as below  I spent 48 minutes reviewing the records (labs, clinician notes, outside records, medical history, ordering medicine/tests/procedures, interpreting the imaging/labs previously done) and coordination of care as well as direct time with the patient today, of which greater than 50% of the time was spent in counseling and coordination of care with the patient/family  · Plan/Labs  · F/u IR for port placement  · Palliative care referral re-sent for Stage IV disease  · Comprehensive NGS to be sent off from liver biopsy tissue (request being worked on today)  · Infusion chairs ordered for Rusty Velázquez q28 days, which we have consented the patient for today  Infusion chairs to be scheduled along with preceding labs  · Dietitian referral for weight loss placed today  · Bone Scan to assess for osseous mets  · Supportive care plan for Z[place] q12 weeks needed if osseous mets found and to be addressed after bone scan completed  Pt to take calcium, Vit D supplements daily ( at next visit)  · Restaging CT CAP w/c in 3 months         Follow Up: q4 weeks (starting in 2 weeks) while on systemic chemotherapy    All questions were answered to the patient's satisfaction during this encounter  The patient knows the contact information for our office and knows to reach out for any relevant concerns related to this encounter  They are to call for any temperature 100 4 or higher, new symptoms including but not restricted to shaking chills, decreased appetite, nausea, vomiting, diarrhea, increased fatigue, shortness of breath or chest pain, confusion, and not feeling the strength to come to the clinic   For all other listed problems and medical diagnosis in their chart - they are managed by PCP and/or other specialists, which the patient acknowledges  Thank you very much for your consultation and making us a part of this patient's care  We are continuing to follow closely with you  Please do not hesitate to reach out to me with any additional questions or concerns  Bharati Henley MD  Hematology & Medical Oncology Staff Physician             Disclaimer: This document was prepared using Reading Room Direct technology  If a word or phrase is confusing, or does not make sense, this is likely due to recognition error which was not discovered during this clinician's review  If you believe an error has occurred, please contact me through [address] [place] line for hong? cation  ONCOLOGY HISTORY OF PRESENT ILLNESS        Oncology History   Primary pancreatic neuroendocrine tumor   6/6/2022 -  Cancer Staged    Staging form: Anus, AJCC 8th Edition  - Clinical stage from 6/6/2022: Stage IV (cT3, cN1a, pM1) - Signed by Dariela Tidwell MD on 6/12/2022  Stage prefix: Initial diagnosis  Sites of metastasis: Liver  Source of metastatic specimen: Bone, Aortic Lymph Nodes       6/12/2022 Initial Diagnosis    Primary pancreatic neuroendocrine tumor           SUBJECTIVE  (INTERVAL HISTORY)      Clotting History None   Bleeding History None   Cancer History Pancreatic   Family Cancer History Father (prostate), mother (uterine)   H/O Blood/Plt Transfusion None   Tobacco/etoh/drug abuse No nicotine use, etoh abuse, or rec drug use       Cancer Screening history No C-scope in the past,   Pap smear (30 years ago)  Mammogram (20 years ago, scheduled to have one this year)   Occupation Worked for a state residential for 16 years  She had a lot of second hand smoke in her life   New medications in the last month:  Pain: LLQ and RLQ abd since 2/2022 and this radiates across the lower abdomen       She had 5 watery BM a day 2/2022 and this ended mid 6/2022    I have reviewed the relevant past medical, surgical, social and family history  I have also reviewed allergies and medications for this patient  Review of Systems    Baseline weight: 145 lbs    She has lost 39 lbs unintentionally since 2/2022  She has had fatigue, intermittent LH  She does have moderate generalized weakness and cannot lift 30-40 lbs like she once was able to (can barely do 20 lbs now)  She has frequent nausea throughout the week  She denies F/C, SOB, CP, rash, itching, hematuria, melena, hematochezia, HA     WILSON going up flight of steps  She used to be called the "energizer bunny"  A 10-point review of system was performed, pertinent positive and negative were detailed as above  Otherwise, the 10-point review of system was negative  No past medical history on file  Past Surgical History:   Procedure Laterality Date    CARDIAC CATHETERIZATION N/A 3/30/2022    Procedure: Cardiac catheterization;  Surgeon: Cheryl Acosta DO;  Location: [facility]; Service: Cardiology    CARDIAC CATHETERIZATION N/A 3/30/2022    Procedure: Cardiac Coronary Angiogram;  Surgeon: Cheryl Acosta DO;  Location: [facility];   Service: Cardiology    IR BIOPSY LIVER MASS  6/6/2022       Family History   Problem Relation Age of Onset    Coronary artery disease Father     Coronary artery disease Brother     Diabetes Brother     Kidney failure Brother     Uterine cancer Mother        Social History     Socioeconomic History    Marital status: /Civil Union     Spouse name: Not on file    Number of children: Not on file    Years of education: Not on file    Highest education level: Not on file   Occupational History    Not on file   Tobacco Use    Smoking status: Never Smoker    Smokeless tobacco: Never Used   Vaping Use    Vaping Use: Never used   Substance and Sexual Activity    Alcohol use: Not Currently    Drug use: Never    Sexual activity: Not on file   Other Topics Concern    Not on file Social History Narrative    Not on file     Social Determinants of Health     Financial Resource Strain: Not on file   Food Insecurity: No Food Insecurity    Worried About Running Out of Food in the Last Year: Never true    Ihsan of Food in the Last Year: Never true   Transportation Needs: No Transportation Needs    Lack of Transportation (Medical): No    Lack of Transportation (Non-Medical):  No   Physical Activity: Not on file   Stress: Not on file   Social Connections: Not on file   Intimate Partner Violence: Not on file   Housing Stability: Low Risk     Unable to Pay for Housing in the Last Year: No    Number of Places Lived in the Last Year: 1    Unstable Housing in the Last Year: No       Allergies   Allergen Reactions    Bentyl [Dicyclomine] Throat Swelling    Codeine Other (See Comments)     Was told allergy    Elastic Bandages & [Zinc] Hives, Swelling and Rash       Current Outpatient Medications   Medication Sig Dispense Refill    acetaminophen (TYLENOL) 500 mg tablet Take 500 mg by mouth every 6 (six) hours as needed for mild pain      Cholecalciferol (Vitamin D3) 50 MCG (2000 UT) TABS Take by mouth        cyanocobalamin (VITAMIN B-12) 500 MCG tablet Take 500 mcg by mouth daily      ferrous sulfate 325 (65 Fe) mg tablet Take 325 mg by mouth daily with breakfast      COLLADO SEAL ROOT PO Take 900 mg by mouth daily Echinaceal/goldenseal blend       Omega-3 Fatty Acids (OMEGA 3 PO) Take 1,000 mg by mouth      ondansetron (Zofran ODT) 4 mg disintegrating tablet Take 1 tablet (4 mg total) by mouth every 8 (eight) hours as needed for nausea or vomiting 20 tablet 0    oxyCODONE (ROXICODONE) 5 immediate release tablet Take 0 5 tablets (2 5 mg total) by mouth every 6 (six) hours as needed for moderate pain Max Daily Amount: 10 mg 15 tablet 0    Pyridoxine HCl (VITAMIN B-6 PO) Take 1 tablet by mouth      Vitamin E 400 units TABS Take by mouth       No current facility-administered medications for this visit  (Not in a hospital admission)      Objective:     24 Hour Vitals Assessment:     Vitals:    06/21/22 1003   BP: 104/58   Pulse: 84   Resp: 18   Temp: (!) 96 7 °F (35 9 °C)   SpO2: 99%       PHYSICIAN EXAM:    General: Appearance: alert, cooperative, no distress  Cachetic appearing  HEENT: Normocephalic, atraumatic  No scleral icterus  conjunctivae clear  EOMI  Chest: No tenderness to palpation  No open wound noted  Lungs: Clear to auscultation bilaterally, Respirations unlabored  Cardiac: Regular rate and rhythm, +S1and S2  Abdomen: Soft, non-tender, non-distended  Bowel sounds are normal   Extremities:  No edema, cyanosis, clubbing  Skin: Skin color, turgor are normal  No rashes  Lymphatics: no palpable supra-cervical, axillary, or inguinal adenopathy  Neurologic: Awake, Alert, and oriented, no gross focal deficits noted b/l  DATA REVIEW:    Pathology Result:    Final Diagnosis   Date Value Ref Range Status   06/06/2022   Final    A  Liver (core needle biopsy):  - Carcinoma with neuroendocrine differentiation    Comment:  - The tumor cells stain for CA19 9, CDX2 with weak focal CK7 expression  The tumor cells are negative for CK20, GATA3, GCDFP15, TTF1  DPC4 is intact  Synaptophysin and chromogranin show focal staining  Ki67 interpretation is limited due to loss of tumor on deeper sectioning  Areas of the tumor show gland formation along with focal staining for neuroendocrine markers  This may represent a mixed adenocarcinoma-neuroendocrine carcinoma  Final characterization is deferred to the excisional specimen  - The immunopanel results raise a differential of an upper luminal GI primary versus a pancreatobiliary primary  The patient is noted on MRI to have a pancreatic mass  - Gabino Rodriguez and Matti Muniz were notified of the diagnosis via Epic messaging on 6/8/22 at 8:32 am   Both were notified of the amended case on 6/9/22 at 7:00 am    - MMR IHC has been ordered and results will be indicated in an addendum  Interpretation performed at [address], [address] [facility], [facility], [facility]            Image Results:   Image result are reviewed and documented in Hematology/Oncology history    MRI ankle/heel left wo contrast  Narrative: MRI ANKLE/HEEL LEFT WO CONTRAST    INDICATION:   M25 572: Pain in left ankle and joints of left foot  G89 29: Other chronic pain  COMPARISON:  4/11/2022    TECHNIQUE:   MR sequences were obtained of the ankle including: Localizers, coronal STIR, coronal T1, axial T2 fat sat, axial PD, sagittal T2 fat sat, sagittal T1 sequences were obtained  Gadolinium was not used  FINDINGS:    Subcutaneous Tissues: Normal     Joint Effusion: Small tibiotalar and subtalar effusions  TENDONS:  Achilles tendon: Normal   However, there is edema of Kager's fat pad, likely reactive  Peroneus brevis and longus: Normal   Posterior tibialis, flexor digitorum longus, flexor hallucis longus: Normal   Anterior tibialis, extensor digitorum longus, extensor hallucis longus:  Normal     LIGAMENTS:  Lateral collateral ligament complex:  Normal   Distal tibiofibular syndesmosis:  Normal   Medial collateral ligament complex:  Normal     Plantar Fascia:  Normal     Articular Surfaces:  Mild midfoot arthrosis  Focal moderate osteoarthritis of the middle subtalar joint  Sinus Tarsi:  Mild edema within the sinus tarsi  Tarsal Tunnel: Unremarkable  Bones:  Mild edema talus and calcaneus surrounding the sinus tarsi  No acute fracture or marrow infiltrative process  Suggestion of pes planovalgus  Musculature:  Normal   Impression: Findings suggestive of hindfoot impingement with sinus tarsi syndrome  Moderate middle subtalar osteoarthritis  Probable pes planovalgus  Edema of Kager's fat pad is likely reactive  Workstation performed: [id number]      LABS:  Lab data are reviewed and documented in HemOn history         Lab Results   Component Value Date    HGB 9 7 (L) 06/02/2022    HCT 30 2 (L) 06/02/2022    MCV 94 06/02/2022     06/02/2022    WBC 7 05 06/02/2022    NRBC 0 06/02/2022     Lab Results   Component Value Date    K 3 9 06/02/2022     06/02/2022    CO2 29 06/02/2022    BUN 14 06/02/2022    CREATININE 0 92 06/02/2022    GLUF 114 (H) 05/19/2022    CALCIUM 8 1 (L) 06/02/2022    CORRECTEDCA 9 5 06/02/2022    AST 48 (H) 06/02/2022    ALT 37 06/02/2022    ALKPHOS 153 (H) 06/02/2022    EGFR 61 06/02/2022       Lab Results   Component Value Date    IRON 26 (L) 05/30/2022    TIBC 186 (L) 05/30/2022    FERRITIN 159 05/30/2022       No results found for: [id number]    No results for input(s): WBC, CREAT in the last 72 hours      Invalid input(s):  PLT    By:  Mei Hagan MD, 6/21/2022, 10:30 AM

## 2022-06-13 ENCOUNTER — OFFICE VISIT (OUTPATIENT)
Dept: FAMILY MEDICINE CLINIC | Facility: CLINIC | Age: 77
End: 2022-06-13
Payer: COMMERCIAL

## 2022-06-13 VITALS
TEMPERATURE: 97.6 F | HEART RATE: 81 BPM | BODY MASS INDEX: 18.71 KG/M2 | HEIGHT: 64 IN | OXYGEN SATURATION: 95 % | WEIGHT: 109.6 LBS | RESPIRATION RATE: 16 BRPM | DIASTOLIC BLOOD PRESSURE: 68 MMHG | SYSTOLIC BLOOD PRESSURE: 106 MMHG

## 2022-06-13 DIAGNOSIS — Z23 NEED FOR PNEUMOCOCCAL VACCINATION: ICD-10-CM

## 2022-06-13 DIAGNOSIS — F41.9 ANXIETY: ICD-10-CM

## 2022-06-13 DIAGNOSIS — D3A.8 PRIMARY PANCREATIC NEUROENDOCRINE TUMOR: Primary | Chronic | ICD-10-CM

## 2022-06-13 DIAGNOSIS — R11.0 NAUSEA: ICD-10-CM

## 2022-06-13 DIAGNOSIS — E43 SEVERE PROTEIN-CALORIE MALNUTRITION (HCC): ICD-10-CM

## 2022-06-13 PROCEDURE — G0009 ADMIN PNEUMOCOCCAL VACCINE: HCPCS | Performed by: FAMILY MEDICINE

## 2022-06-13 PROCEDURE — 90677 PCV20 VACCINE IM: CPT | Performed by: FAMILY MEDICINE

## 2022-06-13 PROCEDURE — 99495 TRANSJ CARE MGMT MOD F2F 14D: CPT | Performed by: FAMILY MEDICINE

## 2022-06-13 RX ORDER — ONDANSETRON 4 MG/1
4 TABLET, ORALLY DISINTEGRATING ORAL EVERY 8 HOURS PRN
Qty: 20 TABLET | Refills: 0 | Status: SHIPPED | OUTPATIENT
Start: 2022-06-13

## 2022-06-13 NOTE — TELEPHONE ENCOUNTER
Spoke to patient and moved Colon/EGD with Dr Tyra Merlos to 06/29/2022 @ [facility] lab  PT is aware that prep instructions will remain the same

## 2022-06-13 NOTE — PROGRESS NOTES
Assessment/Plan:     1  Primary pancreatic neuroendocrine tumor  Comments: Follow up with oncology as scheduled for treatment plan as she would like to explore all tx options; f/u guidance given; advised importance of being vaccinated and agreeable to pneumococcal vaccine, updated today; advised to also get COVID19 vaccine    2  Severe protein-calorie malnutrition ([address] )  Assessment & Plan:  Malnutrition Findings:                         Secondary to CA; c/w calorie supplementation as tolerated           BMI Findings: Body mass index is 18 81 kg/m²  3  Anxiety  Assessment & Plan:  Offered medication but declines at this time; advised counseling which she is agreeable; referral placed she may also find services available through heme/onc    Orders:  -     Ambulatory Referral to [facility]; Future    4  Nausea  -     ondansetron (Zofran ODT) 4 mg disintegrating tablet; Take 1 tablet (4 mg total) by mouth every 8 (eight) hours as needed for nausea or vomiting    5  Need for pneumococcal vaccination  -     Pneumococcal Conjugate Vaccine 20-valent (Pcv20)        Subjective:      Patient ID: Max Herrera is a 78 y o  female  Patient is here for follow up on pancreatic mass with liver metastasis  She was admitted from 5/29-6/2  She states she does have discomfort after eating a meal  Admits to anxiety being high since hospital    Has appointment with oncology on 6/21/22  Upset she lost more weight despite trying to take in more calories  Taking ensure and protein bars  Patient states she has not used oxycodone since being discharged  Is having nausea but no vomiting  Hospital Course Summary:     "Max Herrera is a relatively healthy 53-year-old female presented hospital with worsening abdominal pain and weight loss  In the ED she was found have pancreatic mass with liver metastasis  She was seen by GI and underwent MRI for further characterization    Biopsy cannot be scheduled for this week therefore she will have expedited biopsy next week with IR and will have prompt follow-up with medical oncology  Of note she was incidentally found to have pericardial effusion of unclear significance and was seen by cardiology for this as well  She will be given a limited prescription of oxycodone to take as needed for pain      The case was discussed with son at bedside on day of discharge        Please see problem list listed above "      The following portions of the patient's history were reviewed and updated as appropriate: allergies, current medications, past family history, past medical history, past social history, past surgical history, and problem list     Review of Systems   Constitutional: Positive for fatigue and unexpected weight change  Respiratory: Negative for chest tightness and shortness of breath  Cardiovascular: Negative for chest pain and leg swelling  Gastrointestinal: Positive for abdominal distention and nausea  Psychiatric/Behavioral: The patient is nervous/anxious  Objective:      /68 (BP Location: Right arm, Patient Position: Sitting, Cuff Size: Adult)   Pulse 81   Temp 97 6 °F (36 4 °C) (Temporal)   Resp 16   Ht 5' 4" (1 626 m)   Wt 49 7 kg (109 lb 9 6 oz)   SpO2 95%   BMI 18 81 kg/m²          Physical Exam  Vitals reviewed  Constitutional:       General: She is not in acute distress  Appearance: Normal appearance  She is not ill-appearing, toxic-appearing or diaphoretic  HENT:      Head: Normocephalic and atraumatic  Eyes:      General: No scleral icterus  Right eye: No discharge  Left eye: No discharge  Conjunctiva/sclera: Conjunctivae normal    Cardiovascular:      Rate and Rhythm: Normal rate and regular rhythm  Pulses: Normal pulses  Heart sounds: Normal heart sounds  No murmur heard  No gallop  Pulmonary:      Effort: Pulmonary effort is normal  No respiratory distress        Breath sounds: Normal breath sounds  No stridor  No wheezing, rhonchi or rales  Abdominal:      General: There is distension  Palpations: Abdomen is soft  Tenderness: There is abdominal tenderness  There is no guarding or rebound  Musculoskeletal:      Right lower leg: No edema  Left lower leg: No edema  Neurological:      General: No focal deficit present  Mental Status: She is alert and oriented to person, place, and time  Psychiatric:         Mood and Affect: Mood normal          Behavior: Behavior normal          Thought Content:  Thought content normal          Judgment: Judgment normal

## 2022-06-14 ENCOUNTER — PATIENT OUTREACH (OUTPATIENT)
Dept: CASE MANAGEMENT | Facility: OTHER | Age: 77
End: 2022-06-14

## 2022-06-14 DIAGNOSIS — D3A.8 PRIMARY PANCREATIC NEUROENDOCRINE TUMOR: Primary | ICD-10-CM

## 2022-06-14 NOTE — PROGRESS NOTES
Biopsychosocial and Barriers Assessment    Cancer Diagnosis: metastatic pancreatic cancer  Home/Cell Phone: [phone]  Emergency Contact: greg Alarcon  Marital Status:   Interpretation concerns, speaks another language, preferred language: English  Cultural concerns: none  Ability to read or write: yes    Caregiver/Support: Emotional support from family, she is primary bread-winner  Children: 1 daughter  Child/Elder care: Primary caregiver to spouse, has 79 y/o step-mother living with her and her daughter has neurological issues    Housing: rental property  Home Setup: mobile home  Lives With: spouse and 79 y/o step-mother  Daily Living Activities: independent  Durable Medical Equipment: none  Ambulation: independent    Preferred Pharmacy: [address] [place] [address] co-pays with insurance: AARP Medicare Complete replacement Spec  $35, ER $90  High co-pays with medication coverage: unknown at this time  No medication coverage: N/A    Primary Care Provider: Salas Juarez DO  Hx of [address]: none  Hx of Short term rehab: none  Mental Health Hx: none  Substance Abuse Hx: none  Employment: has SS longterm, however also works at [employer]  [place] Status/Location: spouse is a  and service connected  Ability to pay bills: at this time, no issues  POA/LW/AD: not addressed  Transportation Plan/Concerns: drives self       What do you know about your Cancer Diagnosis    What has your doctor told you about your cancer diagnosis: I have pancreatic cancer    What has your doctor told you about your cancer treatment: I have to meet with the doctor next week    What specific concerns do you have about your diagnosis and treatment: I have to be able to work because everyone in my family relies on me    Have you been made aware of any hair loss associated with treatment: not addressed    Additional Comments:    Pt returned call to OSW today   She lives in [place] in a mobile home with her disabled spouse and her 79 y/o stepmother  Her stepmother is independent and is awaiting an apartment to move out on her own  Grant Goyal has a daughter and twin 15year old grandsons who live next door in another trailer  She pays the rent for her daughter's home  Her grandsons each have medical issues  One has DM 1 and the other has celiac disease  Grant Goyal and her  both collect SS, however 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  OSW expressed concern regarding her ability to work if she needs chemo and/or radiation  She stated she has been thinking about this as well  She stated her daughter has been waiting to get into a neurologist to address her physical issues  She states her daughter will fall from standing without warning, and that her daughter reports "losing feeling" in her arms and legs  Daughter does not work, but collects SNAP benefits  Daughter is  from spouse but he is not the biological father of the twins  Grant Goyal expressed feeling stress and sadness over her cancer diagnosis and how she can continue to help her family  Provided significant emotional support  Explained during winter months, there are programs available for those that qualify for heat and rental assistance  She stated she applied in the past but was denied for exceeding income  Explained if she is not able to work, she may qualify  Offered to call her next week and check in after her hematology oncology visit  She is appreciative

## 2022-06-15 ENCOUNTER — HOSPITAL ENCOUNTER (OUTPATIENT)
Dept: MRI IMAGING | Facility: HOSPITAL | Age: 77
Discharge: HOME | End: 2022-06-15
Attending: PODIATRIST
Payer: COMMERCIAL

## 2022-06-15 ENCOUNTER — TELEPHONE (OUTPATIENT)
Dept: GENETICS | Facility: CLINIC | Age: 77
End: 2022-06-15

## 2022-06-15 DIAGNOSIS — M25.572 CHRONIC PAIN OF LEFT ANKLE: ICD-10-CM

## 2022-06-15 DIAGNOSIS — G89.29 CHRONIC PAIN OF LEFT ANKLE: ICD-10-CM

## 2022-06-15 PROCEDURE — 73721 MRI JNT OF LWR EXTRE W/O DYE: CPT

## 2022-06-15 PROCEDURE — G1004 CDSM NDSC: HCPCS

## 2022-06-15 NOTE — TELEPHONE ENCOUNTER
I called Guadalupe Stack to schedule a new patient appointment with the Cancer Risk and Genetics Program       Outcome:   I left a voice message encouraging the patient to call the genetics team at [phone] to schedule this appointment  Follow-up: We will make one more attempt to reach the patient

## 2022-06-18 ENCOUNTER — HOSPITAL ENCOUNTER (OUTPATIENT)
Dept: CT IMAGING | Facility: HOSPITAL | Age: 77
Discharge: HOME | End: 2022-06-18
Attending: INTERNAL MEDICINE
Payer: COMMERCIAL

## 2022-06-18 DIAGNOSIS — C25.9 PANCREATIC CARCINOMA (HCC): ICD-10-CM

## 2022-06-18 PROCEDURE — 71250 CT THORAX DX C-: CPT

## 2022-06-18 PROCEDURE — G1004 CDSM NDSC: HCPCS

## 2022-06-20 ENCOUNTER — HOSPITAL ENCOUNTER (OUTPATIENT)
Dept: BONE DENSITY | Facility: MEDICAL CENTER | Age: 77
Discharge: HOME | End: 2022-06-20
Payer: COMMERCIAL

## 2022-06-20 DIAGNOSIS — Z78.0 ASYMPTOMATIC POSTMENOPAUSAL STATE: ICD-10-CM

## 2022-06-20 PROCEDURE — 77080 DXA BONE DENSITY AXIAL: CPT

## 2022-06-21 ENCOUNTER — CONSULT (OUTPATIENT)
Dept: HEMATOLOGY ONCOLOGY | Facility: CLINIC | Age: 77
End: 2022-06-21
Payer: COMMERCIAL

## 2022-06-21 ENCOUNTER — TELEPHONE (OUTPATIENT)
Dept: HEMATOLOGY ONCOLOGY | Facility: CLINIC | Age: 77
End: 2022-06-21

## 2022-06-21 ENCOUNTER — TELEPHONE (OUTPATIENT)
Dept: RADIOLOGY | Facility: HOSPITAL | Age: 77
End: 2022-06-21

## 2022-06-21 VITALS
DIASTOLIC BLOOD PRESSURE: 58 MMHG | TEMPERATURE: 96.7 F | RESPIRATION RATE: 18 BRPM | OXYGEN SATURATION: 99 % | HEIGHT: 64 IN | HEART RATE: 84 BPM | WEIGHT: 106.5 LBS | SYSTOLIC BLOOD PRESSURE: 104 MMHG | BODY MASS INDEX: 18.18 KG/M2

## 2022-06-21 DIAGNOSIS — R11.2 CHEMOTHERAPY INDUCED NAUSEA AND VOMITING: ICD-10-CM

## 2022-06-21 DIAGNOSIS — T45.1X5A CHEMOTHERAPY INDUCED NAUSEA AND VOMITING: ICD-10-CM

## 2022-06-21 DIAGNOSIS — C25.9 PRIMARY PANCREATIC ADENOCARCINOMA (HCC): ICD-10-CM

## 2022-06-21 DIAGNOSIS — C25.9 PRIMARY PANCREATIC ADENOCARCINOMA (HCC): Primary | ICD-10-CM

## 2022-06-21 DIAGNOSIS — D3A.8 PRIMARY PANCREATIC NEUROENDOCRINE TUMOR: Primary | Chronic | ICD-10-CM

## 2022-06-21 DIAGNOSIS — K86.89 PANCREATIC MASS: ICD-10-CM

## 2022-06-21 PROCEDURE — 99204 OFFICE O/P NEW MOD 45 MIN: CPT | Performed by: INTERNAL MEDICINE

## 2022-06-21 RX ORDER — SODIUM CHLORIDE 9 MG/ML
20 INJECTION, SOLUTION INTRAVENOUS ONCE
Status: CANCELLED | OUTPATIENT
Start: 2022-07-06

## 2022-06-21 RX ORDER — SODIUM CHLORIDE 9 MG/ML
20 INJECTION, SOLUTION INTRAVENOUS ONCE
Status: CANCELLED | OUTPATIENT
Start: 2022-07-12

## 2022-06-21 RX ORDER — SODIUM CHLORIDE 9 MG/ML
75 INJECTION, SOLUTION INTRAVENOUS CONTINUOUS
Status: CANCELLED | OUTPATIENT
Start: 2022-06-21

## 2022-06-21 RX ORDER — SODIUM CHLORIDE 9 MG/ML
20 INJECTION, SOLUTION INTRAVENOUS ONCE
Status: CANCELLED | OUTPATIENT
Start: 2022-06-29

## 2022-06-21 RX ORDER — CEFAZOLIN SODIUM 1 G/50ML
1000 SOLUTION INTRAVENOUS ONCE
Status: CANCELLED | OUTPATIENT
Start: 2022-06-21 | End: 2022-06-21

## 2022-06-21 NOTE — TELEPHONE ENCOUNTER
While we try to accommodate patient requests, our priority is to schedule treatment according to Doctor's orders and site availability  1  Does the Provider use the intake sheet or checkout note? Checkout Note  2  What would be a preferred day of the week that would work best for your infusion appointment? Monday's are preferred  3  Do you prefer mornings or afternoons for your appointments? Morning  4  Are there any days or dates that do not work for your schedule, including any upcoming vacations? No  5  We are going to try our best to schedule you at the infusion center closest to your home  In the event that we are unable to what would be your next preferred infusion site or sites? 1  [place]  2  [place]  3  [place]    6  Do you have transportation to take you to all of your appointments? Yes  7  Would you like the infusion center to draw labs from your port? (disregard if patient doesn't have a port or need labs for infusion appointment) Patient will go to the walk in lab  Per Provider: Please schedule q4 week appts after the first appointment on 7/5/22 thru the next 3 months  I will schedule her 7/5 appointment

## 2022-06-21 NOTE — TELEPHONE ENCOUNTER
Hi, would you be able to do Wednesday or Thursday of that week and then schedule the rest of the cycles on Monday? I can notify patient because of the availability  Thank you!

## 2022-06-21 NOTE — PRE-PROCEDURE INSTRUCTIONS
Pre-procedure Instructions for Interventional Radiology  [address] [place] [address] [address] [phone]    You are scheduled for a/an [facility]  On Tuesday 6/28/22  Your tentative arrival time is 1200  Short stay will notify you the day before your procedure with the exact arrival time and the location to arrive  To prepare for your procedure:  1  Please arrange for someone to drive you home after the procedure and stay with you until the next morning if you are instructed to do so  This is typically for patients receiving some type of sedative or anesthetic for the procedure  2  DO NOT EAT OR DRINK ANYTHING after midnight on the evening before your procedure including candy & gum   3  ONLY SIPS OF WATER with your medications are allowed on the morning of your procedure  4  TAKE ALL OF YOUR REGULAR MEDICATIONS THE MORNING OF YOUR PROCEDURE with sips of water! We may call you to stop some of your blood sugar, blood pressure and blood thinning medications depending on the procedure  Please take all of these medications unless we instruct you to stop them  5  If you have an allergy to x-ray dye, please contact Interventional Radiology for an x-ray dye preparation which usually consists of an oral steroid and Benadryl  The day of your procedure:  1  Bring a list of the medications you take at home  2  Bring medications you take for breathing problems (such as inhalers), medications for chest pain, or both  3  Bring a case for your glasses or contacts  4  Bring your insurance card and a form of photo ID   5  Please leave all valuables such as credit cards and jewelry at home  6  Report to the registration desk in the main lobby at the [facility]  Ask to be directed to [facility]  7  While your procedure is being performed, your family may wait in the Radiology Waiting Room on the 1st floor in Radiology    if they need to leave, they may provide a number to be called following the procedure  8  Be prepared to stay overnight just in case  Sometimes procedures will indicate the need for further observation or treatment  9  If you are scheduled for a follow-up visit with the Interventional Radiologist after your procedure, you will be called with a date and time  10  Covid screening completed no vaccine  Vaccine encouraged      Special Instructions (Medications to stop taking before your procedure etc ):

## 2022-06-21 NOTE — TELEPHONE ENCOUNTER
So I don't have anything on Monday the 27th in the morning and then we have the holiday week and are closed Monday the 4th and I have nothing Tuesday the 5th  Let me know what you want to do please?  Thank you

## 2022-06-21 NOTE — TELEPHONE ENCOUNTER
Cycle 1 is scheduled  No other cycles were signed  Cycle one unfortunately cannot be on Monday's  If future cycles are signed I can get those scheduled on Mondays for the patient thank you

## 2022-06-22 ENCOUNTER — TELEPHONE (OUTPATIENT)
Dept: GENETICS | Facility: CLINIC | Age: 77
End: 2022-06-22

## 2022-06-22 ENCOUNTER — OFFICE VISIT (OUTPATIENT)
Dept: PODIATRY | Facility: CLINIC | Age: 77
End: 2022-06-22
Payer: COMMERCIAL

## 2022-06-22 ENCOUNTER — TELEPHONE (OUTPATIENT)
Dept: NUTRITION | Facility: CLINIC | Age: 77
End: 2022-06-22

## 2022-06-22 VITALS
WEIGHT: 106 LBS | HEIGHT: 64 IN | SYSTOLIC BLOOD PRESSURE: 110 MMHG | DIASTOLIC BLOOD PRESSURE: 64 MMHG | BODY MASS INDEX: 18.1 KG/M2

## 2022-06-22 DIAGNOSIS — G89.29 CHRONIC PAIN OF LEFT ANKLE: Primary | ICD-10-CM

## 2022-06-22 DIAGNOSIS — M25.572 SINUS TARSI SYNDROME OF LEFT ANKLE: ICD-10-CM

## 2022-06-22 DIAGNOSIS — M25.572 CHRONIC PAIN OF LEFT ANKLE: Primary | ICD-10-CM

## 2022-06-22 PROCEDURE — 99212 OFFICE O/P EST SF 10 MIN: CPT | Performed by: PODIATRIST

## 2022-06-22 NOTE — TELEPHONE ENCOUNTER
I called Yenni Valdivia to schedule a new patient appointment with the Cancer Risk and Genetics Program       Outcome:   Spoke with patient and daughter, ok with Bella to schedule for tomorrow at 9:30  Pt has metastatic pancreatic cancer fhx of uterine cancer cancer that spread to bone in mom and prostate cancer in dad - no prior genetic testing done in pt nor family members

## 2022-06-22 NOTE — PROGRESS NOTES
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

## 2022-06-22 NOTE — PROGRESS NOTES
Pre-Test Genetic Counseling Consult Note    Patient Name: Maximus Mccord   /Age: [age] y o  Referring Provider: Beverly Verdugo MD    Date of Service: 2022  Genetic Counselor: Marina Pantoja MS, [facility]  Interpretation Services: None  [facility]      Cait Lees was referred to the [address] [place] [address] and Genetic Assessment Program due to her personal history of pancreatic cancer and family history of uterine and possibly thyroid cancer  She presents today to discuss the possibility of a hereditary cancer syndrome, options for genetic testing, and implications for her and her family  Cancer History and Treatment:                   Personal History: Personal history of 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 at age 78    2022  Final Diagnosis   A  Liver (core needle biopsy):  - Carcinoma with neuroendocrine differentiation     Comment:  - The tumor cells stain for CA19 9, CDX2 with weak focal CK7 expression  The tumor cells are negative for CK20, GATA3, GCDFP15, TTF1  DPC4 is intact  Synaptophysin and chromogranin show focal staining  Ki67 interpretation is limited due to loss of tumor on deeper sectioning  Areas of the tumor show gland formation along with focal staining for neuroendocrine markers  This may represent a mixed adenocarcinoma-neuroendocrine carcinoma  Final characterization is deferred to the excisional specimen  - The immunopanel results raise a differential of an upper luminal GI primary versus a pancreatobiliary primary  The patient is noted on MRI to have a pancreatic mass  - Gabino Terry and Fawn Mendes were notified of the diagnosis via Epic messaging on 22 at 8:32 am   Both were notified of the amended case on 22 at 7:00 am    - MMR IHC has been ordered and results will be indicated in an addendum       Interpretation performed at [facility], [address], [place] [address]     Addendum   RESULTS OF IMMUNOHISTOCHEMICAL ANALYSIS FOR MISMATCH REPAIR PROTEIN LOSS     INTERPRETATION: No loss of nuclear expression of MMR proteins: Low probability of MSI-H     Note: Background non-neoplastic tissue and/or internal control with intact nuclear expression       RESULTS:  Antibody          Clone               Description                           Results  MLH1               M1                   Mismatch repair protein       Intact nuclear expression  MSH2              [id number]       Mismatch repair protein       Intact nuclear expression  CNQ0              55                     Mismatch repair protein       Intact nuclear expression  EDY5              [id number]           Mismatch repair protein       Intact nuclear expression        6/1/22 MRI abd/pelv: Large pancreatic head mass (5 6 x 4 9 cm) most consistent with primary adenocarcinoma with liver metastasis, retroperitoneal lymphadenopathy, and probable small lung and bone metastasis  Screening Hx:     Breast:  Breast Imaging: No current screening     Colon:  Colonoscopy: Active order     Gynecologic:  Ovaries and Uterus intact     Medical and Surgical History  Pertinent surgical history:   Past Surgical History:   Procedure Laterality Date    CARDIAC CATHETERIZATION N/A 3/30/2022    Procedure: Cardiac catheterization;  Surgeon: Lisa Kumar DO;  Location: [facility] CARDIAC CATH LAB; Service: Cardiology    CARDIAC CATHETERIZATION N/A 3/30/2022    Procedure: Cardiac Coronary Angiogram;  Surgeon: Lisa Kumar DO;  Location: [facility] CARDIAC CATH LAB; Service: Cardiology    IR BIOPSY LIVER MASS  6/6/2022      Pertinent medical history:No past medical history on file        Other History:  Height:   Ht Readings from Last 1 Encounters:   06/21/22 5' 4" (1 626 m)     Weight:   Wt Readings from Last 1 Encounters:   06/21/22 48 3 kg (106 lb 8 oz)     Relevant Family History   Patient reports no Ashkenazi Anabaptism ancestry  Maternal Family History: Mother (d age 61) with uterine cancer diagnosed at age 61  Maternal Grandmother (d age 28) with a large thyroid goiter possibly cancer  Deny Harris has limited/no information on maternal aunts, uncles or cousins     Paternal Family History:  Father (d early 66's) with prostate cancer  Deny Harris has limited/no information on paternal aunts, uncles or cousins     Please refer to the scanned pedigree in the Media Tab for a complete family history     *All history is reported as provided by the patient; records are not available for review, except where indicated  Assessment:  We discussed sporadic, familial and hereditary cancer  We also discussed the many factors that influence our risk for cancer such as age, environmental exposures, lifestyle choices and family history  We reviewed the indications suggestive of a hereditary predisposition to cancer  Genetic testing is indicated for Deny Harris based on the following criteria:     Meets NCCN V2 2022 Testing Criteria for Pancreatic Cancer Susceptibility Genes: Personal history of pancreatic adenocarcinoma  Also meets NCCN Neuroendocrine and Adrenal Tumors Testing Criteria V1 2021 which recommends individuals with pancreatic NETconsider genetic testing at any age  Data shows that genetic testing of unselected patients with pancreatic NET identified a mutation in approximately 16-17% of cases  The risks, benefits, and limitations of genetic testing were reviewed with the patient, as well as genetic discrimination laws, and possible test results (positive, negative, variants of uncertain significance) and their clinical implications  If positive for a mutation, options for managing cancer risk including increased surveillance, chemoprevention, and in some cases prophylactic surgery were discussed   Deny Harris was informed that if a hereditary cancer syndrome was identified in her, first degree relatives (parents, siblings, and children) have a chance of also inheriting the condition  Genetic testing would allow for predictive genetic testing in other relatives, who may also be at risk depending on their degree of relation  Plan: Patient decided to proceed with testing and provided consent  Summary:     Sample Collection:  The patient's blood sample was drawn in the office on 6/23/2022 by medical assistant Anita Hanson     Genetic Testing Preformed: [employer] [facility] + RNA insight (52 genes): APC, GISELLE, AXIN2 BARD1, BRCA1, BRCA2, BRIP1, BMPR1A, CDH1, CDK4, CDKN1B, CDKN2A, CHEK2, DICER1, EGLN1, EPCAM, FH, GREM1, HOXB13, KIF1B, MAX, MEN1, MLH1, MSH2, MSH3, MSH6, MUTYH, NBN, NF1, NTHL1, PALB2, PMS2, POLD1, POLE, PTEN, RAD51C, RAD51D, RECQL, RET, SDHA, SDHAF2, SDHB, SDHC, SDHD, SMAD4, SMARCA4, STK11, WUAZ184, TP53, TSC1, TSC2, VHL      Results take approximately 2-3 weeks to complete once test is started  We will contact Opal Schwab once results are available  Additional recommendations for surveillance/medical management will be made pending genetic test results

## 2022-06-22 NOTE — TELEPHONE ENCOUNTER
Received notification by Dr Daphney Rodriguez on 6/21/22 that pt has triggered for oncology nutrition care (reason for referral: Dr Daphney Rodriguez request due to weight loss)  Abdulaziz Sidhu and introduced self and explained the reason for today's call  Spoke with Deny Harris today who reports she's lost about 35# since February  She states she has very little appetite, and has been experiencing N/D  Discussed oncology nutrition services available (options for in-person and phone consultation) and the benefits of meeting for a consultation  Initial RD consultation set up for 6/29/22 during her infusion  Provided this RDs contact information asking that Deny Harris reach out prn  All questions/concerns addressed at this time

## 2022-06-23 ENCOUNTER — CLINICAL SUPPORT (OUTPATIENT)
Dept: GENETICS | Facility: CLINIC | Age: 77
End: 2022-06-23

## 2022-06-23 ENCOUNTER — PATIENT OUTREACH (OUTPATIENT)
Dept: CASE MANAGEMENT | Facility: OTHER | Age: 77
End: 2022-06-23

## 2022-06-23 DIAGNOSIS — C25.9 PRIMARY PANCREATIC ADENOCARCINOMA (HCC): Primary | ICD-10-CM

## 2022-06-23 DIAGNOSIS — D01.5: ICD-10-CM

## 2022-06-23 DIAGNOSIS — D3A.8 PRIMARY PANCREATIC NEUROENDOCRINE TUMOR: ICD-10-CM

## 2022-06-23 DIAGNOSIS — K86.89 PANCREATIC MASS: ICD-10-CM

## 2022-06-23 PROCEDURE — NC001 PR NO CHARGE: Performed by: GENETIC COUNSELOR, MS

## 2022-06-23 NOTE — PATIENT INSTRUCTIONS
PRESCRIPTION REFILL REMINDER:  All medication refills should be requested prior to [facility] on Friday  Any refill requests after noon on Friday would be addressed the following Monday  Please protect yourself from Manette Kittitian   = Wash your hands  Soap and water, or hand  with at least 60% alcohol, are both effective at killing the virus  = Wear a mask  This will help protect others from any virus particles you might spread  Your mouth and nose BOTH need to be covered  = Keep the distance  Keep 6 feet of distance from other people, even if they seem healthy  Keeping distance protects you from the other person's virus spread     = Get a vaccine, and boost it  Three vaccines are approved for use in the [place] for all adults  These vaccines do provide protection against all known variants   + Pfizer is FDA approved for all persons age 11 and older   + [place] may be given to all person age 25 and older   - We do NOT recommend [address] vaccine for our Palliative Care patients  = Call 3-139-SXNIXXK (Choose Option 7 for faster service!) to get scheduled for your shot   = [facility], [employer], [facility], [facility], and many [facility] locations ALSO have shots   + The CDC recommends booster shots on ALL vaccines for ALL adults  = https://Anulex/  = If you are over 50 or have an immune compromise, the CDC recommends a fourth shot     = Test to treat  If you have symptoms, get tested, and get treatment!   New drugs like Paxlovid can prevent you from ever having to go to the hospital , and may be covered completely by your insurance or special government programs    - https://aspr hhs gov/TestToTreat/      Informacion en espanol sobre vacunas, de nos companeros de [facility] --  Jam perez    Check out [employer] for [address] that are updated daily:    http://www Just Between Friends/     Global Epidemics  Org, from [facility] (OUTPATIENT [facility]), will give you Ocrgqn-gg-Jkaiuq information on virus cases and vaccination rates:    Https://globalepidemics  org/    Frequently Asked Questions about COVID, answered by [facility]    [employer]

## 2022-06-23 NOTE — PROGRESS NOTES
OSW placed supportive call to [facility] today, however she did not answer  LM on VM along with return call information  Will continue to follow

## 2022-06-23 NOTE — LETTER
2022     Rapheal Mohs, [place] [address]  [address]  [address] [address]    Patient: Caryle Pitcairn  YOB: 1945  Date of Visit: 2022      Dear Dr Deana Bobo:    Thank you for referring Caryle Pitcairn to me for evaluation  Below are my notes for this consultation  If you have questions, please do not hesitate to call me  I look forward to following your patient along with you  Sincerely,        Bella Bran GC        CC: No Recipients        Pre-Test Genetic Counseling Consult Note    Patient Name: Caryle Pitcairn   /Age: [age] y o  Referring Provider: Rapheal Mohs, MD    Date of Service: 2022  Genetic Counselor: Don Allen MS, [facility] [place]  Interpretation Services: None  [facility]      Pj Krause was referred to the [address] [place] [address] and Genetic Assessment Program due to her personal history of pancreatic cancer and family history of uterine and possibly thyroid cancer  She presents today to discuss the possibility of a hereditary cancer syndrome, options for genetic testing, and implications for her and her family  Cancer History and Treatment:                   Personal History: Personal history of 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 at age 78    2022  Final Diagnosis   A  Liver (core needle biopsy):  - Carcinoma with neuroendocrine differentiation     Comment:  - The tumor cells stain for CA19 9, CDX2 with weak focal CK7 expression  The tumor cells are negative for CK20, GATA3, GCDFP15, TTF1  DPC4 is intact  Synaptophysin and chromogranin show focal staining  Ki67 interpretation is limited due to loss of tumor on deeper sectioning  Areas of the tumor show gland formation along with focal staining for neuroendocrine markers  This may represent a mixed adenocarcinoma-neuroendocrine carcinoma    Final characterization is deferred to the excisional specimen  - The immunopanel results raise a differential of an upper luminal GI primary versus a pancreatobiliary primary  The patient is noted on MRI to have a pancreatic mass  - Gabino Layton and Richar Evangelista were notified of the diagnosis via Epic messaging on 6/8/22 at 8:32 am   Both were notified of the amended case on 6/9/22 at 7:00 am    - MMR IHC has been ordered and results will be indicated in an addendum       Interpretation performed at [facility], [address] [facility], [facility], [address] ANALYSIS FOR MISMATCH REPAIR PROTEIN LOSS     INTERPRETATION: No loss of nuclear expression of MMR proteins: Low probability of MSI-H     Note: Background non-neoplastic tissue and/or internal control with intact nuclear expression       RESULTS:  Antibody          Clone               Description                           Results  MLH1               M1                   Mismatch repair protein       Intact nuclear expression  XHW7              [id number]       Mismatch repair protein       Intact nuclear expression  LIX1              98                     Mismatch repair protein       Intact nuclear expression  NFZ1              [id number]           Mismatch repair protein       Intact nuclear expression        6/1/22 MRI abd/pelv: Large pancreatic head mass (5 6 x 4 9 cm) most consistent with primary adenocarcinoma with liver metastasis, retroperitoneal lymphadenopathy, and probable small lung and bone metastasis  Screening Hx:     Breast:  Breast Imaging: No current screening     Colon:  Colonoscopy: Active order     Gynecologic:  Ovaries and Uterus intact     Medical and Surgical History  Pertinent surgical history:   Past Surgical History:   Procedure Laterality Date    CARDIAC CATHETERIZATION N/A 3/30/2022    Procedure: Cardiac catheterization;  Surgeon: Gretchen Verdugo DO;  Location: [facility] CARDIAC CATH LAB;   Service: Cardiology   [facility] CATHETERIZATION N/A 3/30/2022    Procedure: Cardiac Coronary Angiogram;  Surgeon: Maria G Barahona DO;  Location: [facility] CARDIAC CATH LAB; Service: Cardiology    IR BIOPSY LIVER MASS  6/6/2022      Pertinent medical history:No past medical history on file  Other History:  Height:   Ht Readings from Last 1 Encounters:   06/21/22 5' 4" (1 626 m)     Weight:   Wt Readings from Last 1 Encounters:   06/21/22 48 3 kg (106 lb 8 oz)     Relevant Family History   Patient reports no Ashkenazi Confucianist ancestry  Maternal Family History: Mother (d age 61) with uterine cancer diagnosed at age 61  Maternal Grandmother (d age 28) with a large thyroid goiter possibly cancer  Cait Lees has limited/no information on maternal aunts, uncles or cousins     Paternal Family History:  Father (d early 66's) with prostate cancer  Cait Lees has limited/no information on paternal aunts, uncles or cousins     Please refer to the scanned pedigree in the Media Tab for a complete family history     *All history is reported as provided by the patient; records are not available for review, except where indicated  Assessment:  We discussed sporadic, familial and hereditary cancer  We also discussed the many factors that influence our risk for cancer such as age, environmental exposures, lifestyle choices and family history  We reviewed the indications suggestive of a hereditary predisposition to cancer  Genetic testing is indicated for Cait Lees based on the following criteria:     Meets NCCN V2 2022 Testing Criteria for Pancreatic Cancer Susceptibility Genes: Personal history of pancreatic adenocarcinoma  Also meets NCCN Neuroendocrine and Adrenal Tumors Testing Criteria V1 2021 which recommends individuals with pancreatic NETconsider genetic testing at any age  Data shows that genetic testing of unselected patients with pancreatic NET identified a mutation in approximately 16-17% of cases      The risks, benefits, and limitations of genetic testing were reviewed with the patient, as well as genetic discrimination laws, and possible test results (positive, negative, variants of uncertain significance) and their clinical implications  If positive for a mutation, options for managing cancer risk including increased surveillance, chemoprevention, and in some cases prophylactic surgery were discussed  Dong Hodge was informed that if a hereditary cancer syndrome was identified in her, first degree relatives (parents, siblings, and children) have a chance of also inheriting the condition  Genetic testing would allow for predictive genetic testing in other relatives, who may also be at risk depending on their degree of relation  Plan: Patient decided to proceed with testing and provided consent  Summary:     Sample Collection:  The patient's blood sample was drawn in the office on 6/23/2022 by medical assistant Verna Mitchell     Genetic Testing Preformed: [employer] + RNA insight (52 genes): APC, GISELLE, AXIN2 BARD1, BRCA1, BRCA2, BRIP1, BMPR1A, CDH1, CDK4, CDKN1B, CDKN2A, CHEK2, DICER1, EGLN1, EPCAM, FH, GREM1, HOXB13, KIF1B, MAX, MEN1, MLH1, MSH2, MSH3, MSH6, MUTYH, NBN, NF1, NTHL1, PALB2, PMS2, POLD1, POLE, PTEN, RAD51C, RAD51D, RECQL, RET, SDHA, SDHAF2, SDHB, SDHC, SDHD, SMAD4, SMARCA4, STK11, CIYN297, TP53, TSC1, TSC2, VHL      Results take approximately 2-3 weeks to complete once test is started  We will contact Dong Hodge once results are available  Additional recommendations for surveillance/medical management will be made pending genetic test results

## 2022-06-24 ENCOUNTER — CONSULT (OUTPATIENT)
Dept: PALLIATIVE MEDICINE | Facility: CLINIC | Age: 77
End: 2022-06-24
Payer: COMMERCIAL

## 2022-06-24 ENCOUNTER — SOCIAL WORK (OUTPATIENT)
Dept: PALLIATIVE MEDICINE | Facility: CLINIC | Age: 77
End: 2022-06-24
Payer: COMMERCIAL

## 2022-06-24 VITALS
OXYGEN SATURATION: 99 % | RESPIRATION RATE: 16 BRPM | HEART RATE: 89 BPM | TEMPERATURE: 97.1 F | DIASTOLIC BLOOD PRESSURE: 62 MMHG | HEIGHT: 64 IN | SYSTOLIC BLOOD PRESSURE: 112 MMHG | BODY MASS INDEX: 18.71 KG/M2 | WEIGHT: 109.6 LBS

## 2022-06-24 DIAGNOSIS — K52.9 CHRONIC DIARRHEA: ICD-10-CM

## 2022-06-24 DIAGNOSIS — C25.9 PRIMARY PANCREATIC ADENOCARCINOMA (HCC): ICD-10-CM

## 2022-06-24 DIAGNOSIS — G89.3 CANCER RELATED PAIN: Primary | ICD-10-CM

## 2022-06-24 DIAGNOSIS — K86.89 PANCREATIC INSUFFICIENCY: ICD-10-CM

## 2022-06-24 DIAGNOSIS — Z71.89 COUNSELING REGARDING ADVANCED CARE PLANNING AND GOALS OF CARE: ICD-10-CM

## 2022-06-24 DIAGNOSIS — Z71.89 COUNSELING AND COORDINATION OF CARE: Primary | ICD-10-CM

## 2022-06-24 PROBLEM — R19.7 DIARRHEA: Status: RESOLVED | Noted: 2022-01-01 | Resolved: 2022-01-01

## 2022-06-24 PROCEDURE — 99204 OFFICE O/P NEW MOD 45 MIN: CPT | Performed by: INTERNAL MEDICINE

## 2022-06-24 PROCEDURE — NC001 PR NO CHARGE

## 2022-06-24 PROCEDURE — 99497 ADVNCD CARE PLAN 30 MIN: CPT | Performed by: INTERNAL MEDICINE

## 2022-06-24 NOTE — PROGRESS NOTES
-Palliative and Supportive Care   Shanita Lugo 78 y o  female [id number]    Assessment/Plan:  1  Cancer related pain    2  Primary pancreatic adenocarcinoma ([address] )    3  Chronic diarrhea    4  Pancreatic insufficiency    5  Counseling regarding advanced care planning and goals of care      · Start creon 24,000 units 3x a day with meals for suspected pancreatic insufficiency from large pancreatic mass/cancer   Can uptitrate based on response to this initial dosing  · Decrease dose if with significant side effects - can cause headache, peripheral edema, abdominal pain, amongst other things  She will call us if she develops any new side effects after taking this  · Continue 2 5mg PO oxyIR q4H prn cancer pain  No refills for now, has enough from previous script, only used one  · She's already referred to onc nutritionist  · ACP discussed in detail  5 wishes signed and witnessed on this visit  · She appoints her son/Miguel as her 1st agent, daughter/Katty as her 2nd agent  We removed her  as 3rd given parkinon's dementia that is worsening per her report   do not sound like he has capacity to follow her decisions on her behalf  · In those 3 different scenarios, she chooses to receive CPR if beneficial  If started, she wants it stopped if her doctors feel that they are no longer helping her  She also does not want to have a feeding tube or tracheostomy  · RTO in 3 weeks, call sooner if needed    Controlled Substance Review    [place]MP or [place]  reviewed: No red flags were identified; safe to proceed with prescription  Aury Goodrich 06/02/2022  1   06/02/2022  Oxycodone Hcl (Ir) 5 MG Tablet    15 00  7 Max Barber   [id number]   Jeanie (5508) 70 55 MME        Requested Prescriptions     Signed Prescriptions Disp Refills    pancrelipase, Lip-Prot-Amyl, (CREON) 24,000 units 90 capsule 0     Sig: Take 24,000 units of lipase by mouth 3 (three) times a day with meals     There are no discontinued medications      Representatives have queried the patient's controlled substance dispensing history in the Prescription Drug Monitoring Program in compliance with regulations before I have prescribed any controlled substances  The prescription history is consistent with prescribed therapy and our practice policies  60 minutes were spent face to face with Jeanette Betancur and her daughter-in-law with greater than 50% of the time spent in counseling or coordination of care including discussions of etiology of diagnosis, pathogenesis of diagnosis, diagnostic results, impression, and recommendations, risks and benefits of treatment, instructions for disease self management, treatment instructions, follow up requirements, risk factors and risk reduction of disease, patient and family counseling/involvement in care, compliance with treatment regimen and advanced care planning  All of the patient's questions were answered during this discussion  No follow-ups on file  Subjective:   Chief Complaint  New consultation for:  symptom management, goal of care assessment and decisional support, disease process education and discussion of prognosis, advance care planning, emotional support in the setting of serious illness  HPI     Jeanette Betacnur is a 78 y o  female with palliative diagnosis of metastatic pancreatic adenocarcinoma with liver, lung, bone, and retroperitoneal LN involvement  She is scheduled to start gemcitabine+abraxane on 6/29/2022  She is [facility] to [facility] for [facility] cares  She arrives today with her DIL  Introduced palliative cares  Patient experiences lower dull abdominal pain, that likely is related to her cancer given no other better explanation from her imaging  She will also see GI in July for consideration of a colonoscopy  She also complains of chronic diarrhea, watery, copious amounts per day that has since slowed down lately but still significant enough  Diarrhea did not respond to her attempts to change her diet   As a result of this, she lost a significant amount of weight and is still losing  This is likely from pancreatic insufficiency from her pancreatic cancer  We discussed management with shubham  Explained mechanism of action and side effects of creon  She wishes to proceed  Occasionally, she also experiences pain in the epigastric and LUQ which could be due to the large pancreatic mass that was seen in her imaging  Time spent discussing etiology of pain (related to cancer) and overall management of cancer-related pain  Cancer-directed therapies, ie chemotherapy, radiation therapy, immunotherapy, will make the most significant and long term improvement in cancer pain  Discussed future weaning of opioids when able  They voiced understanding and wished to proceed  She had oxyIR 5mg from her previous hospitalization and she had so far only used 1/2 tab  She is instructed to take 1/2 tab every 4 hours as needed for cancer pain that prevents her from eating, walking, talking, sleeping, etc  She voiced understanding  She is treatment-focused  She plans on spending time at her son's house after her first few chemos to see how she feels  We spent time discussing her ACP and the 5 Wishes  ACP/Social: ACP discussed in detail  5 wishes signed and witnessed on this visit  Original copy sent to her with 2 additional copies for her HCRs  A copy was scanned in the chart  She appoints her son/Miguel as her 1st agent, daughter/Katty as her 2nd agent  We removed her  as 3rd given parkinon's dementia that is worsening per her report   do not sound like he has capacity to follow her decisions on her behalf  In those 3 different scenarios, she chooses to receive CPR if beneficial  If started, she wants it stopped if her doctors feel that they are no longer helping her  She also does not want to have a feeding tube or tracheostomy  She lives with her  who has worsening Parkinsons Dementia   Her step mother is also there but is about to move to [facility]  Her daughter who is debilitated from an undiagnosed neurologic condition is unable to work and is also living with her, along with her daughter's 2 children (including 1 with special needs)  His son and his wife lives 10 minutes away from her and is able to provide her support when she needs it  The following portions of the medical history were reviewed: past medical history, problem list, medication list, and social history  Current Outpatient Medications:     Cholecalciferol (Vitamin D3) 50 MCG (2000 UT) TABS, Take by mouth  , Disp: , Rfl:     cyanocobalamin (VITAMIN B-12) 500 MCG tablet, Take 500 mcg by mouth daily, Disp: , Rfl:     ferrous sulfate 325 (65 Fe) mg tablet, Take 325 mg by mouth daily with breakfast, Disp: , Rfl:     COLLADO SEAL ROOT PO, Take 900 mg by mouth daily Echinaceal/goldenseal blend , Disp: , Rfl:     Omega-3 Fatty Acids (OMEGA 3 PO), Take 1,000 mg by mouth, Disp: , Rfl:     oxyCODONE (ROXICODONE) 5 immediate release tablet, Take 0 5 tablets (2 5 mg total) by mouth every 6 (six) hours as needed for moderate pain Max Daily Amount: 10 mg, Disp: 15 tablet, Rfl: 0    pancrelipase, Lip-Prot-Amyl, (CREON) 24,000 units, Take 24,000 units of lipase by mouth 3 (three) times a day with meals, Disp: 90 capsule, Rfl: 0    Pyridoxine HCl (VITAMIN B-6 PO), Take 1 tablet by mouth, Disp: , Rfl:     Vitamin E 400 units TABS, Take by mouth, Disp: , Rfl:     acetaminophen (TYLENOL) 500 mg tablet, Take 500 mg by mouth every 6 (six) hours as needed for mild pain (Patient not taking: Reported on 6/24/2022), Disp: , Rfl:     ondansetron (Zofran ODT) 4 mg disintegrating tablet, Take 1 tablet (4 mg total) by mouth every 8 (eight) hours as needed for nausea or vomiting (Patient not taking: Reported on 6/24/2022), Disp: 20 tablet, Rfl: 0  Review of Systems   Constitutional: Positive for unexpected weight change   Negative for activity change, appetite change and fatigue  HENT: Negative for trouble swallowing  Respiratory: Negative for shortness of breath  Cardiovascular: Negative for chest pain  Gastrointestinal: Positive for abdominal pain and diarrhea  Negative for abdominal distention, constipation, nausea and vomiting  Musculoskeletal: Negative for back pain  Neurological: Negative for weakness  Psychiatric/Behavioral: Negative for sleep disturbance  The patient is not nervous/anxious  All other systems reviewed and are negative  All other systems negative    Objective:  Vital Signs  /62 (BP Location: Left arm, Patient Position: Sitting, Cuff Size: Standard)   Pulse 89   Temp (!) 97 1 °F (36 2 °C) (Temporal)   Resp 16   Ht 5' 4" (1 626 m)   Wt 49 7 kg (109 lb 9 6 oz)   SpO2 99%   BMI 18 81 kg/m²    Physical Exam    Constitutional: Appears thin, well-kempt, pleasant, jovial, appears chronically ill but not toxic appearing  In no acute physical or emotional distress  Head: Normocephalic and atraumatic  Eyes: EOM are normal  No ocular discharge  No scleral icterus  Neck: No visible adenopathy or masses  Respiratory: Effort normal  No stridor  No respiratory distress  Gastrointestinal: No abdominal distension  Musculoskeletal: No edema  Neurological: Alert, oriented and appropriately conversant  Skin: No diaphoresis, no rashes seen on exposed areas of skin  Pale   Psychiatric: Displays a normal mood and affect   Behavior, judgement and thought content appear normal  Appropriately tearful    Ciro Quiroz MD  Palliative Medicine & Supportive Care  Internal Medicine  Available via [facility] Text  Office: [phone]  Fax: [phone]

## 2022-06-24 NOTE — PROGRESS NOTES
Palliative Outpatient Assessment of Need    MSW completed an assessment of need which was completed with patient and her DIL, Simone Polanco, in the office  Family dynamics:   Relationship status:   Duration of relationship:   Name of significant other: Pat  Children and Ages: 1 daughter Tony Pinzon and 1 son Keli Willingham  Pets:  Other important family information:   Living situation: Resides with her spouse, pts step mother lives there, as well as their daughter Tony Pinzon  Pts step mother will be moving to [facility] next week    Patient's primary caregiver:  Self  Any limitations of caregiver:  Environmental concerns or barriers:   history: N/A  Employment history/source of income: Works part time at [employer] 78: N/A   Spirituality/ Spiritism:    Patient's strengths, social supports, and resources: Pt has support from her son and DIL  She also has 2 friends who are cancer survivors and are very supportive  I provided her with The [address] [place] [address] information, as well as The Cancer Support community newsletter  Cultural information:   Mental Health current or previous: Pt has a great deal of stress in her life  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, she also has a daughter who is unable to work and she supports her as well  I encouraged her to have her daughter apply for disability  She is waiting for a formal diagnosis first  I also encouraged the pt to reach out to the [place] to see if her spouse qualifies for ay services, as she will need help once she starts her treatments     Substance use or history:   Sleep: Varies  Exercise: As tolerated  Diet/nutrition: Adequate, but she suffers from diarrhea  Durable Medical Equipment needs: None at this time  Transportation: Self/family  Financial concerns: Yes  Advanced Directive: Completed 5 wishes  Other medical or social work providers involved: MAURILIO Uribe  Patient/caregiver current level of coping: Pt has many social stressors, she is eager to begin her treatment  Understanding: She is understanding of her dx  Patient/family concerns and areas of need: Pts main concern was her diarrhea  Dr Gisella Serrano prescribed creon  Patient's Interests: pt enjoys crocheting, word searches an she 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

## 2022-06-25 NOTE — PROGRESS NOTES
Hematology/Oncology Outpatient Office Note    Date of Service: 2022    [facility] HEMATOLOGY ONCOLOGY SPECIALISTS XIOMARA Sands  [facility]  [phone]    Reason for Consultation:   Chief Complaint   Patient presents with    Follow-up       Cancer Stage at diagnosis: IV    Referral Physician: Alka ref  provider found    Primary Care Physician:  Irving Alexander DO     Nickname: [place] Rodriguez: eldest son  GF: Edu Chs: daughter    Original ECO    Today's ECO    Goals and Barriers:  Current Goal: Minimize effects of disease burden, extend life  Barriers to accomplishing this: low energy, lower abd pain  She still does the laundry and cooks but can't quite do the yard-work    Patient's Capacity to Self Care:  Patient is able to self care    Code Status: not addressed today    Advanced directives: not addressed today    ASSESSMENT & PLAN      Diagnosis ICD-10-CM Associated Orders   1  Primary pancreatic adenocarcinoma (HCC)  C25 9          This is a 78 y o  c PMHx notable for absence of co-morbidities, being seen in consultation for 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      The patient's liver biopsy had the characterization of a mixed-adenocarcinoma and NET pathology  Patient is MMR-proficient  As a result, I would favor treating this as a metastatic adenocarcinoma  (primarily gland formation)  The tumor cells stain for CA19 9, CDX2 with weak focal CK7 expression  The tumor cells are negative for CK20, GATA3, GCDFP15, TTF1  DPC4 is intact  Synaptophysin and chromogranin show focal staining  Ki67 interpretation is limited due to loss of tumor on deeper sectioning  Areas of the tumor show gland formation along with focal staining for neuroendocrine markers   The immunopanel results raise a differential of an upper luminal GI primary versus a pancreatobiliary primary  Discussion of decision making   Oncology history updated, accordingly, during this visit   Goals of care/patient communication  o I discussed with the patient the clinical course leading up to their cancer diagnosis  I reviewed relevant office notes, imaging reports and pathology result as well   o I told the patient that this is a case of incurable disease and what this means  We discussed that the goal of anti-cancer therapy is to provide best quality of life, extend overall survival, and progression free survival as shown in clinical trials  We also discussed that there might be a point when the cancer will no longer respond to this anti-neoplastic therapy  As a result, we also discussed the role of the palliative care team being introduced early in the treatment course  We have made this referral  o I explained the risks/benefits of the proposed cancer therapy: Anderson-Abraxane and after discussion including understanding risks of possible life-threatening complications and therapy-related malignancy development, informed consent for blood products and treatment has been signed and obtained   TNM/Staging At Diagnosis  o [id number]  Cancer Staging: IV   Disease Features/Tumor Markers/Genetics  o Tumor Marker: n/a  o Notable Path Features: 6/6/22 Liver (core needle biopsy): Carcinoma with neuroendocrine differentiation   Treatment: Anderson-Abraxane   Other Supportive care:    Treatment Team Members  o Surgeon  o Rad Onc  o Palliative: referral made   Labs   Diagnostics  6/1/22 MRI abd/pelv: Large pancreatic head mass (5 6 x 4 9 cm) most consistent with primary adenocarcinoma with liver metastasis, retroperitoneal lymphadenopathy, and probable small lung and bone metastasis  6/18/22 CT Chest w/o c:  Diffuse, bilateral pulmonary metastases  No lymphadenopathy in the chest  Similar small pericardial effusion   Similar hepatic masses and retroperitoneal lymphadenopathy in the visualized abdomen      Discussion of decision making    I personally reviewed the following lab results, the image studies, pathology, other specialty/physicians consult notes and recommendations, and outside medical records from [facility]  I had a lengthy discussion with the patient and shared the work-up findings  We discussed the diagnosis and management plan as below  I spent 32 minutes reviewing the records (labs, clinician notes, outside records, medical history, ordering medicine/tests/procedures, interpreting the imaging/labs previously done) and coordination of care as well as direct time with the patient today, of which greater than 50% of the time was spent in counseling and coordination of care with the patient/family  · Plan/Labs  · F/u Palliative care   · F/u [facility] Comprehensive NGS testing off of liver biopsy  · Cont systemic treatment with [place]-Abraxane q28 days  Infusion chairs scheduled with preceding labs  C1D8 coming up   · F/u Dietitian for her weight loss, they are following closely with her  She is taking a protein powder supplements  · Bone Scan to assess for osseous mets 7/7/2022  · Supportive care plan for Zometa q12 weeks needed if osseous mets found and to be addressed after bone scan completed  Pt to take calcium, Vit D supplements daily (she takes this already)  · Restaging CT CAP w/c in 3 months (9/9/2022)        Follow Up: q4 weeks while on systemic chemotherapy (scheduled out to 10/25/22)    All questions were answered to the patient's satisfaction during this encounter  The patient knows the contact information for our office and knows to reach out for any relevant concerns related to this encounter  They are to call for any temperature 100 4 or higher, new symptoms including but not restricted to shaking chills, decreased appetite, nausea, vomiting, diarrhea, increased fatigue, shortness of breath or chest pain, confusion, and not feeling the strength to come to the clinic   For all other listed problems and medical diagnosis in their chart - they are managed by PCP and/or other specialists, which the patient acknowledges  Thank you very much for your consultation and making us a part of this patient's care  We are continuing to follow closely with you  Please do not hesitate to reach out to me with any additional questions or concerns  Bharati Mcclellan MD  Hematology & Medical Oncology Staff Physician             Disclaimer: This document was prepared using [employer] technology  If a word or phrase is confusing, or does not make sense, this is likely due to recognition error which was not discovered during this clinician's review  If you believe an error has occurred, please contact me through [address] [place] line for hong? cation        ONCOLOGY HISTORY OF PRESENT ILLNESS        Oncology History   Primary pancreatic adenocarcinoma ([facility] [address] )   6/6/2022 -  Cancer Staged    Staging form: Anus, AJCC 8th Edition  - Clinical stage from 6/6/2022: Stage IV (cT3, cN1a, pM1) - Signed by Tara Villalpando MD on 6/12/2022  Stage prefix: Initial diagnosis  Sites of metastasis: Liver  Source of metastatic specimen: Bone, Aortic Lymph Nodes       6/12/2022 Initial Diagnosis    Primary pancreatic neuroendocrine tumor     6/29/2022 -  Chemotherapy    paclitaxel protein-bound (ABRAXANE) IVPB, 125 mg/m2 = 188 mg, Intravenous, Once, 1 of 6 cycles  Administration: 188 mg (6/29/2022)  gemcitabine (GEMZAR) infusion, 1,000 mg/m2 = 1,500 mg, Intravenous, Once, 1 of 6 cycles  Administration: 1,500 mg (6/29/2022)           SUBJECTIVE  (INTERVAL HISTORY)      Clotting History None   Bleeding History None   Cancer History Pancreatic   Family Cancer History Father (prostate), mother (uterine)   H/O Blood/Plt Transfusion None   Tobacco/etoh/drug abuse No nicotine use, etoh abuse, or rec drug use       Cancer Screening history No C-scope in the past,   Pap smear (30 years ago)  Mammogram (20 years ago, scheduled to have one this year)   Occupation Worked for a [employer] for 16 years  She had a lot of second hand smoke in her life   New medications in the last month:  Pain: LLQ and RLQ abd since 2/2022 and this radiates across the lower abdomen  She had 5 watery BM a day 2/2022 and this ended mid 6/2022    I have reviewed the relevant past medical, surgical, social and family history  I have also reviewed allergies and medications for this patient  Interval events:     She has had epigastric burning pain that started the day after chemo and has ranged from 4-8/10 in intensity  She took Tylenol once that didn't alleviate it  She took two at one time and it took the edge off of it  NO N/V on the treatment  She still is dealing with early satiety  Review of Systems    Baseline weight: 145 lbs    She has lost 39 lbs unintentionally since 2/2022  She has had fatigue, intermittent LH  She does have moderate generalized weakness and cannot lift 30-40 lbs like she once was able to (can barely do 20 lbs now)  She has frequent nausea throughout the week which as subsided  She denies F/C, SOB, CP, rash, itching, hematuria, melena, hematochezia, HA     WILSON going up flight of steps  She used to be called the "energizer bunny"  A 10-point review of system was performed, pertinent positive and negative were detailed as above  Otherwise, the 10-point review of system was negative  No past medical history on file  Past Surgical History:   Procedure Laterality Date    CARDIAC CATHETERIZATION N/A 3/30/2022    Procedure: Cardiac catheterization;  Surgeon: Rocky Perez DO;  Location: [facility] CARDIAC [facility]; Service: Cardiology    CARDIAC CATHETERIZATION N/A 3/30/2022    Procedure: Cardiac Coronary Angiogram;  Surgeon: Rocky Perez DO;  Location: [facility]ARD[facility];   Service: Cardiology    IR BIOPSY LIVER MASS  6/6/2022    IR PORT PLACEMENT  6/28/2022       Family History   Problem Relation Age of Onset    Coronary artery disease Father     Coronary artery disease Brother     Diabetes Brother     Kidney failure Brother     Uterine cancer Mother        Social History     Socioeconomic History    Marital status: /Civil Union     Spouse name: Not on file    Number of children: Not on file    Years of education: Not on file    Highest education level: Not on file   Occupational History    Not on file   Tobacco Use    Smoking status: Never Smoker    Smokeless tobacco: Never Used   Vaping Use    Vaping Use: Never used   Substance and Sexual Activity    Alcohol use: Not Currently    Drug use: Never    Sexual activity: Not on file   Other Topics Concern    Not on file   Social History Narrative    Not on file     Social Determinants of Health     Financial Resource Strain: Not on file   Food Insecurity: No Food Insecurity    Worried About [address] in the Last Year: Never true    [address] [employer] in the Last Year: Never true   Transportation Needs: No Transportation Needs    Lack of Transportation (Medical): No    Lack of Transportation (Non-Medical):  No   Physical Activity: Not on file   Stress: Not on file   Social Connections: Not on file   Intimate Partner Violence: Not on file   Housing Stability: Low Risk     Unable to Pay for Housing in the Last Year: No    Number of Places Lived in the Last Year: 1    Unstable Housing in the Last Year: No       Allergies   Allergen Reactions    Bentyl [Dicyclomine] Throat Swelling    Codeine Other (See Comments)     Was told allergy    Elastic Bandages & [Zinc] Hives, Swelling and Rash       Current Outpatient Medications   Medication Sig Dispense Refill    Cholecalciferol (Vitamin D3) 50 MCG (2000 UT) TABS Take by mouth        cyanocobalamin (VITAMIN B-12) 500 MCG tablet Take 500 mcg by mouth daily      ferrous sulfate 325 (65 Fe) mg tablet Take 325 mg by mouth daily with breakfast      lidocaine-prilocaine (EMLA) cream Apply topically as needed for mild pain 30 g 0    Omega-3 Fatty Acids (OMEGA 3 PO) Take 1,000 mg by mouth      pancrelipase, Lip-Prot-Amyl, (CREON) 24,000 units Take 24,000 units of lipase by mouth 3 (three) times a day with meals 90 capsule 0    Pyridoxine HCl (VITAMIN B-6 PO) Take 1 tablet by mouth      acetaminophen (TYLENOL) 500 mg tablet Take 500 mg by mouth every 6 (six) hours as needed for mild pain (Patient not taking: No sig reported)      COLLADO SEAL ROOT PO Take 900 mg by mouth daily Echinaceal/goldenseal blend  (Patient not taking: Reported on 7/5/2022)      ondansetron (Zofran ODT) 4 mg disintegrating tablet Take 1 tablet (4 mg total) by mouth every 8 (eight) hours as needed for nausea or vomiting (Patient not taking: No sig reported) 20 tablet 0    oxyCODONE (ROXICODONE) 5 immediate release tablet Take 0 5 tablets (2 5 mg total) by mouth every 6 (six) hours as needed for moderate pain Max Daily Amount: 10 mg (Patient not taking: Reported on 7/5/2022) 15 tablet 0    Vitamin E 400 units TABS Take by mouth (Patient not taking: Reported on 7/5/2022)       No current facility-administered medications for this visit  (Not in a hospital admission)      Objective:     24 Hour Vitals Assessment:     Vitals:    07/05/22 1334   BP: 96/60   Pulse: 92   Resp: 16   Temp: 97 7 °F (36 5 °C)   SpO2: 98%       PHYSICIAN EXAM:    General: Appearance: alert, cooperative, no distress  Cachetic appearing  HEENT: Normocephalic, atraumatic  No scleral icterus  conjunctivae clear  EOMI  Chest: No tenderness to palpation  No open wound noted  Lungs: Clear to auscultation bilaterally, Respirations unlabored  Cardiac: Regular rate and rhythm, +S1and S2  Abdomen: Soft, non-tender, non-distended  Bowel sounds are normal   Extremities:  No edema, cyanosis, clubbing  Skin: Skin color, turgor are normal  No rashes  Lymphatics: no palpable supra-cervical, axillary, or inguinal adenopathy  Neurologic: Awake, Alert, and oriented, no gross focal deficits noted b/l  Port: c/d/i, no erythema, healing well     DATA REVIEW:    Pathology Result:    Final Diagnosis   Date Value Ref Range Status   06/06/2022   Final    A  Liver (core needle biopsy):  - Carcinoma with neuroendocrine differentiation    Comment:  - The tumor cells stain for CA19 9, CDX2 with weak focal CK7 expression  The tumor cells are negative for CK20, GATA3, GCDFP15, TTF1  DPC4 is intact  Synaptophysin and chromogranin show focal staining  Ki67 interpretation is limited due to loss of tumor on deeper sectioning  Areas of the tumor show gland formation along with focal staining for neuroendocrine markers  This may represent a mixed adenocarcinoma-neuroendocrine carcinoma  Final characterization is deferred to the excisional specimen  - The immunopanel results raise a differential of an upper luminal GI primary versus a pancreatobiliary primary  The patient is noted on MRI to have a pancreatic mass  - Gabino Castorena and Chandler Langley were notified of the diagnosis via Epic messaging on 6/8/22 at 8:32 am   Both were notified of the amended case on 6/9/22 at 7:00 am    - MMR IHC has been ordered and results will be indicated in an addendum  Interpretation performed at [facility], [address] [facility], [facility], [address] [facility]            Image Results:   Image result are reviewed and documented in Hematology/Oncology history    IR port placement  Narrative: Chest port placement  Clinical History: Pancreatic cancer, in need of a port for chemotherapy  Contrast: None    Fluoro time: 0 4mft    Number of Images: 1    Radiation dose: 1 mGy     Concious sedation time: 20min    Technique: The patient was brought to the interventional radiology suite and identified verbally and by wristband  The patient was placed supine on the table  The right internal jugular vein was evaluated as a potential access site with ultrasound  The   vessel was found to be patent and compressible       The right neck and upper chest was prepped and draped in the usual sterile fashion  All elements of maximal sterile barrier technique were followed (cap, mask, sterile gown, sterile gloves, large sterile sheet, hand hygiene, and 2% chlorhexidine for   cutaneous antisepsis)  Lidocaine was administered to the skin and a small skin incision was made  Under ultrasound guidance, utilizing sterile ultrasound technique with sterile gel and a sterile probe cover, the right internal jugular vein was accessed   using single wall Seldinger technique  Static images of real time needle entry into the vessel were obtained  A 0 018 wire was then advanced through the needle into the central venous system  The needle was removed, and a 5 Afghan coaxial dilator was   inserted  A heavy wire was inserted through the outer dilator and a 6 Afghan peel-away sheath was inserted over the wire  A subcutaneous pocket was created in the skin of the upper chest for the reservoir utilizing a surgical incision  The port catheter   was then tunneled under the skin of the upper chest  The catheter was advanced through the peel-away and the peel-away was removed  The catheter tip was then positioned in the right atrium under fluoroscopic control  The catheter was connected to a low   profile port, and the port inserted into the subcutaneous pocket  The port was sutured in place utilizing Vicryl suture  The pocket was closed with Vicryl suture and Histoacryl  The port was flushed with saline  Impression: Impression:  1  Successful ultrasound and fluoroscopically guided placement of a chest port via the right internal jugular vein  2  The tip of the catheter is in the right atrium and may be used immediately  Workstation performed: [id number]      LABS:  Lab data are reviewed and documented in HemOnc history         Lab Results   Component Value Date    HGB 10 8 (L) 06/27/2022    HCT 34 3 (L) 06/27/2022    MCV 94 06/27/2022     06/27/2022    WBC 9 12 06/27/2022    NRBC 0 06/27/2022     Lab Results   Component Value Date    K 3 3 (L) 06/27/2022     06/27/2022    CO2 27 06/27/2022    BUN 16 06/27/2022    CREATININE 0 91 06/27/2022    GLUF 144 (H) 06/27/2022    CALCIUM 8 8 06/27/2022    CORRECTEDCA 9 7 06/27/2022    AST 97 (H) 06/27/2022    ALT 47 06/27/2022    ALKPHOS 269 (H) 06/27/2022    EGFR 61 06/27/2022       Lab Results   Component Value Date    IRON 26 (L) 05/30/2022    TIBC 186 (L) 05/30/2022    FERRITIN 159 05/30/2022       No results found for: [id number]    No results for input(s): WBC, CREAT in the last 72 hours      Invalid input(s):  PLT    By:  Selene Santillan MD, 7/5/2022, 2:10 PM

## 2022-06-27 ENCOUNTER — APPOINTMENT (OUTPATIENT)
Dept: LAB | Facility: CLINIC | Age: 77
End: 2022-06-27
Payer: COMMERCIAL

## 2022-06-27 DIAGNOSIS — C25.9 PRIMARY PANCREATIC ADENOCARCINOMA (HCC): ICD-10-CM

## 2022-06-27 LAB
ALBUMIN SERPL BCP-MCNC: 2.9 G/DL (ref 3.5–5)
ALP SERPL-CCNC: 269 U/L (ref 46–116)
ALT SERPL W P-5'-P-CCNC: 47 U/L (ref 12–78)
ANION GAP SERPL CALCULATED.3IONS-SCNC: 6 MMOL/L (ref 4–13)
AST SERPL W P-5'-P-CCNC: 97 U/L (ref 5–45)
BASOPHILS # BLD AUTO: 0.06 THOUSANDS/ΜL (ref 0–0.1)
BASOPHILS NFR BLD AUTO: 1 % (ref 0–1)
BILIRUB SERPL-MCNC: 0.29 MG/DL (ref 0.2–1)
BUN SERPL-MCNC: 16 MG/DL (ref 5–25)
CALCIUM ALBUM COR SERPL-MCNC: 9.7 MG/DL (ref 8.3–10.1)
CALCIUM SERPL-MCNC: 8.8 MG/DL (ref 8.3–10.1)
CHLORIDE SERPL-SCNC: 106 MMOL/L (ref 100–108)
CO2 SERPL-SCNC: 27 MMOL/L (ref 21–32)
CREAT SERPL-MCNC: 0.91 MG/DL (ref 0.6–1.3)
EOSINOPHIL # BLD AUTO: 0.3 THOUSAND/ΜL (ref 0–0.61)
EOSINOPHIL NFR BLD AUTO: 3 % (ref 0–6)
ERYTHROCYTE [DISTWIDTH] IN BLOOD BY AUTOMATED COUNT: 14.2 % (ref 11.6–15.1)
GFR SERPL CREATININE-BSD FRML MDRD: 61 ML/MIN/1.73SQ M
GLUCOSE P FAST SERPL-MCNC: 144 MG/DL (ref 65–99)
HCT VFR BLD AUTO: 34.3 % (ref 34.8–46.1)
HGB BLD-MCNC: 10.8 G/DL (ref 11.5–15.4)
IMM GRANULOCYTES # BLD AUTO: 0.04 THOUSAND/UL (ref 0–0.2)
IMM GRANULOCYTES NFR BLD AUTO: 0 % (ref 0–2)
LYMPHOCYTES # BLD AUTO: 0.91 THOUSANDS/ΜL (ref 0.6–4.47)
LYMPHOCYTES NFR BLD AUTO: 10 % (ref 14–44)
MCH RBC QN AUTO: 29.6 PG (ref 26.8–34.3)
MCHC RBC AUTO-ENTMCNC: 31.5 G/DL (ref 31.4–37.4)
MCV RBC AUTO: 94 FL (ref 82–98)
MONOCYTES # BLD AUTO: 0.54 THOUSAND/ΜL (ref 0.17–1.22)
MONOCYTES NFR BLD AUTO: 6 % (ref 4–12)
NEUTROPHILS # BLD AUTO: 7.27 THOUSANDS/ΜL (ref 1.85–7.62)
NEUTS SEG NFR BLD AUTO: 80 % (ref 43–75)
NRBC BLD AUTO-RTO: 0 /100 WBCS
PLATELET # BLD AUTO: 279 THOUSANDS/UL (ref 149–390)
PMV BLD AUTO: 11.4 FL (ref 8.9–12.7)
POTASSIUM SERPL-SCNC: 3.3 MMOL/L (ref 3.5–5.3)
PROT SERPL-MCNC: 6.7 G/DL (ref 6.4–8.2)
RBC # BLD AUTO: 3.65 MILLION/UL (ref 3.81–5.12)
SODIUM SERPL-SCNC: 139 MMOL/L (ref 136–145)
WBC # BLD AUTO: 9.12 THOUSAND/UL (ref 4.31–10.16)

## 2022-06-27 PROCEDURE — 85025 COMPLETE CBC W/AUTO DIFF WBC: CPT

## 2022-06-27 PROCEDURE — 80053 COMPREHEN METABOLIC PANEL: CPT

## 2022-06-27 PROCEDURE — 36415 COLL VENOUS BLD VENIPUNCTURE: CPT

## 2022-06-28 ENCOUNTER — HOSPITAL ENCOUNTER (OUTPATIENT)
Dept: RADIOLOGY | Facility: HOSPITAL | Age: 77
Discharge: HOME | End: 2022-06-28
Attending: INTERNAL MEDICINE
Payer: COMMERCIAL

## 2022-06-28 VITALS
DIASTOLIC BLOOD PRESSURE: 54 MMHG | WEIGHT: 106 LBS | HEIGHT: 64 IN | HEART RATE: 85 BPM | RESPIRATION RATE: 19 BRPM | TEMPERATURE: 98.1 F | SYSTOLIC BLOOD PRESSURE: 111 MMHG | OXYGEN SATURATION: 97 % | BODY MASS INDEX: 18.1 KG/M2

## 2022-06-28 DIAGNOSIS — D01.5: ICD-10-CM

## 2022-06-28 PROCEDURE — 36561 INSERT TUNNELED CV CATH: CPT

## 2022-06-28 PROCEDURE — 77001 FLUOROGUIDE FOR VEIN DEVICE: CPT

## 2022-06-28 PROCEDURE — 99153 MOD SED SAME PHYS/QHP EA: CPT

## 2022-06-28 PROCEDURE — 77001 FLUOROGUIDE FOR VEIN DEVICE: CPT | Performed by: RADIOLOGY

## 2022-06-28 PROCEDURE — 76937 US GUIDE VASCULAR ACCESS: CPT

## 2022-06-28 PROCEDURE — C1788 PORT, INDWELLING, IMP: HCPCS

## 2022-06-28 PROCEDURE — 76937 US GUIDE VASCULAR ACCESS: CPT | Performed by: RADIOLOGY

## 2022-06-28 PROCEDURE — 36561 INSERT TUNNELED CV CATH: CPT | Performed by: RADIOLOGY

## 2022-06-28 PROCEDURE — 99152 MOD SED SAME PHYS/QHP 5/>YRS: CPT

## 2022-06-28 RX ORDER — CEFAZOLIN SODIUM 1 G/50ML
1000 SOLUTION INTRAVENOUS ONCE
Status: COMPLETED | OUTPATIENT
Start: 2022-06-28 | End: 2022-06-28

## 2022-06-28 RX ORDER — SODIUM CHLORIDE 9 MG/ML
75 INJECTION, SOLUTION INTRAVENOUS CONTINUOUS
Status: DISCONTINUED | OUTPATIENT
Start: 2022-06-28 | End: 2022-06-29 | Stop reason: HOSPADM

## 2022-06-28 RX ADMIN — CEFAZOLIN SODIUM 1000 MG: 1 SOLUTION INTRAVENOUS at 12:38

## 2022-06-28 RX ADMIN — SODIUM CHLORIDE 75 ML/HR: 0.9 INJECTION, SOLUTION INTRAVENOUS at 11:35

## 2022-06-28 NOTE — SEDATION DOCUMENTATION
Right chest port placement completed by Dr Deidra Rebollar without complications, pt tolerated well  Pt is for a 1 hour bedrest starting at 1400

## 2022-06-28 NOTE — INTERVAL H&P NOTE
The history and physical were reviewed, along with progress notes, and the patient was examined  There are no changes since it was written      /58   Pulse 65   Temp 98 1 °F (36 7 °C) (Oral)   Resp 18   Ht 5' 4" (1 626 m)   Wt 48 1 kg (106 lb)   SpO2 98%   BMI 18 19 kg/m²        Petra Dumont MD/June 28, 2022/1:16 PM

## 2022-06-28 NOTE — BRIEF OP NOTE (RAD/CATH)
INTERVENTIONAL RADIOLOGY PROCEDURE NOTE    Date: 6/28/2022    Procedure: IR PORT PLACEMENT    Preoperative diagnosis:   1  Carcinoma in situ of liver         Postoperative diagnosis: Same  Surgeon: Elliot Gifford MD     Assistant: None  No qualified resident was available  Blood loss:  1 mL    Specimens:  None     Findings: Many right chest port placed via right internal jugular vein  Tip in RA, okay to use  Complications: None immediate      Anesthesia: conscious sedation

## 2022-06-28 NOTE — DISCHARGE INSTRUCTIONS
Implanted Venous Access Port     WHAT YOU NEED TO KNOW:   An implanted venous access port is a device used to give treatments and take blood  It may also be called a central venous access device (CVAD)  The port is a small container that is placed under your skin, usually in your upper chest  The port is attached to a catheter that enters a large vein  DISCHARGE INSTRUCTIONS:   Resume your normal diet  Small sips of flat soda will help with mild nausea  Prevent an infection:   Wash your hands often  Use soap and water  Clean your hands before and after you care for your port  Remind everyone who cares for your port to wash their hands  Check your skin for infection every day  Look for redness, swelling, or fluid oozing from the port site  Care for your port:   1  You may shower beginning 48 hours after procedure  2  Change dressing if it becomes wet  3  Remove dressing after 24 hours  Leave glue in place  4  It is normal for some bruising to occur  5  Use Tylenol for pain  6  Limit use of arm on the side that your port was placed  Lift nothing heavier than 5 pounds for 1 week, and then gradually increase activity as tolerated  7  DO NOT apply ointment, lotion or cream to port site until incision is healed  Allow glue to fall off  DO NOT attempt to peel glue from skin even it it begins to flake  8, After the port incision is healed you may swim, bathe  Notify the Interventional Radiologist if you have any of the followin  Fever above 101 F    2  Increased redness or swelling after 1st day  3  Increased pain after 1st day  4  Any sign of infection (drainage from port site, skin separation, hot to touch)  5  Persistent nausea or vomiting  Contact Interventional Radiology at [phone] [facility] PATIENTS: Contact Interventional Radiology at [phone]) ([address]: Contact Interventional Radiology at [phone])          Moderate Sedation   WHAT YOU NEED TO KNOW:   Moderate sedation, or conscious sedation, is medicine used during procedures to help you feel relaxed and calm  You will be awake and able to follow directions without anxiety or pain  You will remember little to none of the procedure  You may feel tired, weak, or unsteady on your feet after you get sedation  You may also have trouble concentrating or short-term memory loss  These symptoms should go away in 24 hours or less  DISCHARGE INSTRUCTIONS:   Call 911 or have someone else call for any of the following: You have sudden trouble breathing  You cannot be woken  Seek care immediately if:   You have a severe headache or dizziness  Your heart is beating faster than usual   Contact your healthcare provider if:   You have a fever  You have nausea or are vomiting for more than 8 hours after the procedure  Your skin is itchy, swollen, or you have a rash  You have questions or concerns about your condition or care  Self-care:   Have someone stay with you for 24 hours  This person can drive you to errands and help you do things around the house  This person can also watch for problems  Rest and do quiet activities for 24 hours  Do not exercise, ride a bike, or play sports  Stand up slowly to prevent dizziness and falls  Take short walks around the house with another person  Slowly return to your usual activities the next day  Do not drive or use dangerous machines or tools for 24 hours  You may injure yourself or others  Examples include a lawnmower, saw, or drill  Do not return to work for 24 hours if you use dangerous machines or tools for work  Do not make important decisions for 24 hours  For example, do not sign important papers or invest money  Drink liquids as directed  Liquids help flush the sedation medicine out of your body  Ask how much liquid to drink each day and which liquids are best for you        Eat small, frequent meals to prevent nausea and vomiting  Start with clear liquids such as juice or broth  If you do not vomit after clear liquids, you can eat your usual foods  Do not drink alcohol or take medicines that make you drowsy  This includes medicines that help you sleep and anxiety medicines  Ask your healthcare provider if it is safe for you to take pain medicine  Follow up with your healthcare provider as directed: Write down your questions so you remember to ask them during your visits  © 2017 [address] Information is for End User's use only and may not be sold, redistributed or otherwise used for commercial purposes  All illustrations and images included in CareNotes® are the copyrighted property of [employer]  or Leonard Ole[employer]  The above information is an  only  It is not intended as medical advice for individual conditions or treatments  Talk to your doctor, nurse or pharmacist before following any medical regimen to see if it is safe and effective for you

## 2022-06-28 NOTE — PROGRESS NOTES
Outpatient Oncology Nutrition Consultation   Type of Consult: Initial Consult  Care Location: Infusion Center    Reason for referral: Received notification by Dr Javan Hoffmann on 6/21/22 that pt has triggered for oncology nutrition care (reason for referral: Dr Javan Hoffmann request due to weight loss)  Nutrition Assessment:   Oncology Diagnosis & Treatments: dx with pancreatic cancer 6/2022  -currently receiving chemo (abraxane, gemzar) started 6/29/22   -s/p port placement 6/28/22  Oncology History   Primary pancreatic adenocarcinoma ([facility] [address] )   6/6/2022 -  Cancer Staged    Staging form: Anus, AJCC 8th Edition  - Clinical stage from 6/6/2022: Stage IV (cT3, cN1a, pM1) - Signed by Devora Robertson MD on 6/12/2022  Stage prefix: Initial diagnosis  Sites of metastasis: Liver  Source of metastatic specimen: Bone, Aortic Lymph Nodes       6/12/2022 Initial Diagnosis    Primary pancreatic neuroendocrine tumor     6/29/2022 -  Chemotherapy    paclitaxel protein-bound (ABRAXANE) IVPB, 125 mg/m2 = 188 mg, Intravenous, Once, 1 of 6 cycles  gemcitabine (GEMZAR) infusion, 1,000 mg/m2 = 1,500 mg, Intravenous, Once, 1 of 6 cycles       Past Medical & Surgical Hx:   Patient Active Problem List   Diagnosis    UTI (urinary tract infection)    Chest pain    Elevated TSH    Elevated brain natriuretic peptide (BNP) level    Elevated d-dimer    Iron deficiency anemia    Hypokalemia    Chronic pain of left ankle    Chronic diarrhea    Weight loss    Stress at home   [facility] annual wellness visit, subsequent    [facility]cal[facility] malnutrition, [facility]    Pancreatic mass    Pericardial effusion    Anemia    Severe protein-calorie malnutrition ([facility] [address] )    Primary pancreatic adenocarcinoma (HCC)    Anxiety    Nausea    Cancer related pain    Counseling regarding advanced care planning and goals of care     No past medical history on file    Past Surgical History:   Procedure Laterality Date    CARDIAC CATHETERIZATION N/A 3/30/2022    Procedure: Cardiac catheterization;  Surgeon: George Muñiz DO;  Location: [address] C[address] LAB; Service: Cardiology    CARDIAC CATHETERIZATION N/A 3/30/2022    Procedure: Cardiac Coronary Angiogram;  Surgeon: George Muñiz DO;  Location: [facility] CARDIAC CATH LAB;   Service: Cardiology    IR BIOPSY LIVER MASS  6/6/2022    IR PORT PLACEMENT  6/28/2022       Review of Medications:   Vitamins, Supplements and Herbals: Med List Reviewed & pt is only taking: vitamin B, D, E, iron, sheridan seal root, omega-3 , Discussed the potential interactions of high dose antioxidants with cancer tx and recommended exercising caution when taking these supplements and Refered pt to [facility] "About Herbs" website for further information on safety/effectiveness/interactions of supplements    Current Outpatient Medications:     acetaminophen (TYLENOL) 500 mg tablet, Take 500 mg by mouth every 6 (six) hours as needed for mild pain (Patient not taking: No sig reported), Disp: , Rfl:     Cholecalciferol (Vitamin D3) 50 MCG (2000 UT) TABS, Take by mouth  , Disp: , Rfl:     cyanocobalamin (VITAMIN B-12) 500 MCG tablet, Take 500 mcg by mouth daily, Disp: , Rfl:     ferrous sulfate 325 (65 Fe) mg tablet, Take 325 mg by mouth daily with breakfast, Disp: , Rfl:     SHERIDAN SEAL ROOT PO, Take 900 mg by mouth daily Echinaceal/goldenseal blend , Disp: , Rfl:     Omega-3 Fatty Acids (OMEGA 3 PO), Take 1,000 mg by mouth, Disp: , Rfl:     ondansetron (Zofran ODT) 4 mg disintegrating tablet, Take 1 tablet (4 mg total) by mouth every 8 (eight) hours as needed for nausea or vomiting (Patient not taking: Reported on 6/24/2022), Disp: 20 tablet, Rfl: 0    oxyCODONE (ROXICODONE) 5 immediate release tablet, Take 0 5 tablets (2 5 mg total) by mouth every 6 (six) hours as needed for moderate pain Max Daily Amount: 10 mg, Disp: 15 tablet, Rfl: 0    pancrelipase, Lip-Prot-Amyl, (CREON) 24,000 units, Take 24,000 units of lipase by mouth 3 (three) times a day with meals, Disp: 90 capsule, Rfl: 0    Pyridoxine HCl (VITAMIN B-6 PO), Take 1 tablet by mouth, Disp: , Rfl:     Vitamin E 400 units TABS, Take by mouth, Disp: , Rfl:   No current facility-administered medications for this visit      Facility-Administered Medications Ordered in Other Visits:     gemcitabine (GEMZAR) 1,500 mg in sodium chloride 0 9 % 250 mL infusion, 1,000 mg/m2 (Treatment Plan Recorded), Intravenous, Once, Florence La MD    PACLitaxel protein bound (ABRAXANE) 188 mg in IVPB 37 6 mL, 125 mg/m2 (Treatment Plan Recorded), Intravenous, Once, Florence La MD    Most Recent Lab Results:   Lab Results   Component Value Date    WBC 9 12 06/27/2022    NEUTROABS 7 27 06/27/2022    IRON 26 (L) 05/30/2022    TIBC 186 (L) 05/30/2022    FERRITIN 159 05/30/2022    CHOLESTEROL 119 03/29/2022    TRIG 74 03/29/2022    HDL 46 (L) 03/29/2022    LDLCALC 58 03/29/2022    ALT 47 06/27/2022    AST 97 (H) 06/27/2022    ALB 2 9 (L) 06/27/2022    SODIUM 139 06/27/2022    SODIUM 140 06/02/2022    K 3 3 (L) 06/27/2022    K 3 9 06/02/2022     06/27/2022    BUN 16 06/27/2022    BUN 14 06/02/2022    CREATININE 0 91 06/27/2022    CREATININE 0 92 06/02/2022    EGFR 61 06/27/2022    GLUF 144 (H) 06/27/2022    GLUF 114 (H) 05/19/2022    GLUC 101 06/02/2022    HGBA1C 5 5 04/11/2022    CALCIUM 8 8 06/27/2022    MG 2 0 06/01/2022       Anthropometric Measurements:   Height: 64 "  Ht Readings from Last 1 Encounters:   06/29/22 5' 4 72" (1 644 m)     Wt Readings from Last 20 Encounters:   06/29/22 49 3 kg (108 lb 11 oz)   06/28/22 48 1 kg (106 lb)   06/24/22 49 7 kg (109 lb 9 6 oz)   06/22/22 48 1 kg (106 lb)   06/21/22 48 3 kg (106 lb 8 oz)   06/13/22 49 7 kg (109 lb 9 6 oz)   06/06/22 50 3 kg (111 lb)   06/02/22 50 3 kg (110 lb 14 3 oz)   05/30/22 52 2 kg (115 lb)   05/25/22 52 2 kg (115 lb)   05/23/22 52 4 kg (115 lb 9 6 oz)   05/16/22 52 4 kg (115 lb 9 6 oz) 04/11/22 57 4 kg (126 lb 9 6 oz)   03/30/22 54 4 kg (120 lb)   08/17/19 65 8 kg (145 lb)   08/21/18 64 7 kg (142 lb 9 6 oz)   08/26/14 67 1 kg (148 lb)       Weight History:    Usual Weight: 145#   Home Scale: n/a    Oncology Nutrition-Anthropometrics    Flowsheet Row Nutrition from 6/29/2022 in [address] [place] [address] Oncology Dietitian Services   Patient age (years): 78 years   Patient (female) height (in): 59 in   Current Weight to be used for anthropometric calculations (lbs) 108 4 lbs   Current Weight to be used for anthropometric calculations (kg) 49 3 kg   BMI: 18 6   IBW female: 120 lbs   IBW (kg) female: 54 5 kg   IBW % (female) 90 3 %   Adjusted BW (female): 117 1 lbs   Adjusted BW kg (female): 53 2 kg   % weight change after 1 week: 1 8 %   Weight change after 1 week (lbs) 1 9 lbs   % weight change after 1 month: -5 7 %   Weight change after 1 month (lbs) -6 6 lbs   % weight change after 3 months: -9 7 %   Weight change after 3 months (lbs) -11 6 lbs          Nutrition-Focused Physical Findings: mild  muscle depletion ([place]) - hollowing/scooping/depression and moderate muscle depletion (Clav[place]) - protruding/visible bone    Food/Nutrition-Related History & Client/Social History:    Current Nutrition Impact Symptoms:  [x] Nausea -sometimes  Has Zofran if needed [x] Reduced Appetite -starting to improve [] Acid Reflux    [] Vomiting  [x] Unintended Wt Loss  [] Malabsorption    [x] Diarrhea -ongoing for months, but starting to improve now that on Creon   Stools are more formed now [] Unintended Wt Gain  [] Dumping Syndrome    [] Constipation  [] Thick Mucous/Secretions  [x] Abdominal Pain    [] Dysgeusia (Altered Taste)  [] Xerostomia (Dry Mouth)  [] Gas    [] Dysosmia (Altered Smell)  [] Thrush  [] Difficulty Chewing    [] Oral Mucositis (Sore Mouth)  [x] Fatigue  [] Hyperglycemia    [] Odynophagia  [] Esophagitis  [x] Other: takes Creon   [] Dysphagia  [] Early Satiety  [] No Problems Eating Food Allergies & Intolerances: no    Current Diet: Regular Diet, No Restrictions  Current Nutrition Intake: Less than usual   Appetite: Good, Fair   Nutrition Route: PO  Oral Care: brushes daily  Activity level: ADLs, has to rest more than she used to  Was working full time up until May 2022    24 Hr Diet Recall:   Breakfast: cereal (rice krispies or corn chex) with a banana and whole milk  Snack: yogurt  Lunch: yesterday had turkey sandwich with castle and mustard  Snack: 2 cookies  Dinner: baked ziti with cheese  Snack: sometimes will have a baked good like a cupcake    Beverages: carbonated water (2L x1), gatorade (20oz x1), tea (8oz x1), whole milk (8oz x2)  Supplements:    [employer] ([id number] kcal, 5 g pro) mixed with whole milk    Had some ensure pudding in the hospital and liked this      Oncology Nutrition-Estimated Needs    Flowsheet Row Nutrition from 2022 in [address] [place] [address] Oncology Dietitian Services   Weight type used Actual weight   Weight in kilograms (kg) used for estimated needs 49 3 kg   Energy needs formula:  35-40 kcal/kg   Energy needs based on 35 kcal/k kcal   Energy needs based on 40 kcal/k kcal   Protein needs formula: 1 5-2 g/kg   Protein needs based on 1 5 g/kg 74 g   Protein needs based on 2 g/kg 99 g   Fluid needs formula: 30-35 mL/kg   Fluid needs based on 30 mL/kg 1473 mL   Fluid needs in ounces 50 oz   Fluid needs based on 35 mL/kg 1719 mL   Fluid needs in ounces 58 oz           Discussion & Intervention:   Esperanza Del Rio was evaluated today for an initial RD consultation regarding wt loss, poor po intake and nutrition impact sx management  Esperanza Del Rio is currently undergoing tx for pancreatic cancer  Met with Esperanza Del Rio and her daughter today during infusion  Today is her first day of chemo  She states that she was experiencing wt loss, poor PO, and diarrhea for months   Her appetite has started to improve, and her diarrhea has been improving with the addition of Creon over the last week  She is still working, but only part time or as needed  She eats 3 meals/day, but her portions are much smaller than they used to be  We discussed small/frequent meals, and calorie/protein dense foods  We also discussed trying oral nutrition supplements  She recently tried ensure pudding in the hospital and enjoyed this  She also drinks CIB occasionally  Encouraged her to continue there, and we also reviewed other options to try  She stated that she had a hard time getting down regular ensure (vanilla/chocolate) and felt nauseous with these  Reviewed 24 hour recall, which revealed an inadequate po intake, and discussed ways to increase kcal, protein, and fluid intakes and optimize nutrient intake  Also reviewed the importance of wt management throughout the tx process and the role of a high kcal/ high protein diet in managing wt and overall health    Based on today's assessment, discussion included: MNT for: N/V, diarrhea, early satiety, how to modify foods for anticipated nutrition impact symptoms pt may experience during CA tx, a high kcal/protein diet & food choices to include at all meals & snacks (Examples of high kcal foods: cheese, full-fat dairy products, nut butter, plant-based fats, coconut oil/milk, avocado, butter, cream soup, etc  Examples of high protein foods: eggs, chicken, fish, beans/legumes, nuts/nut butters, bone broth, etc ) , fortifying foods for added kcal and protein (examples include: adding cheese to foods such as eggs, mashed potatoes, casseroles, etc ; Making oatmeal with whole milk rather than water; Making fortified mashed potatoes with cream, butter, dry milk powder, plain [place] yogurt, and cheese ), adequate hydration & fluid choices, sipping on calorie containing beverages (examples include: adding whole milk or cream to coffee, oral nutrition supplements, juice, electrolyte replacement beverages, milk, etc ), eating smaller more frequent meals every 2-3 hours (5-6 small meals/day), continuing and trying other oral nutrition supplements and adding extra fat to foods while cooking such as butter, plant-based oil, coconut oil/milk, avocado, nut butters, etc    Moving forward, Cait Lees was encouraged to increase kcal, protein, and fluid intakes   Materials Provided: [facility] Eating Hints book, PCAN Diet and Nutrition booklet, Ensure samples, Ensure Coupons, Boost samples, Boost Coupons, Unjury samples, Commercial Nutrition Supplements List, Diarrhea handout, poor appetite handout, nausea and vomiting handout, Oncology Nutrition Services Brochure and Oncology Nutrition Class Flyer  All questions and concerns addressed during todays visit  Cait Lees has RD contact information  Nutrition Diagnosis:    Inadequate Energy Intake related to physiological causes, disease state and treatment related issues as evidenced by food recall, wt loss and discussion with pt and/or family   Increased Nutrient Needs (kcal & pro) related to increased demand for nutrients and disease state as evidenced by cancer dx and pt undergoing tx for cancer   Patient has clinical indicators (or ASPEN criteria) consistent with severe protein-calorie malnutrition in the context of Chronic Illness as evidenced by >5% wt loss in 1 month and </=75% energy intake vs  Estimated needs for >/=1 month  Monitoring & Evaluation:   Goals:  · weight maintenance/stabilization  · adequate nutrition impact symptom management  · pt to meet >/=75% estimated nutrition needs daily    · Progress Towards Goals: Initiated    Nutrition Rx & Recommendations:  · Diet: High Calorie, High Protein (for high calorie foods see pages 52-53, and for high protein foods see pages 49-51 in your Eating Hints book)  · Keep a daily food journal (pen/paper, juan such as [employer])  · Nutrition Supplements: utilize oral nutrition supplements  Continue CIB with whole milk   Try Ensure clear or Boost Soothe between meals, 2-3 per day  May use homemade shakes/smoothies as desired  If using a pre-made shake/bar, choose ones with >300 calories and >10 grams protein (ex  Ensure Complete, Ensure Enlive, Ensure Plus, Boost Plus, Boost Very High Calorie, etc )  · Small, frequent meals/snacks may be easier to tolerate than 3 large daily meals  Aim for 5-6 small meals per day (every 2-3 hours)  · Include protein at all meals/snacks  · Include a variety of foods (as tolerated/allowed by diet)  · Incorporate physical activity as able/allowed  · Stay hydrated by sipping fluids of choice/tolerance throughout the day  · Liquid nutrition may be better tolerated than solids at times  · Alter food choices and eating patterns to accommodate changing needs  · Refer to Eating Hints book for other meal/snack ideas and symptom management  · For appetite loss: try powdered or liquid nutrition supplements; eating by the clock every 2-3 hours; set a timer to remind yourself to eat, keep snacks nearby; add extra protein & calories to your diet; drink liquids in between meals, choose liquids that add calories; eat a bedtime snack; eat soft, cool or frozen foods; eat a larger meal when you feel well & rested; only sip small amounts of liquids during meals (see pages 10-12 in your Eating Hints book)    · For weight loss: monitor your weight at home at least 2x/week, record your weight, start by adding 250-500 extra calories per day, eat 5-6 small scheduled meals every 2-3 hrs, choose foods that are high in protein and calories (see pages 49-53 in your Eating Hints book), drink liquids with calories for example: milkshakes/smoothies/juice/soup/whole milk/chocolate milk, cook with protein fortified milk (see recipe on page 36 in your Eating Hints book), consider ready-to-drink oral nutrition supplements such as Ensure Plus, Ensure Enlive, Boost Plus, or Boost Very High Calorie, avoid "diet" and "light" foods when possible, avoid drinking too much with meals, contact your dietitian with any continued weight loss over the course of 1 week  For more info see pages 35-37 in your Eating Hints book  · For diarrhea: drink plenty of fluids (>64 oz/day), avoid carbonation; eat 5-6 small meals/day; include high sodium & potassium foods/liquids (Sodium: soup/broth; Potassium: bananas, canned apricots, potatoes); eat low-fiber foods; choose foods/liquids that are room temperature; avoid foods/drinks that can make diarrhea worse (ex  high fiber, high sugar, hot/cold drinks, greasy/fatty foods, gas forming foods such as beans, raw produce, milk products, alcohol, spicy foods, caffeine, sugar alcohols (xylitol, sorbitol), and apple juice (high in sorbitol) (see pages 15-16 in your Eating Hints book)  If diarrhea is severe, consider adding Banatrol (banana flakes) 1-3 times per day mixed in applesauce (can be purchased online from [address] [place] [address])  · For nausea/vomiting: try plain/bland foods; try lemon/lime flavors; eat 5-6 small meals/day (except when vomiting); do not skip meals/snacks; choose appealing foods; sip only small amounts of liquids during meals; stay hydrated in between meals; eat foods/drinks that are room temperature; eat dry toast/crackers (see pages  21-22 and 31-32 in your Eating Hints book)  · Weigh yourself regularly  If you notice weight loss, make an effort to increase your daily food/calorie intake  If you continue to notice loss after these efforts, reach out to your dietitian to establish a plan to stabilize weight  · Consider stopping all high dose antioxidant supplements (vitamin A, C, E, selenium, etc )  Refer to [facility] "About Herbs" website for more information on the safety of supplements    · High calorie foods to try: add extra fats to foods (extra olive oil, avocado oil, butter), add avocado or cheese to foods, use extra sauce/gravies, whole milk products, oral nutrition supplements, nut butters  (see pages 52-53 in your Eating Hints book)  · High protein foods to try: whole milk, fairlife milk, switch to greek yogurt, nut butters, oral nutrition supplements, protein powder (see pages 49-51 in your Eating Hints book)    Follow Up Plan: 7/12/22 during infusion  Recommend Referral to Other Providers: none at this time

## 2022-06-29 ENCOUNTER — PATIENT OUTREACH (OUTPATIENT)
Dept: CASE MANAGEMENT | Facility: OTHER | Age: 77
End: 2022-06-29

## 2022-06-29 ENCOUNTER — HOSPITAL ENCOUNTER (OUTPATIENT)
Dept: INFUSION CENTER | Facility: CLINIC | Age: 77
Discharge: HOME | End: 2022-06-29
Payer: COMMERCIAL

## 2022-06-29 ENCOUNTER — NUTRITION (OUTPATIENT)
Dept: NUTRITION | Facility: CLINIC | Age: 77
End: 2022-06-29

## 2022-06-29 VITALS
HEART RATE: 78 BPM | BODY MASS INDEX: 18.11 KG/M2 | DIASTOLIC BLOOD PRESSURE: 78 MMHG | TEMPERATURE: 97.1 F | HEIGHT: 65 IN | SYSTOLIC BLOOD PRESSURE: 122 MMHG | RESPIRATION RATE: 18 BRPM | WEIGHT: 108.69 LBS

## 2022-06-29 DIAGNOSIS — C25.9 PRIMARY PANCREATIC ADENOCARCINOMA (HCC): Primary | ICD-10-CM

## 2022-06-29 DIAGNOSIS — Z95.828 PORT-A-CATH IN PLACE: Primary | ICD-10-CM

## 2022-06-29 DIAGNOSIS — Z71.3 NUTRITIONAL COUNSELING: Primary | ICD-10-CM

## 2022-06-29 PROCEDURE — 96417 CHEMO IV INFUS EACH ADDL SEQ: CPT

## 2022-06-29 PROCEDURE — 96413 CHEMO IV INFUSION 1 HR: CPT

## 2022-06-29 PROCEDURE — 96367 TX/PROPH/DG ADDL SEQ IV INF: CPT

## 2022-06-29 RX ORDER — LIDOCAINE AND PRILOCAINE 25; 25 MG/G; MG/G
CREAM TOPICAL AS NEEDED
Qty: 30 G | Refills: 0 | Status: SHIPPED | OUTPATIENT
Start: 2022-06-29 | End: 2022-07-05 | Stop reason: SDUPTHER

## 2022-06-29 RX ORDER — SODIUM CHLORIDE 9 MG/ML
20 INJECTION, SOLUTION INTRAVENOUS ONCE
Status: COMPLETED | OUTPATIENT
Start: 2022-06-29 | End: 2022-06-29

## 2022-06-29 RX ADMIN — GEMCITABINE HYDROCHLORIDE 1500 MG: 1 INJECTION, SOLUTION INTRAVENOUS at 12:08

## 2022-06-29 RX ADMIN — Medication 188 MG: at 11:04

## 2022-06-29 RX ADMIN — DEXAMETHASONE SODIUM PHOSPHATE 10 MG: 100 INJECTION INTRAMUSCULAR; INTRAVENOUS at 10:10

## 2022-06-29 RX ADMIN — SODIUM CHLORIDE 20 ML/HR: 0.9 INJECTION, SOLUTION INTRAVENOUS at 10:12
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
## 2022-06-29 NOTE — TELEPHONE ENCOUNTER
Patient requesting KIESHA cream to be sent to her pharmacy  Prescription pending provider review and signature

## 2022-06-29 NOTE — PLAN OF CARE
Problem: Potential for Falls  Goal: Patient will remain free of falls  Description: INTERVENTIONS:  - Educate patient/family on patient safety including physical limitations  - Instruct patient to call for assistance with activity   - Consult OT/PT to assist with strengthening/mobility   - Keep Call bell within reach  - Keep bed low and locked with side rails adjusted as appropriate  - Keep care items and personal belongings within reach  - Initiate and maintain comfort rounds  - Make Fall Risk Sign visible to staff  - Apply yellow socks and bracelet for high fall risk patients  - Consider moving patient to room near nurses station  Outcome: Progressing Dandy

## 2022-06-29 NOTE — PATIENT INSTRUCTIONS
Nutrition Rx & Recommendations:  Diet: High Calorie, High Protein (for high calorie foods see pages 52-53, and for high protein foods see pages 49-51 in your Eating Hints book)  Keep a daily food journal (pen/paper, juan such as VoxPopMe)  Nutrition Supplements: utilize oral nutrition supplements  Continue CIB with whole milk  Try Ensure clear or Boost Soothe between meals, 2-3 per day  May use homemade shakes/smoothies as desired  If using a pre-made shake/bar, choose ones with >300 calories and >10 grams protein (ex  Ensure Complete, Ensure Enlive, Ensure Plus, Boost Plus, Boost Very High Calorie, etc )  Small, frequent meals/snacks may be easier to tolerate than 3 large daily meals  Aim for 5-6 small meals per day (every 2-3 hours)  Include protein at all meals/snacks  Include a variety of foods (as tolerated/allowed by diet)  Incorporate physical activity as able/allowed  Stay hydrated by sipping fluids of choice/tolerance throughout the day  Liquid nutrition may be better tolerated than solids at times  Alter food choices and eating patterns to accommodate changing needs  Refer to Eating Hints book for other meal/snack ideas and symptom management  For appetite loss: try powdered or liquid nutrition supplements; eating by the clock every 2-3 hours; set a timer to remind yourself to eat, keep snacks nearby; add extra protein & calories to your diet; drink liquids in between meals, choose liquids that add calories; eat a bedtime snack; eat soft, cool or frozen foods; eat a larger meal when you feel well & rested; only sip small amounts of liquids during meals (see pages 10-12 in your Eating Hints book)    For weight loss: monitor your weight at home at least 2x/week, record your weight, start by adding 250-500 extra calories per day, eat 5-6 small scheduled meals every 2-3 hrs, choose foods that are high in protein and calories (see pages 49-53 in your Eating Hints book), drink liquids with calories for example: milkshakes/smoothies/juice/soup/whole milk/chocolate milk, cook with protein fortified milk (see recipe on page 36 in your Eating Hints book), consider ready-to-drink oral nutrition supplements such as Ensure Plus, Ensure Enlive, Boost Plus, or Boost Very High Calorie, avoid "diet" and "light" foods when possible, avoid drinking too much with meals, contact your dietitian with any continued weight loss over the course of 1 week  For more info see pages 35-37 in your Eating Hints book  For diarrhea: drink plenty of fluids (>64 oz/day), avoid carbonation; eat 5-6 small meals/day; include high sodium & potassium foods/liquids (Sodium: soup/broth; Potassium: bananas, canned apricots, potatoes); eat low-fiber foods; choose foods/liquids that are room temperature; avoid foods/drinks that can make diarrhea worse (ex  high fiber, high sugar, hot/cold drinks, greasy/fatty foods, gas forming foods such as beans, raw produce, milk products, alcohol, spicy foods, caffeine, sugar alcohols (xylitol, sorbitol), and apple juice (high in sorbitol) (see pages 15-16 in your Eating Hints book)  If diarrhea is severe, consider adding Banatrol (banana flakes) 1-3 times per day mixed in applesauce (can be purchased online from [address] [place] [address])  For nausea/vomiting: try plain/bland foods; try lemon/lime flavors; eat 5-6 small meals/day (except when vomiting); do not skip meals/snacks; choose appealing foods; sip only small amounts of liquids during meals; stay hydrated in between meals; eat foods/drinks that are room temperature; eat dry toast/crackers (see pages  21-22 and 31-32 in your Eating Hints book)  Weigh yourself regularly  If you notice weight loss, make an effort to increase your daily food/calorie intake  If you continue to notice loss after these efforts, reach out to your dietitian to establish a plan to stabilize weight    Consider stopping all high dose antioxidant supplements (vitamin A, C, E, selenium, etc )  Refer to [facility] "About Herbs" website for more information on the safety of supplements    High calorie foods to try: add extra fats to foods (extra olive oil, avocado oil, butter), add avocado or cheese to foods, use extra sauce/gravies, whole milk products, oral nutrition supplements, nut butters  (see pages 52-53 in your Eating Hints book)  High protein foods to try: whole milk, fairlife milk, switch to greek yogurt, nut butters, oral nutrition supplements, protein powder (see pages 49-51 in your Eating Hints book)    Follow Up Plan: 7/12/22 during infusion  Recommend Referral to Other Providers: none at this time

## 2022-06-29 NOTE — PROGRESS NOTES
Patient tolerated chemotherapy treatment without incident  Pt is aware of all future appointments  AVS provided

## 2022-07-01 ENCOUNTER — HOSPITAL ENCOUNTER (OUTPATIENT)
Dept: RADIOLOGY | Facility: HOSPITAL | Age: 77
Discharge: HOME | End: 2022-07-01
Attending: RADIOLOGY

## 2022-07-01 DIAGNOSIS — D01.5: ICD-10-CM

## 2022-07-01 NOTE — SEDATION DOCUMENTATION
Patient presents with family member, Patient reports she had port placed this past Tuesday, port was accessed yesterday for treatment  Patient was advised to continue to watch for increased redness, swelling, fever, drainage  Advised to continue tylenol, and add ice  Patient was seen and evaluated by Dr Haywood   She was given the opportunity to ask questions, express concerns, and left with a good understanding of today's plan of care

## 2022-07-01 NOTE — PROGRESS NOTES
Right chest port placed just a few days ago  She is having pain and redness at the site  Site evaluated which shows some bruising at both the port pocket and venostomy sites  No fluctuance  Small amount of erythema adjacent to port incision  Seems to all be related to postsurgical changes  Her daughter and her were instructed to keep a close eye on the redness and should she develop a fever, drainage from the site, or increased redness she should call us or go to the ER  We have scheduled a follow-up site check next Tuesday to make sure there is no progression  She is having occasional sharp and dull pain at this site and with movement  She is taking Tylenol  I suggested she can try an ice pack and see it helps with some of her pain  All questions answered      Glenys Abebe MD

## 2022-07-05 ENCOUNTER — OFFICE VISIT (OUTPATIENT)
Dept: HEMATOLOGY ONCOLOGY | Facility: CLINIC | Age: 77
End: 2022-07-05
Payer: COMMERCIAL

## 2022-07-05 ENCOUNTER — APPOINTMENT (OUTPATIENT)
Dept: LAB | Facility: MEDICAL CENTER | Age: 77
End: 2022-07-05
Payer: COMMERCIAL

## 2022-07-05 VITALS
WEIGHT: 108 LBS | HEIGHT: 64 IN | RESPIRATION RATE: 16 BRPM | OXYGEN SATURATION: 98 % | DIASTOLIC BLOOD PRESSURE: 60 MMHG | SYSTOLIC BLOOD PRESSURE: 96 MMHG | HEART RATE: 92 BPM | TEMPERATURE: 97.7 F | BODY MASS INDEX: 18.44 KG/M2

## 2022-07-05 DIAGNOSIS — C25.9 PRIMARY PANCREATIC ADENOCARCINOMA (HCC): Primary | ICD-10-CM

## 2022-07-05 DIAGNOSIS — Z95.828 PORT-A-CATH IN PLACE: ICD-10-CM

## 2022-07-05 DIAGNOSIS — T45.1X5A CHEMOTHERAPY INDUCED NAUSEA AND VOMITING: ICD-10-CM

## 2022-07-05 DIAGNOSIS — R11.2 CHEMOTHERAPY INDUCED NAUSEA AND VOMITING: ICD-10-CM

## 2022-07-05 DIAGNOSIS — C25.9 PRIMARY PANCREATIC ADENOCARCINOMA (HCC): ICD-10-CM

## 2022-07-05 LAB
BASOPHILS # BLD AUTO: 0.07 THOUSANDS/ΜL (ref 0–0.1)
BASOPHILS NFR BLD AUTO: 1 % (ref 0–1)
EOSINOPHIL # BLD AUTO: 0.11 THOUSAND/ΜL (ref 0–0.61)
EOSINOPHIL NFR BLD AUTO: 2 % (ref 0–6)
ERYTHROCYTE [DISTWIDTH] IN BLOOD BY AUTOMATED COUNT: 14.3 % (ref 11.6–15.1)
HCT VFR BLD AUTO: 32.3 % (ref 34.8–46.1)
HGB BLD-MCNC: 10.3 G/DL (ref 11.5–15.4)
IMM GRANULOCYTES # BLD AUTO: 0.01 THOUSAND/UL (ref 0–0.2)
IMM GRANULOCYTES NFR BLD AUTO: 0 % (ref 0–2)
LYMPHOCYTES # BLD AUTO: 1.15 THOUSANDS/ΜL (ref 0.6–4.47)
LYMPHOCYTES NFR BLD AUTO: 24 % (ref 14–44)
MCH RBC QN AUTO: 30.2 PG (ref 26.8–34.3)
MCHC RBC AUTO-ENTMCNC: 31.9 G/DL (ref 31.4–37.4)
MCV RBC AUTO: 95 FL (ref 82–98)
MONOCYTES # BLD AUTO: 0.19 THOUSAND/ΜL (ref 0.17–1.22)
MONOCYTES NFR BLD AUTO: 4 % (ref 4–12)
NEUTROPHILS # BLD AUTO: 3.35 THOUSANDS/ΜL (ref 1.85–7.62)
NEUTS SEG NFR BLD AUTO: 69 % (ref 43–75)
NRBC BLD AUTO-RTO: 0 /100 WBCS
PLATELET # BLD AUTO: 286 THOUSANDS/UL (ref 149–390)
PMV BLD AUTO: 10.9 FL (ref 8.9–12.7)
RBC # BLD AUTO: 3.41 MILLION/UL (ref 3.81–5.12)
WBC # BLD AUTO: 4.88 THOUSAND/UL (ref 4.31–10.16)

## 2022-07-05 PROCEDURE — 36415 COLL VENOUS BLD VENIPUNCTURE: CPT

## 2022-07-05 PROCEDURE — 99214 OFFICE O/P EST MOD 30 MIN: CPT | Performed by: INTERNAL MEDICINE

## 2022-07-05 PROCEDURE — 85025 COMPLETE CBC W/AUTO DIFF WBC: CPT

## 2022-07-05 RX ORDER — LIDOCAINE AND PRILOCAINE 25; 25 MG/G; MG/G
CREAM TOPICAL AS NEEDED
Qty: 30 G | Refills: 0 | Status: SHIPPED | OUTPATIENT
Start: 2022-07-05

## 2022-07-06 ENCOUNTER — HOSPITAL ENCOUNTER (OUTPATIENT)
Dept: INFUSION CENTER | Facility: CLINIC | Age: 77
Discharge: HOME | End: 2022-07-06
Payer: COMMERCIAL

## 2022-07-06 VITALS
SYSTOLIC BLOOD PRESSURE: 117 MMHG | TEMPERATURE: 97.6 F | DIASTOLIC BLOOD PRESSURE: 69 MMHG | WEIGHT: 109.13 LBS | HEART RATE: 96 BPM | HEIGHT: 64 IN | BODY MASS INDEX: 18.63 KG/M2 | RESPIRATION RATE: 18 BRPM

## 2022-07-06 DIAGNOSIS — R11.2 CHEMOTHERAPY INDUCED NAUSEA AND VOMITING: ICD-10-CM

## 2022-07-06 DIAGNOSIS — T45.1X5A CHEMOTHERAPY INDUCED NAUSEA AND VOMITING: ICD-10-CM

## 2022-07-06 DIAGNOSIS — C25.9 PRIMARY PANCREATIC ADENOCARCINOMA (HCC): Primary | ICD-10-CM

## 2022-07-06 LAB
ALBUMIN SERPL BCP-MCNC: 2.7 G/DL (ref 3.5–5)
ALP SERPL-CCNC: 446 U/L (ref 46–116)
ALT SERPL W P-5'-P-CCNC: 84 U/L (ref 12–78)
ANION GAP SERPL CALCULATED.3IONS-SCNC: 10 MMOL/L (ref 4–13)
AST SERPL W P-5'-P-CCNC: 78 U/L (ref 5–45)
BILIRUB SERPL-MCNC: 0.28 MG/DL (ref 0.2–1)
BUN SERPL-MCNC: 16 MG/DL (ref 5–25)
CALCIUM ALBUM COR SERPL-MCNC: 9.4 MG/DL (ref 8.3–10.1)
CALCIUM SERPL-MCNC: 8.4 MG/DL (ref 8.3–10.1)
CHLORIDE SERPL-SCNC: 103 MMOL/L (ref 100–108)
CO2 SERPL-SCNC: 25 MMOL/L (ref 21–32)
CREAT SERPL-MCNC: 0.85 MG/DL (ref 0.6–1.3)
GFR SERPL CREATININE-BSD FRML MDRD: 67 ML/MIN/1.73SQ M
GLUCOSE SERPL-MCNC: 132 MG/DL (ref 65–140)
POTASSIUM SERPL-SCNC: 3.5 MMOL/L (ref 3.5–5.3)
PROT SERPL-MCNC: 6.6 G/DL (ref 6.4–8.2)
SODIUM SERPL-SCNC: 138 MMOL/L (ref 136–145)

## 2022-07-06 PROCEDURE — 80053 COMPREHEN METABOLIC PANEL: CPT | Performed by: INTERNAL MEDICINE

## 2022-07-06 PROCEDURE — 96417 CHEMO IV INFUS EACH ADDL SEQ: CPT

## 2022-07-06 PROCEDURE — 96413 CHEMO IV INFUSION 1 HR: CPT

## 2022-07-06 PROCEDURE — 96367 TX/PROPH/DG ADDL SEQ IV INF: CPT

## 2022-07-06 RX ORDER — SODIUM CHLORIDE 9 MG/ML
20 INJECTION, SOLUTION INTRAVENOUS ONCE
Status: COMPLETED | OUTPATIENT
Start: 2022-07-06 | End: 2022-07-06

## 2022-07-06 RX ADMIN — Medication 188 MG: at 10:56

## 2022-07-06 RX ADMIN — ONDANSETRON 8 MG: 2 INJECTION INTRAMUSCULAR; INTRAVENOUS at 10:23

## 2022-07-06 RX ADMIN — GEMCITABINE HYDROCHLORIDE 1500 MG: 1 INJECTION, SOLUTION INTRAVENOUS at 11:46

## 2022-07-06 RX ADMIN — SODIUM CHLORIDE 20 ML/HR: 0.9 INJECTION, SOLUTION INTRAVENOUS at 09:40

## 2022-07-06 RX ADMIN — DEXAMETHASONE SODIUM PHOSPHATE 10 MG: 10 INJECTION, SOLUTION INTRAMUSCULAR; INTRAVENOUS at 09:45

## 2022-07-06 NOTE — PROGRESS NOTES
Pt  Denied new symptoms or concerns today  Labs reviewed  Parameters met  Pt  Tolerated Abraxane and Gemzar without adverse event  Future appointments reviewed  AVS provided

## 2022-07-07 ENCOUNTER — HOSPITAL ENCOUNTER (OUTPATIENT)
Dept: NUCLEAR MEDICINE | Facility: HOSPITAL | Age: 77
Discharge: HOME | End: 2022-07-07
Attending: INTERNAL MEDICINE
Payer: COMMERCIAL

## 2022-07-07 DIAGNOSIS — C25.9 PRIMARY PANCREATIC ADENOCARCINOMA (HCC): ICD-10-CM

## 2022-07-07 PROCEDURE — G1004 CDSM NDSC: HCPCS

## 2022-07-07 PROCEDURE — A9503 TC99M MEDRONATE: HCPCS

## 2022-07-07 PROCEDURE — 78306 BONE IMAGING WHOLE BODY: CPT

## 2022-07-08 NOTE — PROGRESS NOTES
Dates changed  Zofran changed from PRN to scheduled  Per patient request  Did call patient and patient daughter stated that patient had stated that patient would like zofran scheduled due to helping her

## 2022-07-08 NOTE — ADDENDUM NOTE
Encounter addended by: Pb Reese, Pharmacist on: 7/8/2022 6:21 AM   Actions taken: i-Vent created or edited

## 2022-07-09 ENCOUNTER — APPOINTMENT (OUTPATIENT)
Dept: LAB | Facility: CLINIC | Age: 77
End: 2022-07-09
Payer: COMMERCIAL

## 2022-07-09 DIAGNOSIS — C25.9 PRIMARY PANCREATIC ADENOCARCINOMA (HCC): ICD-10-CM

## 2022-07-09 DIAGNOSIS — R11.2 CHEMOTHERAPY INDUCED NAUSEA AND VOMITING: ICD-10-CM

## 2022-07-09 DIAGNOSIS — T45.1X5A CHEMOTHERAPY INDUCED NAUSEA AND VOMITING: ICD-10-CM

## 2022-07-09 LAB
ALBUMIN SERPL BCP-MCNC: 2.7 G/DL (ref 3.5–5)
ALP SERPL-CCNC: 407 U/L (ref 46–116)
ALT SERPL W P-5'-P-CCNC: 108 U/L (ref 12–78)
ANION GAP SERPL CALCULATED.3IONS-SCNC: 6 MMOL/L (ref 4–13)
ANISOCYTOSIS BLD QL SMEAR: PRESENT
AST SERPL W P-5'-P-CCNC: 106 U/L (ref 5–45)
BASOPHILS # BLD MANUAL: 0 THOUSAND/UL (ref 0–0.1)
BASOPHILS NFR MAR MANUAL: 0 % (ref 0–1)
BILIRUB SERPL-MCNC: 0.75 MG/DL (ref 0.2–1)
BUN SERPL-MCNC: 19 MG/DL (ref 5–25)
CALCIUM ALBUM COR SERPL-MCNC: 9.7 MG/DL (ref 8.3–10.1)
CALCIUM SERPL-MCNC: 8.7 MG/DL (ref 8.3–10.1)
CHLORIDE SERPL-SCNC: 103 MMOL/L (ref 100–108)
CO2 SERPL-SCNC: 28 MMOL/L (ref 21–32)
CREAT SERPL-MCNC: 0.98 MG/DL (ref 0.6–1.3)
EOSINOPHIL # BLD MANUAL: 0.14 THOUSAND/UL (ref 0–0.4)
EOSINOPHIL NFR BLD MANUAL: 2 % (ref 0–6)
ERYTHROCYTE [DISTWIDTH] IN BLOOD BY AUTOMATED COUNT: 14.1 % (ref 11.6–15.1)
GFR SERPL CREATININE-BSD FRML MDRD: 56 ML/MIN/1.73SQ M
GLUCOSE P FAST SERPL-MCNC: 174 MG/DL (ref 65–99)
HCT VFR BLD AUTO: 30.4 % (ref 34.8–46.1)
HGB BLD-MCNC: 9.8 G/DL (ref 11.5–15.4)
LYMPHOCYTES # BLD AUTO: 0.69 THOUSAND/UL (ref 0.6–4.47)
LYMPHOCYTES # BLD AUTO: 10 % (ref 14–44)
MACROCYTES BLD QL AUTO: PRESENT
MCH RBC QN AUTO: 31 PG (ref 26.8–34.3)
MCHC RBC AUTO-ENTMCNC: 32.2 G/DL (ref 31.4–37.4)
MCV RBC AUTO: 96 FL (ref 82–98)
METAMYELOCYTES NFR BLD MANUAL: 2 % (ref 0–1)
MONOCYTES # BLD AUTO: 0 THOUSAND/UL (ref 0–1.22)
MONOCYTES NFR BLD: 0 % (ref 4–12)
NEUTROPHILS # BLD MANUAL: 5.81 THOUSAND/UL (ref 1.85–7.62)
NEUTS BAND NFR BLD MANUAL: 3 % (ref 0–8)
NEUTS SEG NFR BLD AUTO: 81 % (ref 43–75)
OVALOCYTES BLD QL SMEAR: PRESENT
PLATELET # BLD AUTO: 282 THOUSANDS/UL (ref 149–390)
PLATELET BLD QL SMEAR: ADEQUATE
PMV BLD AUTO: 11.1 FL (ref 8.9–12.7)
POIKILOCYTOSIS BLD QL SMEAR: PRESENT
POTASSIUM SERPL-SCNC: 3.7 MMOL/L (ref 3.5–5.3)
PROT SERPL-MCNC: 6.7 G/DL (ref 6.4–8.2)
RBC # BLD AUTO: 3.16 MILLION/UL (ref 3.81–5.12)
RBC MORPH BLD: PRESENT
SODIUM SERPL-SCNC: 137 MMOL/L (ref 136–145)
VARIANT LYMPHS # BLD AUTO: 2 %
WBC # BLD AUTO: 6.92 THOUSAND/UL (ref 4.31–10.16)

## 2022-07-09 PROCEDURE — 80053 COMPREHEN METABOLIC PANEL: CPT

## 2022-07-09 PROCEDURE — 36415 COLL VENOUS BLD VENIPUNCTURE: CPT

## 2022-07-09 PROCEDURE — 85007 BL SMEAR W/DIFF WBC COUNT: CPT

## 2022-07-09 PROCEDURE — 85027 COMPLETE CBC AUTOMATED: CPT

## 2022-07-12 ENCOUNTER — NUTRITION (OUTPATIENT)
Dept: NUTRITION | Facility: CLINIC | Age: 77
End: 2022-07-12

## 2022-07-12 ENCOUNTER — HOSPITAL ENCOUNTER (OUTPATIENT)
Dept: INFUSION CENTER | Facility: CLINIC | Age: 77
Discharge: HOME | End: 2022-07-12
Payer: COMMERCIAL

## 2022-07-12 VITALS — WEIGHT: 101.41 LBS | BODY MASS INDEX: 17.31 KG/M2 | HEIGHT: 64 IN

## 2022-07-12 DIAGNOSIS — T45.1X5A CHEMOTHERAPY INDUCED NAUSEA AND VOMITING: ICD-10-CM

## 2022-07-12 DIAGNOSIS — C25.9 PRIMARY PANCREATIC ADENOCARCINOMA (HCC): Primary | ICD-10-CM

## 2022-07-12 DIAGNOSIS — R11.2 CHEMOTHERAPY INDUCED NAUSEA AND VOMITING: ICD-10-CM

## 2022-07-12 DIAGNOSIS — Z71.3 NUTRITIONAL COUNSELING: Primary | ICD-10-CM

## 2022-07-12 PROCEDURE — 96367 TX/PROPH/DG ADDL SEQ IV INF: CPT

## 2022-07-12 PROCEDURE — 96413 CHEMO IV INFUSION 1 HR: CPT

## 2022-07-12 PROCEDURE — 96417 CHEMO IV INFUS EACH ADDL SEQ: CPT

## 2022-07-12 RX ORDER — SODIUM CHLORIDE 9 MG/ML
20 INJECTION, SOLUTION INTRAVENOUS ONCE
Status: COMPLETED | OUTPATIENT
Start: 2022-07-12 | End: 2022-07-12

## 2022-07-12 RX ADMIN — ONDANSETRON 8 MG: 2 INJECTION INTRAMUSCULAR; INTRAVENOUS at 11:25

## 2022-07-12 RX ADMIN — DEXAMETHASONE SODIUM PHOSPHATE 10 MG: 10 INJECTION, SOLUTION INTRAMUSCULAR; INTRAVENOUS at 11:48

## 2022-07-12 RX ADMIN — SODIUM CHLORIDE 20 ML/HR: 0.9 INJECTION, SOLUTION INTRAVENOUS at 11:24

## 2022-07-12 RX ADMIN — GEMCITABINE HYDROCHLORIDE 1500 MG: 1 INJECTION, SOLUTION INTRAVENOUS at 13:31

## 2022-07-12 RX ADMIN — Medication 188 MG: at 12:27

## 2022-07-12 NOTE — PROGRESS NOTES
Outpatient Oncology Nutrition Consultation   Type of Consult: Follow Up  Care Location: Infusion Center    Reason for referral: Received notification by Dr Elly Sheets on 6/21/22 that pt has triggered for oncology nutrition care (reason for referral: Dr Elly Sheets request due to weight loss)      Nutrition Assessment:   Oncology Diagnosis & Treatments: dx with pancreatic cancer 6/2022  -port placement 6/28  -currently receiving chemo (abraxane, gemzar) started 6/29/22  Oncology History   Primary pancreatic adenocarcinoma ([facility] [address] )   6/6/2022 -  Cancer Staged    Staging form: Anus, AJCC 8th Edition  - Clinical stage from 6/6/2022: Stage IV (cT3, cN1a, pM1) - Signed by Leigh Benavides MD on 6/12/2022  Stage prefix: Initial diagnosis  Sites of metastasis: Liver  Source of metastatic specimen: Bone, Aortic Lymph Nodes       6/12/2022 Initial Diagnosis    Primary pancreatic neuroendocrine tumor     6/29/2022 -  Chemotherapy    paclitaxel protein-bound (ABRAXANE) IVPB, 125 mg/m2 = 188 mg, Intravenous, Once, 1 of 6 cycles  Administration: 188 mg (6/29/2022), 188 mg (7/6/2022)  gemcitabine (GEMZAR) infusion, 1,000 mg/m2 = 1,500 mg, Intravenous, Once, 1 of 6 cycles  Administration: 1,500 mg (6/29/2022), 1,500 mg (7/6/2022)       Past Medical & Surgical Hx:   Patient Active Problem List   Diagnosis    UTI (urinary tract infection)    Chest pain    Elevated TSH    Elevated brain natriuretic peptide (BNP) level    Elevated d-dimer    Iron deficiency anemia    Hypokalemia    Chronic pain of left ankle    Chronic diarrhea    Weight loss    Stress at home   [facility] annual wellness visit, subsequent    Protein-calorie malnutrition, unspecified severity ([facility] [address] )    Pancreatic mass    Pericardial effusion    Anemia    Severe protein-calorie malnutrition ([facility] [address] )    Primary pancreatic adenocarcinoma (HCC)    Anxiety    Nausea    Cancer related pain    Counseling regarding advanced care planning and goals of care    Chemotherapy induced nausea and vomiting     No past medical history on file  Past Surgical History:   Procedure Laterality Date    CARDIAC CATHETERIZATION N/A 3/30/2022    Procedure: Cardiac catheterization;  Surgeon: Alexus Kiser DO;  Location: [facility]; Service: Cardiology    CARDIAC CATHETERIZATION N/A 3/30/2022    Procedure: Cardiac Coronary Angiogram;  Surgeon: Alexus Kiser DO;  Location: [facility];   Service: Cardiology    IR BIOPSY LIVER MASS  6/6/2022    IR PORT PLACEMENT  6/28/2022       Review of Medications:   Vitamins, Supplements and Herbals: Med List Reviewed & pt is only taking: vitamin B, D, E, iron, sheridan seal root, omega-3 , Discussed the potential interactions of high dose antioxidants with cancer tx and recommended exercising caution when taking these supplements and Refered pt to [facility] "About Herbs" website for further information on safety/effectiveness/interactions of supplements    Current Outpatient Medications:     acetaminophen (TYLENOL) 500 mg tablet, Take 500 mg by mouth every 6 (six) hours as needed for mild pain (Patient not taking: No sig reported), Disp: , Rfl:     Cholecalciferol (Vitamin D3) 50 MCG (2000 UT) TABS, Take by mouth  , Disp: , Rfl:     cyanocobalamin (VITAMIN B-12) 500 MCG tablet, Take 500 mcg by mouth daily, Disp: , Rfl:     ferrous sulfate 325 (65 Fe) mg tablet, Take 325 mg by mouth daily with breakfast, Disp: , Rfl:     SHERIDAN SEAL ROOT PO, Take 900 mg by mouth daily Echinaceal/goldenseal blend  (Patient not taking: Reported on 7/5/2022), Disp: , Rfl:     lidocaine-prilocaine (EMLA) cream, Apply topically as needed for mild pain, Disp: 30 g, Rfl: 0    Omega-3 Fatty Acids (OMEGA 3 PO), Take 1,000 mg by mouth, Disp: , Rfl:     ondansetron (Zofran ODT) 4 mg disintegrating tablet, Take 1 tablet (4 mg total) by mouth every 8 (eight) hours as needed for nausea or vomiting (Patient not taking: No sig reported), Disp: 20 tablet, Rfl: 0    oxyCODONE (ROXICODONE) 5 immediate release tablet, Take 0 5 tablets (2 5 mg total) by mouth every 6 (six) hours as needed for moderate pain Max Daily Amount: 10 mg (Patient not taking: Reported on 7/5/2022), Disp: 15 tablet, Rfl: 0    pancrelipase, Lip-Prot-Amyl, (CREON) 24,000 units, Take 24,000 units of lipase by mouth 3 (three) times a day with meals, Disp: 90 capsule, Rfl: 0    Pyridoxine HCl (VITAMIN B-6 PO), Take 1 tablet by mouth, Disp: , Rfl:     Vitamin E 400 units TABS, Take by mouth (Patient not taking: Reported on 7/5/2022), Disp: , Rfl:   No current facility-administered medications for this visit      Facility-Administered Medications Ordered in Other Visits:     dexamethasone (DECADRON) 10 mg in sodium chloride 0 9 % 51 mL IVPB, 10 mg, Intravenous, Once, Elina Benjamin MD, Last Rate: 153 mL/hr at 07/12/22 1148, 10 mg at 07/12/22 1148    gemcitabine (GEMZAR) 1,500 mg in sodium chloride 0 9 % 250 mL infusion, 1,000 mg/m2 (Treatment Plan Recorded), Intravenous, Once, Elina Benjamin MD    PACLitaxel protein bound (ABRAXANE) 188 mg in IVPB 37 6 mL, 125 mg/m2 (Treatment Plan Recorded), Intravenous, Once, Elina Benjamin MD    Most Recent Lab Results:   Lab Results   Component Value Date    WBC 6 92 07/09/2022    NEUTROABS 3 35 07/05/2022    IRON 26 (L) 05/30/2022    TIBC 186 (L) 05/30/2022    FERRITIN 159 05/30/2022    CHOLESTEROL 119 03/29/2022    TRIG 74 03/29/2022    HDL 46 (L) 03/29/2022    LDLCALC 58 03/29/2022     (H) 07/09/2022     (H) 07/09/2022    ALB 2 7 (L) 07/09/2022    SODIUM 137 07/09/2022    SODIUM 138 07/06/2022    K 3 7 07/09/2022    K 3 5 07/06/2022     07/09/2022    BUN 19 07/09/2022    BUN 16 07/06/2022    CREATININE 0 98 07/09/2022    CREATININE 0 85 07/06/2022    EGFR 56 07/09/2022    GLUF 174 (H) 07/09/2022    GLUF 144 (H) 06/27/2022    GLUC 132 07/06/2022    HGBA1C 5 5 04/11/2022    CALCIUM 8 7 07/09/2022    MG 2 0 06/01/2022 Anthropometric Measurements:   Height: 64 "  Ht Readings from Last 1 Encounters:   07/12/22 5' 3 98" (1 625 m)     Wt Readings from Last 20 Encounters:   07/12/22 46 kg (101 lb 6 6 oz)   07/06/22 49 5 kg (109 lb 2 oz)   07/05/22 49 kg (108 lb)   06/29/22 49 3 kg (108 lb 11 oz)   06/28/22 48 1 kg (106 lb)   06/24/22 49 7 kg (109 lb 9 6 oz)   06/22/22 48 1 kg (106 lb)   06/21/22 48 3 kg (106 lb 8 oz)   06/13/22 49 7 kg (109 lb 9 6 oz)   06/06/22 50 3 kg (111 lb)   06/02/22 50 3 kg (110 lb 14 3 oz)   05/30/22 52 2 kg (115 lb)   05/25/22 52 2 kg (115 lb)   05/23/22 52 4 kg (115 lb 9 6 oz)   05/16/22 52 4 kg (115 lb 9 6 oz)   04/11/22 57 4 kg (126 lb 9 6 oz)   03/30/22 54 4 kg (120 lb)   08/17/19 65 8 kg (145 lb)   08/21/18 64 7 kg (142 lb 9 6 oz)   08/26/14 67 1 kg (148 lb)       Weight History:    Usual Weight: 145#   Home Scale: n/a    Oncology Nutrition-Anthropometrics    Flowsheet Row Nutrition from 7/12/2022 in [address] [place] [address] [facility]cology [facility] Nutrition from 6/29/2022 in [address] [place] [address] Oncology Dietitian Services   Patient age (years): 78 years 78 years   Patient (female) height (in): 59 in 59 in   Current Weight to be used for anthropometric calculations (lbs) 101 4 lbs 108 4 lbs   Current Weight to be used for anthropometric calculations (kg) 46 1 kg 49 3 kg   BMI: 17 4 18 6   IBW female: 120 lbs 120 lbs   IBW (kg) female: 54 5 kg 54 5 kg   IBW % (female) 84 5 % 90 3 %   Adjusted BW (female): 115 4 lbs 117 1 lbs   Adjusted BW kg (female): 52 5 kg 53 2 kg   % weight change after 1 week: -6 1 % 1 8 %   Weight change after 1 week (lbs) -6 6 lbs 1 9 lbs   % weight change after 1 month: -7 5 % -5 7 %   Weight change after 1 month (lbs) -8 2 lbs -6 6 lbs   % weight change after 3 months: -19 9 % -9 7 %   Weight change after 3 months (lbs) -25 2 lbs -11 6 lbs          Nutrition-Focused Physical Findings: mild  muscle depletion ([place]) - hollowing/scooping/depression and moderate muscle depletion (Clavicle) - protruding/visible bone    Food/Nutrition-Related History & Client/Social History:    Current Nutrition Impact Symptoms:  [x] Nausea -sometimes  Has Zofran if needed [x] Reduced Appetite -starting to improve [x] Acid Reflux    [] Vomiting  [x] Unintended Wt Loss  [] Malabsorption    [x] Diarrhea -ongoing for months, but starting to improve now that on Creon  Stools are more formed now  -had some diarrhea earlier this week [] Unintended Wt Gain  [] Dumping Syndrome    [] Constipation  [] Thick Mucous/Secretions  [x] Abdominal Pain    [] Dysgeusia (Altered Taste)  [] Xerostomia (Dry Mouth)  [] Gas    [] Dysosmia (Altered Smell)  [] Thrush  [] Difficulty Chewing    [] Oral Mucositis (Sore Mouth)  [x] Fatigue  [] Hyperglycemia    [] Odynophagia  [] Esophagitis  [x] Other: takes Creon   [] Dysphagia  [] Early Satiety  [] No Problems Eating      Food Allergies & Intolerances: no    Current Diet: Regular Diet, No Restrictions  Current Nutrition Intake: Unchanged from last visit  Appetite: Good, Fair   Nutrition Route: PO  Oral Care: brushes daily  Activity level: ADLs, has to rest more than she used to  Was working full time up until May 2022    24 Hr Diet Recall:   Breakfast: cereal (rice krispies or corn chex) with a banana and whole milk  Sometimes will have cottage cheese and fruit OR panacakes OR Wallisian toast  Snack: yogurt  Lunch: yesterday had 1/2 turkey sandwich with castle and mustard  Ensure complete  Snack: PB with santhosh crackers  Dinner: meat, starch, vegetable usually   Last night had spare ribs and potato salad and small amount of corn on the cob  Snack: small amount of ice cream    Beverages: carbonated water (2L/day), gatorade (20oz x1), tea (8oz x1), whole milk (8oz x2)  Supplements:    Ensure Complete (10 oz, 350 kcal, 30 g pro) will sip some throughout the day  Medtronic Essentials (9jfx=634 kcal, 5 g pro) mixed with whole milk-hasn't been drinking this much lately   Had some ensure pudding in the hospital and liked this      Oncology Nutrition-Estimated Needs    Flowsheet Row Nutrition from 2022 in [facility]   Weight type used Actual weight   Weight in kilograms (kg) used for estimated needs 49 3 kg   Energy needs formula:  35-40 kcal/kg   Energy needs based on 35 kcal/k kcal   Energy needs based on 40 kcal/k kcal   Protein needs formula: 1 5-2 g/kg   Protein needs based on 1 5 g/kg 74 g   Protein needs based on 2 g/kg 99 g   Fluid needs formula: 30-35 mL/kg   Fluid needs based on 30 mL/kg 1473 mL   Fluid needs in ounces 50 oz   Fluid needs based on 35 mL/kg 1719 mL   Fluid needs in ounces 58 oz           Discussion & Intervention:   Melissa Hill was evaluated today for an RD follow up regarding wt loss, poor po intake and nutrition impact sx management  Melissa Hill is currently undergoing tx for pancreatic cancer  Met with Melissa Hill today during her infusion  She states she's doing OK, her appetite has been fluctuating  She was doing well with no diarrhea up until this past   She is still taking Creon daily  She is trying to eat small/frequent meals like we discussed last visit  She is also trying to choose high calorie/high protein foods  She states that sometimes milk doesn't agree with her  Discussed trying Fairlife milk as this is lactose free and higher in protein  She hasn't been drinking the CIB shakes lately, but has tried Ensure Complete  She isn't able to drink 8oz at a time, so we discussed breaking this up into 2-4 oz portions and drinking that throughout the day  Also discussed trying Ensure Clear or Boost Soothe as she might tolerate these better  Her weight is down 7# in the last week- question accuracy of this as Melissa Hill did not feel she's lost that much weight     Reviewed 24 hour recall, which revealed an inadequate po intake, and discussed ways to increase kcal, protein, and fluid intakes and optimize nutrient intake  Also reviewed the importance of wt management throughout the tx process and the role of a high kcal/ high protein diet in managing wt and overall health  Based on today's assessment, discussion included: MNT for: N/V, diarrhea, early satiety, how to modify foods for anticipated nutrition impact symptoms pt may experience during CA tx, a high kcal/protein diet & food choices to include at all meals & snacks (Examples of high kcal foods: cheese, full-fat dairy products, nut butter, plant-based fats, coconut oil/milk, avocado, butter, cream soup, etc  Examples of high protein foods: eggs, chicken, fish, beans/legumes, nuts/nut butters, bone broth, etc ) , fortifying foods for added kcal and protein (examples include: adding cheese to foods such as eggs, mashed potatoes, casseroles, etc ; Making oatmeal with whole milk rather than water; Making fortified mashed potatoes with cream, butter, dry milk powder, plain [place] yogurt, and cheese ), adequate hydration & fluid choices, sipping on calorie containing beverages (examples include: adding whole milk or cream to coffee, oral nutrition supplements, juice, electrolyte replacement beverages, milk, etc ), eating smaller more frequent meals every 2-3 hours (5-6 small meals/day), continuing and trying other oral nutrition supplements and adding extra fat to foods while cooking such as butter, plant-based oil, coconut oil/milk, avocado, nut butters, etc    Moving forward, Enrique Worthington was encouraged to increase kcal, protein, and fluid intakes   Materials Provided: Unjury samples, Commercial Nutrition Supplements List and not applicable  All questions and concerns addressed during todays visit  Enrique Worthington has RD contact information  Nutrition Diagnosis:    Inadequate Energy Intake related to physiological causes, disease state and treatment related issues as evidenced by food recall, wt loss and discussion with pt and/or family     Increased Nutrient Needs (kcal & pro) related to increased demand for nutrients and disease state as evidenced by cancer dx and pt undergoing tx for cancer   Patient has clinical indicators (or ASPEN criteria) consistent with severe protein-calorie malnutrition in the context of Chronic Illness as evidenced by >5% wt loss in 1 month and </=75% energy intake vs  Estimated needs for >/=1 month  Monitoring & Evaluation:   Goals:  · weight maintenance/stabilization  · adequate nutrition impact symptom management  · pt to meet >/=75% estimated nutrition needs daily    · Progress Towards Goals: Progressing    Nutrition Rx & Recommendations:  · Diet: High Calorie, High Protein (for high calorie foods see pages 52-53, and for high protein foods see pages 49-51 in your Eating Hints book)  · Keep a daily food journal (pen/paper, juan such as rumr)  · Nutrition Supplements: utilize oral nutrition supplements  Continue CIB with whole milk  Try Ensure clear or Boost Soothe between meals, 2-3 per day  May use homemade shakes/smoothies as desired  If using a pre-made shake/bar, choose ones with >300 calories and >10 grams protein (ex  Ensure Complete, Ensure Enlive, Ensure Plus, Boost Plus, Boost Very High Calorie, etc )  · Small, frequent meals/snacks may be easier to tolerate than 3 large daily meals  Aim for 5-6 small meals per day (every 2-3 hours)  · Include protein at all meals/snacks  · Include a variety of foods (as tolerated/allowed by diet)  · Incorporate physical activity as able/allowed  · Stay hydrated by sipping fluids of choice/tolerance throughout the day  · Liquid nutrition may be better tolerated than solids at times  · Alter food choices and eating patterns to accommodate changing needs  · Refer to Eating Hints book for other meal/snack ideas and symptom management    · For appetite loss: try powdered or liquid nutrition supplements; eating by the clock every 2-3 hours; set a timer to remind yourself to eat, keep snacks nearby; add extra protein & calories to your diet; drink liquids in between meals, choose liquids that add calories; eat a bedtime snack; eat soft, cool or frozen foods; eat a larger meal when you feel well & rested; only sip small amounts of liquids during meals (see pages 10-12 in your Eating Hints book)  · For weight loss: monitor your weight at home at least 2x/week, record your weight, start by adding 250-500 extra calories per day, eat 5-6 small scheduled meals every 2-3 hrs, choose foods that are high in protein and calories (see pages 49-53 in your Eating Hints book), drink liquids with calories for example: milkshakes/smoothies/juice/soup/whole milk/chocolate milk, cook with protein fortified milk (see recipe on page 36 in your Eating Hints book), consider ready-to-drink oral nutrition supplements such as Ensure Plus, Ensure Enlive, Boost Plus, or Boost Very High Calorie, avoid "diet" and "light" foods when possible, avoid drinking too much with meals, contact your dietitian with any continued weight loss over the course of 1 week  For more info see pages 35-37 in your Eating Hints book  · For diarrhea: drink plenty of fluids (>64 oz/day), avoid carbonation; eat 5-6 small meals/day; include high sodium & potassium foods/liquids (Sodium: soup/broth; Potassium: bananas, canned apricots, potatoes); eat low-fiber foods; choose foods/liquids that are room temperature; avoid foods/drinks that can make diarrhea worse (ex  high fiber, high sugar, hot/cold drinks, greasy/fatty foods, gas forming foods such as beans, raw produce, milk products, alcohol, spicy foods, caffeine, sugar alcohols (xylitol, sorbitol), and apple juice (high in sorbitol) (see pages 15-16 in your Eating Hints book)  If diarrhea is severe, consider adding Banatrol (banana flakes) 1-3 times per day mixed in applesauce (can be purchased online from [address])    · For nausea/vomiting: try plain/bland foods; try lemon/lime flavors; eat 5-6 small meals/day (except when vomiting); do not skip meals/snacks; choose appealing foods; sip only small amounts of liquids during meals; stay hydrated in between meals; eat foods/drinks that are room temperature; eat dry toast/crackers (see pages  21-22 and 31-32 in your Eating Hints book)  · Weigh yourself regularly  If you notice weight loss, make an effort to increase your daily food/calorie intake  If you continue to notice loss after these efforts, reach out to your dietitian to establish a plan to stabilize weight  · Consider stopping all high dose antioxidant supplements (vitamin A, C, E, selenium, etc )  Refer to [facility] "About Herbs" website for more information on the safety of supplements    · High calorie foods to try: add extra fats to foods (extra olive oil, avocado oil, butter), add avocado or cheese to foods, use extra sauce/gravies, whole milk products, oral nutrition supplements, nut butters  (see pages 52-53 in your Eating Hints book)  · High protein foods to try: whole milk, fairlife milk, switch to greek yogurt, nut butters, oral nutrition supplements, protein powder (see pages 49-51 in your Eating Hints book)    Follow Up Plan: 7/25/22 during infusion  Recommend Referral to Other Providers: none at this time

## 2022-07-12 NOTE — PATIENT INSTRUCTIONS
Nutrition Rx & Recommendations:  Diet: High Calorie, High Protein (for high calorie foods see pages 52-53, and for high protein foods see pages 49-51 in your Eating Hints book)  Keep a daily food journal (pen/paper, juan such as [place])  Nutrition Supplements: utilize oral nutrition supplements  Continue CIB with whole milk  Try Ensure clear or Boost Soothe between meals, 2-3 per day  May use homemade shakes/smoothies as desired  If using a pre-made shake/bar, choose ones with >300 calories and >10 grams protein (ex  Ensure Complete, Ensure Enlive, Ensure Plus, Boost Plus, Boost Very High Calorie, etc )  Small, frequent meals/snacks may be easier to tolerate than 3 large daily meals  Aim for 5-6 small meals per day (every 2-3 hours)  Include protein at all meals/snacks  Include a variety of foods (as tolerated/allowed by diet)  Incorporate physical activity as able/allowed  Stay hydrated by sipping fluids of choice/tolerance throughout the day  Liquid nutrition may be better tolerated than solids at times  Alter food choices and eating patterns to accommodate changing needs  Refer to Eating Hints book for other meal/snack ideas and symptom management  For appetite loss: try powdered or liquid nutrition supplements; eating by the clock every 2-3 hours; set a timer to remind yourself to eat, keep snacks nearby; add extra protein & calories to your diet; drink liquids in between meals, choose liquids that add calories; eat a bedtime snack; eat soft, cool or frozen foods; eat a larger meal when you feel well & rested; only sip small amounts of liquids during meals (see pages 10-12 in your Eating Hints book)    For weight loss: monitor your weight at home at least 2x/week, record your weight, start by adding 250-500 extra calories per day, eat 5-6 small scheduled meals every 2-3 hrs, choose foods that are high in protein and calories (see pages 49-53 in your Eating Hints book), drink liquids with calories for example: milkshakes/smoothies/juice/soup/whole milk/chocolate milk, cook with protein fortified milk (see recipe on page 36 in your Eating Hints book), consider ready-to-drink oral nutrition supplements such as Ensure Plus, Ensure Enlive, Boost Plus, or Boost Very High Calorie, avoid "diet" and "light" foods when possible, avoid drinking too much with meals, contact your dietitian with any continued weight loss over the course of 1 week  For more info see pages 35-37 in your Eating Hints book  For diarrhea: drink plenty of fluids (>64 oz/day), avoid carbonation; eat 5-6 small meals/day; include high sodium & potassium foods/liquids (Sodium: soup/broth; Potassium: bananas, canned apricots, potatoes); eat low-fiber foods; choose foods/liquids that are room temperature; avoid foods/drinks that can make diarrhea worse (ex  high fiber, high sugar, hot/cold drinks, greasy/fatty foods, gas forming foods such as beans, raw produce, milk products, alcohol, spicy foods, caffeine, sugar alcohols (xylitol, sorbitol), and apple juice (high in sorbitol) (see pages 15-16 in your Eating Hints book)  If diarrhea is severe, consider adding Banatrol (banana flakes) 1-3 times per day mixed in applesauce (can be purchased online from [address] [place] [address])  For nausea/vomiting: try plain/bland foods; try lemon/lime flavors; eat 5-6 small meals/day (except when vomiting); do not skip meals/snacks; choose appealing foods; sip only small amounts of liquids during meals; stay hydrated in between meals; eat foods/drinks that are room temperature; eat dry toast/crackers (see pages  21-22 and 31-32 in your Eating Hints book)  Weigh yourself regularly  If you notice weight loss, make an effort to increase your daily food/calorie intake  If you continue to notice loss after these efforts, reach out to your dietitian to establish a plan to stabilize weight    Consider stopping all high dose antioxidant supplements (vitamin A, C, E, selenium, etc )  Refer to [facility] "About Herbs" website for more information on the safety of supplements    High calorie foods to try: add extra fats to foods (extra olive oil, avocado oil, butter), add avocado or cheese to foods, use extra sauce/gravies, whole milk products, oral nutrition supplements, nut butters  (see pages 52-53 in your Eating Hints book)  High protein foods to try: whole milk, fairlife milk, switch to greek yogurt, nut butters, oral nutrition supplements, protein powder (see pages 49-51 in your Eating Hints book)    Follow Up Plan: 7/25/22 during infusion  Recommend Referral to Other Providers: none at this time

## 2022-07-12 NOTE — PROGRESS NOTES
Heavenly Diaz RN notified of rising LFT's  Per Dr Heri Guillaume, he is aware and OK to proceed today

## 2022-07-13 NOTE — PATIENT INSTRUCTIONS
PRESCRIPTION REFILL REMINDER:  All medication refills should be requested prior to [facility] on Friday  Any refill requests after noon on Friday would be addressed the following Monday  Please protect yourself from [place]   = Wash your hands  Soap and water, or hand  with at least 60% alcohol, are both effective at killing the virus  = Wear a mask  This will help protect others from any virus particles you might spread  Your mouth and nose BOTH need to be covered  = Keep the distance  Keep 6 feet of distance from other people, even if they seem healthy  Keeping distance protects you from the other person's virus spread     = Get a vaccine, and boost it  Three vaccines are approved for use in the [place] for all adults  These vaccines do provide protection against all known variants   + Pfizer is FDA approved for all persons age 11 and older   + Versa Said may be given to all person age 25 and older   - We do NOT recommend [address] vaccine for our Palliative Care patients  = Call 9-128-UMGDMXX (Choose Option 7 for faster service!) to get scheduled for your shot   = [facility], [employer], [employer], [employer], and many [facility] locations ALSO have shots   + The CDC recommends booster shots on ALL vaccines for ALL adults  = https://[employer]/  = If you are over 50 or have an immune compromise, the CDC recommends a fourth shot     = Test to treat  If you have symptoms, get tested, and get treatment!   New drugs like Paxlovid can prevent you from ever having to go to the hospital , and may be covered completely by your insurance or special government programs    - https://aspr hhs gov/TestToTreat/      Informacion en espanol sobre vacunas, de nos companeros de [facility] --  Jam perez    Check out [employer] for [address] that are updated daily:    http://www [employer]/     Global Epidemics  Org, from [facility] (OUTPAT[facility]), will give you Wpocif-ni-Hcuisa information on virus cases and vaccination rates:    Https://globalepidemics  org/    Frequently Asked Questions about COVID, answered by [facility]    [employer]

## 2022-07-15 ENCOUNTER — OFFICE VISIT (OUTPATIENT)
Dept: PALLIATIVE MEDICINE | Facility: CLINIC | Age: 77
End: 2022-07-15
Payer: COMMERCIAL

## 2022-07-15 VITALS
BODY MASS INDEX: 18.04 KG/M2 | SYSTOLIC BLOOD PRESSURE: 100 MMHG | DIASTOLIC BLOOD PRESSURE: 60 MMHG | HEART RATE: 72 BPM | OXYGEN SATURATION: 99 % | RESPIRATION RATE: 16 BRPM | TEMPERATURE: 97.3 F | WEIGHT: 105 LBS

## 2022-07-15 DIAGNOSIS — Z71.89 COUNSELING REGARDING ADVANCED CARE PLANNING AND GOALS OF CARE: ICD-10-CM

## 2022-07-15 DIAGNOSIS — K52.9 CHRONIC DIARRHEA: ICD-10-CM

## 2022-07-15 DIAGNOSIS — G89.3 CANCER RELATED PAIN: Primary | ICD-10-CM

## 2022-07-15 DIAGNOSIS — K86.89 PANCREATIC INSUFFICIENCY: ICD-10-CM

## 2022-07-15 DIAGNOSIS — C25.9 PRIMARY PANCREATIC ADENOCARCINOMA (HCC): ICD-10-CM

## 2022-07-15 DIAGNOSIS — R43.2 DYSGEUSIA: ICD-10-CM

## 2022-07-15 PROCEDURE — 99214 OFFICE O/P EST MOD 30 MIN: CPT | Performed by: INTERNAL MEDICINE

## 2022-07-15 NOTE — PROGRESS NOTES
-Palliative and [address] 78 y o  female [phone]    Assessment/Plan:  1  Cancer related pain    2  Primary pancreatic adenocarcinoma ([address] )    3  Chronic diarrhea    4  Pancreatic insufficiency    5  Counseling regarding advanced care planning and goals of care    6  Dysgeusia      · Continue creon 24,000 units 3x a day with meals for suspected pancreatic insufficiency from large pancreatic mass/cancer  · Tolerating well  Transaminases likely from chemo  · Encourage to try 2 5mg PO oxyIR q6H prn cancer pain  She was apprehensive before and so did not take yet, but appears more agreeable now  · No refills for now, has enough from previous script, only used one of #15  · Miralax daily prn to prevent OIC  · She's already referred to onc nutritionist  Discussed strategies for supportive management of dysgeusia:  Avoid metallic silverware, use plastic utensils instead   Add more seasoning/spices to food to alter taste to your liking  Choose mild flavored protein (chicken, turkey, etc)  Add sugar to decrease bitter or salty taste  Use Lemon juice/lemon drop candies  Reduce bitter tasting food like coffee, chocolates  Marinate meats to change the taste  Snack on frozen fruits like grapes, melons balls  Drink more water with meals  Eat small meals several times a day  Maintain good oral hygiene  · 5 Wishes already completed 6/24  · RTO in 2 weeks, to follow up on pain and oxy  Controlled Substance Review    [place]MP or [place]  reviewed: No red flags were identified; safe to proceed with prescription  Haider Hamm 06/02/2022  1   06/02/2022  Oxycodone Hcl (Ir) 5 MG Tablet    15 00  7 Ramya German   [id number]   Jeanie ([id number])    16 23 MME        Requested Prescriptions     Pending Prescriptions Disp Refills    pancrelipase, Lip-Prot-Amyl, (CREON) 24,000 units 90 capsule 0     Sig: Take 24,000 units of lipase by mouth 3 (three) times a day with meals     There are no discontinued medications      Representatives have queried the patient's controlled substance dispensing history in the Prescription Drug Monitoring Program in compliance with regulations before I have prescribed any controlled substances  The prescription history is consistent with prescribed therapy and our practice policies  20 minutes were spent face to face with Bessie Clarke and her daughter-in-law with greater than 50% of the time spent in counseling or coordination of care including discussions of etiology of diagnosis, pathogenesis of diagnosis, diagnostic results, impression, and recommendations, risks and benefits of treatment, instructions for disease self management, treatment instructions, follow up requirements, risk factors and risk reduction of disease, patient and family counseling/involvement in care, compliance with treatment regimen and advanced care planning  All of the patient's questions were answered during this discussion  No follow-ups on file  Subjective:   Chief Complaint  Follow up visit for:  symptom management, goal of care assessment and decisional support, disease process education and discussion of prognosis, advance care planning, emotional support in the setting of serious illness  HPI     Bessie Clarke is a 78 y o  female with palliative diagnosis of metastatic pancreatic adenocarcinoma with liver, lung, bone, and retroperitoneal LN involvement  She is currently on gemcitabine+abraxane that was started on 6/29/2022  She is known to [facility] for supportive cares  She was last seen on initial consultation on 6/24/2022 where creon was started for ongoing diarrhea likely related to pancreatic insufficiency  She was asked to take oxycodone IR 2 5mg PO q4H prn for cancer related pain  5 Wishes was discussed  Since her last visit, she started chemo  She last saw oncology on 7/5 where regimen was continued  She returns today with her daughter   Since starting creon, diarrhea improved significantly and gained some weight  Does not feel that chemo caused diarrhea to worsen  Tolerating chemo well, except for fatigue that starts a day after she is done with chemo, lasts for 2-3 days; increased abdominal pain; and dysgeusia  She was hesitant to take oxycodone, so hasn't been taking it  Time spent discussing cancer related management and encouraged her to try a very low dose  We can wean off as soon as able  She said she will try  Management of OIC and dysgeusia as noted above discussed as well  She remains treatment-focused  She spends time at her son's house a few days after her chemos  ACP/Social: ACP discussed in detail  5 wishes signed and witnessed on 6/24  Original copy sent to her with 2 additional copies for her HCRs  A copy was scanned in the chart  She appoints her son/Miguel as her 1st agent, daughter/Katty as her 2nd agent  We removed her  as 3rd given parkinon's dementia that is worsening per her report   do not sound like he has capacity to follow her decisions on her behalf  In those 3 different scenarios, she chooses to receive CPR if beneficial  If started, she wants it stopped if her doctors feel that they are no longer helping her  She also does not want to have a feeding tube or tracheostomy  She lives with her  who has worsening Parkinsons Dementia  Her step mother is also there but is about to move to [facility]  Her daughter who is debilitated from an undiagnosed neurologic condition is unable to work and is also living with her, along with her daughter's 2 children (including 1 with special needs)  His son and his wife lives 10 minutes away from her and is able to provide her support when she needs it  The following portions of the medical history were reviewed: past medical history, problem list, medication list, and social history      Current Outpatient Medications:     acetaminophen (TYLENOL) 500 mg tablet, Take 500 mg by mouth every 6 (six) hours as needed for mild pain, Disp: , Rfl:     Cholecalciferol (Vitamin D3) 50 MCG (2000 UT) TABS, Take by mouth  , Disp: , Rfl:     cyanocobalamin (VITAMIN B-12) 500 MCG tablet, Take 500 mcg by mouth daily, Disp: , Rfl:     ferrous sulfate 325 (65 Fe) mg tablet, Take 325 mg by mouth daily with breakfast, Disp: , Rfl:     lidocaine-prilocaine (EMLA) cream, Apply topically as needed for mild pain, Disp: 30 g, Rfl: 0    pancrelipase, Lip-Prot-Amyl, (CREON) 24,000 units, Take 24,000 units of lipase by mouth 3 (three) times a day with meals, Disp: 90 capsule, Rfl: 0    Pyridoxine HCl (VITAMIN B-6 PO), Take 1 tablet by mouth, Disp: , Rfl:     COLLADO SEAL ROOT PO, Take 900 mg by mouth daily Echinaceal/goldenseal blend  (Patient not taking: No sig reported), Disp: , Rfl:     Omega-3 Fatty Acids (OMEGA 3 PO), Take 1,000 mg by mouth (Patient not taking: Reported on 7/15/2022), Disp: , Rfl:     ondansetron (Zofran ODT) 4 mg disintegrating tablet, Take 1 tablet (4 mg total) by mouth every 8 (eight) hours as needed for nausea or vomiting (Patient not taking: No sig reported), Disp: 20 tablet, Rfl: 0    oxyCODONE (ROXICODONE) 5 immediate release tablet, Take 0 5 tablets (2 5 mg total) by mouth every 6 (six) hours as needed for moderate pain Max Daily Amount: 10 mg (Patient not taking: No sig reported), Disp: 15 tablet, Rfl: 0    Vitamin E 400 units TABS, Take by mouth (Patient not taking: No sig reported), Disp: , Rfl:   Review of Systems   Constitutional: Positive for unexpected weight change  Negative for activity change, appetite change and fatigue  HENT: Negative for trouble swallowing  Respiratory: Negative for shortness of breath  Cardiovascular: Negative for chest pain  Gastrointestinal: Positive for abdominal pain and diarrhea  Negative for abdominal distention, constipation, nausea and vomiting  Musculoskeletal: Negative for back pain  Neurological: Negative for weakness     Psychiatric/Behavioral: Negative for sleep disturbance  The patient is not nervous/anxious  All other systems reviewed and are negative  All other systems negative    Objective:  Vital Signs  /60 (BP Location: Left arm, Patient Position: Sitting, Cuff Size: Adult) Comment (Cuff Size): small  Pulse 72   Temp (!) 97 3 °F (36 3 °C) (Temporal)   Resp 16   Wt 47 6 kg (105 lb)   SpO2 99%   BMI 18 04 kg/m²    Physical Exam    Constitutional: Appears thin, well-kempt, pleasant, jovial, appears chronically ill but not toxic appearing  Appears the same as last time  In no acute physical or emotional distress  Head: Normocephalic and atraumatic  temporal mm wasting  Eyes: EOM are normal  No ocular discharge  No scleral icterus  Neck: No visible adenopathy or masses  Respiratory: Effort normal  No stridor  No respiratory distress  Gastrointestinal: No abdominal distension  Musculoskeletal: No edema  Neurological: Alert, oriented and appropriately conversant  Skin: No diaphoresis, no rashes seen on exposed areas of skin  Pale   Psychiatric: Displays a normal mood and affect   Behavior, judgement and thought content appear normal      Ana Chiu MD  Palliative Medicine & Supportive Care  Internal Medicine  Available via [facility] Text  Office: [phone]  Fax: [phone]

## 2022-07-19 ENCOUNTER — TELEPHONE (OUTPATIENT)
Dept: GENETICS | Facility: CLINIC | Age: 77
End: 2022-07-19

## 2022-07-20 ENCOUNTER — TELEPHONE (OUTPATIENT)
Dept: GENETICS | Facility: CLINIC | Age: 77
End: 2022-07-20

## 2022-07-20 NOTE — TELEPHONE ENCOUNTER
Post-Test Genetic Counseling Consult Note  Today I spoke with Melissa Hill over the phone to review the results of her genetic test for hereditary cancer  We met previously on 6/23/22 for pre-test counseling  A copy of this consult note and genetic test result will be shared with the patient  SUMMARY:    Test(s): [employer] [facility] (52 genes): APC, GISELLE, AXIN2 BARD1, BRCA1, BRCA2, BRIP1, BMPR1A, CDH1, CDK4, CDKN1B, CDKN2A, CHEK2, DICER1, EGLN1, EPCAM, FH, GREM1, HOXB13, KIF1B, MAX, MEN1, MLH1, MSH2, MSH3, MSH6, MUTYH, NBN, NF1, NTHL1, PALB2, PMS2, POLD1, POLE, PTEN, RAD51C, RAD51D, RECQL, RET, SDHA, SDHAF2, SDHB, SDHC, SDHD, SMAD4, SMARCA4, STK11, IHGV975, TP53, TSC1, TSC2, VHL      Result: Negative - No Clinically Significant Variants Detected      Assessment:   A negative result significantly reduces the likelihood that Melissa Hill has a hereditary cancer syndrome  However, this testing is unable to completely rule out the presence of hereditary cancer  It remains possible that:  - There is a variant in an area of a gene which was not tested or there is a variant not detectable due to technical limitations of this test      - There is a variant in another gene that was not included in this test or in a gene not known to be linked to cancer or tumors  - A family member has a genetic variant that the patient did not inherit  - The cancer in the family is sporadic and is related to non-hereditary factors  Risks and Testing for Family Members:    Despite a negative result, Seema's first-degree relatives may be at increased risk for pancreatic cancer based on her personal history  As compared with the general population risk of approximately 1-2%, empiric data suggest that a person's risk to develop pancreatic cancer is approximately 4-6% given one first-degree relative with pancreatic cancer (PMID: 13634338)    We recommend they discuss screening and management recommendations with their healthcare providers  At this time we do not recommend testing for Lis Cheney 's children based on her negative test result  Lis Cheney 's children still need to consider the history of cancer on the other side of their family when determining their risks  Plan:   There are no additional recommendations based on Seema's negative result  she should continue cancer screening and medical management as clinically indicated and as determined appropriate by her healthcare providers  Negative Result: Lis Cheney was strongly encouraged to contact us regarding any changes in her personal or family history of cancer as these changes could alter our recommendation regarding genetic testing and/or cancer screening

## 2022-07-21 ENCOUNTER — TELEPHONE (OUTPATIENT)
Dept: GENETICS | Facility: CLINIC | Age: 77
End: 2022-07-21

## 2022-07-21 NOTE — TELEPHONE ENCOUNTER
----- Message from [place], [place] sent at 7/20/2022  5:38 PM EDT -----  Regarding: Chart Complete  GC Completed Chart     Result Type: Negative    Result Delivery: Patient Requested Both    Monthly Review: Does not need monthly review- COMPLETE
Jacek Warren

## 2022-07-22 ENCOUNTER — APPOINTMENT (OUTPATIENT)
Dept: LAB | Facility: CLINIC | Age: 77
End: 2022-07-22
Payer: COMMERCIAL

## 2022-07-22 DIAGNOSIS — C25.9 PRIMARY PANCREATIC ADENOCARCINOMA (HCC): ICD-10-CM

## 2022-07-22 DIAGNOSIS — T45.1X5A CHEMOTHERAPY INDUCED NAUSEA AND VOMITING: ICD-10-CM

## 2022-07-22 DIAGNOSIS — R11.2 CHEMOTHERAPY INDUCED NAUSEA AND VOMITING: ICD-10-CM

## 2022-07-22 LAB
ALBUMIN SERPL BCP-MCNC: 2.7 G/DL (ref 3.5–5)
ALP SERPL-CCNC: 466 U/L (ref 46–116)
ALT SERPL W P-5'-P-CCNC: 79 U/L (ref 12–78)
ANION GAP SERPL CALCULATED.3IONS-SCNC: 7 MMOL/L (ref 4–13)
ANISOCYTOSIS BLD QL SMEAR: PRESENT
AST SERPL W P-5'-P-CCNC: 89 U/L (ref 5–45)
BASOPHILS # BLD MANUAL: 0.08 THOUSAND/UL (ref 0–0.1)
BASOPHILS NFR MAR MANUAL: 1 % (ref 0–1)
BILIRUB SERPL-MCNC: 0.25 MG/DL (ref 0.2–1)
BUN SERPL-MCNC: 16 MG/DL (ref 5–25)
CALCIUM ALBUM COR SERPL-MCNC: 9.9 MG/DL (ref 8.3–10.1)
CALCIUM SERPL-MCNC: 8.9 MG/DL (ref 8.3–10.1)
CHLORIDE SERPL-SCNC: 100 MMOL/L (ref 96–108)
CO2 SERPL-SCNC: 25 MMOL/L (ref 21–32)
CREAT SERPL-MCNC: 0.86 MG/DL (ref 0.6–1.3)
EOSINOPHIL # BLD MANUAL: 0 THOUSAND/UL (ref 0–0.4)
EOSINOPHIL NFR BLD MANUAL: 0 % (ref 0–6)
ERYTHROCYTE [DISTWIDTH] IN BLOOD BY AUTOMATED COUNT: 15.8 % (ref 11.6–15.1)
GFR SERPL CREATININE-BSD FRML MDRD: 66 ML/MIN/1.73SQ M
GLUCOSE P FAST SERPL-MCNC: 192 MG/DL (ref 65–99)
HCT VFR BLD AUTO: 31.1 % (ref 34.8–46.1)
HELMET CELLS BLD QL SMEAR: PRESENT
HGB BLD-MCNC: 9.8 G/DL (ref 11.5–15.4)
LYMPHOCYTES # BLD AUTO: 1.09 THOUSAND/UL (ref 0.6–4.47)
LYMPHOCYTES # BLD AUTO: 13 % (ref 14–44)
MACROCYTES BLD QL AUTO: PRESENT
MCH RBC QN AUTO: 30.7 PG (ref 26.8–34.3)
MCHC RBC AUTO-ENTMCNC: 31.5 G/DL (ref 31.4–37.4)
MCV RBC AUTO: 98 FL (ref 82–98)
MONOCYTES # BLD AUTO: 0.59 THOUSAND/UL (ref 0–1.22)
MONOCYTES NFR BLD: 7 % (ref 4–12)
NEUTROPHILS # BLD MANUAL: 6.48 THOUSAND/UL (ref 1.85–7.62)
NEUTS BAND NFR BLD MANUAL: 10 % (ref 0–8)
NEUTS SEG NFR BLD AUTO: 67 % (ref 43–75)
OVALOCYTES BLD QL SMEAR: PRESENT
PLATELET # BLD AUTO: 473 THOUSANDS/UL (ref 149–390)
PLATELET BLD QL SMEAR: ABNORMAL
PMV BLD AUTO: 10 FL (ref 8.9–12.7)
POIKILOCYTOSIS BLD QL SMEAR: PRESENT
POLYCHROMASIA BLD QL SMEAR: PRESENT
POTASSIUM SERPL-SCNC: 3.7 MMOL/L (ref 3.5–5.3)
PROT SERPL-MCNC: 7.1 G/DL (ref 6.4–8.4)
RBC # BLD AUTO: 3.19 MILLION/UL (ref 3.81–5.12)
RBC MORPH BLD: PRESENT
SODIUM SERPL-SCNC: 132 MMOL/L (ref 135–147)
VARIANT LYMPHS # BLD AUTO: 2 %
WBC # BLD AUTO: 8.42 THOUSAND/UL (ref 4.31–10.16)

## 2022-07-22 PROCEDURE — 36415 COLL VENOUS BLD VENIPUNCTURE: CPT

## 2022-07-22 PROCEDURE — 85007 BL SMEAR W/DIFF WBC COUNT: CPT

## 2022-07-22 PROCEDURE — 85027 COMPLETE CBC AUTOMATED: CPT

## 2022-07-22 PROCEDURE — 80053 COMPREHEN METABOLIC PANEL: CPT

## 2022-07-23 NOTE — PROGRESS NOTES
Hematology/Oncology Outpatient Office Note    Date of Service: 2022    [facility]  [phone]    Reason for Consultation:   Chief Complaint   Patient presents with    Follow-up       Cancer Stage at diagnosis: IV    Referral Physician: No ref  provider found    Primary Care Physician:  Harriett Aguilar DO     Nickname: Marisela Iam: eldest son  GF: Selvin Alfaro: daughter    Original ECO    Today's ECO    Goals and Barriers:  Current Goal: Minimize effects of disease burden, extend life  Barriers to accomplishing this: low energy, lower abd pain  She still does the laundry and cooks but can't quite do the yard-work    Patient's Capacity to Self Care:  Patient is able to self care    Code Status: not addressed today    Advanced directives: not addressed today    ASSESSMENT & PLAN      Diagnosis ICD-10-CM Associated Orders   1  Primary pancreatic adenocarcinoma (HCC)  C25 9          This is a 78 y o  c PMHx notable for absence of co-morbidities, being seen in consultation for 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      The patient's liver biopsy had the characterization of a mixed-adenocarcinoma and NET pathology  Patient is MMR-proficient  As a result, I favored treating this as a metastatic adenocarcinoma  (primarily gland formation)  Oncology genetics testing was negative  The tumor cells stain for CA19 9, CDX2 with weak focal CK7 expression  The tumor cells are negative for CK20, GATA3, GCDFP15, TTF1  DPC4 is intact  Synaptophysin and chromogranin show focal staining  Ki67 interpretation is limited due to loss of tumor on deeper sectioning  Areas of the tumor show gland formation along with focal staining for neuroendocrine markers   The immunopanel results raised a differential of an upper luminal GI primary versus a pancreatobiliary primary    Discussion of decision making   Oncology history updated, accordingly, during this visit   Goals of care/patient communication  o I discussed with the patient the clinical course leading up to their cancer diagnosis  I reviewed relevant office notes, imaging reports and pathology result as well   o I told the patient that this is a case of incurable disease and what this means  We discussed that the goal of anti-cancer therapy is to provide best quality of life, extend overall survival, and progression free survival as shown in clinical trials  We also discussed that there might be a point when the cancer will no longer respond to this anti-neoplastic therapy  As a result, we also discussed the role of the palliative care team being introduced early in the treatment course  We have made this referral  o I explained the risks/benefits of the proposed cancer therapy: [place]-Abraxane and after discussion including understanding risks of possible life-threatening complications and therapy-related malignancy development, informed consent for blood products and treatment has been signed and obtained   TNM/Staging At Diagnosis  o [id number]  Cancer Staging: IV   Disease Features/Tumor Markers/Genetics  o Tumor Marker: n/a  o Notable Path Features: 6/6/22 Liver (core needle biopsy): Carcinoma with neuroendocrine differentiation  6/21 [facility] MMR-proficient, PDL1-negative, no other biomarkers noted  Whole exome not done due to sample   Treatment: [place]-Abraxane   Other Supportive care: Francisca Stallworth   Treatment Team Members  o Surgeon  o Rad Onc  o Palliative: Dr Nakul Biggs  6/1/22 MRI abd/pelv: Large pancreatic head mass (5 6 x 4 9 cm) most consistent with primary adenocarcinoma with liver metastasis, retroperitoneal lymphadenopathy, and probable small lung and bone metastasis  6/18/22 CT Chest w/o c:  Diffuse, bilateral pulmonary metastases   No lymphadenopathy in the chest  Similar small pericardial effusion  Similar hepatic masses and retroperitoneal lymphadenopathy in the visualized abdomen  6/20/22 DEXA; osteopenia  7/7/2022: NM bone scan: Scattered foci in pelvis suspicious for osseous mets    Discussion of decision making    I personally reviewed the following lab results, the image studies, pathology, other specialty/physicians consult notes and recommendations, and outside medical records from [facility]  I had a lengthy discussion with the patient and shared the work-up findings  We discussed the diagnosis and management plan as below  I spent 34 minutes reviewing the records (labs, clinician notes, outside records, medical history, ordering medicine/tests/procedures, interpreting the imaging/labs previously done) and coordination of care as well as direct time with the patient today, of which greater than 50% of the time was spent in counseling and coordination of care with the patient/family  · Plan/Labs  · F/u Palliative care   · Cont systemic treatment with [place]-Abraxane q28 days  Infusion chairs scheduled with preceding labs  C2D15 coming up  Low threshold to decrease dose by 20% if her chemo induced weakness/pain increases  She wants to hold off on this for now  · F/u Dietitian for her weight loss, they are following closely with her  She is taking a protein powder supplements  · Bone Scan to assess for osseous mets 7/7/2022  · Treating with Potassium supplements 20meq daily for 5 days due to K 3 2  · Supportive care plan for Zometa q12 weeks needed due to osseous mets and is being ordered today  Pt to take calcium, Vit D supplements daily (she takes Vitamin D already)  · Restaging CT CAP w/c in scheduled (9/9/2022)        Follow Up: q4 weeks while on systemic chemotherapy (scheduled out to 10/25/22)    All questions were answered to the patient's satisfaction during this encounter   The patient knows the contact information for our office and knows to reach out for any relevant concerns related to this encounter  They are to call for any temperature 100 4 or higher, new symptoms including but not restricted to shaking chills, decreased appetite, nausea, vomiting, diarrhea, increased fatigue, shortness of breath or chest pain, confusion, and not feeling the strength to come to the clinic  For all other listed problems and medical diagnosis in their chart - they are managed by PCP and/or other specialists, which the patient acknowledges  Thank you very much for your consultation and making us a part of this patient's care  We are continuing to follow closely with you  Please do not hesitate to reach out to me with any additional questions or concerns  Bharati Holley MD  Hematology & Medical Oncology Staff Physician             Disclaimer: This document was prepared using Scrip-t Direct technology  If a word or phrase is confusing, or does not make sense, this is likely due to recognition error which was not discovered during this clinician's review  If you believe an error has occurred, please contact me through [address] line for hong? cation        ONCOLOGY HISTORY OF PRESENT ILLNESS        Oncology History   Primary pancreatic adenocarcinoma ([facility] [address] )   6/6/2022 -  Cancer Staged    Staging form: Anus, AJCC 8th Edition  - Clinical stage from 6/6/2022: Stage IV (cT3, cN1a, pM1) - Signed by Luke Dickens MD on 6/12/2022  Stage prefix: Initial diagnosis  Sites of metastasis: Liver  Source of metastatic specimen: Bone, Aortic Lymph Nodes       6/12/2022 Initial Diagnosis    Primary pancreatic neuroendocrine tumor     6/29/2022 -  Chemotherapy    paclitaxel protein-bound (ABRAXANE) IVPB, 125 mg/m2 = 188 mg, Intravenous, Once, 2 of 6 cycles  Administration: 188 mg (6/29/2022), 188 mg (7/6/2022), 188 mg (7/12/2022), 188 mg (7/25/2022), 188 mg (8/1/2022)  gemcitabine (GEMZAR) infusion, 1,000 mg/m2 = 1,500 mg, Intravenous, Once, 2 of 6 cycles  Administration: 1,500 mg (6/29/2022), 1,500 mg (7/6/2022), 1,500 mg (7/12/2022), 1,500 mg (7/25/2022), 1,500 mg (8/1/2022)           SUBJECTIVE  (INTERVAL HISTORY)      Clotting History None   Bleeding History None   Cancer History Pancreatic   Family Cancer History Father (prostate), mother (uterine)   H/O Blood/Plt Transfusion None   Tobacco/etoh/drug abuse No nicotine use, etoh abuse, or rec drug use       Cancer Screening history No C-scope in the past,   Pap smear (30 years ago)  Mammogram (20 years ago, scheduled to have one this year)   Occupation Worked for a [employer] for 16 years  She had a lot of second hand smoke in her life   New medications in the last month:  Pain: LLQ and RLQ abd since 2/2022 and this radiates across the lower abdomen  She had 5 watery BM a day 2/2022 and this ended mid 6/2022    I have reviewed the relevant past medical, surgical, social and family history  I have also reviewed allergies and medications for this patient  Interval events: She had pressure type pain the entirety of last week  Her burning pain is better while on antacids  She has had a few days of generalized weakness, low BP on treatment  She has taken Oxycodone prn  NO N/V on the treatment  She is having less early satiety as she is eating smaller meals and more frequently  Review of Systems    Baseline weight: 145 lbs    She has lost 39 lbs unintentionally since 2/2022  She has had fatigue, intermittent LH  She does have moderate generalized weakness and cannot lift 30-40 lbs like she once was able to (can barely do 20 lbs now)  She has frequent nausea throughout the week which as subsided  She denies F/C, SOB, CP, rash, itching, hematuria, melena, hematochezia, HA     WILSON going up flight of steps  She used to be called the "energizer bunny"  A 10-point review of system was performed, pertinent positive and negative were detailed as above  Otherwise, the 10-point review of system was negative        No past medical history on file     Past Surgical History:   Procedure Laterality Date    CARDIAC CATHETERIZATION N/A 3/30/2022    Procedure: Cardiac catheterization;  Surgeon: Ligia Ramírez DO;  Location: [facility] C[facility]; Service: Cardiology    CARDIAC CATHETERIZATION N/A 3/30/2022    Procedure: Cardiac Coronary Angiogram;  Surgeon: Ligia Ramírez DO;  Location: [facility]; Service: Cardiology    IR BIOPSY LIVER MASS  6/6/2022    IR PORT PLACEMENT  6/28/2022       Family History   Problem Relation Age of Onset    Coronary artery disease Father     Coronary artery disease Brother     Diabetes Brother     Kidney failure Brother     Uterine cancer Mother        Social History     Socioeconomic History    Marital status: /Civil Union     Spouse name: Not on file    Number of children: Not on file    Years of education: Not on file    Highest education level: Not on file   Occupational History    Not on file   Tobacco Use    Smoking status: Never Smoker    Smokeless tobacco: Never Used   Vaping Use    Vaping Use: Never used   Substance and Sexual Activity    Alcohol use: Not Currently    Drug use: Never    Sexual activity: Not on file   Other Topics Concern    Not on file   Social History Narrative    Not on file     Social Determinants of Health     Financial Resource Strain: Not on file   Food Insecurity: No Food Insecurity    Worried About [address] [employer] in the Last Year: Never true    [address] in the Last Year: Never true   Transportation Needs: No Transportation Needs    Lack of Transportation (Medical): No    Lack of Transportation (Non-Medical):  No   Physical Activity: Not on file   Stress: Not on file   Social Connections: Not on file   Intimate Partner Violence: Not on file   Housing Stability: Low Risk     Unable to Pay for Housing in the Last Year: No    Number of Places Lived in the Last Year: 1    Unstable Housing in the Last Year: No       Allergies Allergen Reactions    Bentyl [Dicyclomine] Throat Swelling    Codeine Other (See Comments)     Was told allergy    Elastic Bandages & [Zinc] Hives, Swelling and Rash       Current Outpatient Medications   Medication Sig Dispense Refill    acetaminophen (TYLENOL) 500 mg tablet Take 500 mg by mouth every 6 (six) hours as needed for mild pain      Cholecalciferol (Vitamin D3) 50 MCG (2000 UT) TABS Take by mouth        cyanocobalamin (VITAMIN B-12) 500 MCG tablet Take 500 mcg by mouth daily      ferrous sulfate 325 (65 Fe) mg tablet Take 325 mg by mouth daily with breakfast      COLLADO SEAL ROOT PO Take 900 mg by mouth daily Echinaceal/goldenseal blend      lidocaine-prilocaine (EMLA) cream Apply topically as needed for mild pain 30 g 0    Omega-3 Fatty Acids (OMEGA 3 PO) Take 1,000 mg by mouth      ondansetron (Zofran ODT) 4 mg disintegrating tablet Take 1 tablet (4 mg total) by mouth every 8 (eight) hours as needed for nausea or vomiting 20 tablet 0    oxyCODONE (ROXICODONE) 5 immediate release tablet Take 0 5 tablets (2 5 mg total) by mouth every 6 (six) hours as needed for moderate pain Max Daily Amount: 10 mg 15 tablet 0    pancrelipase, Lip-Prot-Amyl, (CREON) 24,000 units Take 24,000 units of lipase by mouth 3 (three) times a day with meals 90 capsule 0    pantoprazole (PROTONIX) 20 mg tablet Take 1 tablet (20 mg total) by mouth daily 60 tablet 3    Pyridoxine HCl (VITAMIN B-6 PO) Take 1 tablet by mouth      Vitamin E 400 units TABS Take by mouth       No current facility-administered medications for this visit  (Not in a hospital admission)      Objective:     24 Hour Vitals Assessment:     Vitals:    08/02/22 0928   BP: 96/52   Pulse: (!) 125   Resp: 18   Temp: (!) 96 9 °F (36 1 °C)   SpO2: 99%       Weight last time: 106 lbs  Weight today: 105 lbs    PHYSICIAN EXAM:    General: Appearance: alert, cooperative, no distress  Cachetic appearing  HEENT: Normocephalic, atraumatic   No scleral icterus  conjunctivae clear  EOMI  Chest: No tenderness to palpation  No open wound noted  Lungs: Clear to auscultation bilaterally, Respirations unlabored  Cardiac: Regular rate and rhythm, +S1and S2  Abdomen: Soft, non-tender, non-distended  Bowel sounds are normal   Extremities:  No edema, cyanosis, clubbing  Skin: Skin color, turgor are normal  No rashes  Lymphatics: no palpable supra-cervical, axillary, or inguinal adenopathy  Neurologic: Awake, Alert, and oriented, no gross focal deficits noted b/l  Port: c/d/i, no erythema, healing well     DATA REVIEW:    Pathology Result:    Final Diagnosis   Date Value Ref Range Status   06/06/2022   Final    A  Liver (core needle biopsy):  - Carcinoma with neuroendocrine differentiation    Comment:  - The tumor cells stain for CA19 9, CDX2 with weak focal CK7 expression  The tumor cells are negative for CK20, GATA3, GCDFP15, TTF1  DPC4 is intact  Synaptophysin and chromogranin show focal staining  Ki67 interpretation is limited due to loss of tumor on deeper sectioning  Areas of the tumor show gland formation along with focal staining for neuroendocrine markers  This may represent a mixed adenocarcinoma-neuroendocrine carcinoma  Final characterization is deferred to the excisional specimen  - The immunopanel results raise a differential of an upper luminal GI primary versus a pancreatobiliary primary  The patient is noted on MRI to have a pancreatic mass  - Gabino Lofton and Mavis Segura were notified of the diagnosis via Epic messaging on 6/8/22 at 8:32 am   Both were notified of the amended case on 6/9/22 at 7:00 am    - MMR IHC has been ordered and results will be indicated in an addendum       Interpretation performed at [facility], [address] [facility], [facility], [address] [facility]            Image Results:   Image result are reviewed and documented in Hematology/Oncology history    NM bone scan whole body  Narrative: BONE SCAN  WHOLE BODY    INDICATION: C25 9: Malignant neoplasm of pancreas, unspecified    PREVIOUS FILM CORRELATION:    CT chest 6/18/2022, MRI abdomen 6/1/2022, CT abdomen pelvis 5/29/2022    TECHNIQUE:   This study was performed following the intravenous administration of 27 5 mCi Tc-99m labeled MDP  Delayed, anterior and posterior whole body images were acquired, 2-3 hours after radiopharmaceutical administration  Additional delayed   oblique static images acquired of the bilateral ribs and pelvis  FINDINGS:     Heterogeneous foci of radiotracer uptake in the left hemipelvis mainly along the left iliac bone  Additional patchy focus suggested in the right sacroiliac region  Findings suspicious for osseous metastasis  A few mild foci radiotracer uptake in the cervical and midthoracic spine, nonspecific, may be degenerative  No suspicious radiotracer uptake in the multiple small lesions seen on MRI in the lower thoracic and lumbar spine but these may be too small to   assess  Foci of radiotracer uptake at the bilateral shoulders, manubrioclavicular joints, hands and wrists likely related to arthritic changes given the distribution  Faint renal activity  Impression: 1  Scattered foci of radiotracer uptake in the bony pelvis suspicious for osseous metastasis  2    No suspicious radiotracer uptake in the multiple small lesions seen on MRI in the lower thoracic and lumbar spine but these may be too small to assess  Workstation performed: [id number]      LABS:  Lab data are reviewed and documented in HemOnc history         Lab Results   Component Value Date    HGB 9 4 (L) 07/30/2022    HCT 30 5 (L) 07/30/2022    MCV 97 07/30/2022     (H) 07/30/2022    WBC 6 92 07/30/2022    NRBC 0 07/30/2022    BANDSPCT 10 (H) 07/22/2022    ATYLMPCT 2 (H) 07/22/2022     Lab Results   Component Value Date    K 4 3 07/30/2022     07/30/2022    CO2 27 07/30/2022    BUN 19 07/30/2022    CREATININE 0 77 07/30/2022    GLUF 92 07/30/2022    CALCIUM 8 9 07/30/2022    CORRECTEDCA 9 9 07/30/2022     (H) 07/30/2022    ALT 97 (H) 07/30/2022    ALKPHOS 743 (H) 07/30/2022    EGFR 75 07/30/2022       Lab Results   Component Value Date    IRON 26 (L) 05/30/2022    TIBC 186 (L) 05/30/2022    FERRITIN 159 05/30/2022       No results found for: [id number]    No results for input(s): WBC, CREAT in the last 72 hours      Invalid input(s):  PLT    By:  Isacc Hernandez MD, 8/2/2022, 9:38 AM

## 2022-07-25 ENCOUNTER — HOSPITAL ENCOUNTER (OUTPATIENT)
Dept: INFUSION CENTER | Facility: CLINIC | Age: 77
Discharge: HOME | End: 2022-07-25
Payer: COMMERCIAL

## 2022-07-25 ENCOUNTER — TELEPHONE (OUTPATIENT)
Dept: CARDIOLOGY CLINIC | Facility: CLINIC | Age: 77
End: 2022-07-25

## 2022-07-25 ENCOUNTER — NUTRITION (OUTPATIENT)
Dept: NUTRITION | Facility: CLINIC | Age: 77
End: 2022-07-25

## 2022-07-25 VITALS
HEART RATE: 103 BPM | DIASTOLIC BLOOD PRESSURE: 68 MMHG | BODY MASS INDEX: 17.92 KG/M2 | SYSTOLIC BLOOD PRESSURE: 101 MMHG | RESPIRATION RATE: 18 BRPM | TEMPERATURE: 98.1 F | WEIGHT: 104.94 LBS | HEIGHT: 64 IN

## 2022-07-25 DIAGNOSIS — R11.2 CHEMOTHERAPY INDUCED NAUSEA AND VOMITING: ICD-10-CM

## 2022-07-25 DIAGNOSIS — C25.9 PRIMARY PANCREATIC ADENOCARCINOMA (HCC): Primary | ICD-10-CM

## 2022-07-25 DIAGNOSIS — Z71.3 NUTRITIONAL COUNSELING: Primary | ICD-10-CM

## 2022-07-25 DIAGNOSIS — C25.9 PRIMARY PANCREATIC ADENOCARCINOMA (HCC): ICD-10-CM

## 2022-07-25 DIAGNOSIS — K86.89 PANCREATIC INSUFFICIENCY: ICD-10-CM

## 2022-07-25 DIAGNOSIS — T45.1X5A CHEMOTHERAPY INDUCED NAUSEA AND VOMITING: ICD-10-CM

## 2022-07-25 DIAGNOSIS — K52.9 CHRONIC DIARRHEA: ICD-10-CM

## 2022-07-25 PROCEDURE — 96413 CHEMO IV INFUSION 1 HR: CPT

## 2022-07-25 PROCEDURE — 96367 TX/PROPH/DG ADDL SEQ IV INF: CPT

## 2022-07-25 PROCEDURE — 96417 CHEMO IV INFUS EACH ADDL SEQ: CPT

## 2022-07-25 RX ORDER — SODIUM CHLORIDE 9 MG/ML
20 INJECTION, SOLUTION INTRAVENOUS ONCE
Status: COMPLETED | OUTPATIENT
Start: 2022-07-25 | End: 2022-07-25

## 2022-07-25 RX ADMIN — ONDANSETRON 8 MG: 2 INJECTION INTRAMUSCULAR; INTRAVENOUS at 10:23

## 2022-07-25 RX ADMIN — GEMCITABINE HYDROCHLORIDE 1500 MG: 1 INJECTION, SOLUTION INTRAVENOUS at 11:48

## 2022-07-25 RX ADMIN — SODIUM CHLORIDE 20 ML/HR: 0.9 INJECTION, SOLUTION INTRAVENOUS at 09:48

## 2022-07-25 RX ADMIN — DEXAMETHASONE SODIUM PHOSPHATE 10 MG: 10 INJECTION, SOLUTION INTRAMUSCULAR; INTRAVENOUS at 09:51

## 2022-07-25 RX ADMIN — Medication 188 MG: at 10:47

## 2022-07-25 NOTE — PROGRESS NOTES
Outpatient Oncology Nutrition Consultation   Type of Consult: Follow Up  Care Location: Infusion Center    Reason for referral: Received notification by Dr Oxana Pinedo on 6/21/22 that pt has triggered for oncology nutrition care (reason for referral: Dr Oxana Pinedo request due to weight loss)      Nutrition Assessment:   Oncology Diagnosis & Treatments: dx with pancreatic cancer 6/2022  -port placement 6/28  -currently receiving chemo (abraxane, gemzar) started 6/29/22  Oncology History   Primary pancreatic adenocarcinoma ([facility] [address] )   6/6/2022 -  Cancer Staged    Staging form: Anus, AJCC 8th Edition  - Clinical stage from 6/6/2022: Stage IV (cT3, cN1a, pM1) - Signed by Brandi Liu MD on 6/12/2022  Stage prefix: Initial diagnosis  Sites of metastasis: Liver  Source of metastatic specimen: Bone, Aortic Lymph Nodes       6/12/2022 Initial Diagnosis    Primary pancreatic neuroendocrine tumor     6/29/2022 -  Chemotherapy    paclitaxel protein-bound (ABRAXANE) IVPB, 125 mg/m2 = 188 mg, Intravenous, Once, 2 of 6 cycles  Administration: 188 mg (6/29/2022), 188 mg (7/6/2022), 188 mg (7/12/2022)  gemcitabine (GEMZAR) infusion, 1,000 mg/m2 = 1,500 mg, Intravenous, Once, 2 of 6 cycles  Administration: 1,500 mg (6/29/2022), 1,500 mg (7/6/2022), 1,500 mg (7/12/2022)       Past Medical & Surgical Hx:   Patient Active Problem List   Diagnosis    UTI (urinary tract infection)    Chest pain    Elevated TSH    Elevated brain natriuretic peptide (BNP) level    Elevated d-dimer    Iron deficiency anemia    Hypokalemia    Chronic pain of left ankle    Weight loss    Stress at home   [employer] annual wellness visit, subsequent    Protein-calorie malnutrition, unspecified severity ([facility] [address] )    Pancreatic mass    Pericardial effusion    Anemia    Severe protein-calorie malnutrition ([facility] [address] )    Primary pancreatic adenocarcinoma (HCC)    Anxiety    Nausea    Cancer related pain    Counseling regarding advanced care planning and goals of care    Chemotherapy induced nausea and vomiting    Pancreatic insufficiency    Dysgeusia     No past medical history on file  Past Surgical History:   Procedure Laterality Date    CARDIAC CATHETERIZATION N/A 3/30/2022    Procedure: Cardiac catheterization;  Surgeon: Eleni Abel DO;  Location: [facility]; Service: Cardiology    CARDIAC CATHETERIZATION N/A 3/30/2022    Procedure: Cardiac Coronary Angiogram;  Surgeon: Eleni Abel DO;  Location: [facility];   Service: Cardiology    IR BIOPSY LIVER MASS  6/6/2022    IR PORT PLACEMENT  6/28/2022       Review of Medications:   Vitamins, Supplements and Herbals: Med List Reviewed & pt is only taking: vitamin B, D, iron, omega-3 , Discussed the potential interactions of high dose antioxidants with cancer tx and recommended exercising caution when taking these supplements and Discussed reading supplement labels and making sure supplements have <100% of the daily value for all nutrients to avoid over supplementation    Current Outpatient Medications:     acetaminophen (TYLENOL) 500 mg tablet, Take 500 mg by mouth every 6 (six) hours as needed for mild pain, Disp: , Rfl:     Cholecalciferol (Vitamin D3) 50 MCG (2000 UT) TABS, Take by mouth  , Disp: , Rfl:     cyanocobalamin (VITAMIN B-12) 500 MCG tablet, Take 500 mcg by mouth daily, Disp: , Rfl:     ferrous sulfate 325 (65 Fe) mg tablet, Take 325 mg by mouth daily with breakfast, Disp: , Rfl:     COLLADO SEAL ROOT PO, Take 900 mg by mouth daily Echinaceal/goldenseal blend  (Patient not taking: No sig reported), Disp: , Rfl:     lidocaine-prilocaine (EMLA) cream, Apply topically as needed for mild pain, Disp: 30 g, Rfl: 0    Omega-3 Fatty Acids (OMEGA 3 PO), Take 1,000 mg by mouth (Patient not taking: Reported on 7/15/2022), Disp: , Rfl:     ondansetron (Zofran ODT) 4 mg disintegrating tablet, Take 1 tablet (4 mg total) by mouth every 8 (eight) hours as needed for nausea or vomiting (Patient not taking: No sig reported), Disp: 20 tablet, Rfl: 0    oxyCODONE (ROXICODONE) 5 immediate release tablet, Take 0 5 tablets (2 5 mg total) by mouth every 6 (six) hours as needed for moderate pain Max Daily Amount: 10 mg (Patient not taking: No sig reported), Disp: 15 tablet, Rfl: 0    pancrelipase, Lip-Prot-Amyl, (CREON) 24,000 units, Take 24,000 units of lipase by mouth 3 (three) times a day with meals, Disp: 90 capsule, Rfl: 0    Pyridoxine HCl (VITAMIN B-6 PO), Take 1 tablet by mouth, Disp: , Rfl:     Vitamin E 400 units TABS, Take by mouth (Patient not taking: No sig reported), Disp: , Rfl:   No current facility-administered medications for this visit      Facility-Administered Medications Ordered in Other Visits:     dexamethasone (DECADRON) 10 mg in sodium chloride 0 9 % 51 mL IVPB, 10 mg, Intravenous, Once, Tara Villalpando MD, Last Rate: 153 mL/hr at 07/25/22 0951, 10 mg at 07/25/22 0951    gemcitabine (GEMZAR) 1,500 mg in sodium chloride 0 9 % 250 mL infusion, 1,000 mg/m2 (Treatment Plan Recorded), Intravenous, Once, Tara Villalpando MD    ondansetron (ZOFRAN) 8 mg in sodium chloride 0 9 % 50 mL IVPB, 8 mg, Intravenous, Once, Tara Villalpando MD    PACLitaxel protein bound (ABRAXANE) 188 mg in IVPB 37 6 mL, 125 mg/m2 (Treatment Plan Recorded), Intravenous, Once, Tara Villalpando MD    Most Recent Lab Results:   Lab Results   Component Value Date    WBC 8 42 07/22/2022    NEUTROABS 3 35 07/05/2022    IRON 26 (L) 05/30/2022    TIBC 186 (L) 05/30/2022    FERRITIN 159 05/30/2022    CHOLESTEROL 119 03/29/2022    TRIG 74 03/29/2022    HDL 46 (L) 03/29/2022    LDLCALC 58 03/29/2022    ALT 79 (H) 07/22/2022    AST 89 (H) 07/22/2022    ALB 2 7 (L) 07/22/2022    SODIUM 132 (L) 07/22/2022    SODIUM 137 07/09/2022    K 3 7 07/22/2022    K 3 7 07/09/2022     07/22/2022    BUN 16 07/22/2022    BUN 19 07/09/2022    CREATININE 0 86 07/22/2022    CREATININE 0 98 07/09/2022 EGFR 66 07/22/2022    GLUF 192 (H) 07/22/2022    GLUF 174 (H) 07/09/2022    GLUC 132 07/06/2022    HGBA1C 5 5 04/11/2022    CALCIUM 8 9 07/22/2022    MG 2 0 06/01/2022       Anthropometric Measurements:   Height: 64 "  Ht Readings from Last 1 Encounters:   07/25/22 5' 3 98" (1 625 m)     Wt Readings from Last 20 Encounters:   07/25/22 47 6 kg (104 lb 15 oz)   07/15/22 47 6 kg (105 lb)   07/12/22 46 kg (101 lb 6 6 oz)   07/06/22 49 5 kg (109 lb 2 oz)   07/05/22 49 kg (108 lb)   06/29/22 49 3 kg (108 lb 11 oz)   06/28/22 48 1 kg (106 lb)   06/24/22 49 7 kg (109 lb 9 6 oz)   06/22/22 48 1 kg (106 lb)   06/21/22 48 3 kg (106 lb 8 oz)   06/13/22 49 7 kg (109 lb 9 6 oz)   06/06/22 50 3 kg (111 lb)   06/02/22 50 3 kg (110 lb 14 3 oz)   05/30/22 52 2 kg (115 lb)   05/25/22 52 2 kg (115 lb)   05/23/22 52 4 kg (115 lb 9 6 oz)   05/16/22 52 4 kg (115 lb 9 6 oz)   04/11/22 57 4 kg (126 lb 9 6 oz)   03/30/22 54 4 kg (120 lb)   08/17/19 65 8 kg (145 lb)       Weight History:    Usual Weight: 145#   Home Scale: n/a    Oncology Nutrition-Anthropometrics    Flowsheet Row Nutrition from 7/25/2022 in [address] [place] [address] [facility] Nutrition from 7/12/2022 in [address] [place] [address] Oncology [facility]   Patient age (years): 78 years 78 years   Patient (female) height (in): 59 in 59 in   Current Weight to be used for anthropometric calculations (lbs) 104 9 lbs 101 4 lbs   Current Weight to be used for anthropometric calculations (kg) 47 7 kg 46 1 kg   BMI: 18 17 4   IBW female: 120 lbs 120 lbs   IBW (kg) female: 54 5 kg 54 5 kg   IBW % (female) 87 4 % 84 5 %   Adjusted BW (female): 116 2 lbs 115 4 lbs   Adjusted BW kg (female): 52 8 kg 52 5 kg   % weight change after 1 week: -- -6 1 %   Weight change after 1 week (lbs) -- -6 6 lbs   % weight change after 1 month: -4 2 % -7 5 %   Weight change after 1 month (lbs) -4 6 lbs -8 2 lbs   % weight change after 3 months: -17 1 % -19 9 %   Weight change after 3 months (lbs) -21 6 lbs -25 2 lbs          Nutrition-Focused Physical Findings: moderate muscle depletion (Temples) - hollowing/scooping/depression, moderate muscle depletion (Clavicle) - protruding/visible bone and moderate body fat depletion (Triceps) - space between folds/fingers    Food/Nutrition-Related History & Client/Social History:    Current Nutrition Impact Symptoms:  [x] Nausea -sometimes  Has Zofran if needed [x] Reduced Appetite -fluctuates [x] Acid Reflux    [] Vomiting  [x] Unintended Wt Loss  [] Malabsorption    [x] Diarrhea -ongoing for months, but starting to improve now that on Creon  Stools are more formed now  -had some diarrhea earlier this week [] Unintended Wt Gain  [] Dumping Syndrome    [] Constipation  [] Thick Mucous/Secretions  [x] Abdominal Pain    [] Dysgeusia (Altered Taste)  [] Xerostomia (Dry Mouth)  [] Gas    [] Dysosmia (Altered Smell)  [] Thrush  [] Difficulty Chewing    [] Oral Mucositis (Sore Mouth)  [x] Fatigue  [] Hyperglycemia    [] Odynophagia  [] Esophagitis  [x] Other: takes Creon   [] Dysphagia  [] Early Satiety  [] No Problems Eating      Food Allergies & Intolerances: no    Current Diet: Regular Diet, No Restrictions  Current Nutrition Intake: Increased since last visit  Appetite: Good, Fair , Fluctuating  Nutrition Route: PO  Oral Care: brushes daily  Activity level: ADLs, has to rest more than she used to  Was working full time up until May 2022    24 Hr Diet Recall:   Breakfast: cereal (rice krispies or corn chex) with a banana and whole milk   Sometimes will have cottage cheese and fruit OR panacakes OR Brazilian toast  Snack: yogurt OR cottage cheese with fruit if doesn't have that for breakfast  Lunch: yesterday had potato soup with crackers  Snack: PB with santhosh crackers and whole milk  Dinner:had the rest of the potato soup for dinner  Snack: small amount of mint chocolate chip ice cream    Beverages: carbonated water (2L/day), gatorade (20oz x1), whole milk (8oz x2), oral nutrition supplement (8oz x1-2)  Supplements: usually 1 or 2 per day    Ensure Complete (10 oz, 350 kcal, 30 g pro) will sip some throughout the day  [employer] Essentials (4krs=336 kcal, 5 g pro) mixed with whole milk      Oncology Nutrition-Estimated Needs    Flowsheet Row Nutrition from 2022 in [facility]   Weight type used Actual weight   Weight in kilograms (kg) used for estimated needs 49 3 kg   Energy needs formula:  35-40 kcal/kg   Energy needs based on 35 kcal/k kcal   Energy needs based on 40 kcal/k kcal   Protein needs formula: 1 5-2 g/kg   Protein needs based on 1 5 g/kg 74 g   Protein needs based on 2 g/kg 99 g   Fluid needs formula: 30-35 mL/kg   Fluid needs based on 30 mL/kg 1473 mL   Fluid needs in ounces 50 oz   Fluid needs based on 35 mL/kg 1719 mL   Fluid needs in ounces 58 oz           Discussion & Intervention:   Mary Lou Newsome was evaluated today for an RD follow up regarding wt loss, poor po intake and nutrition impact sx management  Mary Lou Newsome is currently undergoing tx for pancreatic cancer  Met with Mary Lou Newsome today during her infusion  She states she's doing OK, her appetite continues to fluctuate  She tries to eat small, frequent meals throughout the day and chooses high calorie/high protein foods like peanut butter, cottage cheese, whole milk, cream-based soups  She states her diarrhea has improved, but she did experience some with potato soup the other day (she had potato soup for lunch and dinner but it only bothered her after dinner)  She is also drinking Ensure and CIB shakes, usually twice daily  She continues to take Creon  Her weight is stable around 105# since last visit  Reviewed 24 hour recall, which revealed an inadequate po intake, and discussed ways to increase kcal, protein, and fluid intakes and optimize nutrient intake    Also reviewed the importance of wt management throughout the tx process and the role of a high kcal/ high protein diet in managing wt and overall health  Based on today's assessment, discussion included: MNT for: N/V, diarrhea, early satiety, how to modify foods for anticipated nutrition impact symptoms pt may experience during CA tx, a high kcal/protein diet & food choices to include at all meals & snacks (Examples of high kcal foods: cheese, full-fat dairy products, nut butter, plant-based fats, coconut oil/milk, avocado, butter, cream soup, etc  Examples of high protein foods: eggs, chicken, fish, beans/legumes, nuts/nut butters, bone broth, etc ) , fortifying foods for added kcal and protein (examples include: adding cheese to foods such as eggs, mashed potatoes, casseroles, etc ; Making oatmeal with whole milk rather than water; Making fortified mashed potatoes with cream, butter, dry milk powder, plain [place] yogurt, and cheese ), adequate hydration & fluid choices, sipping on calorie containing beverages (examples include: adding whole milk or cream to coffee, oral nutrition supplements, juice, electrolyte replacement beverages, milk, etc ), eating smaller more frequent meals every 2-3 hours (5-6 small meals/day), continuing and trying other oral nutrition supplements and adding extra fat to foods while cooking such as butter, plant-based oil, coconut oil/milk, avocado, nut butters, etc    Moving forward, Alonso Abel was encouraged to increase kcal, protein, and fluid intakes   Materials Provided: Boost samples (Boost soothe)  All questions and concerns addressed during todays visit  Alonso Abel has RD contact information  Nutrition Diagnosis:    Inadequate Energy Intake related to physiological causes, disease state and treatment related issues as evidenced by food recall, wt loss and discussion with pt and/or family     Increased Nutrient Needs (kcal & pro) related to increased demand for nutrients and disease state as evidenced by cancer dx and pt undergoing tx for cancer   Patient has clinical indicators (or ASPEN criteria) consistent with severe protein-calorie malnutrition in the context of Chronic Illness as evidenced by >5% wt loss in 1 month and </=75% energy intake vs  Estimated needs for >/=1 month  Monitoring & Evaluation:   Goals:  · weight maintenance/stabilization  · adequate nutrition impact symptom management  · pt to meet >/=75% estimated nutrition needs daily    · Progress Towards Goals: Progressing    Nutrition Rx & Recommendations:  · Diet: High Calorie, High Protein (for high calorie foods see pages 52-53, and for high protein foods see pages 49-51 in your Eating Hints book)  · Keep a daily food journal (pen/paper, juan such as LIFEMODELER)  · Nutrition Supplements: Continue CIB with whole milk, and Ensure complete  Can also try Boost Soothe  Aim for 2-3 per day  May use homemade shakes/smoothies as desired  If using a pre-made shake/bar, choose ones with >300 calories and >10 grams protein (ex  Ensure Complete, Ensure Enlive, Ensure Plus, Boost Plus, Boost Very High Calorie, etc )  · Small, frequent meals/snacks may be easier to tolerate than 3 large daily meals  Aim for 5-6 small meals per day (every 2-3 hours)  · Include protein at all meals/snacks  · Include a variety of foods (as tolerated/allowed by diet)  · Incorporate physical activity as able/allowed  · Stay hydrated by sipping fluids of choice/tolerance throughout the day  · Liquid nutrition may be better tolerated than solids at times  · Alter food choices and eating patterns to accommodate changing needs  · Refer to Eating Hints book for other meal/snack ideas and symptom management    · For appetite loss: try powdered or liquid nutrition supplements; eating by the clock every 2-3 hours; set a timer to remind yourself to eat, keep snacks nearby; add extra protein & calories to your diet; drink liquids in between meals, choose liquids that add calories; eat a bedtime snack; eat soft, cool or frozen foods; eat a larger meal when you feel well & rested; only sip small amounts of liquids during meals (see pages 10-12 in your Eating Hints book)  · For weight loss: monitor your weight at home at least 2x/week, record your weight, start by adding 250-500 extra calories per day, eat 5-6 small scheduled meals every 2-3 hrs, choose foods that are high in protein and calories (see pages 49-53 in your Eating Hints book), drink liquids with calories for example: milkshakes/smoothies/juice/soup/whole milk/chocolate milk, cook with protein fortified milk (see recipe on page 36 in your Eating Hints book), consider ready-to-drink oral nutrition supplements such as Ensure Plus, Ensure Enlive, Boost Plus, or Boost Very High Calorie, avoid "diet" and "light" foods when possible, avoid drinking too much with meals, contact your dietitian with any continued weight loss over the course of 1 week  For more info see pages 35-37 in your Eating Hints book  · For diarrhea: drink plenty of fluids (>64 oz/day), avoid carbonation; eat 5-6 small meals/day; include high sodium & potassium foods/liquids (Sodium: soup/broth; Potassium: bananas, canned apricots, potatoes); eat low-fiber foods; choose foods/liquids that are room temperature; avoid foods/drinks that can make diarrhea worse (ex  high fiber, high sugar, hot/cold drinks, greasy/fatty foods, gas forming foods such as beans, raw produce, milk products, alcohol, spicy foods, caffeine, sugar alcohols (xylitol, sorbitol), and apple juice (high in sorbitol) (see pages 15-16 in your Eating Hints book)  If diarrhea is severe, consider adding Banatrol (banana flakes) 1-3 times per day mixed in applesauce (can be purchased online from [address] [place], [place] [address])  · Try adding soluble fiber: applesauce, banana, oatmeal, potatoes, rice, etc  to help thicken stools    · For nausea/vomiting: try plain/bland foods; try lemon/lime flavors; eat 5-6 small meals/day (except when vomiting); do not skip meals/snacks; choose appealing foods; sip only small amounts of liquids during meals; stay hydrated in between meals; eat foods/drinks that are room temperature; eat dry toast/crackers (see pages  21-22 and 31-32 in your Eating Hints book)  · Weigh yourself regularly  If you notice weight loss, make an effort to increase your daily food/calorie intake  If you continue to notice loss after these efforts, reach out to your dietitian to establish a plan to stabilize weight  · Consider stopping all high dose antioxidant supplements (vitamin A, C, E, selenium, etc )  Refer to [facility] "About Herbs" website for more information on the safety of supplements    · High calorie foods to try: add extra fats to foods (extra olive oil, avocado oil, butter), add avocado or cheese to foods, use extra sauce/gravies, whole milk products, oral nutrition supplements, nut butters  (see pages 52-53 in your Eating Hints book)  · High protein foods to try: whole milk, fairlife milk, switch to greek yogurt, nut butters, oral nutrition supplements, protein powder (see pages 49-51 in your Eating Hints book)    Follow Up Plan: 8/8/22 during infusion  Recommend Referral to Other Providers: none at this time

## 2022-07-25 NOTE — PATIENT INSTRUCTIONS
Nutrition Rx & Recommendations:  Diet: High Calorie, High Protein (for high calorie foods see pages 52-53, and for high protein foods see pages 49-51 in your Eating Hints book)  Keep a daily food journal (pen/paper, juan such as Zuldi)  Nutrition Supplements: Continue CIB with whole milk, and Ensure complete  Can also try Boost Soothe  Aim for 2-3 per day  May use homemade shakes/smoothies as desired  If using a pre-made shake/bar, choose ones with >300 calories and >10 grams protein (ex  Ensure Complete, Ensure Enlive, Ensure Plus, Boost Plus, Boost Very High Calorie, etc )  Small, frequent meals/snacks may be easier to tolerate than 3 large daily meals  Aim for 5-6 small meals per day (every 2-3 hours)  Include protein at all meals/snacks  Include a variety of foods (as tolerated/allowed by diet)  Incorporate physical activity as able/allowed  Stay hydrated by sipping fluids of choice/tolerance throughout the day  Liquid nutrition may be better tolerated than solids at times  Alter food choices and eating patterns to accommodate changing needs  Refer to Eating Hints book for other meal/snack ideas and symptom management  For appetite loss: try powdered or liquid nutrition supplements; eating by the clock every 2-3 hours; set a timer to remind yourself to eat, keep snacks nearby; add extra protein & calories to your diet; drink liquids in between meals, choose liquids that add calories; eat a bedtime snack; eat soft, cool or frozen foods; eat a larger meal when you feel well & rested; only sip small amounts of liquids during meals (see pages 10-12 in your Eating Hints book)    For weight loss: monitor your weight at home at least 2x/week, record your weight, start by adding 250-500 extra calories per day, eat 5-6 small scheduled meals every 2-3 hrs, choose foods that are high in protein and calories (see pages 49-53 in your Eating Hints book), drink liquids with calories for example: milkshakes/smoothies/juice/soup/whole milk/chocolate milk, cook with protein fortified milk (see recipe on page 36 in your Eating Hints book), consider ready-to-drink oral nutrition supplements such as Ensure Plus, Ensure Enlive, Boost Plus, or Boost Very High Calorie, avoid "diet" and "light" foods when possible, avoid drinking too much with meals, contact your dietitian with any continued weight loss over the course of 1 week  For more info see pages 35-37 in your Eating Hints book  For diarrhea: drink plenty of fluids (>64 oz/day), avoid carbonation; eat 5-6 small meals/day; include high sodium & potassium foods/liquids (Sodium: soup/broth; Potassium: bananas, canned apricots, potatoes); eat low-fiber foods; choose foods/liquids that are room temperature; avoid foods/drinks that can make diarrhea worse (ex  high fiber, high sugar, hot/cold drinks, greasy/fatty foods, gas forming foods such as beans, raw produce, milk products, alcohol, spicy foods, caffeine, sugar alcohols (xylitol, sorbitol), and apple juice (high in sorbitol) (see pages 15-16 in your Eating Hints book)  If diarrhea is severe, consider adding Banatrol (banana flakes) 1-3 times per day mixed in applesauce (can be purchased online from [address] [place] [address])  Try adding soluble fiber: applesauce, banana, oatmeal, potatoes, rice, etc  to help thicken stools  For nausea/vomiting: try plain/bland foods; try lemon/lime flavors; eat 5-6 small meals/day (except when vomiting); do not skip meals/snacks; choose appealing foods; sip only small amounts of liquids during meals; stay hydrated in between meals; eat foods/drinks that are room temperature; eat dry toast/crackers (see pages  21-22 and 31-32 in your Eating Hints book)  Weigh yourself regularly  If you notice weight loss, make an effort to increase your daily food/calorie intake   If you continue to notice loss after these efforts, reach out to your dietitian to establish a plan to stabilize weight  Consider stopping all high dose antioxidant supplements (vitamin A, C, E, selenium, etc )  Refer to [facility] "About Herbs" website for more information on the safety of supplements    High calorie foods to try: add extra fats to foods (extra olive oil, avocado oil, butter), add avocado or cheese to foods, use extra sauce/gravies, whole milk products, oral nutrition supplements, nut butters  (see pages 52-53 in your Eating Hints book)  High protein foods to try: whole milk, fairlife milk, switch to greek yogurt, nut butters, oral nutrition supplements, protein powder (see pages 49-51 in your Eating Hints book)    Follow Up Plan: 8/8/22 during infusion  Recommend Referral to Other Providers: none at this time

## 2022-07-25 NOTE — PROGRESS NOTES
Patient arrived on unit for treatment today  Denies any present issues/concerns  Cleared for treatment  Tolerated infusion without incident  Aware of next appointment   AVS given to patient

## 2022-07-27 ENCOUNTER — TELEPHONE (OUTPATIENT)
Dept: NUTRITION | Facility: CLINIC | Age: 77
End: 2022-07-27

## 2022-07-27 NOTE — TELEPHONE ENCOUNTER
Received VM from [place] asking about nutrition supplements, and if she should take them or not  Called [place] back  No answer  Left VM encouraging her to call back to discuss supplements  RD contact information provided

## 2022-07-28 NOTE — PATIENT INSTRUCTIONS
PRESCRIPTION REFILL REMINDER:  All medication refills should be requested prior to [facility] on Friday  Any refill requests after noon on Friday would be addressed the following Monday  Please protect yourself from [place]   = Wash your hands  Soap and water, or hand  with at least 60% alcohol, are both effective at killing the virus  = Wear a mask  This will help protect others from any virus particles you might spread  Your mouth and nose BOTH need to be covered  = Keep the distance  Keep 6 feet of distance from other people, even if they seem healthy  Keeping distance protects you from the other person's virus spread     = Get a vaccine, and boost it  Three vaccines are approved for use in the [place] for all adults  These vaccines do provide protection against all known variants   + Pfizer is FDA approved for all persons age 11 and older   + Catalina Kenning may be given to all person age 25 and older   - We do NOT recommend [address] vaccine for our Palliative Care patients  = Call 4-484-OPENBCQ (Choose Option 7 for faster service!) to get scheduled for your shot   = [facility], [employer], [facility], [facility], and many [facility] locations ALSO have shots   + The CDC recommends booster shots on ALL vaccines for ALL adults  = https://MDSmartSearch.com/  = If you are over 50 or have an immune compromise, the CDC recommends a fourth shot     = Test to treat  If you have symptoms, get tested, and get treatment!   New drugs like Paxlovid can prevent you from ever having to go to the hospital , and may be covered completely by your insurance or special government programs    - https://aspr hhs gov/TestToTreat/      Informacion en espanol sobre vacunas, de nos companeros de [facility] --  Jam perez    Check out [employer] for [address] that are updated daily:    http://www Oomnitza/     Global Epidemics  Org, from [facility] will give you Amtfev-be-Pcpjsq information on virus cases and vaccination rates:    Https://globalepidemics  org/    Frequently Asked Questions about COVID, answered by [facility]    Security[employer] es

## 2022-07-29 ENCOUNTER — OFFICE VISIT (OUTPATIENT)
Dept: PALLIATIVE MEDICINE | Facility: CLINIC | Age: 77
End: 2022-07-29
Payer: COMMERCIAL

## 2022-07-29 VITALS
SYSTOLIC BLOOD PRESSURE: 92 MMHG | HEART RATE: 100 BPM | RESPIRATION RATE: 16 BRPM | BODY MASS INDEX: 18 KG/M2 | OXYGEN SATURATION: 99 % | TEMPERATURE: 96.7 F | WEIGHT: 104.8 LBS | DIASTOLIC BLOOD PRESSURE: 60 MMHG

## 2022-07-29 DIAGNOSIS — K52.9 CHRONIC DIARRHEA: ICD-10-CM

## 2022-07-29 DIAGNOSIS — C25.9 PRIMARY PANCREATIC ADENOCARCINOMA (HCC): Primary | ICD-10-CM

## 2022-07-29 DIAGNOSIS — K86.89 PANCREATIC INSUFFICIENCY: ICD-10-CM

## 2022-07-29 DIAGNOSIS — G89.3 CANCER RELATED PAIN: ICD-10-CM

## 2022-07-29 DIAGNOSIS — K86.89 PANCREATIC MASS: ICD-10-CM

## 2022-07-29 DIAGNOSIS — K21.9 GASTRIC REFLUX: ICD-10-CM

## 2022-07-29 PROCEDURE — 99214 OFFICE O/P EST MOD 30 MIN: CPT | Performed by: INTERNAL MEDICINE

## 2022-07-29 RX ORDER — PANTOPRAZOLE SODIUM 20 MG/1
20 TABLET, DELAYED RELEASE ORAL DAILY
Qty: 60 TABLET | Refills: 3 | Status: SHIPPED | OUTPATIENT
Start: 2022-07-29

## 2022-07-29 RX ORDER — OXYCODONE HYDROCHLORIDE 5 MG/1
2.5 TABLET ORAL EVERY 6 HOURS PRN
Qty: 15 TABLET | Refills: 0
Start: 2022-07-29 | End: 2022-08-28

## 2022-07-29 NOTE — PROGRESS NOTES
-Palliative and [address] 78 y o  female [phone]    Assessment/Plan:  1  Primary pancreatic adenocarcinoma ([facility] [address] )    2  Pancreatic insufficiency    3  Cancer related pain    4  Chronic diarrhea    5  Pancreatic mass    6  Gastric reflux      · Continue creon 24,000 units 3x a day with meals for suspected pancreatic insufficiency from large pancreatic mass/cancer  · Tolerating well  Transaminases likely from chemo  · Continue 2 5mg PO oxyIR q6H prn cancer pain  She tried 2 5mg last night which she said was helpful and afforded her better sleep  I encouraged to take more up to 4 a day  But still appears apprehensive  · No refills for now, has enough from previous script, she knows to call for refills  · Miralax daily prn to prevent OIC  · Start protonix 20mg OD first thing in the morning for what she describes as heartburn since starting chemo  Stop Tums  · She's already referred to onc nutritionist  I told her they were trying to call her yesterday but she didn't   · 5 Wishes already completed 6/24  · RTO in 3 weeks, to follow up on pain and oxy  Controlled Substance Review    [place]MP or [place]  reviewed: No red flags were identified; safe to proceed with prescription  Maximo Prado     06/02/2022  1   06/02/2022  Oxycodone Hcl (Ir) 5 MG Tablet    15 00  7 Andrae Barreto   [id number]   Jeanie [phone] MMW      Requested Prescriptions     Signed Prescriptions Disp Refills    pancrelipase, Lip-Prot-Amyl, (CREON) 24,000 units 90 capsule 0     Sig: Take 24,000 units of lipase by mouth 3 (three) times a day with meals    oxyCODONE (ROXICODONE) 5 immediate release tablet 15 tablet 0     Sig: Take 0 5 tablets (2 5 mg total) by mouth every 6 (six) hours as needed for moderate pain Max Daily Amount: 10 mg    pantoprazole (PROTONIX) 20 mg tablet 60 tablet 3     Sig: Take 1 tablet (20 mg total) by mouth daily     Medications Discontinued During This Encounter   Medication Reason    pancrelipase, Lip-Prot-Amyl, (CREON) 24,000 units Reorder    oxyCODONE (ROXICODONE) 5 immediate release tablet        Representatives have queried the patient's controlled substance dispensing history in the Prescription Drug Monitoring Program in compliance with regulations before I have prescribed any controlled substances  The prescription history is consistent with prescribed therapy and our practice policies  20 minutes were spent face to face with Andre Carter and her daughter-in-law with greater than 50% of the time spent in counseling or coordination of care including discussions of etiology of diagnosis, pathogenesis of diagnosis, diagnostic results, impression, and recommendations, risks and benefits of treatment, instructions for disease self management, treatment instructions, follow up requirements, risk factors and risk reduction of disease, patient and family counseling/involvement in care, compliance with treatment regimen and advanced care planning  All of the patient's questions were answered during this discussion  No follow-ups on file  Subjective:   Chief Complaint  Follow up visit for:  symptom management, goal of care assessment and decisional support, disease process education and discussion of prognosis, advance care planning, emotional support in the setting of serious illness  Fatigue  Associated symptoms include abdominal pain and fatigue  Pertinent negatives include no chest pain, nausea, vomiting or weakness  Andre Carter is a 78 y o  female with palliative diagnosis of metastatic pancreatic adenocarcinoma with liver, lung, bone, and retroperitoneal LN involvement  She is currently on gemcitabine+abraxane that was started on 6/29/2022  She is known to [facility] for supportive cares  She was last seen 7/15 where she reports improvement in diarrhea after starting creon  Since then, she was referred to nutrition  She returns today by herself   She said today is particularly trying day  She just finished her chemo a few days ago, she just started C2  She will go again next Monday  She feels that she is getting more tired the more she takes chemo  Last night, she had abdominal/epigastric pain that was bad enough that she took a 1/2 tab of oxy that afforded her good sleep  I encourage her to take more of it up to a max # of 4 a day (2 5mg x 4)  She should start taking senokot or miralax daily to prevent OIC if she starts taking more oxycodone as oxycodone causes constipation  She will buy these OTC  She asks about Tums for her gastric reflux and I recommend switching to PPI instead  I encouraged her to let her oncologist team know about her tolerance to treatments especially if it is becoming more and more challenging for her  She voiced understanding  She continues with the creon, no more diarrhea  She is working with a nutritionist      ACP/Social: ACP discussed in detail  5 wishes signed and witnessed on 6/24  Original copy sent to her with 2 additional copies for her HCRs  A copy was scanned in the chart  She appoints her son/Miguel as her 1st agent, daughter/Katty as her 2nd agent  We removed her  as 3rd given pedro's dementia that is worsening per her report   do not sound like he has capacity to follow her decisions on her behalf  In those 3 different scenarios, she chooses to receive CPR if beneficial  If started, she wants it stopped if her doctors feel that they are no longer helping her  She also does not want to have a feeding tube or tracheostomy  She lives with her  who has worsening Parkinsons Dementia  Her step mother is also there but is about to move to [facility]  Her daughter who is debilitated from an undiagnosed neurologic condition is unable to work and is also living with her, along with her daughter's 2 children (including 1 with special needs)   His son and his wife lives 10 minutes away from her and is able to provide her support when she needs it  The following portions of the medical history were reviewed: past medical history, problem list, medication list, and social history  Current Outpatient Medications:     Cholecalciferol (Vitamin D3) 50 MCG (2000 UT) TABS, Take by mouth  , Disp: , Rfl:     cyanocobalamin (VITAMIN B-12) 500 MCG tablet, Take 500 mcg by mouth daily, Disp: , Rfl:     ferrous sulfate 325 (65 Fe) mg tablet, Take 325 mg by mouth daily with breakfast, Disp: , Rfl:     lidocaine-prilocaine (EMLA) cream, Apply topically as needed for mild pain, Disp: 30 g, Rfl: 0    oxyCODONE (ROXICODONE) 5 immediate release tablet, Take 0 5 tablets (2 5 mg total) by mouth every 6 (six) hours as needed for moderate pain Max Daily Amount: 10 mg, Disp: 15 tablet, Rfl: 0    pancrelipase, Lip-Prot-Amyl, (CREON) 24,000 units, Take 24,000 units of lipase by mouth 3 (three) times a day with meals, Disp: 90 capsule, Rfl: 0    pantoprazole (PROTONIX) 20 mg tablet, Take 1 tablet (20 mg total) by mouth daily, Disp: 60 tablet, Rfl: 3    Pyridoxine HCl (VITAMIN B-6 PO), Take 1 tablet by mouth, Disp: , Rfl:     acetaminophen (TYLENOL) 500 mg tablet, Take 500 mg by mouth every 6 (six) hours as needed for mild pain (Patient not taking: Reported on 7/29/2022), Disp: , Rfl:     COLLADO SEAL ROOT PO, Take 900 mg by mouth daily Echinaceal/goldenseal blend  (Patient not taking: No sig reported), Disp: , Rfl:     Omega-3 Fatty Acids (OMEGA 3 PO), Take 1,000 mg by mouth (Patient not taking: No sig reported), Disp: , Rfl:     ondansetron (Zofran ODT) 4 mg disintegrating tablet, Take 1 tablet (4 mg total) by mouth every 8 (eight) hours as needed for nausea or vomiting (Patient not taking: No sig reported), Disp: 20 tablet, Rfl: 0    Vitamin E 400 units TABS, Take by mouth (Patient not taking: No sig reported), Disp: , Rfl:   Review of Systems   Constitutional: Positive for fatigue and unexpected weight change   Negative for activity change and appetite change  HENT: Negative for trouble swallowing  Respiratory: Negative for shortness of breath  Cardiovascular: Negative for chest pain  Gastrointestinal: Positive for abdominal pain  Negative for abdominal distention, constipation, diarrhea, nausea and vomiting  Musculoskeletal: Negative for back pain  Neurological: Negative for weakness  Psychiatric/Behavioral: Negative for sleep disturbance  The patient is not nervous/anxious  All other systems reviewed and are negative  All other systems negative    Objective:  Vital Signs  BP 92/60 (BP Location: Right arm, Patient Position: Sitting, Cuff Size: Standard)   Pulse 100   Temp (!) 96 7 °F (35 9 °C) (Temporal)   Resp 16   Wt 47 5 kg (104 lb 12 8 oz)   SpO2 99%   BMI 18 00 kg/m²    Physical Exam    Constitutional: Appears thin, well-kempt, pleasant, jovial, appears chronically ill but not toxic appearing  Appears more tired looking than on previous visit  In no acute physical or emotional distress  Head: Normocephalic and atraumatic  temporal mm wasting  Eyes: EOM are normal  No ocular discharge  No scleral icterus  Neck: No visible adenopathy or masses  Respiratory: Effort normal  No stridor  No respiratory distress  Gastrointestinal: No abdominal distension  Musculoskeletal: No edema  Neurological: Alert, oriented and appropriately conversant  Skin: No diaphoresis, no rashes seen on exposed areas of skin  Pale   Psychiatric: Displays a normal mood and affect   Behavior, judgement and thought content appear normal      Ayde Espino MD  Palliative Medicine & Supportive Care  Internal Medicine  Available via [facility] Text  Office: [phone]  Fax: [phone]

## 2022-07-30 ENCOUNTER — APPOINTMENT (OUTPATIENT)
Dept: LAB | Facility: CLINIC | Age: 77
End: 2022-07-30
Payer: COMMERCIAL

## 2022-07-30 DIAGNOSIS — R11.2 CHEMOTHERAPY INDUCED NAUSEA AND VOMITING: ICD-10-CM

## 2022-07-30 DIAGNOSIS — C25.9 PRIMARY PANCREATIC ADENOCARCINOMA (HCC): ICD-10-CM

## 2022-07-30 DIAGNOSIS — T45.1X5A CHEMOTHERAPY INDUCED NAUSEA AND VOMITING: ICD-10-CM

## 2022-07-30 LAB
ALBUMIN SERPL BCP-MCNC: 2.7 G/DL (ref 3.5–5)
ALP SERPL-CCNC: 743 U/L (ref 46–116)
ALT SERPL W P-5'-P-CCNC: 97 U/L (ref 12–78)
ANION GAP SERPL CALCULATED.3IONS-SCNC: 6 MMOL/L (ref 4–13)
AST SERPL W P-5'-P-CCNC: 101 U/L (ref 5–45)
BASOPHILS # BLD AUTO: 0.12 THOUSANDS/ΜL (ref 0–0.1)
BASOPHILS NFR BLD AUTO: 2 % (ref 0–1)
BILIRUB SERPL-MCNC: 0.37 MG/DL (ref 0.2–1)
BUN SERPL-MCNC: 19 MG/DL (ref 5–25)
CALCIUM ALBUM COR SERPL-MCNC: 9.9 MG/DL (ref 8.3–10.1)
CALCIUM SERPL-MCNC: 8.9 MG/DL (ref 8.3–10.1)
CHLORIDE SERPL-SCNC: 103 MMOL/L (ref 96–108)
CO2 SERPL-SCNC: 27 MMOL/L (ref 21–32)
CREAT SERPL-MCNC: 0.77 MG/DL (ref 0.6–1.3)
EOSINOPHIL # BLD AUTO: 0.15 THOUSAND/ΜL (ref 0–0.61)
EOSINOPHIL NFR BLD AUTO: 2 % (ref 0–6)
ERYTHROCYTE [DISTWIDTH] IN BLOOD BY AUTOMATED COUNT: 15.9 % (ref 11.6–15.1)
GFR SERPL CREATININE-BSD FRML MDRD: 75 ML/MIN/1.73SQ M
GLUCOSE P FAST SERPL-MCNC: 92 MG/DL (ref 65–99)
HCT VFR BLD AUTO: 30.5 % (ref 34.8–46.1)
HGB BLD-MCNC: 9.4 G/DL (ref 11.5–15.4)
IMM GRANULOCYTES # BLD AUTO: 0.1 THOUSAND/UL (ref 0–0.2)
IMM GRANULOCYTES NFR BLD AUTO: 1 % (ref 0–2)
LYMPHOCYTES # BLD AUTO: 0.86 THOUSANDS/ΜL (ref 0.6–4.47)
LYMPHOCYTES NFR BLD AUTO: 12 % (ref 14–44)
MCH RBC QN AUTO: 29.9 PG (ref 26.8–34.3)
MCHC RBC AUTO-ENTMCNC: 30.8 G/DL (ref 31.4–37.4)
MCV RBC AUTO: 97 FL (ref 82–98)
MONOCYTES # BLD AUTO: 0.28 THOUSAND/ΜL (ref 0.17–1.22)
MONOCYTES NFR BLD AUTO: 4 % (ref 4–12)
NEUTROPHILS # BLD AUTO: 5.41 THOUSANDS/ΜL (ref 1.85–7.62)
NEUTS SEG NFR BLD AUTO: 79 % (ref 43–75)
NRBC BLD AUTO-RTO: 0 /100 WBCS
PLATELET # BLD AUTO: 989 THOUSANDS/UL (ref 149–390)
PMV BLD AUTO: 10.4 FL (ref 8.9–12.7)
POTASSIUM SERPL-SCNC: 4.3 MMOL/L (ref 3.5–5.3)
PROT SERPL-MCNC: 7.2 G/DL (ref 6.4–8.4)
RBC # BLD AUTO: 3.14 MILLION/UL (ref 3.81–5.12)
SODIUM SERPL-SCNC: 136 MMOL/L (ref 135–147)
WBC # BLD AUTO: 6.92 THOUSAND/UL (ref 4.31–10.16)

## 2022-07-30 PROCEDURE — 80053 COMPREHEN METABOLIC PANEL: CPT

## 2022-07-30 PROCEDURE — 36415 COLL VENOUS BLD VENIPUNCTURE: CPT

## 2022-07-30 PROCEDURE — 85025 COMPLETE CBC W/AUTO DIFF WBC: CPT

## 2022-08-01 ENCOUNTER — HOSPITAL ENCOUNTER (OUTPATIENT)
Dept: INFUSION CENTER | Facility: CLINIC | Age: 77
Discharge: HOME | End: 2022-08-01
Payer: COMMERCIAL

## 2022-08-01 VITALS
RESPIRATION RATE: 16 BRPM | DIASTOLIC BLOOD PRESSURE: 66 MMHG | HEIGHT: 64 IN | WEIGHT: 104.72 LBS | SYSTOLIC BLOOD PRESSURE: 99 MMHG | TEMPERATURE: 97.6 F | BODY MASS INDEX: 17.88 KG/M2 | HEART RATE: 114 BPM

## 2022-08-01 DIAGNOSIS — C25.9 PRIMARY PANCREATIC ADENOCARCINOMA (HCC): Primary | ICD-10-CM

## 2022-08-01 DIAGNOSIS — R11.2 CHEMOTHERAPY INDUCED NAUSEA AND VOMITING: ICD-10-CM

## 2022-08-01 DIAGNOSIS — T45.1X5A CHEMOTHERAPY INDUCED NAUSEA AND VOMITING: ICD-10-CM

## 2022-08-01 PROCEDURE — 96417 CHEMO IV INFUS EACH ADDL SEQ: CPT

## 2022-08-01 PROCEDURE — 96413 CHEMO IV INFUSION 1 HR: CPT

## 2022-08-01 PROCEDURE — 96375 TX/PRO/DX INJ NEW DRUG ADDON: CPT

## 2022-08-01 PROCEDURE — 96367 TX/PROPH/DG ADDL SEQ IV INF: CPT

## 2022-08-01 RX ORDER — SODIUM CHLORIDE 9 MG/ML
20 INJECTION, SOLUTION INTRAVENOUS ONCE
Status: COMPLETED | OUTPATIENT
Start: 2022-08-01 | End: 2022-08-01

## 2022-08-01 RX ADMIN — SODIUM CHLORIDE 20 ML/HR: 0.9 INJECTION, SOLUTION INTRAVENOUS at 09:58

## 2022-08-01 RX ADMIN — DEXAMETHASONE SODIUM PHOSPHATE 10 MG: 10 INJECTION, SOLUTION INTRAMUSCULAR; INTRAVENOUS at 10:21

## 2022-08-01 RX ADMIN — ONDANSETRON 8 MG: 2 INJECTION INTRAMUSCULAR; INTRAVENOUS at 09:58

## 2022-08-01 RX ADMIN — GEMCITABINE HYDROCHLORIDE 1500 MG: 1 INJECTION, SOLUTION INTRAVENOUS at 12:06

## 2022-08-01 RX ADMIN — Medication 188 MG: at 10:50

## 2022-08-01 NOTE — PROGRESS NOTES
Received notification that patient AST and ALT increasing  Reviewed Liver enzymes results and also Alk Phos [phone]   Per Dr Alejandro Shea patient okay for treatment as scheduled on 8/1/22

## 2022-08-02 ENCOUNTER — TELEPHONE (OUTPATIENT)
Dept: GASTROENTEROLOGY | Facility: CLINIC | Age: 77
End: 2022-08-02

## 2022-08-02 ENCOUNTER — OFFICE VISIT (OUTPATIENT)
Dept: HEMATOLOGY ONCOLOGY | Facility: CLINIC | Age: 77
End: 2022-08-02
Payer: COMMERCIAL

## 2022-08-02 VITALS
SYSTOLIC BLOOD PRESSURE: 96 MMHG | TEMPERATURE: 96.9 F | HEIGHT: 64 IN | RESPIRATION RATE: 18 BRPM | HEART RATE: 125 BPM | BODY MASS INDEX: 18.01 KG/M2 | DIASTOLIC BLOOD PRESSURE: 52 MMHG | OXYGEN SATURATION: 99 % | WEIGHT: 105.5 LBS

## 2022-08-02 DIAGNOSIS — C79.51 OSSEOUS METASTASIS (HCC): ICD-10-CM

## 2022-08-02 DIAGNOSIS — C25.9 PRIMARY PANCREATIC ADENOCARCINOMA (HCC): Primary | ICD-10-CM

## 2022-08-02 DIAGNOSIS — E87.6 HYPOKALEMIA: ICD-10-CM

## 2022-08-02 PROCEDURE — 99214 OFFICE O/P EST MOD 30 MIN: CPT | Performed by: INTERNAL MEDICINE

## 2022-08-02 RX ORDER — POTASSIUM CHLORIDE 20 MEQ/1
20 TABLET, EXTENDED RELEASE ORAL DAILY
Qty: 20 TABLET | Refills: 0 | Status: SHIPPED | OUTPATIENT
Start: 2022-08-02 | End: 2022-08-28

## 2022-08-02 RX ORDER — SODIUM CHLORIDE 9 MG/ML
20 INJECTION, SOLUTION INTRAVENOUS ONCE
Status: CANCELLED | OUTPATIENT
Start: 2022-08-22

## 2022-08-02 NOTE — TELEPHONE ENCOUNTER
Patients GI provider:  Dr Miladys Blount    Number to return call: [phone]    Reason for call: Pt calling to cancel her procedure       Scheduled procedure/appointment date if applicable: procedure 0/20/16

## 2022-08-06 ENCOUNTER — APPOINTMENT (OUTPATIENT)
Dept: LAB | Facility: CLINIC | Age: 77
End: 2022-08-06
Payer: COMMERCIAL

## 2022-08-06 DIAGNOSIS — T45.1X5A CHEMOTHERAPY INDUCED NAUSEA AND VOMITING: ICD-10-CM

## 2022-08-06 DIAGNOSIS — C25.9 PRIMARY PANCREATIC ADENOCARCINOMA (HCC): ICD-10-CM

## 2022-08-06 DIAGNOSIS — R11.2 CHEMOTHERAPY INDUCED NAUSEA AND VOMITING: ICD-10-CM

## 2022-08-06 LAB
ALBUMIN SERPL BCP-MCNC: 2.8 G/DL (ref 3.5–5)
ALP SERPL-CCNC: 729 U/L (ref 46–116)
ALT SERPL W P-5'-P-CCNC: 150 U/L (ref 12–78)
ANION GAP SERPL CALCULATED.3IONS-SCNC: 5 MMOL/L (ref 4–13)
AST SERPL W P-5'-P-CCNC: 130 U/L (ref 5–45)
BASOPHILS # BLD AUTO: 0.09 THOUSANDS/ΜL (ref 0–0.1)
BASOPHILS NFR BLD AUTO: 1 % (ref 0–1)
BILIRUB SERPL-MCNC: 0.39 MG/DL (ref 0.2–1)
BUN SERPL-MCNC: 20 MG/DL (ref 5–25)
CALCIUM ALBUM COR SERPL-MCNC: 10.1 MG/DL (ref 8.3–10.1)
CALCIUM SERPL-MCNC: 9.1 MG/DL (ref 8.3–10.1)
CHLORIDE SERPL-SCNC: 99 MMOL/L (ref 96–108)
CO2 SERPL-SCNC: 27 MMOL/L (ref 21–32)
CREAT SERPL-MCNC: 0.7 MG/DL (ref 0.6–1.3)
EOSINOPHIL # BLD AUTO: 0.21 THOUSAND/ΜL (ref 0–0.61)
EOSINOPHIL NFR BLD AUTO: 2 % (ref 0–6)
ERYTHROCYTE [DISTWIDTH] IN BLOOD BY AUTOMATED COUNT: 16.9 % (ref 11.6–15.1)
GFR SERPL CREATININE-BSD FRML MDRD: 85 ML/MIN/1.73SQ M
GLUCOSE P FAST SERPL-MCNC: 92 MG/DL (ref 65–99)
HCT VFR BLD AUTO: 28.3 % (ref 34.8–46.1)
HGB BLD-MCNC: 8.9 G/DL (ref 11.5–15.4)
IMM GRANULOCYTES # BLD AUTO: 0.15 THOUSAND/UL (ref 0–0.2)
IMM GRANULOCYTES NFR BLD AUTO: 1 % (ref 0–2)
LYMPHOCYTES # BLD AUTO: 0.95 THOUSANDS/ΜL (ref 0.6–4.47)
LYMPHOCYTES NFR BLD AUTO: 8 % (ref 14–44)
MCH RBC QN AUTO: 31.1 PG (ref 26.8–34.3)
MCHC RBC AUTO-ENTMCNC: 31.4 G/DL (ref 31.4–37.4)
MCV RBC AUTO: 99 FL (ref 82–98)
MONOCYTES # BLD AUTO: 0.16 THOUSAND/ΜL (ref 0.17–1.22)
MONOCYTES NFR BLD AUTO: 1 % (ref 4–12)
NEUTROPHILS # BLD AUTO: 9.81 THOUSANDS/ΜL (ref 1.85–7.62)
NEUTS SEG NFR BLD AUTO: 87 % (ref 43–75)
NRBC BLD AUTO-RTO: 0 /100 WBCS
PLATELET # BLD AUTO: 410 THOUSANDS/UL (ref 149–390)
PMV BLD AUTO: 10.6 FL (ref 8.9–12.7)
POTASSIUM SERPL-SCNC: 4.5 MMOL/L (ref 3.5–5.3)
PROT SERPL-MCNC: 6.9 G/DL (ref 6.4–8.4)
RBC # BLD AUTO: 2.86 MILLION/UL (ref 3.81–5.12)
SODIUM SERPL-SCNC: 131 MMOL/L (ref 135–147)
WBC # BLD AUTO: 11.37 THOUSAND/UL (ref 4.31–10.16)

## 2022-08-06 PROCEDURE — 85025 COMPLETE CBC W/AUTO DIFF WBC: CPT

## 2022-08-06 PROCEDURE — 80053 COMPREHEN METABOLIC PANEL: CPT

## 2022-08-06 PROCEDURE — 36415 COLL VENOUS BLD VENIPUNCTURE: CPT

## 2022-08-08 ENCOUNTER — PATIENT OUTREACH (OUTPATIENT)
Dept: CASE MANAGEMENT | Facility: OTHER | Age: 77
End: 2022-08-08

## 2022-08-08 ENCOUNTER — NUTRITION (OUTPATIENT)
Dept: NUTRITION | Facility: CLINIC | Age: 77
End: 2022-08-08

## 2022-08-08 ENCOUNTER — HOSPITAL ENCOUNTER (OUTPATIENT)
Dept: INFUSION CENTER | Facility: CLINIC | Age: 77
Discharge: HOME | End: 2022-08-08
Payer: COMMERCIAL

## 2022-08-08 VITALS
TEMPERATURE: 98.2 F | HEIGHT: 64 IN | HEART RATE: 108 BPM | BODY MASS INDEX: 17.78 KG/M2 | DIASTOLIC BLOOD PRESSURE: 73 MMHG | RESPIRATION RATE: 18 BRPM | SYSTOLIC BLOOD PRESSURE: 114 MMHG | WEIGHT: 104.17 LBS

## 2022-08-08 DIAGNOSIS — T45.1X5A CHEMOTHERAPY INDUCED NAUSEA AND VOMITING: ICD-10-CM

## 2022-08-08 DIAGNOSIS — C25.9 PRIMARY PANCREATIC ADENOCARCINOMA (HCC): Primary | ICD-10-CM

## 2022-08-08 DIAGNOSIS — Z71.3 NUTRITIONAL COUNSELING: Primary | ICD-10-CM

## 2022-08-08 DIAGNOSIS — R11.2 CHEMOTHERAPY INDUCED NAUSEA AND VOMITING: ICD-10-CM

## 2022-08-08 PROCEDURE — 96413 CHEMO IV INFUSION 1 HR: CPT

## 2022-08-08 PROCEDURE — 96417 CHEMO IV INFUS EACH ADDL SEQ: CPT

## 2022-08-08 PROCEDURE — 96367 TX/PROPH/DG ADDL SEQ IV INF: CPT

## 2022-08-08 PROCEDURE — 96375 TX/PRO/DX INJ NEW DRUG ADDON: CPT

## 2022-08-08 RX ORDER — SODIUM CHLORIDE 9 MG/ML
20 INJECTION, SOLUTION INTRAVENOUS ONCE
Status: COMPLETED | OUTPATIENT
Start: 2022-08-08 | End: 2022-08-08

## 2022-08-08 RX ADMIN — ONDANSETRON 8 MG: 2 INJECTION INTRAMUSCULAR; INTRAVENOUS at 10:16

## 2022-08-08 RX ADMIN — Medication 188 MG: at 11:10

## 2022-08-08 RX ADMIN — GEMCITABINE HYDROCHLORIDE 1500 MG: 1 INJECTION, SOLUTION INTRAVENOUS at 12:17

## 2022-08-08 RX ADMIN — SODIUM CHLORIDE 20 ML/HR: 0.9 INJECTION, SOLUTION INTRAVENOUS at 10:15

## 2022-08-08 RX ADMIN — DEXAMETHASONE SODIUM PHOSPHATE 10 MG: 10 INJECTION, SOLUTION INTRAMUSCULAR; INTRAVENOUS at 10:31

## 2022-08-08 NOTE — PROGRESS NOTES
Outpatient Oncology Nutrition Consultation   Type of Consult: Follow Up  Care Location: Infusion Center    Reason for referral: Received notification by Dr Griffin Patrick on 6/21/22 that pt has triggered for oncology nutrition care (reason for referral: Dr Griffin Patrick request due to weight loss)      Nutrition Assessment:   Oncology Diagnosis & Treatments: dx with pancreatic cancer 6/2022  -port placement 6/28  -currently receiving chemo (abraxane, gemzar) started 6/29/22  Oncology History   Primary pancreatic adenocarcinoma ([facility] [address] )   6/6/2022 -  Cancer Staged    Staging form: Anus, AJCC 8th Edition  - Clinical stage from 6/6/2022: Stage IV (cT3, cN1a, pM1) - Signed by Marisol Salamanca MD on 6/12/2022  Stage prefix: Initial diagnosis  Sites of metastasis: Liver  Source of metastatic specimen: Bone, Aortic Lymph Nodes       6/12/2022 Initial Diagnosis    Primary pancreatic neuroendocrine tumor     6/29/2022 -  Chemotherapy    paclitaxel protein-bound (ABRAXANE) IVPB, 125 mg/m2 = 188 mg, Intravenous, Once, 2 of 6 cycles  Administration: 188 mg (6/29/2022), 188 mg (7/6/2022), 188 mg (7/12/2022), 188 mg (7/25/2022), 188 mg (8/1/2022)  gemcitabine (GEMZAR) infusion, 1,000 mg/m2 = 1,500 mg, Intravenous, Once, 2 of 6 cycles  Administration: 1,500 mg (6/29/2022), 1,500 mg (7/6/2022), 1,500 mg (7/12/2022), 1,500 mg (7/25/2022), 1,500 mg (8/1/2022)       Past Medical & Surgical Hx:   Patient Active Problem List   Diagnosis    UTI (urinary tract infection)    Chest pain    Elevated TSH    Elevated brain natriuretic peptide (BNP) level    Elevated d-dimer    Iron deficiency anemia    Hypokalemia    Chronic pain of left ankle    Weight loss    Stress at home   [facility] annual wellness visit, subsequent    Protein-calorie malnutrition, unspecified severity ([facility] [address] )    Pancreatic mass    Pericardial effusion    Anemia    Severe protein-calorie malnutrition ([facility] [address] )    Primary pancreatic adenocarcinoma ([facility] [address] )    Anxiety    Nausea    Cancer related pain    Counseling regarding advanced care planning and goals of care    Chemotherapy induced nausea and vomiting    Pancreatic insufficiency    Dysgeusia    Osseous metastasis (HCC)     No past medical history on file  Past Surgical History:   Procedure Laterality Date    CARDIAC CATHETERIZATION N/A 3/30/2022    Procedure: Cardiac catheterization;  Surgeon: Federico Monahan DO;  Location: [facility]; Service: Cardiology    CARDIAC CATHETERIZATION N/A 3/30/2022    Procedure: Cardiac Coronary Angiogram;  Surgeon: Federico Monahan DO;  Location: [facility];   Service: Cardiology    IR BIOPSY LIVER MASS  6/6/2022    IR PORT PLACEMENT  6/28/2022       Review of Medications:   Vitamins, Supplements and Herbals: Med List Reviewed & pt is only taking: vitamin B, D, iron, omega-3 , Discussed the potential interactions of high dose antioxidants with cancer tx and recommended exercising caution when taking these supplements and Discussed reading supplement labels and making sure supplements have <100% of the daily value for all nutrients to avoid over supplementation    Current Outpatient Medications:     acetaminophen (TYLENOL) 500 mg tablet, Take 500 mg by mouth every 6 (six) hours as needed for mild pain, Disp: , Rfl:     Cholecalciferol (Vitamin D3) 50 MCG (2000 UT) TABS, Take by mouth  , Disp: , Rfl:     cyanocobalamin (VITAMIN B-12) 500 MCG tablet, Take 500 mcg by mouth daily, Disp: , Rfl:     ferrous sulfate 325 (65 Fe) mg tablet, Take 325 mg by mouth daily with breakfast, Disp: , Rfl:     COLLADO SEAL ROOT PO, Take 900 mg by mouth daily Echinaceal/goldenseal blend, Disp: , Rfl:     lidocaine-prilocaine (EMLA) cream, Apply topically as needed for mild pain, Disp: 30 g, Rfl: 0    Omega-3 Fatty Acids (OMEGA 3 PO), Take 1,000 mg by mouth, Disp: , Rfl:     ondansetron (Zofran ODT) 4 mg disintegrating tablet, Take 1 tablet (4 mg total) by mouth every 8 (eight) hours as needed for nausea or vomiting, Disp: 20 tablet, Rfl: 0    oxyCODONE (ROXICODONE) 5 immediate release tablet, Take 0 5 tablets (2 5 mg total) by mouth every 6 (six) hours as needed for moderate pain Max Daily Amount: 10 mg, Disp: 15 tablet, Rfl: 0    pancrelipase, Lip-Prot-Amyl, (CREON) 24,000 units, Take 24,000 units of lipase by mouth 3 (three) times a day with meals, Disp: 90 capsule, Rfl: 0    pantoprazole (PROTONIX) 20 mg tablet, Take 1 tablet (20 mg total) by mouth daily, Disp: 60 tablet, Rfl: 3    potassium chloride (K-DUR,KLOR-CON) 20 mEq tablet, Take 1 tablet (20 mEq total) by mouth daily, Disp: 20 tablet, Rfl: 0    Pyridoxine HCl (VITAMIN B-6 PO), Take 1 tablet by mouth, Disp: , Rfl:     Vitamin E 400 units TABS, Take by mouth, Disp: , Rfl:   No current facility-administered medications for this visit      Facility-Administered Medications Ordered in Other Visits:     gemcitabine (GEMZAR) 1,500 mg in sodium chloride 0 9 % 250 mL infusion, 1,000 mg/m2 (Treatment Plan Recorded), Intravenous, Once, Quin Arango MD    Most Recent Lab Results:   Lab Results   Component Value Date    WBC 11 37 (H) 08/06/2022    NEUTROABS 9 81 (H) 08/06/2022    IRON 26 (L) 05/30/2022    TIBC 186 (L) 05/30/2022    FERRITIN 159 05/30/2022    CHOLESTEROL 119 03/29/2022    TRIG 74 03/29/2022    HDL 46 (L) 03/29/2022    LDLCALC 58 03/29/2022     (H) 08/06/2022     (H) 08/06/2022    ALB 2 8 (L) 08/06/2022    SODIUM 131 (L) 08/06/2022    SODIUM 136 07/30/2022    K 4 5 08/06/2022    K 4 3 07/30/2022    CL 99 08/06/2022    BUN 20 08/06/2022    BUN 19 07/30/2022    CREATININE 0 70 08/06/2022    CREATININE 0 77 07/30/2022    EGFR 85 08/06/2022    GLUF 92 08/06/2022    GLUF 92 07/30/2022    GLUC 132 07/06/2022    HGBA1C 5 5 04/11/2022    CALCIUM 9 1 08/06/2022    MG 2 0 06/01/2022       Anthropometric Measurements:   Height: 64 "  Ht Readings from Last 1 Encounters:   08/08/22 5' 3 98" (1 625 m) Wt Readings from Last 20 Encounters:   08/08/22 47 3 kg (104 lb 2 7 oz)   08/02/22 47 9 kg (105 lb 8 oz)   08/01/22 47 5 kg (104 lb 11 5 oz)   07/29/22 47 5 kg (104 lb 12 8 oz)   07/25/22 47 6 kg (104 lb 15 oz)   07/15/22 47 6 kg (105 lb)   07/12/22 46 kg (101 lb 6 6 oz)   07/06/22 49 5 kg (109 lb 2 oz)   07/05/22 49 kg (108 lb)   06/29/22 49 3 kg (108 lb 11 oz)   06/28/22 48 1 kg (106 lb)   06/24/22 49 7 kg (109 lb 9 6 oz)   06/22/22 48 1 kg (106 lb)   06/21/22 48 3 kg (106 lb 8 oz)   06/13/22 49 7 kg (109 lb 9 6 oz)   06/06/22 50 3 kg (111 lb)   06/02/22 50 3 kg (110 lb 14 3 oz)   05/30/22 52 2 kg (115 lb)   05/25/22 52 2 kg (115 lb)   05/23/22 52 4 kg (115 lb 9 6 oz)       Weight History:    Usual Weight: 145#   Home Scale: n/a    Oncology Nutrition-Anthropometrics    Flowsheet Row Nutrition from 8/8/2022 in [address] [place] [address] [facility] Nutrition from 7/25/2022 in [address] [place] [address] Oncology Dietitian Services   Patient age (years): 78 years 78 years   Patient (female) height (in): 59 in 59 in   Current Weight to be used for anthropometric calculations (lbs) 104 2 lbs 104 9 lbs   Current Weight to be used for anthropometric calculations (kg) 47 4 kg 47 7 kg   BMI: 17 9 18   IBW female: 120 lbs 120 lbs   IBW (kg) female: 54 5 kg 54 5 kg   IBW % (female) 86 8 % 87 4 %   Adjusted BW (female): 116 1 lbs 116 2 lbs   Adjusted BW kg (female): 52 8 kg 52 8 kg   % weight change after 1 week: -0 5 % --   Weight change after 1 week (lbs) -0 5 lbs --   % weight change after 1 month: -4 5 % -4 2 %   Weight change after 1 month (lbs) -4 9 lbs -4 6 lbs   % weight change after 3 months: -- -17 1 %   Weight change after 3 months (lbs) -- -21 6 lbs          Nutrition-Focused Physical Findings: moderate muscle depletion ([place]) - hollowing/scooping/depression, moderate muscle depletion (Clavicle) - protruding/visible bone and moderate body fat depletion (Triceps) - space between folds/fingers    Food/Nutrition-Related History & Client/Social History:    Current Nutrition Impact Symptoms:  [x] Nausea -sometimes  Has Zofran if needed [x] Reduced Appetite -fluctuates [x] Acid Reflux -takes protonix daily   [] Vomiting  [x] Unintended Wt Loss  [] Malabsorption    [x] Diarrhea -ongoing for months, but starting to improve now that on Creon  Stools are more formed now  -had some diarrhea earlier this week [] Unintended Wt Gain  [] Dumping Syndrome    [] Constipation  [] Thick Mucous/Secretions  [x] Abdominal Pain    [x] Dysgeusia (Altered Taste) -using plastic silverware which has helped [] Xerostomia (Dry Mouth)  [] Gas    [] Dysosmia (Altered Smell)  [] Thrush  [] Difficulty Chewing    [] Oral Mucositis (Sore Mouth)  [x] Fatigue  [] Hyperglycemia    [] Odynophagia  [] Esophagitis  [x] Other: takes Creon   [] Dysphagia  [x] Early Satiety  [] No Problems Eating      Food Allergies & Intolerances: no    Current Diet: Regular Diet, No Restrictions  Current Nutrition Intake: Increased since last visit  Appetite: Good, Fair , Fluctuating  Nutrition Route: PO  Oral Care: brushes daily  Activity level: ADLs, has to rest more than she used to  Was working full time up until May 2022  Stopped working    24 Hr Diet Recall:   Breakfast: today had yogurt, and 1/2 english muffin with butter and jelly   Usually will have cereal (rice krispies or corn chex) with a banana and whole milk  Sometimes will have Lithuanian toast or pancake  Snack: Ensure max protein, cottage cheese with fruit   Lunch: yesterday had 1/2 chicken BBQ sandwich and chips and pudding  Snack: PB with santhosh crackers and whole milk   OR lunchable (ham and cheese)  Dinner: hard shell taco (with meat and toppings), and some rice   Snack: small amount of ice cream or piece of cake    Beverages: carbonated water (2L/day), gatorade (20oz x1), whole milk (8oz x2), oral nutrition supplement (8oz x1-2)  Supplements: usually 1 or 2 per day    Ensure High Protein (8 oz, 160 kcal, 16 g pro) 1-2 per day  [employer] Essentials (0hwd=896 kcal, 5 g pro) mixed with whole milk      Oncology Nutrition-Estimated Needs    Flowsheet Row Nutrition from 2022 in [facility]   Weight type used Actual weight   Weight in kilograms (kg) used for estimated needs 49 3 kg   Energy needs formula:  35-40 kcal/kg   Energy needs based on 35 kcal/k kcal   Energy needs based on 40 kcal/k kcal   Protein needs formula: 1 5-2 g/kg   Protein needs based on 1 5 g/kg 74 g   Protein needs based on 2 g/kg 99 g   Fluid needs formula: 30-35 mL/kg   Fluid needs based on 30 mL/kg 1473 mL   Fluid needs in ounces 50 oz   Fluid needs based on 35 mL/kg 1719 mL   Fluid needs in ounces 58 oz           Discussion & Intervention:   Jaqui Johnson was evaluated today for an RD follow up regarding wt loss, poor po intake and nutrition impact sx management  Jaqui Johnson is currently undergoing tx for pancreatic cancer  Met with Jaquiblanca Rubinfrankie today during her infusion  Daughter was also present  She states she's doing OK, her appetite continues to fluctuate  She tries to eat small, frequent meals throughout the day and chooses high calorie/high protein foods like peanut butter, cottage cheese, whole milk  Her daughter states that they plan on setting an alarm to eat every ~2 hours to ensure she's eating throughout the day  She has been maintaining her weight around 104# over the last few weeks  She is drinking Ensure max protein and CIB  Discussed higher calorie supplements  Jaqui Johnson only likes Ensure max protein, Ensure enlive, and CIB, but has not been able to find the ensure enlive in stores recently  She also likes Ensure pudding but has been unable to find this as well  Reviewed 24 hour recall, which revealed an inadequate po intake, and discussed ways to increase kcal, protein, and fluid intakes and optimize nutrient intake    Also reviewed the importance of wt management throughout the tx process and the role of a high kcal/ high protein diet in managing wt and overall health  Based on today's assessment, discussion included: MNT for: N/V, diarrhea, early satiety, how to modify foods for anticipated nutrition impact symptoms pt may experience during CA tx, a high kcal/protein diet & food choices to include at all meals & snacks (Examples of high kcal foods: cheese, full-fat dairy products, nut butter, plant-based fats, coconut oil/milk, avocado, butter, cream soup, etc  Examples of high protein foods: eggs, chicken, fish, beans/legumes, nuts/nut butters, bone broth, etc ) , fortifying foods for added kcal and protein (examples include: adding cheese to foods such as eggs, mashed potatoes, casseroles, etc ; Making oatmeal with whole milk rather than water; Making fortified mashed potatoes with cream, butter, dry milk powder, plain [place] yogurt, and cheese ), adequate hydration & fluid choices, sipping on calorie containing beverages (examples include: adding whole milk or cream to coffee, oral nutrition supplements, juice, electrolyte replacement beverages, milk, etc ), eating smaller more frequent meals every 2-3 hours (5-6 small meals/day), continuing and trying other oral nutrition supplements and adding extra fat to foods while cooking such as butter, plant-based oil, coconut oil/milk, avocado, nut butters, etc    Moving forward, Cassie Charles was encouraged to increase kcal, protein, and fluid intakes   Materials Provided: Ensure Coupons and high calorie/high protein recipes  All questions and concerns addressed during todays visit  Cassie Charles has RD contact information  Nutrition Diagnosis:    Inadequate Energy Intake related to physiological causes, disease state and treatment related issues as evidenced by food recall, wt loss and discussion with pt and/or family     Increased Nutrient Needs (kcal & pro) related to increased demand for nutrients and disease state as evidenced by cancer dx and pt undergoing tx for cancer   Patient has clinical indicators (or ASPEN criteria) consistent with severe protein-calorie malnutrition in the context of Chronic Illness as evidenced by >5% wt loss in 1 month and </=75% energy intake vs  Estimated needs for >/=1 month  Monitoring & Evaluation:   Goals:  · weight maintenance/stabilization  · adequate nutrition impact symptom management  · pt to meet >/=75% estimated nutrition needs daily    · Progress Towards Goals: Progressing    Nutrition Rx & Recommendations:  · Diet: High Calorie, High Protein (for high calorie foods see pages 52-53, and for high protein foods see pages 49-51 in your Eating Hints book)  · Keep a daily food journal (pen/paper, juan such as Zevia)  · Nutrition Supplements: Continue CIB with whole milk, and Ensure max protein  If able to find Ensure Enlive, would recommend drinking this as it is higher in calories     May use homemade shakes/smoothies as desired  If using a pre-made shake/bar, choose ones with >300 calories and >10 grams protein (ex  Ensure Complete, Ensure Enlive, Ensure Plus, Boost Plus, Boost Very High Calorie, etc )  · Small, frequent meals/snacks may be easier to tolerate than 3 large daily meals  Aim for 5-6 small meals per day (every 2-3 hours)  · Include protein at all meals/snacks  · Include a variety of foods (as tolerated/allowed by diet)  · Incorporate physical activity as able/allowed  · Stay hydrated by sipping fluids of choice/tolerance throughout the day  · Liquid nutrition may be better tolerated than solids at times  · Alter food choices and eating patterns to accommodate changing needs  · Refer to Eating Hints book for other meal/snack ideas and symptom management    · For appetite loss: try powdered or liquid nutrition supplements; eating by the clock every 2-3 hours; set a timer to remind yourself to eat, keep snacks nearby; add extra protein & calories to your diet; drink liquids in between meals, choose liquids that add calories; eat a bedtime snack; eat soft, cool or frozen foods; eat a larger meal when you feel well & rested; only sip small amounts of liquids during meals (see pages 10-12 in your Eating Hints book)  · For weight loss: monitor your weight at home at least 2x/week, record your weight, start by adding 250-500 extra calories per day, eat 5-6 small scheduled meals every 2-3 hrs, choose foods that are high in protein and calories (see pages 49-53 in your Eating Hints book), drink liquids with calories for example: milkshakes/smoothies/juice/soup/whole milk/chocolate milk, cook with protein fortified milk (see recipe on page 36 in your Eating Hints book), consider ready-to-drink oral nutrition supplements such as Ensure Plus, Ensure Enlive, Boost Plus, or Boost Very High Calorie, avoid "diet" and "light" foods when possible, avoid drinking too much with meals, contact your dietitian with any continued weight loss over the course of 1 week  For more info see pages 35-37 in your Eating Hints book  · For diarrhea: drink plenty of fluids (>64 oz/day), avoid carbonation; eat 5-6 small meals/day; include high sodium & potassium foods/liquids (Sodium: soup/broth; Potassium: bananas, canned apricots, potatoes); eat low-fiber foods; choose foods/liquids that are room temperature; avoid foods/drinks that can make diarrhea worse (ex  high fiber, high sugar, hot/cold drinks, greasy/fatty foods, gas forming foods such as beans, raw produce, milk products, alcohol, spicy foods, caffeine, sugar alcohols (xylitol, sorbitol), and apple juice (high in sorbitol) (see pages 15-16 in your Eating Hints book)  If diarrhea is severe, consider adding Banatrol (banana flakes) 1-3 times per day mixed in applesauce (can be purchased online from [address])  · Try adding soluble fiber: applesauce, banana, oatmeal, potatoes, rice, etc  to help thicken stools    · For nausea/vomiting: try plain/bland foods; try lemon/lime flavors; eat 5-6 small meals/day (except when vomiting); do not skip meals/snacks; choose appealing foods; sip only small amounts of liquids during meals; stay hydrated in between meals; eat foods/drinks that are room temperature; eat dry toast/crackers (see pages  21-22 and 31-32 in your Eating Hints book)  · Weigh yourself regularly  If you notice weight loss, make an effort to increase your daily food/calorie intake  If you continue to notice loss after these efforts, reach out to your dietitian to establish a plan to stabilize weight  · Consider stopping all high dose antioxidant supplements (vitamin A, C, E, selenium, etc )  Refer to [facility] "About Herbs" website for more information on the safety of supplements  · High calorie foods to try: add extra fats to foods (extra olive oil, avocado oil, butter), add avocado or cheese to foods, use extra sauce/gravies, whole milk products, oral nutrition supplements, nut butters  (see pages 52-53 in your Eating Hints book)  · High protein foods to try: whole milk, fairlife milk, switch to greek yogurt, nut butters, oral nutrition supplements, protein powder (see pages 49-51 in your Eating Hints book)   · Discussed ways to add calories to Ensure max protein (add peanut butter, banana, whole milk + blend in )  · Try high calorie/high protein recipes provided today       Follow Up Plan: 8/22/22 during infusion  Recommend Referral to Other Providers: none at this time

## 2022-08-08 NOTE — PROGRESS NOTES
OSW team met with pt during her infusion, as pt requested to see OSW  Pt had questions regarding the waiver program, as pt is having more difficulty caring for herself  We educated on the process and offered to email viral  OSW emailed viral for a referral  Pt also stated that she has been having difficulty paying her electric bill, as her mother had been living there and could only have the heat set to a certain temperature, which increased the bill significantly  Pt states that she did not assist her with the electric bill at all, and she has also been stuck with a $200 00 bill from a storage company  Her mother has recently moved, but unfortunately she is still dealing with paying for these  We educated on compassion funds and encouraged her to bring the bills in with her the next time, or drop them off if they do not want to wait until her next treatment  Pt was appreciative  OSW provided contact number and encouraged her to call with any needs

## 2022-08-08 NOTE — PATIENT INSTRUCTIONS
Nutrition Rx & Recommendations:  Diet: High Calorie, High Protein (for high calorie foods see pages 52-53, and for high protein foods see pages 49-51 in your Eating Hints book)  Keep a daily food journal (pen/paper, juan such as [employer])  Nutrition Supplements: Continue CIB with whole milk, and Ensure max protein  If able to find Ensure Enlive, would recommend drinking this as it is higher in calories     May use homemade shakes/smoothies as desired  If using a pre-made shake/bar, choose ones with >300 calories and >10 grams protein (ex  Ensure Complete, Ensure Enlive, Ensure Plus, Boost Plus, Boost Very High Calorie, etc )  Small, frequent meals/snacks may be easier to tolerate than 3 large daily meals  Aim for 5-6 small meals per day (every 2-3 hours)  Include protein at all meals/snacks  Include a variety of foods (as tolerated/allowed by diet)  Incorporate physical activity as able/allowed  Stay hydrated by sipping fluids of choice/tolerance throughout the day  Liquid nutrition may be better tolerated than solids at times  Alter food choices and eating patterns to accommodate changing needs  Refer to Eating Hints book for other meal/snack ideas and symptom management  For appetite loss: try powdered or liquid nutrition supplements; eating by the clock every 2-3 hours; set a timer to remind yourself to eat, keep snacks nearby; add extra protein & calories to your diet; drink liquids in between meals, choose liquids that add calories; eat a bedtime snack; eat soft, cool or frozen foods; eat a larger meal when you feel well & rested; only sip small amounts of liquids during meals (see pages 10-12 in your Eating Hints book)    For weight loss: monitor your weight at home at least 2x/week, record your weight, start by adding 250-500 extra calories per day, eat 5-6 small scheduled meals every 2-3 hrs, choose foods that are high in protein and calories (see pages 49-53 in your Eating Hints book), drink liquids with calories for example: milkshakes/smoothies/juice/soup/whole milk/chocolate milk, cook with protein fortified milk (see recipe on page 36 in your Eating Hints book), consider ready-to-drink oral nutrition supplements such as Ensure Plus, Ensure Enlive, Boost Plus, or Boost Very High Calorie, avoid "diet" and "light" foods when possible, avoid drinking too much with meals, contact your dietitian with any continued weight loss over the course of 1 week  For more info see pages 35-37 in your Eating Hints book  For diarrhea: drink plenty of fluids (>64 oz/day), avoid carbonation; eat 5-6 small meals/day; include high sodium & potassium foods/liquids (Sodium: soup/broth; Potassium: bananas, canned apricots, potatoes); eat low-fiber foods; choose foods/liquids that are room temperature; avoid foods/drinks that can make diarrhea worse (ex  high fiber, high sugar, hot/cold drinks, greasy/fatty foods, gas forming foods such as beans, raw produce, milk products, alcohol, spicy foods, caffeine, sugar alcohols (xylitol, sorbitol), and apple juice (high in sorbitol) (see pages 15-16 in your Eating Hints book)  If diarrhea is severe, consider adding Banatrol (banana flakes) 1-3 times per day mixed in applesauce (can be purchased online from [address] [place] [address])  Try adding soluble fiber: applesauce, banana, oatmeal, potatoes, rice, etc  to help thicken stools  For nausea/vomiting: try plain/bland foods; try lemon/lime flavors; eat 5-6 small meals/day (except when vomiting); do not skip meals/snacks; choose appealing foods; sip only small amounts of liquids during meals; stay hydrated in between meals; eat foods/drinks that are room temperature; eat dry toast/crackers (see pages  21-22 and 31-32 in your Eating Hints book)  Weigh yourself regularly  If you notice weight loss, make an effort to increase your daily food/calorie intake   If you continue to notice loss after these efforts, reach out to your dietitian to establish a plan to stabilize weight  Consider stopping all high dose antioxidant supplements (vitamin A, C, E, selenium, etc )  Refer to [facility] "About Herbs" website for more information on the safety of supplements  High calorie foods to try: add extra fats to foods (extra olive oil, avocado oil, butter), add avocado or cheese to foods, use extra sauce/gravies, whole milk products, oral nutrition supplements, nut butters  (see pages 52-53 in your Eating Hints book)  High protein foods to try: whole milk, fairlife milk, switch to greek yogurt, nut butters, oral nutrition supplements, protein powder (see pages 49-51 in your Eating Hints book)   Discussed ways to add calories to Ensure max protein (add peanut butter, banana, whole milk + blend in )  Try high calorie/high protein recipes provided today       Follow Up Plan: 8/22/22 during infusion  Recommend Referral to Other Providers: none at this time

## 2022-08-15 ENCOUNTER — TELEPHONE (OUTPATIENT)
Dept: PALLIATIVE MEDICINE | Facility: CLINIC | Age: 77
End: 2022-08-15
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
## 2022-08-15 NOTE — TELEPHONE ENCOUNTER
Please call patient back and let her know that second had smoke is bad regardless of medical history, but may be even more so with her currentt condition  Advise to ask  to smoke outside the house if unable to stop all together   Thank you
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
## 2022-08-16 ENCOUNTER — PATIENT OUTREACH (OUTPATIENT)
Dept: CASE MANAGEMENT | Facility: OTHER | Age: 77
End: 2022-08-16

## 2022-08-16 DIAGNOSIS — Z78.9 NEED FOR FOLLOW-UP BY SOCIAL WORKER: Primary | ICD-10-CM

## 2022-08-16 NOTE — PROGRESS NOTES
OSW returned pts TC from last evening  OSW left a detailed message requesting a return call  OSW provided call back information
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
## 2022-08-17 ENCOUNTER — PATIENT OUTREACH (OUTPATIENT)
Dept: CASE MANAGEMENT | Facility: OTHER | Age: 77
End: 2022-08-17
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
## 2022-08-18 ENCOUNTER — OFFICE VISIT (OUTPATIENT)
Dept: PALLIATIVE MEDICINE | Facility: CLINIC | Age: 77
End: 2022-08-18
Payer: COMMERCIAL

## 2022-08-18 ENCOUNTER — TELEPHONE (OUTPATIENT)
Dept: HEMATOLOGY ONCOLOGY | Facility: CLINIC | Age: 77
End: 2022-08-18

## 2022-08-18 VITALS
SYSTOLIC BLOOD PRESSURE: 100 MMHG | WEIGHT: 108.2 LBS | BODY MASS INDEX: 18.59 KG/M2 | HEART RATE: 113 BPM | DIASTOLIC BLOOD PRESSURE: 60 MMHG | TEMPERATURE: 97.5 F | OXYGEN SATURATION: 97 %

## 2022-08-18 DIAGNOSIS — C25.9 PRIMARY PANCREATIC ADENOCARCINOMA (HCC): ICD-10-CM

## 2022-08-18 DIAGNOSIS — Z71.89 COUNSELING REGARDING ADVANCED CARE PLANNING AND GOALS OF CARE: ICD-10-CM

## 2022-08-18 DIAGNOSIS — K86.89 PANCREATIC INSUFFICIENCY: Primary | ICD-10-CM

## 2022-08-18 DIAGNOSIS — G89.3 CANCER RELATED PAIN: ICD-10-CM

## 2022-08-18 PROCEDURE — 99214 OFFICE O/P EST MOD 30 MIN: CPT | Performed by: INTERNAL MEDICINE

## 2022-08-18 NOTE — PROGRESS NOTES
-Palliative and [address] 78 y o  female [phone]    Assessment/Plan:  1  Pancreatic insufficiency    2  Primary pancreatic adenocarcinoma (HCC)    3  Cancer related pain    4  Counseling regarding advanced care planning and goals of care      · Continue creon 24,000 units with meals, but observe response to lower dose  Advise to start cutting back to BID and observe recurrence of diarrhea  · Continue 2 5mg PO oxyIR q6H prn cancer pain  Feels dose is enough to control pain but makes her very sleepy and tired  · No refills for now, has enough from previous script, she knows to call for refills  · Miralax daily prn to prevent OIC   · Increase protonix 20mg to BID   · 5 Wishes already completed 6/24  · RTO in October, to follow up on pain and oxy  Controlled Substance Review    [place] PDMP or [place]  reviewed: No red flags were identified; safe to proceed with prescription  [place] [facility] 06/02/2022  1   06/02/2022  Oxycodone Hcl (Ir) 5 MG Tablet    15 00  7 Meghan Zee   [id number]   Jeanie ([id number] 50 13 MME      Requested Prescriptions      No prescriptions requested or ordered in this encounter     There are no discontinued medications  Representatives have queried the patient's controlled substance dispensing history in the Prescription Drug Monitoring Program in compliance with regulations before I have prescribed any controlled substances  The prescription history is consistent with prescribed therapy and our practice policies        30 minutes were spent face to face with Miah Li and her daughter with greater than 50% of the time spent in counseling or coordination of care including discussions of etiology of diagnosis, pathogenesis of diagnosis, diagnostic results, impression, and recommendations, risks and benefits of treatment, instructions for disease self management, treatment instructions, follow up requirements, risk factors and risk reduction of disease, patient and family counseling/involvement in care, compliance with treatment regimen and advanced care planning  All of the patient's questions were answered during this discussion  No follow-ups on file  Subjective:   Chief Complaint  Follow up visit for:  symptom management, goal of care assessment and decisional support, disease process education and discussion of prognosis, advance care planning, emotional support in the setting of serious illness  Fatigue  Associated symptoms include abdominal pain and fatigue  Pertinent negatives include no chest pain, nausea, vomiting or weakness  Chino Sanchez is a 78 y o  female with palliative diagnosis of metastatic pancreatic adenocarcinoma with liver, lung, bone, and retroperitoneal LN involvement  She is currently on gemcitabine+abraxane that was started on 6/29/2022  She is known to [facility] for supportive cares  She returns today with her daughter who is now staying with her and plans to move in soon  Patient's  left her and she is dealing with the stress of the aftermath  But overall, felt relieved because he relied on her for everything even after she was diagnosed with cancer  Apparently, her  never helped her out and still expected her to cater to his own medical issues  She continues to have pain in her abdomen  She does not take oxycodone regularly, because while it was helpful, it made her very sleepy  She should continue if needed as side effects may not be avoidable  She voiced understanding  She wonders if she can take protonix BID and I agree with this  She is no longer having diarrhea and we discussed weaning down her creon  She actually forgot a dose not too long ago and there was no reported diarrhea  I encouraged her to start weaning and observe  She will have an upcoming imaging to assess response to treatment and subsequent oncology visit to presumably discuss   further treatments  I will see her afterwards      ACP/Social: ACP discussed in detail  5 wishes signed and witnessed on 6/24  Original copy sent to her with 2 additional copies for her HCRs  A copy was scanned in the chart  She appoints her son/Miguel as her 1st agent, daughter/Katty as her 2nd agent  We removed her  as 3rd given pedro's dementia that is worsening per her report   do not sound like he has capacity to follow her decisions on her behalf  In those 3 different scenarios, she chooses to receive CPR if beneficial  If started, she wants it stopped if her doctors feel that they are no longer helping her  She also does not want to have a feeding tube or tracheostomy  The following portions of the medical history were reviewed: past medical history, problem list, medication list, and social history      Current Outpatient Medications:     acetaminophen (TYLENOL) 500 mg tablet, Take 500 mg by mouth every 6 (six) hours as needed for mild pain, Disp: , Rfl:     Cholecalciferol (Vitamin D3) 50 MCG (2000 UT) TABS, Take by mouth  , Disp: , Rfl:     cyanocobalamin (VITAMIN B-12) 500 MCG tablet, Take 500 mcg by mouth daily, Disp: , Rfl:     ferrous sulfate 325 (65 Fe) mg tablet, Take 325 mg by mouth daily with breakfast, Disp: , Rfl:     COLLADO SEAL ROOT PO, Take 900 mg by mouth daily Echinaceal/goldenseal blend, Disp: , Rfl:     lidocaine-prilocaine (EMLA) cream, Apply topically as needed for mild pain, Disp: 30 g, Rfl: 0    Omega-3 Fatty Acids (OMEGA 3 PO), Take 1,000 mg by mouth, Disp: , Rfl:     ondansetron (Zofran ODT) 4 mg disintegrating tablet, Take 1 tablet (4 mg total) by mouth every 8 (eight) hours as needed for nausea or vomiting, Disp: 20 tablet, Rfl: 0    oxyCODONE (ROXICODONE) 5 immediate release tablet, Take 0 5 tablets (2 5 mg total) by mouth every 6 (six) hours as needed for moderate pain Max Daily Amount: 10 mg, Disp: 15 tablet, Rfl: 0    pancrelipase, Lip-Prot-Amyl, (CREON) 24,000 units, Take 24,000 units of lipase by mouth 3 (three) times a day with meals, Disp: 90 capsule, Rfl: 0    pantoprazole (PROTONIX) 20 mg tablet, Take 1 tablet (20 mg total) by mouth daily, Disp: 60 tablet, Rfl: 3    potassium chloride (K-DUR,KLOR-CON) 20 mEq tablet, Take 1 tablet (20 mEq total) by mouth daily, Disp: 20 tablet, Rfl: 0    Pyridoxine HCl (VITAMIN B-6 PO), Take 1 tablet by mouth, Disp: , Rfl:     Vitamin E 400 units TABS, Take by mouth, Disp: , Rfl:   Review of Systems   Constitutional: Positive for fatigue and unexpected weight change  Negative for activity change and appetite change  HENT: Negative for trouble swallowing  Respiratory: Negative for shortness of breath  Cardiovascular: Negative for chest pain  Gastrointestinal: Positive for abdominal pain  Negative for abdominal distention, constipation, diarrhea, nausea and vomiting  Musculoskeletal: Negative for back pain  Neurological: Negative for weakness  Psychiatric/Behavioral: Negative for sleep disturbance  The patient is not nervous/anxious  All other systems reviewed and are negative  All other systems negative    Objective:  Vital Signs  /60 (BP Location: Left arm, Cuff Size: Standard)   Pulse (!) 113   Temp 97 5 °F (36 4 °C) (Temporal)   Wt 49 1 kg (108 lb 3 2 oz)   SpO2 97%   BMI 18 59 kg/m²    Physical Exam    Constitutional: Appears thin, well-kempt, pleasant, jovial, appears chronically ill but not toxic appearing  Appears more tired looking than on previous visit  Appears to have lost a bit more weight compared to her last visit  In no acute physical or emotional distress  Head: Normocephalic and atraumatic  temporal mm wasting  Eyes: EOM are normal  No ocular discharge  No scleral icterus  Neck: No visible adenopathy or masses  Respiratory: Effort normal  No stridor  No respiratory distress  Gastrointestinal: No abdominal distension  Musculoskeletal: No edema  Neurological: Alert, oriented and appropriately conversant     Skin: No diaphoresis, no rashes seen on exposed areas of skin  Pale   Psychiatric: Displays a normal mood and affect   Behavior, judgement and thought content appear normal      Mar Buckner MD  Palliative Medicine & Supportive Care  Internal Medicine  Available via [facility] Text  Office: [phone]  Fax: [phone]

## 2022-08-18 NOTE — TELEPHONE ENCOUNTER
Left message on preferred phone to have nonfasting labs done prior to treatment at infusion center scheduled for Monday 8/22/22  Left call back number and hours of operation to further discuss

## 2022-08-19 NOTE — ADDENDUM NOTE
Encounter addended by: Zev Romero, Pharmacist on: 8/19/2022 9:20 AM   Actions taken: i-Vent created or edited

## 2022-08-20 ENCOUNTER — APPOINTMENT (OUTPATIENT)
Dept: LAB | Facility: CLINIC | Age: 77
End: 2022-08-20
Payer: COMMERCIAL

## 2022-08-20 DIAGNOSIS — C25.9 PRIMARY PANCREATIC ADENOCARCINOMA (HCC): ICD-10-CM

## 2022-08-20 DIAGNOSIS — T45.1X5A CHEMOTHERAPY INDUCED NAUSEA AND VOMITING: ICD-10-CM

## 2022-08-20 DIAGNOSIS — R11.2 CHEMOTHERAPY INDUCED NAUSEA AND VOMITING: ICD-10-CM

## 2022-08-20 LAB
ALBUMIN SERPL BCP-MCNC: 2.6 G/DL (ref 3.5–5)
ALP SERPL-CCNC: 982 U/L (ref 46–116)
ALT SERPL W P-5'-P-CCNC: 138 U/L (ref 12–78)
ANION GAP SERPL CALCULATED.3IONS-SCNC: 9 MMOL/L (ref 4–13)
AST SERPL W P-5'-P-CCNC: 227 U/L (ref 5–45)
BASOPHILS # BLD MANUAL: 0.15 THOUSAND/UL (ref 0–0.1)
BASOPHILS NFR MAR MANUAL: 1 % (ref 0–1)
BILIRUB SERPL-MCNC: 0.51 MG/DL (ref 0.2–1)
BUN SERPL-MCNC: 15 MG/DL (ref 5–25)
BURR CELLS BLD QL SMEAR: PRESENT
CALCIUM ALBUM COR SERPL-MCNC: 9.6 MG/DL (ref 8.3–10.1)
CALCIUM SERPL-MCNC: 8.5 MG/DL (ref 8.3–10.1)
CHLORIDE SERPL-SCNC: 101 MMOL/L (ref 96–108)
CO2 SERPL-SCNC: 21 MMOL/L (ref 21–32)
CREAT SERPL-MCNC: 0.87 MG/DL (ref 0.6–1.3)
EOSINOPHIL # BLD MANUAL: 0.15 THOUSAND/UL (ref 0–0.4)
EOSINOPHIL NFR BLD MANUAL: 1 % (ref 0–6)
ERYTHROCYTE [DISTWIDTH] IN BLOOD BY AUTOMATED COUNT: 19.9 % (ref 11.6–15.1)
GFR SERPL CREATININE-BSD FRML MDRD: 65 ML/MIN/1.73SQ M
GLUCOSE P FAST SERPL-MCNC: 192 MG/DL (ref 65–99)
HCT VFR BLD AUTO: 30.1 % (ref 34.8–46.1)
HGB BLD-MCNC: 9.4 G/DL (ref 11.5–15.4)
LYMPHOCYTES # BLD AUTO: 0.44 THOUSAND/UL (ref 0.6–4.47)
LYMPHOCYTES # BLD AUTO: 3 % (ref 14–44)
MCH RBC QN AUTO: 30.7 PG (ref 26.8–34.3)
MCHC RBC AUTO-ENTMCNC: 31.2 G/DL (ref 31.4–37.4)
MCV RBC AUTO: 98 FL (ref 82–98)
METAMYELOCYTES NFR BLD MANUAL: 1 % (ref 0–1)
MONOCYTES # BLD AUTO: 0.73 THOUSAND/UL (ref 0–1.22)
MONOCYTES NFR BLD: 5 % (ref 4–12)
MYELOCYTES NFR BLD MANUAL: 3 % (ref 0–1)
NEUTROPHILS # BLD MANUAL: 12.54 THOUSAND/UL (ref 1.85–7.62)
NEUTS SEG NFR BLD AUTO: 86 % (ref 43–75)
OVALOCYTES BLD QL SMEAR: PRESENT
PLATELET # BLD AUTO: 618 THOUSANDS/UL (ref 149–390)
PLATELET BLD QL SMEAR: ABNORMAL
PMV BLD AUTO: 10.8 FL (ref 8.9–12.7)
POIKILOCYTOSIS BLD QL SMEAR: PRESENT
POLYCHROMASIA BLD QL SMEAR: PRESENT
POTASSIUM SERPL-SCNC: 3.9 MMOL/L (ref 3.5–5.3)
PROT SERPL-MCNC: 6.6 G/DL (ref 6.4–8.4)
RBC # BLD AUTO: 3.06 MILLION/UL (ref 3.81–5.12)
RBC MORPH BLD: PRESENT
SODIUM SERPL-SCNC: 131 MMOL/L (ref 135–147)
WBC # BLD AUTO: 14.58 THOUSAND/UL (ref 4.31–10.16)

## 2022-08-20 PROCEDURE — 80053 COMPREHEN METABOLIC PANEL: CPT

## 2022-08-20 PROCEDURE — 85007 BL SMEAR W/DIFF WBC COUNT: CPT

## 2022-08-20 PROCEDURE — 85027 COMPLETE CBC AUTOMATED: CPT

## 2022-08-20 PROCEDURE — 36415 COLL VENOUS BLD VENIPUNCTURE: CPT

## 2022-08-22 ENCOUNTER — TELEPHONE (OUTPATIENT)
Dept: HEMATOLOGY ONCOLOGY | Facility: CLINIC | Age: 77
End: 2022-08-22

## 2022-08-22 ENCOUNTER — NUTRITION (OUTPATIENT)
Dept: NUTRITION | Facility: CLINIC | Age: 77
End: 2022-08-22

## 2022-08-22 ENCOUNTER — HOSPITAL ENCOUNTER (OUTPATIENT)
Dept: INFUSION CENTER | Facility: CLINIC | Age: 77
Discharge: HOME | DRG: 435 | End: 2022-08-22
Payer: COMMERCIAL

## 2022-08-22 ENCOUNTER — PATIENT OUTREACH (OUTPATIENT)
Dept: CASE MANAGEMENT | Facility: OTHER | Age: 77
End: 2022-08-22

## 2022-08-22 VITALS
DIASTOLIC BLOOD PRESSURE: 67 MMHG | SYSTOLIC BLOOD PRESSURE: 115 MMHG | HEART RATE: 108 BPM | TEMPERATURE: 98.4 F | HEIGHT: 64 IN | BODY MASS INDEX: 18.39 KG/M2 | WEIGHT: 107.7 LBS

## 2022-08-22 DIAGNOSIS — C79.51 OSSEOUS METASTASIS (HCC): ICD-10-CM

## 2022-08-22 DIAGNOSIS — Z78.9 NEED FOR FOLLOW-UP BY SOCIAL WORKER: Primary | ICD-10-CM

## 2022-08-22 DIAGNOSIS — T45.1X5A CHEMOTHERAPY INDUCED NAUSEA AND VOMITING: ICD-10-CM

## 2022-08-22 DIAGNOSIS — R11.2 CHEMOTHERAPY INDUCED NAUSEA AND VOMITING: ICD-10-CM

## 2022-08-22 DIAGNOSIS — Z71.3 NUTRITIONAL COUNSELING: Primary | ICD-10-CM

## 2022-08-22 DIAGNOSIS — C25.9 PRIMARY PANCREATIC ADENOCARCINOMA (HCC): Primary | ICD-10-CM

## 2022-08-22 PROCEDURE — 96375 TX/PRO/DX INJ NEW DRUG ADDON: CPT

## 2022-08-22 PROCEDURE — 96417 CHEMO IV INFUS EACH ADDL SEQ: CPT

## 2022-08-22 PROCEDURE — 96367 TX/PROPH/DG ADDL SEQ IV INF: CPT

## 2022-08-22 PROCEDURE — 96413 CHEMO IV INFUSION 1 HR: CPT

## 2022-08-22 RX ORDER — SODIUM CHLORIDE 9 MG/ML
20 INJECTION, SOLUTION INTRAVENOUS ONCE
Status: CANCELLED | OUTPATIENT
Start: 2022-11-14

## 2022-08-22 RX ORDER — SODIUM CHLORIDE 9 MG/ML
20 INJECTION, SOLUTION INTRAVENOUS ONCE
Status: COMPLETED | OUTPATIENT
Start: 2022-08-22 | End: 2022-08-22

## 2022-08-22 RX ORDER — SODIUM CHLORIDE 9 MG/ML
20 INJECTION, SOLUTION INTRAVENOUS ONCE
Status: DISCONTINUED | OUTPATIENT
Start: 2022-08-22 | End: 2022-08-25 | Stop reason: HOSPADM

## 2022-08-22 RX ADMIN — DEXAMETHASONE SODIUM PHOSPHATE 10 MG: 10 INJECTION, SOLUTION INTRAMUSCULAR; INTRAVENOUS at 10:51

## 2022-08-22 RX ADMIN — ZOLEDRONIC ACID 3 MG: 4 INJECTION, SOLUTION, CONCENTRATE INTRAVENOUS at 13:00

## 2022-08-22 RX ADMIN — ONDANSETRON 8 MG: 2 INJECTION INTRAMUSCULAR; INTRAVENOUS at 10:31

## 2022-08-22 RX ADMIN — SODIUM CHLORIDE 20 ML/HR: 0.9 INJECTION, SOLUTION INTRAVENOUS at 10:29

## 2022-08-22 RX ADMIN — Medication 188 MG: at 11:21

## 2022-08-22 RX ADMIN — GEMCITABINE HYDROCHLORIDE 1500 MG: 1 INJECTION, SOLUTION INTRAVENOUS at 12:26
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
## 2022-08-22 NOTE — TELEPHONE ENCOUNTER
Returned telephone call to patient daughter, Domingo Dunham in regards to s/s patient experiencing  Patient reports Abdominal pain and "warmth feeling" on day 3 of treatment  Patient declined any fevers or any changes in skin appearance  Reviewed that patient would utilize cool compress to help alleviate pain which did not relieve pain this time  Patient daughter stated that the pain and "Warmth" to touch feeling increased this last time and patient took PRN pain medication prescribed by Palliative care  Reviewed [employer] printouts of abraxane and also gemzar  Recommended to reach out to palliative care for additional recommendations and interventions while reaching out to Dr Lam Brasher       Patient daughter appreciative of call

## 2022-08-22 NOTE — PATIENT INSTRUCTIONS
Nutrition Rx & Recommendations:  Diet: High Calorie, High Protein (for high calorie foods see pages 52-53, and for high protein foods see pages 49-51 in your Eating Hints book)  Keep a daily food journal (pen/paper, juan such as [employer])  Nutrition Supplements: Continue CIB with whole milk, and Ensure high protein   May use homemade shakes/smoothies as desired  If using a pre-made shake/bar, choose ones with >300 calories and >10 grams protein (ex  Ensure Complete, Ensure Enlive, Ensure Plus, Boost Plus, Boost Very High Calorie, etc )  Small, frequent meals/snacks may be easier to tolerate than 3 large daily meals  Aim for 5-6 small meals per day (every 2-3 hours)  Include protein at all meals/snacks  Include a variety of foods (as tolerated/allowed by diet)  Stay hydrated by sipping fluids of choice/tolerance throughout the day  Liquid nutrition may be better tolerated than solids at times  Alter food choices and eating patterns to accommodate changing needs  Light physical activity (such as walking) is encouraged, as able/tolerated  Refer to Eating Hints book for other meal/snack ideas and symptom management  Follow proper oral care; Try baking soda/salt water rinse recipe (mix 3/4 tsp salt + 1 tsp baking soda + 1 qt water; rinse with plain water after using) in Eating Hints book (pg 18)  Brush your teeth before/after meals & before bed  For lack of taste: Practice good oral care  Blend fresh fruits into shakes, ice cream or yogurt  Eat frozen fruits: grapes, chopped cantaloupe, watermelon  Select fresh vegetables (may be more appealing than canned/frozen)  Add extra flavor to foods: experiment with spices, salt & sweeteners, extra oil/butter, acidic foods; marinate meats (see pages 29-30 in your Eating Hints book)    For appetite loss: try powdered or liquid nutrition supplements; eating by the clock every 2-3 hours; set a timer to remind yourself to eat, keep snacks nearby; add extra protein & calories to your diet; drink liquids in between meals, choose liquids that add calories; eat a bedtime snack; eat soft, cool or frozen foods; eat a larger meal when you feel well & rested; only sip small amounts of liquids during meals (see pages 10-12 in your Eating Hints book)  For weight loss: monitor your weight at home at least 2x/week, record your weight, start by adding 250-500 extra calories per day, eat 5-6 small scheduled meals every 2-3 hrs, choose foods that are high in protein and calories (see pages 49-53 in your Eating Hints book), drink liquids with calories for example: milkshakes/smoothies/juice/soup/whole milk/chocolate milk, cook with protein fortified milk (see recipe on page 36 in your Eating Hints book), consider ready-to-drink oral nutrition supplements such as Ensure Plus, Ensure Enlive, Boost Plus, or Boost Very High Calorie, avoid "diet" and "light" foods when possible, avoid drinking too much with meals, contact your dietitian with any continued weight loss over the course of 1 week  For more info see pages 35-37 in your Eating Hints book  For diarrhea: drink plenty of fluids (>64 oz/day), avoid carbonation; eat 5-6 small meals/day; include high sodium & potassium foods/liquids (Sodium: soup/broth; Potassium: bananas, canned apricots, potatoes); eat low-fiber foods; choose foods/liquids that are room temperature; avoid foods/drinks that can make diarrhea worse (ex  high fiber, high sugar, hot/cold drinks, greasy/fatty foods, gas forming foods such as beans, raw produce, milk products, alcohol, spicy foods, caffeine, sugar alcohols (xylitol, sorbitol), and apple juice (high in sorbitol) (see pages 15-16 in your Eating Hints book)  If diarrhea is severe, consider adding Banatrol (banana flakes) 1-3 times per day mixed in applesauce (can be purchased online from [address])  Try adding soluble fiber: applesauce, banana, oatmeal, potatoes, rice, etc  to help thicken stools    For nausea/vomiting: try plain/bland foods; try lemon/lime flavors; eat 5-6 small meals/day (except when vomiting); do not skip meals/snacks; choose appealing foods; sip only small amounts of liquids during meals; stay hydrated in between meals; eat foods/drinks that are room temperature; eat dry toast/crackers (see pages  21-22 and 31-32 in your Eating Hints book)  Weigh yourself regularly  If you notice weight loss, make an effort to increase your daily food/calorie intake  If you continue to notice loss after these efforts, reach out to your dietitian to establish a plan to stabilize weight  Consider stopping all high dose antioxidant supplements (vitamin A, C, E, selenium, etc )  Refer to [facility] "About Herbs" website for more information on the safety of supplements    High calorie foods to try: add extra fats to foods (extra olive oil, avocado oil, butter), add avocado or cheese to foods, use extra sauce/gravies, whole milk products, oral nutrition supplements, nut butters  (see pages 52-53 in your Eating Hints book)  High protein foods to try: whole milk, fairlife milk, switch to greek yogurt, nut butters, oral nutrition supplements, protein powder (see pages 49-51 in your Eating Hints book)   Continue eating every ~2 hours, choosing high calorie/high protein snacks/meals     Follow Up Plan: 9/6/22 during infusion  Recommend Referral to Other Providers: none at this time

## 2022-08-22 NOTE — PROGRESS NOTES
Outpatient Oncology Nutrition Consultation   Type of Consult: Follow Up  Care Location: Infusion Center    Reason for referral: Received notification by Dr Star Nicholas on 6/21/22 that pt has triggered for oncology nutrition care (reason for referral: Dr Star Nicholas request due to weight loss)      Nutrition Assessment:   Oncology Diagnosis & Treatments: dx with pancreatic cancer 6/2022  -port placement 6/28  -currently receiving chemo (abraxane, gemzar) started 6/29/22  Oncology History   Primary pancreatic adenocarcinoma ([facility] [address] )   6/6/2022 -  Cancer Staged    Staging form: Anus, AJCC 8th Edition  - Clinical stage from 6/6/2022: Stage IV (cT3, cN1a, pM1) - Signed by Sarita Hanna MD on 6/12/2022  Stage prefix: Initial diagnosis  Sites of metastasis: Liver  Source of metastatic specimen: Bone, Aortic Lymph Nodes       6/12/2022 Initial Diagnosis    Primary pancreatic neuroendocrine tumor     6/29/2022 -  Chemotherapy    paclitaxel protein-bound (ABRAXANE) IVPB, 125 mg/m2 = 188 mg, Intravenous, Once, 3 of 6 cycles  Administration: 188 mg (6/29/2022), 188 mg (7/6/2022), 188 mg (7/12/2022), 188 mg (7/25/2022), 188 mg (8/1/2022), 188 mg (8/8/2022)  gemcitabine (GEMZAR) infusion, 1,000 mg/m2 = 1,500 mg, Intravenous, Once, 3 of 6 cycles  Administration: 1,500 mg (6/29/2022), 1,500 mg (7/6/2022), 1,500 mg (7/12/2022), 1,500 mg (7/25/2022), 1,500 mg (8/1/2022), 1,500 mg (8/8/2022)       Past Medical & Surgical Hx:   Patient Active Problem List   Diagnosis    UTI (urinary tract infection)    Chest pain    Elevated TSH    Elevated brain natriuretic peptide (BNP) level    Elevated d-dimer    Iron deficiency anemia    Hypokalemia    Chronic pain of left ankle    Weight loss    Stress at home   [facility] annual wellness visit, subsequent    Protein-calorie malnutrition, unspecified severity ([facility] [address] )    Pancreatic mass    Pericardial effusion    Anemia    Severe protein-calorie malnutrition ([facility] [address] )    Primary pancreatic adenocarcinoma ([facility] [address] )    Anxiety    Nausea    Cancer related pain    Counseling regarding advanced care planning and goals of care    Chemotherapy induced nausea and vomiting    Pancreatic insufficiency    Dysgeusia    Osseous metastasis (HCC)     No past medical history on file  Past Surgical History:   Procedure Laterality Date    CARDIAC CATHETERIZATION N/A 3/30/2022    Procedure: Cardiac catheterization;  Surgeon: Bianca Mistry DO;  Location: [facility]ARD[facility]; Service: Cardiology    CARDIAC CATHETERIZATION N/A 3/30/2022    Procedure: Cardiac Coronary Angiogram;  Surgeon: Bianca Mistry DO;  Location: [facility];   Service: Cardiology    IR BIOPSY LIVER MASS  6/6/2022    IR PORT PLACEMENT  6/28/2022       Review of Medications:   Vitamins, Supplements and Herbals: Med List Reviewed & pt is only taking: vitamin B, D, iron, omega-3 , Discussed the potential interactions of high dose antioxidants with cancer tx and recommended exercising caution when taking these supplements and Discussed reading supplement labels and making sure supplements have <100% of the daily value for all nutrients to avoid over supplementation    Current Outpatient Medications:     acetaminophen (TYLENOL) 500 mg tablet, Take 500 mg by mouth every 6 (six) hours as needed for mild pain, Disp: , Rfl:     Cholecalciferol (Vitamin D3) 50 MCG (2000 UT) TABS, Take by mouth  , Disp: , Rfl:     cyanocobalamin (VITAMIN B-12) 500 MCG tablet, Take 500 mcg by mouth daily, Disp: , Rfl:     ferrous sulfate 325 (65 Fe) mg tablet, Take 325 mg by mouth daily with breakfast, Disp: , Rfl:     COLLADO SEAL ROOT PO, Take 900 mg by mouth daily Echinaceal/goldenseal blend, Disp: , Rfl:     lidocaine-prilocaine (EMLA) cream, Apply topically as needed for mild pain, Disp: 30 g, Rfl: 0    Omega-3 Fatty Acids (OMEGA 3 PO), Take 1,000 mg by mouth, Disp: , Rfl:     ondansetron (Zofran ODT) 4 mg disintegrating tablet, Take 1 tablet (4 mg total) by mouth every 8 (eight) hours as needed for nausea or vomiting, Disp: 20 tablet, Rfl: 0    oxyCODONE (ROXICODONE) 5 immediate release tablet, Take 0 5 tablets (2 5 mg total) by mouth every 6 (six) hours as needed for moderate pain Max Daily Amount: 10 mg, Disp: 15 tablet, Rfl: 0    pancrelipase, Lip-Prot-Amyl, (CREON) 24,000 units, Take 24,000 units of lipase by mouth 3 (three) times a day with meals, Disp: 90 capsule, Rfl: 0    pantoprazole (PROTONIX) 20 mg tablet, Take 1 tablet (20 mg total) by mouth daily, Disp: 60 tablet, Rfl: 3    potassium chloride (K-DUR,KLOR-CON) 20 mEq tablet, Take 1 tablet (20 mEq total) by mouth daily, Disp: 20 tablet, Rfl: 0    Pyridoxine HCl (VITAMIN B-6 PO), Take 1 tablet by mouth, Disp: , Rfl:     Vitamin E 400 units TABS, Take by mouth, Disp: , Rfl:   No current facility-administered medications for this visit      Facility-Administered Medications Ordered in Other Visits:     dexamethasone (DECADRON) 10 mg in sodium chloride 0 9 % 51 mL IVPB, 10 mg, Intravenous, Once, Kiersten Restrepo MD, Last Rate: 153 mL/hr at 08/22/22 1051, 10 mg at 08/22/22 1051    gemcitabine (GEMZAR) 1,500 mg in sodium chloride 0 9 % 250 mL infusion, 1,000 mg/m2 (Treatment Plan Recorded), Intravenous, Once, Kiersten Restrepo MD    PACLitaxel protein bound (ABRAXANE) 188 mg in IVPB 37 6 mL, 125 mg/m2 (Treatment Plan Recorded), Intravenous, Once, Kiersten Restrepo MD    sodium chloride 0 9 % infusion, 20 mL/hr, Intravenous, Once, Kiersten Restrepo MD    zoledronic acid (ZOMETA) 3 mg in sodium chloride 0 9 % 100 mL IVPB, 3 mg, Intravenous, Once, Kiersten Restrepo MD    Most Recent Lab Results:   Lab Results   Component Value Date    WBC 14 58 (H) 08/20/2022    NEUTROABS 9 81 (H) 08/06/2022    IRON 26 (L) 05/30/2022    TIBC 186 (L) 05/30/2022    FERRITIN 159 05/30/2022    CHOLESTEROL 119 03/29/2022    TRIG 74 03/29/2022    HDL 46 (L) 03/29/2022    LDLCALC 58 03/29/2022    ALT 138 (H) 08/20/2022     (H) 08/20/2022    ALB 2 6 (L) 08/20/2022    SODIUM 131 (L) 08/20/2022    SODIUM 131 (L) 08/06/2022    K 3 9 08/20/2022    K 4 5 08/06/2022     08/20/2022    BUN 15 08/20/2022    BUN 20 08/06/2022    CREATININE 0 87 08/20/2022    CREATININE 0 70 08/06/2022    EGFR 65 08/20/2022    GLUF 192 (H) 08/20/2022    GLUF 92 08/06/2022    GLUC 132 07/06/2022    HGBA1C 5 5 04/11/2022    CALCIUM 8 5 08/20/2022    MG 2 0 06/01/2022       Anthropometric Measurements:   Height: 64 "  Ht Readings from Last 1 Encounters:   08/22/22 5' 3 98" (1 625 m)     Wt Readings from Last 20 Encounters:   08/22/22 48 9 kg (107 lb 11 2 oz)   08/18/22 49 1 kg (108 lb 3 2 oz)   08/08/22 47 3 kg (104 lb 2 7 oz)   08/02/22 47 9 kg (105 lb 8 oz)   08/01/22 47 5 kg (104 lb 11 5 oz)   07/29/22 47 5 kg (104 lb 12 8 oz)   07/25/22 47 6 kg (104 lb 15 oz)   07/15/22 47 6 kg (105 lb)   07/12/22 46 kg (101 lb 6 6 oz)   07/06/22 49 5 kg (109 lb 2 oz)   07/05/22 49 kg (108 lb)   06/29/22 49 3 kg (108 lb 11 oz)   06/28/22 48 1 kg (106 lb)   06/24/22 49 7 kg (109 lb 9 6 oz)   06/22/22 48 1 kg (106 lb)   06/21/22 48 3 kg (106 lb 8 oz)   06/13/22 49 7 kg (109 lb 9 6 oz)   06/06/22 50 3 kg (111 lb)   06/02/22 50 3 kg (110 lb 14 3 oz)   05/30/22 52 2 kg (115 lb)       Weight History:    Usual Weight: 145#   Home Scale: n/a    Oncology Nutrition-Anthropometrics    Flowsheet Row Nutrition from 8/22/2022 in [facility] Nutrition from 8/8/2022 in [address] [place] [address] Oncology Dietitian Services   Patient age (years): 78 years 78 years   Patient (female) height (in): 59 in 59 in   Current Weight to be used for anthropometric calculations (lbs) 107 7 lbs 104 2 lbs   Current Weight to be used for anthropometric calculations (kg) 49 kg 47 4 kg   BMI: 18 5 17 9   IBW female: 120 lbs 120 lbs   IBW (kg) female: 54 5 kg 54 5 kg   IBW % (female) 89 8 % 86 8 %   Adjusted BW (female): 116 9 lbs 116 1 lbs Adjusted BW kg (female): 53 1 kg 52 8 kg   % weight change after 1 week: -- -0 5 %   Weight change after 1 week (lbs) -- -0 5 lbs   % weight change after 1 month: 2 7 % -4 5 %   Weight change after 1 month (lbs) 2 8 lbs -4 9 lbs   % weight change after 3 months: -6 3 % --   Weight change after 3 months (lbs) -7 3 lbs --          Nutrition-Focused Physical Findings: moderate muscle depletion (Temples) - hollowing/scooping/depression, moderate muscle depletion (Clavicle) - protruding/visible bone and moderate body fat depletion (Triceps) - space between folds/fingers    Food/Nutrition-Related History & Client/Social History:    Current Nutrition Impact Symptoms:  [x] Nausea -occasionally, has zofran  -yesterday and today nausea was worse [x] Reduced Appetite -fluctuates [x] Acid Reflux -takes protonix daily   [] Vomiting  [x] Unintended Wt Loss  [] Malabsorption    [x] Diarrhea -ongoing for months, but starting to improve now that on Creon  Stools are more formed now [] Unintended Wt Gain  [] Dumping Syndrome    [] Constipation  [] Thick Mucous/Secretions  [x] Abdominal Pain    [x] Dysgeusia (Altered Taste) -using plastic silverware which has helped [] Xerostomia (Dry Mouth)  [] Gas    [] Dysosmia (Altered Smell)  [] Thrush  [] Difficulty Chewing    [] Oral Mucositis (Sore Mouth)  [x] Fatigue  [] Hyperglycemia    [] Odynophagia  [] Esophagitis  [x] Other: takes Creon   [] Dysphagia  [x] Early Satiety  [] No Problems Eating      Food Allergies & Intolerances: no    Current Diet: Regular Diet, No Restrictions  Current Nutrition Intake: Unchanged from last visit  Appetite: Good, Fair , Fluctuating  Nutrition Route: PO  Oral Care: brushes daily  Activity level: ADLs, has to rest more than she used to  Was working full time up until May 2022   Stopped working    24 Hr Diet Recall:   Breakfast: today had blueberry pancake  Snack: Ensure max protein, banana or peaches  Lunch: yesterday had 1/2 PB & marshmellow sandwich, chips, 1/2 glass milk  Snack:berries, CIB  Dinner: cheeseburger  Snack: small amount of ice cream     Beverages: water (2L/day), gatorade (20oz x1), whole milk (8oz x2), oral nutrition supplement (8oz x1-2), turkey hill iced tea (8oz x1-2)  Supplements: usually 2 per day    Ensure High Protein (8 oz, 160 kcal, 16 g pro) 1-2 per day  [employer] Essentials (4xtk=411 kcal, 5 g pro) mixed with whole milk      Oncology Nutrition-Estimated Needs    Flowsheet Row Nutrition from 2022 in [facility] Oncology Dietitian Services   Weight type used Actual weight   Weight in kilograms (kg) used for estimated needs 49 3 kg   Energy needs formula:  35-40 kcal/kg   Energy needs based on 35 kcal/k kcal   Energy needs based on 40 kcal/k kcal   Protein needs formula: 1 5-2 g/kg   Protein needs based on 1 5 g/kg 74 g   Protein needs based on 2 g/kg 99 g   Fluid needs formula: 30-35 mL/kg   Fluid needs based on 30 mL/kg 1473 mL   Fluid needs in ounces 50 oz   Fluid needs based on 35 mL/kg 1719 mL   Fluid needs in ounces 58 oz           Discussion & Intervention:   Catie Guajardo was evaluated today for an RD follow up regarding wt loss, poor po intake and nutrition impact sx management  Catie Guajardo is currently undergoing tx for pancreatic cancer  Met with Catie Guajardo today during her infusion  Daughter was also present  She states she's doing OK  She has been feeling more nauseous and warm yesterday and today, so visit was cut short d/t her not feeling well  She states her appetite fluctuates but she's still making sure to eat every couple hours  Her weight is up from last visit  She eats 3 meals/day + 2 snacks, and will usually have ice cream for dessert  She also drinks 2 oral nutrition supplements during the day, Ensure high protein and CIB      Reviewed 24 hour recall, which revealed an suboptimal po intake, and discussed ways to increase kcal, protein, and fluid intakes and optimize nutrient intake  Also reviewed the importance of wt management throughout the tx process and the role of a high kcal/ high protein diet in managing wt and overall health  Based on today's assessment, discussion included: MNT for: N/V, diarrhea, early satiety, how to modify foods for anticipated nutrition impact symptoms pt may experience during CA tx, practicing proper oral care, a high kcal/protein diet & food choices to include at all meals & snacks (Examples of high kcal foods: cheese, full-fat dairy products, nut butter, plant-based fats, coconut oil/milk, avocado, butter, cream soup, etc  Examples of high protein foods: eggs, chicken, fish, beans/legumes, nuts/nut butters, bone broth, etc ) , fortifying foods for added kcal and protein (examples include: adding cheese to foods such as eggs, mashed potatoes, casseroles, etc ; Making oatmeal with whole milk rather than water; Making fortified mashed potatoes with cream, butter, dry milk powder, plain [place] yogurt, and cheese ), adequate hydration & fluid choices, sipping on calorie containing beverages (examples include: adding whole milk or cream to coffee, oral nutrition supplements, juice, electrolyte replacement beverages, milk, etc ), eating smaller more frequent meals every 2-3 hours (5-6 small meals/day), continuing oral nutrition supplements and adding extra fat to foods while cooking such as butter, plant-based oil, coconut oil/milk, avocado, nut butters, etc    Moving forward, Jacy Ngo was encouraged to increase kcal, protein, and fluid intakes   Materials Provided: not applicable  All questions and concerns addressed during todays visit  Jacy Ngo has RD contact information  Nutrition Diagnosis:    Inadequate Energy Intake related to physiological causes, disease state and treatment related issues as evidenced by food recall, wt loss and discussion with pt and/or family     Increased Nutrient Needs (kcal & pro) related to increased demand for nutrients and disease state as evidenced by cancer dx and pt undergoing tx for cancer   Patient has clinical indicators (or ASPEN criteria) consistent with severe protein-calorie malnutrition in the context of Chronic Illness as evidenced by >5% wt loss in 1 month and </=75% energy intake vs  Estimated needs for >/=1 month  Monitoring & Evaluation:   Goals:  · weight maintenance/stabilization  · adequate nutrition impact symptom management  · pt to meet >/=75% estimated nutrition needs daily    · Progress Towards Goals: Progressing    Nutrition Rx & Recommendations:  · Diet: High Calorie, High Protein (for high calorie foods see pages 52-53, and for high protein foods see pages 49-51 in your Eating Hints book)  · Keep a daily food journal (pen/paper, juan such as Cyan)  · Nutrition Supplements: Continue CIB with whole milk, and Ensure high protein   May use homemade shakes/smoothies as desired  If using a pre-made shake/bar, choose ones with >300 calories and >10 grams protein (ex  Ensure Complete, Ensure Enlive, Ensure Plus, Boost Plus, Boost Very High Calorie, etc )  · Small, frequent meals/snacks may be easier to tolerate than 3 large daily meals  Aim for 5-6 small meals per day (every 2-3 hours)  · Include protein at all meals/snacks  · Include a variety of foods (as tolerated/allowed by diet)  · Stay hydrated by sipping fluids of choice/tolerance throughout the day  · Liquid nutrition may be better tolerated than solids at times  · Alter food choices and eating patterns to accommodate changing needs  · Light physical activity (such as walking) is encouraged, as able/tolerated  · Refer to Eating Hints book for other meal/snack ideas and symptom management  · Follow proper oral care; Try baking soda/salt water rinse recipe (mix 3/4 tsp salt + 1 tsp baking soda + 1 qt water; rinse with plain water after using) in Eating Hints book (pg 18)  Brush your teeth before/after meals & before bed    · For lack of taste: Practice good oral care  Blend fresh fruits into shakes, ice cream or yogurt  Eat frozen fruits: grapes, chopped cantaloupe, watermelon  Select fresh vegetables (may be more appealing than canned/frozen)  Add extra flavor to foods: experiment with spices, salt & sweeteners, extra oil/butter, acidic foods; marinate meats (see pages 29-30 in your Eating Hints book)  · For appetite loss: try powdered or liquid nutrition supplements; eating by the clock every 2-3 hours; set a timer to remind yourself to eat, keep snacks nearby; add extra protein & calories to your diet; drink liquids in between meals, choose liquids that add calories; eat a bedtime snack; eat soft, cool or frozen foods; eat a larger meal when you feel well & rested; only sip small amounts of liquids during meals (see pages 10-12 in your Eating Hints book)  · For weight loss: monitor your weight at home at least 2x/week, record your weight, start by adding 250-500 extra calories per day, eat 5-6 small scheduled meals every 2-3 hrs, choose foods that are high in protein and calories (see pages 49-53 in your Eating Hints book), drink liquids with calories for example: milkshakes/smoothies/juice/soup/whole milk/chocolate milk, cook with protein fortified milk (see recipe on page 36 in your Eating Hints book), consider ready-to-drink oral nutrition supplements such as Ensure Plus, Ensure Enlive, Boost Plus, or Boost Very High Calorie, avoid "diet" and "light" foods when possible, avoid drinking too much with meals, contact your dietitian with any continued weight loss over the course of 1 week  For more info see pages 35-37 in your Eating Hints book  · For diarrhea: drink plenty of fluids (>64 oz/day), avoid carbonation; eat 5-6 small meals/day; include high sodium & potassium foods/liquids (Sodium: soup/broth;   Potassium: bananas, canned apricots, potatoes); eat low-fiber foods; choose foods/liquids that are room temperature; avoid foods/drinks that can make diarrhea worse (ex  high fiber, high sugar, hot/cold drinks, greasy/fatty foods, gas forming foods such as beans, raw produce, milk products, alcohol, spicy foods, caffeine, sugar alcohols (xylitol, sorbitol), and apple juice (high in sorbitol) (see pages 15-16 in your Eating Hints book)  If diarrhea is severe, consider adding Banatrol (banana flakes) 1-3 times per day mixed in applesauce (can be purchased online from [address] [place] [address])  · Try adding soluble fiber: applesauce, banana, oatmeal, potatoes, rice, etc  to help thicken stools  · For nausea/vomiting: try plain/bland foods; try lemon/lime flavors; eat 5-6 small meals/day (except when vomiting); do not skip meals/snacks; choose appealing foods; sip only small amounts of liquids during meals; stay hydrated in between meals; eat foods/drinks that are room temperature; eat dry toast/crackers (see pages  21-22 and 31-32 in your Eating Hints book)  · Weigh yourself regularly  If you notice weight loss, make an effort to increase your daily food/calorie intake  If you continue to notice loss after these efforts, reach out to your dietitian to establish a plan to stabilize weight  · Consider stopping all high dose antioxidant supplements (vitamin A, C, E, selenium, etc )  Refer to [facility] "About Herbs" website for more information on the safety of supplements    · High calorie foods to try: add extra fats to foods (extra olive oil, avocado oil, butter), add avocado or cheese to foods, use extra sauce/gravies, whole milk products, oral nutrition supplements, nut butters  (see pages 52-53 in your Eating Hints book)  · High protein foods to try: whole milk, fairlife milk, switch to greek yogurt, nut butters, oral nutrition supplements, protein powder (see pages 49-51 in your Eating Hints book)   · Continue eating every ~2 hours, choosing high calorie/high protein snacks/meals     Follow Up Plan: 9/6/22 during infusion  Recommend Referral to Other Providers: none at this time

## 2022-08-22 NOTE — PROGRESS NOTES
Pt arrived to [facility] for treatment  Pt still having abdominal pain, moderate amt of pain  Pt said she is having normal bowel movements and takes pain meds which helps with pain but makes her very sleepy  Pt's daughter also said cold compresses on abdomen used to work but are not working anymore  Pt's daughter said she is going to call Dr Jeanette Clark office to discuss her mother's condition and how to keep her comfortable at home  Pt tolerated treatment today including first day of Zometa  Pt's daughter provided with AVS and aware of next appointments

## 2022-08-22 NOTE — TELEPHONE ENCOUNTER
Returned telephone call to patient daughter post review with Dr Ramos Casiano  No further recommendations at this time  Encouraged patient daughter to reach out to palliative care and to call our office if it continues to occur and or worsens  Patient daughter appreciative of call and agreeable to plan

## 2022-08-23 DIAGNOSIS — K52.9 CHRONIC DIARRHEA: ICD-10-CM

## 2022-08-23 DIAGNOSIS — C25.9 PRIMARY PANCREATIC ADENOCARCINOMA (HCC): ICD-10-CM

## 2022-08-23 DIAGNOSIS — K86.89 PANCREATIC INSUFFICIENCY: ICD-10-CM

## 2022-08-23 RX ORDER — PANCRELIPASE 24000; 76000; 120000 [USP'U]/1; [USP'U]/1; [USP'U]/1
CAPSULE, DELAYED RELEASE PELLETS ORAL
Qty: 90 CAPSULE | Refills: 0 | Status: SHIPPED | OUTPATIENT
Start: 2022-08-23

## 2022-08-24 ENCOUNTER — APPOINTMENT (OUTPATIENT)
Dept: RADIOLOGY | Facility: HOSPITAL | Age: 77
DRG: 435 | End: 2022-08-24
Payer: COMMERCIAL

## 2022-08-24 ENCOUNTER — HOSPITAL ENCOUNTER (INPATIENT)
Facility: HOSPITAL | Age: 77
LOS: 4 days | Discharge: HOME | DRG: 435 | End: 2022-08-28
Attending: EMERGENCY MEDICINE | Admitting: INTERNAL MEDICINE
Payer: COMMERCIAL

## 2022-08-24 ENCOUNTER — TELEPHONE (OUTPATIENT)
Dept: PALLIATIVE MEDICINE | Facility: CLINIC | Age: 77
End: 2022-08-24

## 2022-08-24 ENCOUNTER — APPOINTMENT (EMERGENCY)
Dept: CT IMAGING | Facility: HOSPITAL | Age: 77
DRG: 435 | End: 2022-08-24
Payer: COMMERCIAL

## 2022-08-24 ENCOUNTER — TELEPHONE (OUTPATIENT)
Dept: HEMATOLOGY ONCOLOGY | Facility: CLINIC | Age: 77
End: 2022-08-24

## 2022-08-24 DIAGNOSIS — R65.20 SEVERE SEPSIS (HCC): Primary | ICD-10-CM

## 2022-08-24 DIAGNOSIS — R74.01 TRANSAMINITIS: ICD-10-CM

## 2022-08-24 DIAGNOSIS — R10.9 ABDOMINAL PAIN, UNSPECIFIED ABDOMINAL LOCATION: Primary | ICD-10-CM

## 2022-08-24 DIAGNOSIS — R63.0 POOR APPETITE: ICD-10-CM

## 2022-08-24 DIAGNOSIS — G89.3 CANCER RELATED PAIN: ICD-10-CM

## 2022-08-24 DIAGNOSIS — C25.9 PRIMARY PANCREATIC ADENOCARCINOMA (HCC): ICD-10-CM

## 2022-08-24 DIAGNOSIS — A41.9 SEVERE SEPSIS (HCC): Primary | ICD-10-CM

## 2022-08-24 DIAGNOSIS — C25.9 PRIMARY PANCREATIC CANCER WITH METASTASIS TO OTHER SITE (HCC): ICD-10-CM

## 2022-08-24 LAB
ALBUMIN SERPL BCP-MCNC: 2.4 G/DL (ref 3.5–5)
ALP SERPL-CCNC: 1131 U/L (ref 46–116)
ALT SERPL W P-5'-P-CCNC: 172 U/L (ref 12–78)
ANION GAP SERPL CALCULATED.3IONS-SCNC: 13 MMOL/L (ref 4–13)
ANISOCYTOSIS BLD QL SMEAR: PRESENT
APTT PPP: 33 SECONDS (ref 23–37)
AST SERPL W P-5'-P-CCNC: 323 U/L (ref 5–45)
ATRIAL RATE: 117 BPM
BACTERIA UR QL AUTO: ABNORMAL /HPF
BASOPHILS # BLD MANUAL: 0 THOUSAND/UL (ref 0–0.1)
BASOPHILS NFR MAR MANUAL: 0 % (ref 0–1)
BILIRUB SERPL-MCNC: 1.53 MG/DL (ref 0.2–1)
BILIRUB UR QL STRIP: NEGATIVE
BUN SERPL-MCNC: 24 MG/DL (ref 5–25)
BURR CELLS BLD QL SMEAR: PRESENT
CALCIUM ALBUM COR SERPL-MCNC: 9.5 MG/DL (ref 8.3–10.1)
CALCIUM SERPL-MCNC: 8.2 MG/DL (ref 8.3–10.1)
CHLORIDE SERPL-SCNC: 97 MMOL/L (ref 96–108)
CLARITY UR: CLEAR
CO2 SERPL-SCNC: 22 MMOL/L (ref 21–32)
COLOR UR: YELLOW
CREAT SERPL-MCNC: 0.77 MG/DL (ref 0.6–1.3)
EOSINOPHIL # BLD MANUAL: 0 THOUSAND/UL (ref 0–0.4)
EOSINOPHIL NFR BLD MANUAL: 0 % (ref 0–6)
ERYTHROCYTE [DISTWIDTH] IN BLOOD BY AUTOMATED COUNT: 19.5 % (ref 11.6–15.1)
GFR SERPL CREATININE-BSD FRML MDRD: 75 ML/MIN/1.73SQ M
GLUCOSE SERPL-MCNC: 102 MG/DL (ref 65–140)
GLUCOSE UR STRIP-MCNC: NEGATIVE MG/DL
HCT VFR BLD AUTO: 29.9 % (ref 34.8–46.1)
HGB BLD-MCNC: 9.6 G/DL (ref 11.5–15.4)
HGB UR QL STRIP.AUTO: NEGATIVE
INR PPP: 1.47 (ref 0.84–1.19)
KETONES UR STRIP-MCNC: NEGATIVE MG/DL
LACTATE SERPL-SCNC: 1.6 MMOL/L (ref 0.5–2)
LACTATE SERPL-SCNC: 2.1 MMOL/L (ref 0.5–2)
LACTATE SERPL-SCNC: 2.3 MMOL/L (ref 0.5–2)
LACTATE SERPL-SCNC: 3 MMOL/L (ref 0.5–2)
LEUKOCYTE ESTERASE UR QL STRIP: ABNORMAL
LYMPHOCYTES # BLD AUTO: 0.72 THOUSAND/UL (ref 0.6–4.47)
LYMPHOCYTES # BLD AUTO: 3 % (ref 14–44)
MCH RBC QN AUTO: 30.6 PG (ref 26.8–34.3)
MCHC RBC AUTO-ENTMCNC: 32.1 G/DL (ref 31.4–37.4)
MCV RBC AUTO: 95 FL (ref 82–98)
MONOCYTES # BLD AUTO: 0 THOUSAND/UL (ref 0–1.22)
MONOCYTES NFR BLD: 0 % (ref 4–12)
NEUTROPHILS # BLD MANUAL: 23.32 THOUSAND/UL (ref 1.85–7.62)
NEUTS BAND NFR BLD MANUAL: 5 % (ref 0–8)
NEUTS SEG NFR BLD AUTO: 92 % (ref 43–75)
NITRITE UR QL STRIP: POSITIVE
NON-SQ EPI CELLS URNS QL MICRO: ABNORMAL /HPF
P AXIS: 60 DEGREES
PELGER HUET CELLS BLD QL SMEAR: PRESENT
PH UR STRIP.AUTO: 6 [PH]
PLATELET # BLD AUTO: 501 THOUSANDS/UL (ref 149–390)
PLATELET # BLD AUTO: 635 THOUSANDS/UL (ref 149–390)
PLATELET BLD QL SMEAR: ABNORMAL
PMV BLD AUTO: 10.3 FL (ref 8.9–12.7)
PMV BLD AUTO: 10.4 FL (ref 8.9–12.7)
POTASSIUM SERPL-SCNC: 4.1 MMOL/L (ref 3.5–5.3)
PR INTERVAL: 134 MS
PROCALCITONIN SERPL-MCNC: 8.06 NG/ML
PROT SERPL-MCNC: 6.9 G/DL (ref 6.4–8.4)
PROT UR STRIP-MCNC: NEGATIVE MG/DL
PROTHROMBIN TIME: 17.8 SECONDS (ref 11.6–14.5)
QRS AXIS: 84 DEGREES
QRSD INTERVAL: 70 MS
QT INTERVAL: 306 MS
QTC INTERVAL: 426 MS
RBC # BLD AUTO: 3.14 MILLION/UL (ref 3.81–5.12)
RBC #/AREA URNS AUTO: ABNORMAL /HPF
SODIUM SERPL-SCNC: 132 MMOL/L (ref 135–147)
SP GR UR STRIP.AUTO: <=1.005 (ref 1–1.03)
T WAVE AXIS: 44 DEGREES
UROBILINOGEN UR QL STRIP.AUTO: 1 E.U./DL
VENTRICULAR RATE: 117 BPM
WBC # BLD AUTO: 23.82 THOUSAND/UL (ref 4.31–10.16)
WBC #/AREA URNS AUTO: ABNORMAL /HPF

## 2022-08-24 PROCEDURE — 85730 THROMBOPLASTIN TIME PARTIAL: CPT | Performed by: EMERGENCY MEDICINE

## 2022-08-24 PROCEDURE — 96368 THER/DIAG CONCURRENT INF: CPT

## 2022-08-24 PROCEDURE — 83605 ASSAY OF LACTIC ACID: CPT | Performed by: EMERGENCY MEDICINE

## 2022-08-24 PROCEDURE — 99223 1ST HOSP IP/OBS HIGH 75: CPT | Performed by: INTERNAL MEDICINE

## 2022-08-24 PROCEDURE — 71045 X-RAY EXAM CHEST 1 VIEW: CPT

## 2022-08-24 PROCEDURE — 85025 COMPLETE CBC W/AUTO DIFF WBC: CPT | Performed by: EMERGENCY MEDICINE

## 2022-08-24 PROCEDURE — 93010 ELECTROCARDIOGRAM REPORT: CPT

## 2022-08-24 PROCEDURE — 99285 EMERGENCY DEPT VISIT HI MDM: CPT

## 2022-08-24 PROCEDURE — 96374 THER/PROPH/DIAG INJ IV PUSH: CPT

## 2022-08-24 PROCEDURE — 74177 CT ABD & PELVIS W/CONTRAST: CPT

## 2022-08-24 PROCEDURE — 81001 URINALYSIS AUTO W/SCOPE: CPT | Performed by: EMERGENCY MEDICINE

## 2022-08-24 PROCEDURE — 80053 COMPREHEN METABOLIC PANEL: CPT | Performed by: EMERGENCY MEDICINE

## 2022-08-24 PROCEDURE — 84145 PROCALCITONIN (PCT): CPT | Performed by: EMERGENCY MEDICINE

## 2022-08-24 PROCEDURE — 85007 BL SMEAR W/DIFF WBC COUNT: CPT | Performed by: EMERGENCY MEDICINE

## 2022-08-24 PROCEDURE — 93005 ELECTROCARDIOGRAM TRACING: CPT

## 2022-08-24 PROCEDURE — 96365 THER/PROPH/DIAG IV INF INIT: CPT

## 2022-08-24 PROCEDURE — 85610 PROTHROMBIN TIME: CPT | Performed by: EMERGENCY MEDICINE

## 2022-08-24 PROCEDURE — G1004 CDSM NDSC: HCPCS

## 2022-08-24 PROCEDURE — 96366 THER/PROPH/DIAG IV INF ADDON: CPT

## 2022-08-24 PROCEDURE — 36415 COLL VENOUS BLD VENIPUNCTURE: CPT | Performed by: EMERGENCY MEDICINE

## 2022-08-24 PROCEDURE — 85027 COMPLETE CBC AUTOMATED: CPT | Performed by: EMERGENCY MEDICINE

## 2022-08-24 PROCEDURE — 87040 BLOOD CULTURE FOR BACTERIA: CPT | Performed by: EMERGENCY MEDICINE

## 2022-08-24 PROCEDURE — 99285 EMERGENCY DEPT VISIT HI MDM: CPT | Performed by: EMERGENCY MEDICINE

## 2022-08-24 PROCEDURE — 83605 ASSAY OF LACTIC ACID: CPT | Performed by: INTERNAL MEDICINE

## 2022-08-24 PROCEDURE — 85049 AUTOMATED PLATELET COUNT: CPT | Performed by: INTERNAL MEDICINE

## 2022-08-24 RX ORDER — HEPARIN SODIUM 5000 [USP'U]/ML
5000 INJECTION, SOLUTION INTRAVENOUS; SUBCUTANEOUS EVERY 8 HOURS SCHEDULED
Status: DISCONTINUED | OUTPATIENT
Start: 2022-08-24 | End: 2022-08-28 | Stop reason: HOSPADM

## 2022-08-24 RX ORDER — ONDANSETRON 2 MG/ML
4 INJECTION INTRAMUSCULAR; INTRAVENOUS EVERY 6 HOURS PRN
Status: DISCONTINUED | OUTPATIENT
Start: 2022-08-24 | End: 2022-08-28 | Stop reason: HOSPADM

## 2022-08-24 RX ORDER — SODIUM CHLORIDE 9 MG/ML
75 INJECTION, SOLUTION INTRAVENOUS CONTINUOUS
Status: DISCONTINUED | OUTPATIENT
Start: 2022-08-24 | End: 2022-08-26

## 2022-08-24 RX ORDER — OXYCODONE HYDROCHLORIDE 5 MG/1
5 TABLET ORAL EVERY 6 HOURS PRN
Status: DISCONTINUED | OUTPATIENT
Start: 2022-08-24 | End: 2022-08-26

## 2022-08-24 RX ORDER — PANTOPRAZOLE SODIUM 20 MG/1
20 TABLET, DELAYED RELEASE ORAL
Status: DISCONTINUED | OUTPATIENT
Start: 2022-08-25 | End: 2022-08-28 | Stop reason: HOSPADM

## 2022-08-24 RX ORDER — MORPHINE SULFATE 4 MG/ML
4 INJECTION, SOLUTION INTRAMUSCULAR; INTRAVENOUS ONCE
Status: DISCONTINUED | OUTPATIENT
Start: 2022-08-24 | End: 2022-08-26

## 2022-08-24 RX ORDER — ONDANSETRON 2 MG/ML
4 INJECTION INTRAMUSCULAR; INTRAVENOUS ONCE
Status: COMPLETED | OUTPATIENT
Start: 2022-08-24 | End: 2022-08-24

## 2022-08-24 RX ADMIN — CEFEPIME HYDROCHLORIDE 2000 MG: 2 INJECTION, POWDER, FOR SOLUTION INTRAVENOUS at 23:32

## 2022-08-24 RX ADMIN — HEPARIN SODIUM 5000 UNITS: 5000 INJECTION INTRAVENOUS; SUBCUTANEOUS at 21:39

## 2022-08-24 RX ADMIN — PANCRELIPASE 24000 UNITS: 24000; 76000; 120000 CAPSULE, DELAYED RELEASE PELLETS ORAL at 21:39

## 2022-08-24 RX ADMIN — SODIUM CHLORIDE 75 ML/HR: 0.9 INJECTION, SOLUTION INTRAVENOUS at 18:40

## 2022-08-24 RX ADMIN — ONDANSETRON 4 MG: 2 INJECTION INTRAMUSCULAR; INTRAVENOUS at 13:21

## 2022-08-24 RX ADMIN — IOHEXOL 70 ML: 350 INJECTION, SOLUTION INTRAVENOUS at 13:46

## 2022-08-24 RX ADMIN — SODIUM CHLORIDE, POTASSIUM CHLORIDE, SODIUM LACTATE AND CALCIUM CHLORIDE 1000 ML: 600; 310; 30; 20 INJECTION, SOLUTION INTRAVENOUS at 12:33

## 2022-08-24 RX ADMIN — CEFEPIME HYDROCHLORIDE 2000 MG: 2 INJECTION, POWDER, FOR SOLUTION INTRAVENOUS at 14:09

## 2022-08-24 RX ADMIN — SODIUM CHLORIDE, SODIUM LACTATE, POTASSIUM CHLORIDE, AND CALCIUM CHLORIDE 1000 ML: .6; .31; .03; .02 INJECTION, SOLUTION INTRAVENOUS at 14:15

## 2022-08-24 NOTE — TELEPHONE ENCOUNTER
Received voicemail from patient daughter, Patience Nunez in regards to abdominal pain and burning/warm sensation at abdomin area  Reviewed recommendation to reach out to palliative care in regards to s/s due to not treatment s/e  Reviewed that in chart palliative increased oxycodone, but recommended to reach out to our office with further questions and concerns  Message sent to Dr Star Nicholas for review

## 2022-08-24 NOTE — SEPSIS NOTE
t  Sepsis Note   Cassandra Carter 78 y o  female MRN: [id number]  Unit/Bed#: ED 22 Encounter: [id number]       qSOFA     [address] Name 08/24/22 1227 08/24/22 1107             Altered mental status GCS < 15 -- --       Respiratory Rate > / =20 0 0       Systolic BP < / =965 0 0       Q Sofa Score 0 0                  Initial Sepsis Screening     Row Name 08/24/22 1312                Is the patient's history suggestive of a new or worsening infection? --        Suspected source of infection suspect infection, source unknown  -VL        Are two or more of the following signs & symptoms of infection both present and new to the patient? --        Indicate SIRS criteria Tachycardia > 90 bpm;Leukocytosis (WBC > 21964 IJL)  -VL        If the answer is yes to both questions, suspicion of sepsis is present --        If severe sepsis is present AND tissue hypoperfusion perists in the hour after fluid resuscitation or lactate > 4, the patient meets criteria for SEPTIC SHOCK --        Are any of the following organ dysfunction criteria present within 6 hours of suspected infection and SIRS criteria that are NOT considered to be chronic conditions?  Yes  -        Organ dysfunction Lactate > 2 0 mmol/L  -VL        Date of presentation of severe sepsis 08/24/22  -        Time of presentation of severe sepsis 1305  -        Tissue hypoperfusion persists in the hour after crystalloid fluid administration, evidenced, by either: --        Was hypotension present within one hour of the conclusion of crystalloid fluid administration? --        Date of presentation of septic shock --        Time of presentation of septic shock --              User Key  (r) = Recorded By, (t) = Taken By, (c) = Cosigned By    [address] Name Provider Type    VL Ramses Monroe DO Physician

## 2022-08-24 NOTE — ASSESSMENT & PLAN NOTE
This is a 21-year-old female who was recently diagnosed with stage IV metastatic pancreatic adenocarcinoma with metastases to liver, lung, bone and retroperitoneal lymph nodes  She is currently on gemcitabine+abraxane started on 06/29/2022  Started her 3rd cycle on 06/22/2022  Patient is currently living at home with her daughter parotid for worsening abdominal pain, poor p o  Intake and generalized weakness  Patient also seemed confused today as per daughter  There is no report of any nausea, vomiting, cough, fever or chills  · Sepsis criteria met with leukocytosis, tachycardia, lactic acidosis  · Possible source of infection UTI versus intra-abdominal possible SBP?   · Leukocytosis also can be secondary to steroids which patient gets during her chemo infusion  · CT scan commented on new ascites  · Urinalysis with trace leukocytes, positive nitrite, moderate bacteria  · Given cefepime in the ED, will continue  · IV fluids, repeat lactic acid  · IR consult for paracentesis will set fluid analysis including cytology

## 2022-08-24 NOTE — ASSESSMENT & PLAN NOTE
· Patient was diagnosed in May 2020 to started chemotherapy on 06/29/2022, most recent chemotherapy her 3rd cycle started on 08/22/22  · Follows with Oncology outpatient  · Also follow with palliative care for pain control  · CT scan revealed progression of new pulmonary metastases, enlarged hepatic metastases and retroperitoneal lymphadenopathy, new acidic fluid in pelvic and upper abdomen, pancreatic mass remained unchanged  · Patient's confusion, will also get MRI of the brain to evaluate for brain metastases  · Oncology consultation recommendations will be appreciated

## 2022-08-24 NOTE — SEPSIS NOTE
Sepsis Note   Zula Skiff 78 y o  female MRN: [id number]  Unit/Bed#: ED 22 Encounter: [id number]       qSOFA     [address] Name 08/24/22 1430 08/24/22 1422 08/24/22 1351 08/24/22 1320 08/24/22 1227    Altered mental status GCS < 15 -- -- 0 -- --    Respiratory Rate > / =22 0 0 -- 0 0    Systolic BP < / =359 0 0 -- 0 0    Q Sofa Score 0 0 0 0 0    Row Name 08/24/22 1107                Altered mental status GCS < 15 --        Respiratory Rate > / =43 0        Systolic BP < / =878 0        Q Sofa Score 0                   Initial Sepsis Screening     Row Name 08/24/22 1312                Is the patient's history suggestive of a new or worsening infection? --        Suspected source of infection suspect infection, source unknown  -VL        Are two or more of the following signs & symptoms of infection both present and new to the patient? --        Indicate SIRS criteria Tachycardia > 90 bpm;Leukocytosis (WBC > [address] IJL)  -VL        If the answer is yes to both questions, suspicion of sepsis is present --        If severe sepsis is present AND tissue hypoperfusion perists in the hour after fluid resuscitation or lactate > 4, the patient meets criteria for SEPTIC SHOCK --        Are any of the following organ dysfunction criteria present within 6 hours of suspected infection and SIRS criteria that are NOT considered to be chronic conditions?  Yes  -VL        Organ dysfunction Lactate > 2 0 mmol/L  -VL        Date of presentation of severe sepsis 08/24/22  -VL        Time of presentation of severe sepsis 1305  -VL        Tissue hypoperfusion persists in the hour after crystalloid fluid administration, evidenced, by either: --        Was hypotension present within one hour of the conclusion of crystalloid fluid administration? --        Date of presentation of septic shock --        Time of presentation of septic shock --              User Key  (r) = Recorded By, (t) = Taken By, (c) = Cosigned By    [address] Name Provider Type    VL Thomas South DO Physician                  Default Flowsheet Data (last 720 hours)     Sepsis Reassess     Row Name 08/24/22 8776                   Repeat Volume Status and Tissue Perfusion Assessment Performed    Repeat Volume Status and Tissue Perfusion Assessment Performed Yes  -VL                  Volume Status and Tissue Perfusion Post Fluid Resuscitation * Must Document All *    Vital Signs Reviewed (HR, RR, BP, T) Yes  -VL        Shock Index Reviewed --        Arterial Oxygen Saturation Reviewed (POx, SaO2 or SpO2) Yes (comment %)  95% RA  -VL        Cardio Normal S1/S2; Tachycardia; No murmor  HR improved  -VL        Pulmonary Normal effort;Clear to auscultation  -VL        Capillary Refill Brisk  -VL        Peripheral Pulses Radial  -VL        Peripheral Pulse +2  -VL        Skin Warm  -VL        Urine output assessed Other (comment)  urinating now  -VL                  *OR*   Intensive Monitoring- Must Document One of the Following Four *:    Vital Signs Reviewed --        * Central Venous Pressure (CVP or RAP) --        * Central Venous Oxygen (SVO2, ScvO2 or Oxygen saturation via central catheter) --        * Bedside Cardiovascular US in IVC diameter and % collapse --        * Passive Leg Raise OR Crystalloid Challenge --              User Key  (r) = Recorded By, (t) = Taken By, (c) = Cosigned By    Initials Name Provider Type     Thomas South DO Physician

## 2022-08-24 NOTE — ED PROVIDER NOTES
History  Chief Complaint   Patient presents with    Altered Mental Status     Daughter reports patient with altered mental status that began on Sunday  Received Chemo on Monday  Now with abd pain  Denies N/V/D       77y F  palliative diagnosis of metastatic pancreatic adenocarcinoma with liver, lung, bone, and retroperitoneal LN involvement  She is currently on gemcitabine+abraxane that was started on 6/29/2022  brought in by daughter from home for worsening abd pain and "confusion"  Pt recently started third round of chemo for pancreatic cancer  Had done well w/ previous two cycles  Over weekend, daughter noted decreased activity, occas slow to respond and occas repetitive questions  Had chemo again on Monday  Reports having episodes of feeling very hot - jules over abd  Has taken temp a number of times and no fevers  Denies shaking chills/sweats, no sig ha, no cough/congestion, no cp/pressure, no sob/holman, +anorexia, +nausea, no vomiting, having some loose stools, no melena/hematochezia, no dysuria or frequency, no le pain or swelling, no rashes  Has pain across the top of the abd w/ radiation toward the umbilical region  Pain 8/10, worse w/ palpation/movement, nothing really makes it better  Overall just doesn't feel well  Vaccinated for covid, no known sick contacts   No other co      History provided by:  Medical records, patient and relative   used: No    Altered Mental Status  Presenting symptoms: disorientation (intermittent)    Severity:  Mild  Episode history:  Multiple  Timing:  Intermittent  Progression:  Waxing and waning  Chronicity:  New  Context: not dementia and not nursing home resident    Associated symptoms: abdominal pain, decreased appetite, light-headedness and nausea    Associated symptoms: no agitation, no bladder incontinence, no difficulty breathing, no fever, no hallucinations, no headaches, no palpitations, no rash, no slurred speech and no weakness Prior to Admission Medications   Prescriptions Last Dose Informant Patient Reported? Taking?    Cholecalciferol (Vitamin D3) 50 MCG (2000 UT) TABS   Yes Yes   Sig: Take by mouth     Creon [id number] units   No Yes   Sig: TAKE 1 CAPSULE BY MOUTH THREE TIMES DAILY WITH  MEALS   Patient taking differently: 2 (two) times a day   COLLADO SEAL ROOT PO Not Taking at Unknown time  Yes No   Sig: Take 900 mg by mouth daily Echinaceal/goldenseal blend   Patient not taking: Reported on 8/24/2022   Omega-3 Fatty Acids (OMEGA 3 PO) Not Taking at Unknown time  Yes No   Sig: Take 1,000 mg by mouth   Patient not taking: Reported on 8/24/2022   Pyridoxine HCl (VITAMIN B-6 PO)  Self Yes Yes   Sig: Take 1 tablet by mouth   Vitamin E 400 units TABS Not Taking at Unknown time  Yes No   Sig: Take by mouth   Patient not taking: Reported on 8/24/2022   acetaminophen (TYLENOL) 500 mg tablet Not Taking at Unknown time  Yes No   Sig: Take 500 mg by mouth every 6 (six) hours as needed for mild pain   Patient not taking: Reported on 8/24/2022   cyanocobalamin (VITAMIN B-12) 500 MCG tablet   Yes Yes   Sig: Take 500 mcg by mouth daily   ferrous sulfate 325 (65 Fe) mg tablet   Yes Yes   Sig: Take 325 mg by mouth daily with breakfast   lidocaine-prilocaine (EMLA) cream   No Yes   Sig: Apply topically as needed for mild pain   ondansetron (Zofran ODT) 4 mg disintegrating tablet   No Yes   Sig: Take 1 tablet (4 mg total) by mouth every 8 (eight) hours as needed for nausea or vomiting   oxyCODONE (ROXICODONE) 5 immediate release tablet   No Yes   Sig: Take 0 5 tablets (2 5 mg total) by mouth every 6 (six) hours as needed for moderate pain Max Daily Amount: 10 mg   pantoprazole (PROTONIX) 20 mg tablet   No Yes   Sig: Take 1 tablet (20 mg total) by mouth daily   potassium chloride (K-DUR,KLOR-CON) 20 mEq tablet Not Taking at Unknown time  No No   Sig: Take 1 tablet (20 mEq total) by mouth daily   Patient not taking: Reported on 8/24/2022 Facility-Administered Medications: None       Past Medical History:   Diagnosis Date    Pancreatic cancer [facility])        Past Surgical History:   Procedure Laterality Date    CARDIAC CATHETERIZATION N/A 3/30/2022    Procedure: Cardiac catheterization;  Surgeon: Bianca Mistry DO;  Location: [facility]; Service: Cardiology    CARDIAC CATHETERIZATION N/A 3/30/2022    Procedure: Cardiac Coronary Angiogram;  Surgeon: Bianca Mistry DO;  Location: [facility]; Service: Cardiology    IR BIOPSY LIVER MASS  6/6/2022    IR PORT PLACEMENT  6/28/2022       Family History   Problem Relation Age of Onset    Coronary artery disease Father     Coronary artery disease Brother     Diabetes Brother     Kidney failure Brother     Uterine cancer Mother      I have reviewed and agree with the history as documented  E-Cigarette/Vaping    E-Cigarette Use Never User      E-Cigarette/Vaping Substances     Social History     Tobacco Use    Smoking status: Never Smoker    Smokeless tobacco: Never Used   Vaping Use    Vaping Use: Never used   Substance Use Topics    Alcohol use: Not Currently    Drug use: Never       Review of Systems   Constitutional: Positive for decreased appetite  Negative for fever  Cardiovascular: Negative for palpitations  Gastrointestinal: Positive for abdominal pain and nausea  Genitourinary: Negative for bladder incontinence  Skin: Negative for rash  Neurological: Positive for light-headedness  Negative for weakness and headaches  Psychiatric/Behavioral: Negative for agitation and hallucinations  All other systems reviewed and are negative  Physical Exam  Physical Exam  Vitals and nursing note reviewed  Constitutional:       Comments: Appears frail and chronically ill   HENT:      Nose: Nose normal       Mouth/Throat:      Mouth: Mucous membranes are dry     Eyes:      Conjunctiva/sclera: Conjunctivae normal    Cardiovascular:      Rate and Rhythm: Regular rhythm  Tachycardia present  Heart sounds: No murmur heard  Pulmonary:      Effort: Pulmonary effort is normal       Breath sounds: Normal breath sounds  No wheezing, rhonchi or rales  Abdominal:      General: Abdomen is flat  Bowel sounds are decreased  Tenderness: There is abdominal tenderness in the right upper quadrant, epigastric area, periumbilical area and left upper quadrant  There is guarding  There is no rebound  Musculoskeletal:         General: No swelling or tenderness  Cervical back: Normal range of motion  Skin:     General: Skin is warm  Neurological:      General: No focal deficit present  Mental Status: She is alert and oriented to person, place, and time     Psychiatric:         Mood and Affect: Mood normal          Vital Signs  ED Triage Vitals [08/24/22 1107]   Temperature Pulse Respirations Blood Pressure SpO2   99 °F (37 2 °C) (!) 131 18 101/54 97 %      Temp Source Heart Rate Source Patient Position - Orthostatic VS BP Location FiO2 (%)   Oral Monitor Sitting Left arm --      Pain Score       --           Vitals:    08/24/22 1227 08/24/22 1320 08/24/22 1422 08/24/22 1430   BP: 104/58 106/56 116/58 116/58   Pulse: (!) 106 100 101 104   Patient Position - Orthostatic VS: Lying Lying Lying          Visual Acuity      ED Medications  Medications   morphine injection 4 mg (4 mg Intravenous Not Given 8/24/22 1333)   lactated ringers bolus 1,000 mL (1,000 mL Intravenous New Bag 8/24/22 1415)   lactated ringers bolus 1,000 mL (0 mL Intravenous Stopped 8/24/22 1327)   ondansetron (ZOFRAN) injection 4 mg (4 mg Intravenous Given 8/24/22 1321)   cefepime (MAXIPIME) 2 g/50 mL dextrose IVPB (0 mg Intravenous Stopped 8/24/22 1439)   iohexol (OMNIPAQUE) 350 MG/ML injection (MULTI-DOSE) 70 mL (70 mL Intravenous Given 8/24/22 1346)       Diagnostic Studies  Results Reviewed     Procedure Component Value Units Date/Time    Urinalysis with microscopic [[id number]] Collected: 08/24/22 1522    Lab Status: In process Specimen: Urine, Other Updated: 08/24/22 1524    Lactic acid 2 Hours [[id number]]  (Abnormal) Collected: 08/24/22 1422    Lab Status: Final result Specimen: Blood from Arm, Left Updated: 08/24/22 1448     LACTIC ACID 3 0 mmol/L     Narrative:      Result may be elevated if tourniquet was used during collection      Manual Differential(PHLEBS Do Not Order) [[id number]]  (Abnormal) Collected: 08/24/22 1225    Lab Status: Final result Specimen: Blood from Arm, Right Updated: 08/24/22 1405     Segmented % 92 %      Bands % 5 %      Lymphocytes % 3 %      Monocytes % 0 %      Eosinophils, % 0 %      Basophils % 0 %      Absolute Neutrophils 23 32 Thousand/uL      Lymphocytes Absolute 0 72 Thousand/uL      Monocytes Absolute 0 00 Thousand/uL      Eosinophils Absolute 0 00 Thousand/uL      Basophils Absolute 0 00 Thousand/uL      Total Counted --     Pelger Huet Cells Present     Anisocytosis Present     Marquita Cells Present     Platelet Estimate Increased    Comprehensive metabolic panel [[id number]]  (Abnormal) Collected: 08/24/22 1225    Lab Status: Final result Specimen: Blood from Arm, Right Updated: 08/24/22 1312     Sodium 132 mmol/L      Potassium 4 1 mmol/L      Chloride 97 mmol/L      CO2 22 mmol/L      ANION GAP 13 mmol/L      BUN 24 mg/dL      Creatinine 0 77 mg/dL      Glucose 102 mg/dL      Calcium 8 2 mg/dL      Corrected Calcium 9 5 mg/dL       U/L       U/L      Alkaline Phosphatase 1,131 U/L      Total Protein 6 9 g/dL      Albumin 2 4 g/dL      Total Bilirubin 1 53 mg/dL      eGFR 75 ml/min/1 73sq m     Narrative:      [facility] guidelines for Chronic Kidney Disease (CKD):     Stage 1 with normal or high GFR (GFR > 90 mL/min/1 73 square meters)    Stage 2 Mild CKD (GFR = 60-89 mL/min/1 73 square meters)    Stage 3A Moderate CKD (GFR = 45-59 mL/min/1 73 square meters)    Stage 3B Moderate CKD (GFR = 30-44 mL/min/1 73 square meters)    Stage 4 Severe CKD (GFR = 15-29 mL/min/1 73 square meters)    Stage 5 End Stage CKD (GFR <15 mL/min/1 73 square meters)  Note: GFR calculation is accurate only with a steady state creatinine    Procalcitonin [[id number]]  (Abnormal) Collected: 08/24/22 1225    Lab Status: Final result Specimen: Blood from Arm, Right Updated: 08/24/22 1305     Procalcitonin 8 06 ng/ml     Lactic acid [[id number]]  (Abnormal) Collected: 08/24/22 1225    Lab Status: Final result Specimen: Blood from Arm, Right Updated: 08/24/22 1302     LACTIC ACID 2 3 mmol/L     Narrative:      Result may be elevated if tourniquet was used during collection  APTT [[id number]]  (Normal) Collected: 08/24/22 1225    Lab Status: Final result Specimen: Blood from Arm, Right Updated: 08/24/22 1259     PTT 33 seconds     Protime-INR [[id number]]  (Abnormal) Collected: 08/24/22 1225    Lab Status: Final result Specimen: Blood from Arm, Right Updated: 08/24/22 1259     Protime 17 8 seconds      INR 1 47    CBC and differential [[id number]]  (Abnormal) Collected: 08/24/22 1225    Lab Status: Final result Specimen: Blood from Arm, Right Updated: 08/24/22 1252     WBC 23 82 Thousand/uL      RBC 3 14 Million/uL      Hemoglobin 9 6 g/dL      Hematocrit 29 9 %      MCV 95 fL      MCH 30 6 pg      MCHC 32 1 g/dL      RDW 19 5 %      MPV 10 4 fL      Platelets 372 Thousands/uL     Blood culture #2 [[id number]] Collected: 08/24/22 1225    Lab Status: In process Specimen: Blood from Arm, Right Updated: 08/24/22 1232    Blood culture #1 [[id number]] Collected: 08/24/22 1225    Lab Status:  In process Specimen: Blood from Arm, Right Updated: 08/24/22 1232                 CT abdomen pelvis with contrast   ED Interpretation by Erin Llanes DO (08/24 1436)   See below      Final Result by Gladys Sharma MD (08/24 1433)   Progression of disease with new pulmonary metastatic disease, enlargement of the hepatic metastatic disease and retroperitoneal lymphadenopathy  There is new ascites with fluid in the pelvic cul-de-sac and in the upper abdomen and in the retroperitoneum   Pancreatic mass, remains unchanged      No bowel obstruction   No pneumatosis or free air   The study was marked in EPIC for immediate notification  Workstation performed: [id number]         XR chest portable   ED Interpretation by Andrew Hebert DO (08/24 1408)   No acute findings      Final Result by Bayron Pollack MD (08/24 1411)   Typical right IJ CVP line      No acute cardiopulmonary disease        Consistent with prior study except for CVP placement            Workstation performed: [id number]                    Procedures  CriticalCare Time  Performed by: Andrew Hebert DO  Authorized by: Andrew Hebert DO     Critical care provider statement:     Critical care time (minutes):  45    Critical care time was exclusive of:  Separately billable procedures and treating other patients and teaching time    Critical care was necessary to treat or prevent imminent or life-threatening deterioration of the following conditions:  Sepsis    Critical care was time spent personally by me on the following activities:  Blood draw for specimens, obtaining history from patient or surrogate, development of treatment plan with patient or surrogate, discussions with consultants, evaluation of patient's response to treatment, examination of patient, review of old charts, re-evaluation of patient's condition, ordering and review of radiographic studies, ordering and review of laboratory studies and ordering and performing treatments and interventions    I assumed direction of critical care for this patient from another provider in my specialty: no               ED Course  ED Course as of 08/24/22 1552   Wed Aug 24, 2022   1311 Procalcitonin(!): 8 06   1311 WBC(!): 23 82   1315 Alkaline Phosphatase(!): 1,131  982 four days ago, 729 two weeks ago   1316 ALT(!): 172  138 four days ago,150 two weeks ago   1316 AST(!): 323  277 four days ago, 130 two weeks ago   1317 Creatinine: 0 77  stable   1317 LACTIC ACID(!!): 2 3  Will attempt to get urine specimen before giving abx -    Sepsis alert called   1436 CT abdomen pelvis with contrast  Will contact Onc to discuss  Still awaiting urine - will need admission for severe sepsis ?source if urine neg   1452 TT sent to oncology -    1454 LACTIC ACID(!!): 3 0  Increased - will increase fluids (was running 2nd liter over 2h)  BP stable, HR improving   1500 D/w oncology  No additional lab/imaging at this time  Consult placed   1505 D/w pt and daughter rad results and d/w oncology  Pt still receiving fluids, HR improved  Feels like she has to urinate, so will obtain sample now  Pt/family agreeable with admission   1510 TT slim for admission   1530 Accetped  Initial Sepsis Screening     Row Name 08/24/22 1312                Is the patient's history suggestive of a new or worsening infection? --        Suspected source of infection suspect infection, source unknown  -VL        Are two or more of the following signs & symptoms of infection both present and new to the patient? --        Indicate SIRS criteria Tachycardia > 90 bpm;Leukocytosis (WBC > 63810 IJL)  -VL        If the answer is yes to both questions, suspicion of sepsis is present --        If severe sepsis is present AND tissue hypoperfusion perists in the hour after fluid resuscitation or lactate > 4, the patient meets criteria for SEPTIC SHOCK --        Are any of the following organ dysfunction criteria present within 6 hours of suspected infection and SIRS criteria that are NOT considered to be chronic conditions?  Yes  -VL        Organ dysfunction Lactate > 2 0 mmol/L  -VL        Date of presentation of severe sepsis 08/24/22  -VL        Time of presentation of severe sepsis 1305  -VL        Tissue hypoperfusion persists in the hour after crystalloid fluid administration, evidenced, by either: --        Was hypotension present within one hour of the conclusion of crystalloid fluid administration? --        Date of presentation of septic shock --        Time of presentation of septic shock --              User Key  (r) = Recorded By, (t) = Taken By, (c) = Cosigned By    Initials Name Provider Type    VL Thomas South DO Physician              Default Flowsheet Data (last 720 hours)     Sepsis Reassess     Row Name 08/24/22 8734                   Repeat Volume Status and Tissue Perfusion Assessment Performed    Repeat Volume Status and Tissue Perfusion Assessment Performed Yes  -VL                  Volume Status and Tissue Perfusion Post Fluid Resuscitation * Must Document All *    Vital Signs Reviewed (HR, RR, BP, T) Yes  -VL        Shock Index Reviewed --        Arterial Oxygen Saturation Reviewed (POx, SaO2 or SpO2) Yes (comment %)  95% RA  -VL        Cardio Normal S1/S2; Tachycardia; No murmor  HR improved  -VL        Pulmonary Normal effort;Clear to auscultation  -VL        Capillary Refill Brisk  -VL        Peripheral Pulses Radial  -VL        Peripheral Pulse +2  -VL        Skin Warm  -VL        Urine output assessed Other (comment)  urinating now  -VL                  *OR*   Intensive Monitoring- Must Document One of the Following Four *:    Vital Signs Reviewed --        * Central Venous Pressure (CVP or RAP) --        * Central Venous Oxygen (SVO2, ScvO2 or Oxygen saturation via central catheter) --        * Bedside Cardiovascular US in IVC diameter and % collapse --        * Passive Leg Raise OR Crystalloid Challenge --              User Key  (r) = Recorded By, (t) = Taken By, (c) = Cosigned By    Initials Name Provider Type    VL Thomas South DO Physician              SBIRT 22yo+    Flowsheet Row Most Recent Value   SBIRT (25 yo +)    In order to provide better care to our patients, we are screening all of our patients for alcohol and drug use   Would it be okay to ask you these screening questions? No Filed at: 08/24/2022 1342                    MDM  Number of Diagnoses or Management Options  Primary pancreatic cancer with metastasis to other site [facility]): new and requires workup  Severe sepsis [facility]): new and requires workup  Transaminitis: new and requires workup  Diagnosis management comments: Subjective fever, worsening abd pain w/ metastatic pancreatic cancer on chemo  ddx includes/not limited to occult infection (pna, uti, intra-abd infection, doubt cns), metabolic abnl, advancement of disease, Pt tachycardic - will get sepsis work up including ua, cxr, give fluids, ck ct a/p, and d/w onc    Will likely need admission       Amount and/or Complexity of Data Reviewed  Clinical lab tests: reviewed and ordered  Tests in the radiology section of CPT®: reviewed and ordered  Decide to obtain previous medical records or to obtain history from someone other than the patient: yes  Obtain history from someone other than the patient: yes  Discuss the patient with other providers: yes  Independent visualization of images, tracings, or specimens: yes        Disposition  Final diagnoses:   Severe sepsis ([facility] [address] )   Transaminitis   Primary pancreatic cancer with metastasis to other site [facility])     Time reflects when diagnosis was documented in both MDM as applicable and the Disposition within this note     Time User Action Codes Description Comment    8/24/2022  3:03 PM Emily Paris [A41 9,  R65 20] Severe sepsis ([address] )     8/24/2022  3:03 PM Christina Mirza Add [R74 01] Transaminitis     8/24/2022  3:13 PM Christina Mirza Add [C25 9] Primary pancreatic cancer with metastasis to other site [facility])       ED Disposition     ED Disposition   Admit    Condition   Stable    Date/Time   Wed Aug 24, 2022  3:36 PM    Comment   Case was discussed with Dr Matthew Jacobson and the patient's admission status was agreed to be inpatient to the service of Dr Cathleen Rangel Information    None         Patient's Medications   Discharge Prescriptions    No medications on file       No discharge procedures on file      PDMP Review       Value Time User    PDMP Reviewed  Yes 8/18/2022  3:50 PM Kun Henriquez MD          ED Provider  Electronically Signed by           Erin Llanes DO  08/24/22 9197

## 2022-08-24 NOTE — TELEPHONE ENCOUNTER
Call out to patient daughter in regards to patient s/s  Patient having increase in abdominal pain with no relief despite PRN pain medications  Patient having increase in disorientation and poor appetite  Patient reporting warm/burning sensation of abdominal area as well  Per Dr Eloisa Velásquez recommendation for ED evaluation  Patient daughter agreeable to plan and will take patient to [address] [facility] for further evaluation  Reviewed call back number and encouraged patient daughter to call back with further questions and concerns

## 2022-08-24 NOTE — TELEPHONE ENCOUNTER
Please let patient/daughter know to inform oncology about this  In the interim, can increase oxycodone to 10mg PO q4H prn  Please let me know if they need refill on the oxy   Thank you

## 2022-08-24 NOTE — Clinical Note
Case was discussed with  and the patient's admission status was agreed to be  to the service of

## 2022-08-24 NOTE — TELEPHONE ENCOUNTER
Patient's daughter called office c/o patient experiencing pain since last Sunday, has gotten worst last night and this morning  Per daughter patient is experiencing abdominal pain at a level ten, where the cancer is located  Patient  is also experiencing burning sensation on her skin, warmth to the touch but no fever with nausea     Per daughter patient did take oxycodone 5 mg but still experiencing this symptoms and are getting worst      Please advise

## 2022-08-24 NOTE — PLAN OF CARE
Problem: MOBILITY - ADULT  Goal: Maintain or return to baseline ADL function  Description: INTERVENTIONS:  -  Assess patient's ability to carry out ADLs; assess patient's baseline for ADL function and identify physical deficits which impact ability to perform ADLs (bathing, care of mouth/teeth, toileting, grooming, dressing, etc )  - Assess/evaluate cause of self-care deficits   - Assess range of motion  - Assess patient's mobility; develop plan if impaired  - Assess patient's need for assistive devices and provide as appropriate  - Encourage maximum independence but intervene and supervise when necessary  - Involve family in performance of ADLs  - Assess for home care needs following discharge   - Consider OT consult to assist with ADL evaluation and planning for discharge  - Provide patient education as appropriate  Outcome: Progressing     Problem: PAIN - ADULT  Goal: Verbalizes/displays adequate comfort level or baseline comfort level  Description: Interventions:  - Encourage patient to monitor pain and request assistance  - Assess pain using appropriate pain scale  - Administer analgesics based on type and severity of pain and evaluate response  - Implement non-pharmacological measures as appropriate and evaluate response  - Consider cultural and social influences on pain and pain management  - Notify physician/advanced practitioner if interventions unsuccessful or patient reports new pain  Outcome: Progressing     Problem: MOBILITY - ADULT  Goal: Maintains/Returns to pre admission functional level  Description: INTERVENTIONS:  - Perform BMAT or MOVE assessment daily    - Set and communicate daily mobility goal to care team and patient/family/caregiver  - Collaborate with rehabilitation services on mobility goals if consulted  - Perform Range of Motion 4 times a day  - Reposition patient every 2 hours    - Dangle patient 4 times a day  - Stand patient 2 times a day  - Ambulate patient 2 times a day  - Out of bed to chair 2 times a day   - Out of bed for meals 3 times a day  - Out of bed for toileting  - Record patient progress and toleration of activity level   Outcome: Progressing     Problem: INFECTION - ADULT  Goal: Absence or prevention of progression during hospitalization  Description: INTERVENTIONS:  - Assess and monitor for signs and symptoms of infection  - Monitor lab/diagnostic results  - Monitor all insertion sites, i e  indwelling lines, tubes, and drains  - Monitor endotracheal if appropriate and nasal secretions for changes in amount and color  - Lindsay appropriate cooling/warming therapies per order  - Administer medications as ordered  - Instruct and encourage patient and family to use good hand hygiene technique  - Identify and instruct in appropriate isolation precautions for identified infection/condition  Outcome: Progressing     Problem: INFECTION - ADULT  Goal: Absence of fever/infection during neutropenic period  Description: INTERVENTIONS:  - Monitor WBC    Outcome: Progressing     Problem: SAFETY ADULT  Goal: Maintain or return to baseline ADL function  Description: INTERVENTIONS:  -  Assess patient's ability to carry out ADLs; assess patient's baseline for ADL function and identify physical deficits which impact ability to perform ADLs (bathing, care of mouth/teeth, toileting, grooming, dressing, etc )  - Assess/evaluate cause of self-care deficits   - Assess range of motion  - Assess patient's mobility; develop plan if impaired  - Assess patient's need for assistive devices and provide as appropriate  - Encourage maximum independence but intervene and supervise when necessary  - Involve family in performance of ADLs  - Assess for home care needs following discharge   - Consider OT consult to assist with ADL evaluation and planning for discharge  - Provide patient education as appropriate  Outcome: Progressing

## 2022-08-24 NOTE — TELEPHONE ENCOUNTER
This MA reached out to patient's daughter no answer  According to patient's chart patient was instructed by oncology to go to the ED

## 2022-08-24 NOTE — H&P
[address] [facility]  H&P- Danielle Downs 1945, 78 y o  female MRN: [id number]  Unit/Bed#: E2 -27 Encounter: [id number]  Primary Care Provider: George Franklin,    Date and time admitted to hospital: 8/24/2022 11:51 AM    * Sepsis [facility])  Assessment & Plan  This is a 79-year-old female who was recently diagnosed with stage IV metastatic pancreatic adenocarcinoma with metastases to liver, lung, bone and retroperitoneal lymph nodes  She is currently on gemcitabine+abraxane started on 06/29/2022  Started her 3rd cycle on 06/22/2022  Patient is currently living at home with her daughter parotid for worsening abdominal pain, poor p o  Intake and generalized weakness  Patient also seemed confused today as per daughter  There is no report of any nausea, vomiting, cough, fever or chills  · Sepsis criteria met with leukocytosis, tachycardia, lactic acidosis  · Possible source of infection UTI versus intra-abdominal possible SBP?   · Leukocytosis also can be secondary to steroids which patient gets during her chemo infusion  · CT scan commented on new ascites  · Urinalysis with trace leukocytes, positive nitrite, moderate bacteria  · Given cefepime in the ED, will continue  · IV fluids, repeat lactic acid  · IR consult for paracentesis will set fluid analysis including cytology    Primary pancreatic adenocarcinoma [facility])  Assessment & Plan  · Patient was diagnosed in May 2020 to started chemotherapy on 06/29/2022, most recent chemotherapy her 3rd cycle started on 08/22/22  · Follows with Oncology outpatient  · Also follow with palliative care for pain control  · CT scan revealed progression of new pulmonary metastases, enlarged hepatic metastases and retroperitoneal lymphadenopathy, new acidic fluid in pelvic and upper abdomen, pancreatic mass remained unchanged  · Patient's confusion, will also get MRI of the brain to evaluate for brain metastases  · Oncology consultation recommendations will be appreciated    Transaminitis  Assessment & Plan  · Elevated AST, ALT, alkaline phosphatase secondary to hepatic involvement of pancreatic cancer    Pancreatic insufficiency  Assessment & Plan  · Continue Creon b i d  Cancer related pain  Assessment & Plan  · Continue oxycodone 5 mg q 6 hours p r n  · PDMP reviewed        VTE Prophylaxis: Heparin  / sequential compression device   Code Status: Full  POLST: There is no POLST form on file for this patient (pre-hospital)    Anticipated Length of Stay:  Patient will be admitted on an Inpatient basis with an anticipated length of stay of  greater than 2 midnights  Justification for Hospital Stay: sepsis    Total Time for Visit, including Counseling / Coordination of Care: 45 minutes  Greater than 50% of this total time spent on direct patient counseling and coordination of care  Chief Complaint:   Confusion, weakness, abdominal pain    History of Present Illness:    Tiarra Beavers is a 78 y o  female who presents with abdominal pain, confusion, weakness  Patient has history of recently diagnosed with stage IV metastatic pancreatic adenocarcinoma with metastases to liver, lung, bone and retroperitoneal lymph nodes  She is currently on gemcitabine+abraxane started on 06/29/2022  Started her 3rd cycle on 06/22/2022  Patient is currently living at home with her daughter parotid for worsening abdominal pain, poor p o  Intake and generalized weakness  Patient also seemed confused today as per daughter  There is no report of any nausea, vomiting, cough, fever or chills  Review of Systems:    Review of Systems   Constitutional: Positive for activity change and fatigue  HENT: Negative  Eyes: Negative  Respiratory: Negative  Cardiovascular: Negative  Gastrointestinal: Positive for abdominal pain  Endocrine: Negative  Genitourinary: Negative  Musculoskeletal: Negative  Skin: Negative      Allergic/Immunologic: Negative  Neurological: Positive for weakness  Hematological: Negative  Psychiatric/Behavioral: Positive for confusion  Past Medical and Surgical History:     Past Medical History:   Diagnosis Date    Pancreatic cancer [facility])        Past Surgical History:   Procedure Laterality Date    CARDIAC CATHETERIZATION N/A 3/30/2022    Procedure: Cardiac catheterization;  Surgeon: Nino Avitia DO;  Location: [facility] CARDIAC CATH LAB; Service: Cardiology    CARDIAC CATHETERIZATION N/A 3/30/2022    Procedure: Cardiac Coronary Angiogram;  Surgeon: Nino Avitia DO;  Location: [facility]ARD[facility]; Service: Cardiology    IR BIOPSY LIVER MASS  6/6/2022    IR PORT PLACEMENT  6/28/2022       Meds/Allergies:    Prior to Admission medications    Medication Sig Start Date End Date Taking?  Authorizing Provider   Cholecalciferol (Vitamin D3) 50 MCG (2000 UT) TABS Take by mouth     Yes Historical Provider, MD Espinosa [id number] units TAKE 1 CAPSULE BY MOUTH THREE TIMES DAILY WITH  MEALS  Patient taking differently: 2 (two) times a day 8/23/22  Yes Marc Sandifer, MD   cyanocobalamin (VITAMIN B-12) 500 MCG tablet Take 500 mcg by mouth daily   Yes Historical Provider, MD   ferrous sulfate 325 (65 Fe) mg tablet Take 325 mg by mouth daily with breakfast   Yes Historical Provider, MD   lidocaine-prilocaine (EMLA) cream Apply topically as needed for mild pain 7/5/22  Yes Zonia Khan MD   ondansetron (Zofran ODT) 4 mg disintegrating tablet Take 1 tablet (4 mg total) by mouth every 8 (eight) hours as needed for nausea or vomiting 6/13/22  Yes Shaista Mehta DO   oxyCODONE (ROXICODONE) 5 immediate release tablet Take 0 5 tablets (2 5 mg total) by mouth every 6 (six) hours as needed for moderate pain Max Daily Amount: 10 mg 7/29/22  Yes Marc Sandifer, MD   pantoprazole (PROTONIX) 20 mg tablet Take 1 tablet (20 mg total) by mouth daily 7/29/22  Yes Marc Sandifer, MD   Pyridoxine HCl (VITAMIN B-6 PO) Take 1 tablet by mouth   Yes Historical Provider, MD   acetaminophen (TYLENOL) 500 mg tablet Take 500 mg by mouth every 6 (six) hours as needed for mild pain  Patient not taking: Reported on 8/24/2022    Historical Provider, MD   COLLADO SEAL ROOT PO Take 900 mg by mouth daily Echinaceal/goldenseal blend  Patient not taking: Reported on 8/24/2022    Historical Provider, MD   Omega-3 Fatty Acids (OMEGA 3 PO) Take 1,000 mg by mouth  Patient not taking: Reported on 8/24/2022    Historical Provider, MD   potassium chloride (K-DUR,KLOR-CON) 20 mEq tablet Take 1 tablet (20 mEq total) by mouth daily  Patient not taking: Reported on 8/24/2022 8/2/22   Isacc Hernandez MD   Vitamin E 400 units TABS Take by mouth  Patient not taking: Reported on 8/24/2022    Historical Provider, MD     I have reviewed home medications with patient personally  Allergies:    Allergies   Allergen Reactions    Bentyl [Dicyclomine] Throat Swelling    Codeine Other (See Comments)     Was told allergy    Elastic Bandages & [Zinc] Hives, Swelling and Rash       Social History:     Social History     Substance and Sexual Activity   Alcohol Use Not Currently     Social History     Tobacco Use   Smoking Status Never Smoker   Smokeless Tobacco Never Used     Social History     Substance and Sexual Activity   Drug Use Never       Family History:    Family History   Problem Relation Age of Onset    Coronary artery disease Father     Coronary artery disease Brother     Diabetes Brother     Kidney failure Brother     Uterine cancer Mother        Physical Exam:     Vitals:   Blood Pressure: 102/55 (08/24/22 1647)  Pulse: 105 (08/24/22 1700)  Temperature: 98 7 °F (37 1 °C) (08/24/22 1647)  Temp Source: Temporal (08/24/22 1647)  Respirations: 18 (08/24/22 1647)  Height: 5' 4" (162 6 cm) (08/24/22 1647)  Weight - Scale: 50 8 kg (111 lb 15 9 oz) (08/24/22 1647)  SpO2: 96 % (08/24/22 1647)    Constitutional: Patient is oriented to person, place and time, no acute distress  HEENT:  Normocephalic, atraumatic  Cardiovascular: Normal S1S2, RRR, No murmurs/rubs/gallops appreciated  Pulmonary:  Bilateral air entry, No rhonchi/rales/wheezing appreciated  Abdominal: Soft, Bowel sounds present, distended, generalized tenderness, no rebound or guarding  Extremities:  No cyanosis, clubbing or edema  Neurological: Cranial nerves II-XII grossly intact, sensation intact, otherwise no focal neurological symptoms  Additional Data:     Lab Results: I have personally reviewed pertinent reports  Results from last 7 days   Lab Units 08/24/22  1225   WBC Thousand/uL 23 82*   HEMOGLOBIN g/dL 9 6*   HEMATOCRIT % 29 9*   PLATELETS Thousands/uL 635*   LYMPHO PCT % 3*   MONO PCT % 0*   EOS PCT % 0     Results from last 7 days   Lab Units 08/24/22  1225   POTASSIUM mmol/L 4 1   CHLORIDE mmol/L 97   CO2 mmol/L 22   BUN mg/dL 24   CREATININE mg/dL 0 77   CALCIUM mg/dL 8 2*   ALK PHOS U/L 1,131*   ALT U/L 172*   AST U/L 323*     Results from last 7 days   Lab Units 08/24/22  1225   INR  1 47*       Imaging: I have personally reviewed pertinent reports  XR chest portable    Result Date: 8/24/2022  Narrative: CHEST INDICATION:   sepsis eval  COMPARISON:  5/29/2022 EXAM PERFORMED/VIEWS:  XR CHEST PORTABLE Single view FINDINGS: Right IJ CVP line has been placed terminating in the projection of the upper right atrium Cardiomediastinal silhouette appears unremarkable  The lungs are clear  No pneumothorax or pleural effusion  Osseous structures appear within normal limits for patient age  Impression: Typical right IJ CVP line No acute cardiopulmonary disease  Consistent with prior study except for CVP placement Workstation performed: [id number]     CT abdomen pelvis with contrast    Result Date: 8/24/2022  Narrative: CT ABDOMEN AND PELVIS WITH IV CONTRAST INDICATION:   Abdominal pain, acute, nonlocalized worsening pain, being treated pancreatic ca   COMPARISON: CT from, May 29, 2022 TECHNIQUE:  CT examination of the abdomen and pelvis was performed  Axial, sagittal, and coronal 2D reformatted images were created from the source data and submitted for interpretation  Radiation dose length product (DLP) for this visit:  658 mGy-cm   This examination, like all CT scans performed in the [facility], was performed utilizing techniques to minimize radiation dose exposure, including the use of iterative reconstruction and automated exposure control  IV Contrast:  70 mL of iohexol (OMNIPAQUE) Enteric Contrast:  Enteric contrast was not administered  FINDINGS: ABDOMEN LOWER CHEST:  Multiple lung nodules seen  A lingular region nodule seen measuring 1 cm, right lower lobe lung nodule seen measuring about 1 3 cm LIVER/BILIARY TREE:  Diffuse hepatic metastatic disease seen, progressed from the previous A segment 4 lesion seen measuring 4 3 cm, new and seen in image 24 series 2 segment 7 lesion seen measuring about 4 5 cm, new Portal vein is patent Hepatic artery patent GALLBLADDER:  No calcified gallstones  No pericholecystic inflammatory change  SPLEEN:  Unremarkable  PANCREAS:  Pancreatic atrophy seen Again noted is a partly calcified mass measuring 4 6 cm this indents the 2nd part of the duodenum  ADRENAL GLANDS:  Right adrenal gland appear unremarkable Left adrenal gland appear unremarkable KIDNEYS/URETERS:  The left kidney appear unremarkable There is anterior position of the right kidney STOMACH AND BOWEL:  Stomach appear unremarkable The 2nd part of the duodenum is compressed between the pancreatic mass and the enlarged liver related to metastatic disease Fluid-filled small bowel loops are seen in the lower abdomen in the pelvic cul-de-sac area APPENDIX:  No findings to suggest appendicitis  ABDOMINOPELVIC CAVITY:  Ascites seen with fluid in the pelvis  Perihepatic region Fluid is also seen in the retroperitoneum around the right adrenal gland    Paratracheal lymphadenopathy seen, larger from the previous study Aortocaval lymph node seen measuring about 1 6 cm  Retrocrural lymph nodes are seen measuring about 1 cm, larger from the previous study VESSELS:  Celiac trunk is patent SMA is patent RUBI is patent Iliac vessels are patent PELVIS REPRODUCTIVE ORGANS:  Unremarkable for patient's age  Multiple and enlarged pelvic venous collaterals noted with enlarged left lateral brain URINARY BLADDER:  Unremarkable  ABDOMINAL WALL/INGUINAL REGIONS:  Unremarkable  OSSEOUS STRUCTURES:  No acute compression collapse vertebra No gross lytic lesion Degenerative changes are seen within the lumbar spine Healed fracture of the right inferior pubic ramus Patient is known to have a foci of increased uptake in the bony pelvis based on the bone scan from July 7, 2022 these are occult on the current CT     Impression: Progression of disease with new pulmonary metastatic disease, enlargement of the hepatic metastatic disease and retroperitoneal lymphadenopathy  There is new ascites with fluid in the pelvic cul-de-sac and in the upper abdomen and in the retroperitoneum Pancreatic mass, remains unchanged No bowel obstruction No pneumatosis or free air The study was marked in EPIC for immediate notification  Workstation performed: [id number]       [employer] / [facility] Records Reviewed: Yes     ** Please Note: This note has been constructed using a voice recognition system   **

## 2022-08-25 ENCOUNTER — APPOINTMENT (OUTPATIENT)
Dept: MRI IMAGING | Facility: HOSPITAL | Age: 77
DRG: 435 | End: 2022-08-25
Payer: COMMERCIAL

## 2022-08-25 PROBLEM — R18.8 ASCITES: Status: ACTIVE | Noted: 2022-01-01

## 2022-08-25 PROBLEM — G93.40 ENCEPHALOPATHY: Status: ACTIVE | Noted: 2022-01-01

## 2022-08-25 PROCEDURE — G1004 CDSM NDSC: HCPCS

## 2022-08-25 PROCEDURE — 99233 SBSQ HOSP IP/OBS HIGH 50: CPT | Performed by: INTERNAL MEDICINE

## 2022-08-25 PROCEDURE — 99223 1ST HOSP IP/OBS HIGH 75: CPT | Performed by: NURSE PRACTITIONER

## 2022-08-25 PROCEDURE — A9585 GADOBUTROL INJECTION: HCPCS | Performed by: INTERNAL MEDICINE

## 2022-08-25 PROCEDURE — 70553 MRI BRAIN STEM W/O & W/DYE: CPT

## 2022-08-25 RX ADMIN — SODIUM CHLORIDE 75 ML/HR: 0.9 INJECTION, SOLUTION INTRAVENOUS at 21:27

## 2022-08-25 RX ADMIN — CEFEPIME HYDROCHLORIDE 2000 MG: 2 INJECTION, POWDER, FOR SOLUTION INTRAVENOUS at 13:39

## 2022-08-25 RX ADMIN — OXYCODONE 5 MG: 5 TABLET ORAL at 16:28

## 2022-08-25 RX ADMIN — HEPARIN SODIUM 5000 UNITS: 5000 INJECTION INTRAVENOUS; SUBCUTANEOUS at 06:09

## 2022-08-25 RX ADMIN — HEPARIN SODIUM 5000 UNITS: 5000 INJECTION INTRAVENOUS; SUBCUTANEOUS at 13:39

## 2022-08-25 RX ADMIN — PANTOPRAZOLE SODIUM 20 MG: 20 TABLET, DELAYED RELEASE ORAL at 06:09

## 2022-08-25 RX ADMIN — HEPARIN SODIUM 5000 UNITS: 5000 INJECTION INTRAVENOUS; SUBCUTANEOUS at 21:24

## 2022-08-25 RX ADMIN — PANCRELIPASE 24000 UNITS: 24000; 76000; 120000 CAPSULE, DELAYED RELEASE PELLETS ORAL at 08:40

## 2022-08-25 RX ADMIN — GADOBUTROL 5 ML: 604.72 INJECTION INTRAVENOUS at 11:40

## 2022-08-25 RX ADMIN — PANCRELIPASE 24000 UNITS: 24000; 76000; 120000 CAPSULE, DELAYED RELEASE PELLETS ORAL at 17:14

## 2022-08-25 RX ADMIN — SODIUM CHLORIDE 75 ML/HR: 0.9 INJECTION, SOLUTION INTRAVENOUS at 06:09

## 2022-08-25 NOTE — ASSESSMENT & PLAN NOTE
· Stage IV pancreatic cancer her status post recent chemotherapy with Gemzar, Abraxane, Zometa, dexamethasone on 08/22  · Brain MRI now with metastatic disease  · CT chest abdomen and pelvis showed enlarging liver disease, new lung Mets, new ascites and retroperitoneal adenopathy  · Await oncology evaluation and recommendations regarding further treatment

## 2022-08-25 NOTE — ACP (ADVANCE CARE PLANNING)
Serious Illness Conversation    1  What is your understanding now of where you are with your illness? Prognostic Understanding: appropriate understanding of prognosis     2  How much information about what is likely to be ahead with your illness would you like to have? Information: patient wants to be fully informed      3  If your health situation worsens, what are your most important goals? Goals: be physically comfortable  "I saw my mom die from cancer and she was in a lot of pain "     4  What are the biggest fears and worries about the future and your health? Fears/Worries: pain     5  What abilities are so critical to your life that you cannot imagine living without them? Unacceptable Function: being in pain or very uncomfortable     6  What gives you strength as you think about the future with your illness? Family has been supportive      7  If you become sicker, how much are you willing to go through for the possibility of gaining more time? Be in the hospital: Yes Have a feeding tube: No      8  How much does your proxy and family know about your priorities and wishes?   Discussion Discussion: wants clinician to talk with family
no

## 2022-08-25 NOTE — PROGRESS NOTES
Jillian 50  Progress Note - Bessie Clarke 1945, 78 y o  female MRN: [id number]  Unit/Bed#: E2 -27 Encounter: [id number]  Primary Care Provider: Marquis Monse DO   Date and time admitted to hospital: 8/24/2022 11:51 AM    * Sepsis [facility])  Assessment & Plan  · Sepsis due to UTI versus SIRS from stage 4 cancer pancreatic cancer with new ascites and after effect of recent chemotherapy  · Continue empiric cefepime  Follow-up culture results  · Awake diagnostic and therapeutic paracentesis  · Serial exam    Primary pancreatic adenocarcinoma [facility])  Assessment & Plan  · Stage IV pancreatic cancer her status post recent chemotherapy with Gemzar, Abraxane, Zometa, dexamethasone on 08/22  · Brain MRI now with metastatic disease  · CT chest abdomen and pelvis showed enlarging liver disease, new lung Mets, new ascites and retroperitoneal adenopathy  · Await oncology evaluation and recommendations regarding further treatment    Ascites  Assessment & Plan  · Malignant versus infectious  · Diagnostic/therapeutic paracentesis pending  · Ongoing empiric antibiotics given immunocompromised state and sepsis    Encephalopathy  Assessment & Plan  · Metabolic encephalopathy secondary to sepsis/SIRS, recent chemotherapy, liver metastasis, acute illness  · She does have metastatic disease on MRI but this is very small, 3 mm in the precentral gyrus and I do not think this caused her confusion  Cancer related pain  Assessment & Plan  · Continue oxycodone 5 mg q 6 hours p r n    · PDMP reviewed  · Follow-up with palliative care    Transaminitis  Assessment & Plan  · Secondary to known liver disease with worsening    Pancreatic insufficiency  Assessment & Plan  · Secondary to stage IV pancreatic cancer  · Continue Creon with meals      VTE Pharmacologic Prophylaxis:   Pharmacologic: Heparin  Mechanical VTE Prophylaxis in Place: No    Patient Centered Rounds: I have performed bedside rounds with nursing staff today  Discussions with Specialists or Other Care Team Provider:  H&p and outpatient records reviewed  Discussed with oncology Ms Johanny Avalos    Education and Discussions with Family / Patient: spoke with son Marleny Kaiser and left Vm for daughter [facility]    Time Spent for Care: 30 minutes  More than 50% of total time spent on counseling and coordination of care as described above  Current Length of Stay: 1 day(s)    Current Patient Status: Inpatient   Certification Statement: The patient will continue to require additional inpatient hospital stay due to SIRS versus sepsis    Discharge Plan: To be determined    Code Status: Level 1 - Full Code      Subjective:   + epigastric pain on palpation  + feels weak  + poor appetite    Objective:     Vitals:   Temp (24hrs), Av 4 °F (36 3 °C), Min:96 6 °F (35 9 °C), Max:98 7 °F (37 1 °C)    Temp:  [96 6 °F (35 9 °C)-98 7 °F (37 1 °C)] 97 3 °F (36 3 °C)  HR:  [] 98  Resp:  [16-20] 20  BP: ()/(50-58) 95/53  SpO2:  [95 %-100 %] 96 %  Body mass index is 19 22 kg/m²  Input and Output Summary (last 24 hours): Intake/Output Summary (Last 24 hours) at 2022 1331  Last data filed at 2022  Gross per 24 hour   Intake 50 ml   Output 500 ml   Net -450 ml       Physical Exam:     Physical Exam  Constitutional:       Appearance: She is ill-appearing  HENT:      Head: Normocephalic and atraumatic  Nose: No congestion or rhinorrhea  Eyes:      General: No scleral icterus  Right eye: No discharge  Left eye: No discharge  Cardiovascular:      Rate and Rhythm: Normal rate and regular rhythm  Heart sounds: No murmur heard  No gallop  Pulmonary:      Effort: No respiratory distress  Breath sounds: No wheezing  Comments: Right upper chest wall port  Prominent clavicle  Abdominal:      General: Abdomen is flat  Palpations: Abdomen is soft        Comments: Tenderness on direct palpation of the epigastric area   Musculoskeletal:      Cervical back: Neck supple  Comments: Diminished muscle mass   Skin:     General: Skin is warm and dry  Neurological:      Mental Status: She is alert  Comments: Oriented to person and place   Psychiatric:         Behavior: Behavior normal       Comments: Mood is depressed       Additional Data:     Labs:    Results from last 7 days   Lab Units 08/24/22  2048 08/24/22  1225   WBC Thousand/uL  --  23 82*   HEMOGLOBIN g/dL  --  9 6*   HEMATOCRIT %  --  29 9*   PLATELETS Thousands/uL 501* 635*   BANDS PCT %  --  5   LYMPHO PCT %  --  3*   MONO PCT %  --  0*   EOS PCT %  --  0     Results from last 7 days   Lab Units 08/24/22  1225   SODIUM mmol/L 132*   POTASSIUM mmol/L 4 1   CHLORIDE mmol/L 97   CO2 mmol/L 22   BUN mg/dL 24   CREATININE mg/dL 0 77   ANION GAP mmol/L 13   CALCIUM mg/dL 8 2*   ALBUMIN g/dL 2 4*   TOTAL BILIRUBIN mg/dL 1 53*   ALK PHOS U/L 1,131*   ALT U/L 172*   AST U/L 323*   GLUCOSE RANDOM mg/dL 102     Results from last 7 days   Lab Units 08/24/22  1225   INR  1 47*             Results from last 7 days   Lab Units 08/24/22  2059 08/24/22  1749 08/24/22  1422 08/24/22  1225   LACTIC ACID mmol/L 1 6 2 1* 3 0* 2 3*   PROCALCITONIN ng/ml  --   --   --  8 06*           * I Have Reviewed All Lab Data Listed Above  * Additional Pertinent Lab Tests Reviewed: All Labs Within Last 24 Hours Reviewed    Imaging:    Imaging Reports Reviewed Today Include:  CT, MRI  Imaging Personally Reviewed by Myself Includes:  MRI images shown to the patient and explained in simple terms    Recent Cultures (last 7 days):     Results from last 7 days   Lab Units 08/24/22  1225   BLOOD CULTURE  Received in Microbiology Lab  Culture in Progress  Received in Microbiology Lab  Culture in Progress         Last 24 Hours Medication List:   Current Facility-Administered Medications   Medication Dose Route Frequency Provider Last Rate    cefepime  2,000 mg Intravenous Q12H Tahmina Tash Hammond MD 2,000 mg (08/24/22 2292)    heparin (porcine)  5,000 Units Subcutaneous [facility] Alix Hammonds MD      morphine injection  4 mg Intravenous Once Alix Hammonds MD      ondansetron  4 mg Intravenous Q6H PRN Alix Hammonds MD      oxyCODONE  5 mg Oral Q6H PRN Alix Hammonds MD      pancrelipase (Lip-Prot-Amyl)  24,000 Units Oral BID Alix Hammonds MD      pantoprazole  20 mg Oral Early Morning Alix Hammonds MD      sodium chloride  75 mL/hr Intravenous Continuous Alix Hammonds MD 75 mL/hr (08/25/22 6782)        Today, Patient Was Seen By: Clyde Rahman MD    ** Please Note: Dictation voice to text software may have been used in the creation of this document   **

## 2022-08-25 NOTE — MALNUTRITION/BMI
This medical record reflects one or more clinical indicators suggestive of malnutrition and/or morbid obesity  Malnutrition Findings:   Adult Malnutrition type: Chronic illness  Adult Degree of Malnutrition: Other severe protein calorie malnutrition  Malnutrition Characteristics: Fat loss, Muscle loss, Inadequate energy, Weight loss                  360 Statement: chronic severe protein calorie malnutrition r/t pancreatic CA as evidenced by severe muscle and fat wasting (triceps, clavicles, shoulders), <75% energy intake >1 month, significant wt loss (pt showing wt reduction of 9 9kg/20 8% from April 2022 to July 2022)  Treated with regular po diet, added supplements and snacks to maximize nutritent intake    BMI Findings: Body mass index is 19 22 kg/m²  See Nutrition note dated 8/25/2022 for additional details  Completed nutrition assessment is viewable in the nutrition documentation

## 2022-08-25 NOTE — ASSESSMENT & PLAN NOTE
· Sepsis due to UTI versus SIRS from stage 4 cancer pancreatic cancer with new ascites and after effect of recent chemotherapy  · Continue empiric cefepime  Follow-up culture results    · Awake diagnostic and therapeutic paracentesis  · Serial exam

## 2022-08-25 NOTE — CONSULTS
Medical Oncology/Hematology Consult Note  Bryan Comer, female, 78 y  o , 1945,  East 2 /E2 -*, [id number]     Reason for consultation: progression of disease, pancreatic adenocarcinoma  Presented in the ED on 8/24/22 for abdominal pain, altered mental status    Assessment and Plan:    1  Primary pancreatic adenocarcinoma   -Intiially seen by Dr Star Nicholas on 6/21/22  Patient's liver biopsy had characterization of a mixed-adenocarcinoma and NET pathology  Mismatched repair (MMR)-proficient  Treatment as a metastatic adenocarcinoma (primarily gland formation)  - Areas of the tumor show gland formation along with focal staining for neuroendocrine markers  The immunopanel results raise a differential of an upper luminal GI primary versus a pancreatobiliary primary  TNM Staging [id number], stage IV  -Started chemotherapy on 06/29/2022, most recent chemotherapy her 3rd cycle was on 08/22/22  Plan was for total of 6 cycles  (6/28/2022- 11/28/2022), first line palliative treatment     -Imaging: CT scan (8/24/22) revealed progression of new pulmonary metastases, enlarged hepatic metastases and retroperitoneal lymphadenopathy, new ascitic fluid in pelvic and upper abdomen, pancreatic mass remained unchanged  -MRI of the brain (8/25/22) revealed right precentral gyrus tiny (3 mm) enhancing cortical lesion concerning for metastasis  No associated edema  -presence of elevated AST, ALT, alkaline phosphatase secondary to hepatic involvement of pancreatic cancer    2  Cancer-related pain  -Patient currently on  oxycodone 5 mg q 6 hours p r n   -Last dose was last night; reported some hesitance in taking it as it makes her drowsy    3  Leukocytosis  -Sepsis criteria met with leukocytosis (23 82), tachycardia, lactic acidosis (2 1)  ANC 23 32  -Possible source of infection being worked up  Last temp at 97 3 F this morning    -Can also 2/2 steroids; patient received during her chemo treatment on 8/22/23  Dexamethasone 10 mg IV  -IR consulted for paracentesis r/t new ascitic fluid in pelvic and upper abdomen    4  Thrombocytosis  -Evident since 7/22/22 at 473K  Of note, 501K  -Most likely from GI malignancy, inflammatory processes    5  Normocytic, normochromic anemia it is  -Stable at 9 6  No signs of bleeding   -Anemia evident since 3/29/22  Baseline 9-10  -Transfuse as needed for hemoglobin <7    PLAN:  1) Discussed recent MRI brain findings: right precentral gyrus tiny (3 mm) enhancing cortical lesion concerning for metastasis with no associated edema; not certain if this was the cause of her initial presentation for confusion  May be a contributing factor along with associated metabolic issues and chemotherapy side effects  Also discussed the CT findings that revealed progression of metastatic disease  Spoke about possible options as patient's daughter and son were curious about what's next (continuing with regimen vs  second line treatment vs  stopping treatment/pursuing comfort care)  Ultimately, the decision will have to be made by Dr Griffin Patrick, along with the patient and patient's family  Patient's family not entirely ready to pursue comfort care, but appreciative of the conversation  Supportive care plan for Zometa q12 weeks on board due to osseous mets  Received it on 8/22/22    2) Palliative Medicine on board, appreciate Dr Clark Frederick' support during this difficult process; continuing conversations of goals of care    3) Outpatient follow up plan: Patient has an outpatient infusion appointment on 08/29/22 that will need to be canceled  Will keep appointment with Dr Griffin Patrick on 08/30/2022  At this time, patient has another infusion appointment on 09/06/2022, will determine on appointment with Dr Griffin Patrick regarding infusion appointment changes    Will coordinate with inpatient Cancer Coordinator, BRIGIDO BeaulieuT    Communication with patient/family: Spoke with patient's daughter at the bedside, son on the phone      Communication with team:  Communicated with primary team  Reviewed this patient with Dr Peña Messina  Thank you for this consult  Brigid Oglesby, NINO, [address]  Hematology-Oncology      History of present illness:  Clarisse Oppenheim is a 78 y o  female who was recently diagnosed with stage IV metastatic pancreatic adenocarcinoma with metastases to liver, lung, bone and retroperitoneal lymph nodes  MRI of the brain revealed   ight precentral gyrus tiny (3 mm) enhancing cortical lesion concerning for metastasis with no associated edema; not certain if this was the cause of her initial presentation for confusion  May be a contributing factor along with associated metabolic issues and chemotherapy side effects  She is currently on gemcitabine and abraxane that started on 06/29/2022  She finished her 3rd cycle on 8/22/2022  Plan was for total of 6 cycles  (6/28/2022- 11/28/2022), first line palliative treatment  She presented in the ED with complaints of abdominal pain and altered mental status  Patient reported that she did well with the past 2 cycles of chemotherapy, but the 3rd cycle "hit her hard " She reported that she was having a fairly good day on Sunday, and was even entertaining a good friend that day  Before her friend left, she started to not feel well  Sunday evening was the beginning of her general unwell feeling  Oncology was consulted to assist with patient has questions about "what's next" in light of this inpatient hospitalization/progression of disease  Today during my examination, patient was up eating a yogurt and drinking milk  She reported feeling better, and does not feel that she needed to be tapped for the "fluid in my belly  "We had a long conversation about what were the possible options when she gets discharged  The conclusion of the conversation was that the discussion will need to be made with her primary oncologist, Dr Sameer Marino        Review of Systems: Review of Systems   Constitutional: Positive for activity change, appetite change and fatigue  Negative for chills and fever  HENT: Negative for ear pain and sore throat  Eyes: Negative for pain and visual disturbance  Respiratory: Positive for shortness of breath  Negative for cough  Cardiovascular: Negative for chest pain and palpitations  Gastrointestinal: Positive for abdominal distention and abdominal pain  Negative for vomiting  Genitourinary: Negative for dysuria and hematuria  Musculoskeletal: Negative for arthralgias and back pain  Skin: Positive for pallor  Negative for color change and rash  Neurological: Positive for weakness  Negative for seizures and syncope  All other systems reviewed and are negative        Oncology History:   Cancer Staging  Primary pancreatic adenocarcinoma [facility])  Staging form: Anus, AJCC 8th Edition  - Clinical stage from 6/6/2022: Stage IV (cT3, cN1a, pM1) - Signed by Brisa Mccauley MD on 6/12/2022    Oncology History   Primary pancreatic adenocarcinoma ([facility] [address] )   6/6/2022 -  Cancer Staged    Staging form: Anus, AJCC 8th Edition  - Clinical stage from 6/6/2022: Stage IV (cT3, cN1a, pM1) - Signed by Brisa Mccauley MD on 6/12/2022  Stage prefix: Initial diagnosis  Sites of metastasis: Liver  Source of metastatic specimen: Bone, Aortic Lymph Nodes       6/12/2022 Initial Diagnosis    Primary pancreatic neuroendocrine tumor     6/29/2022 -  Chemotherapy    paclitaxel protein-bound (ABRAXANE) IVPB, 125 mg/m2 = 188 mg, Intravenous, Once, 3 of 6 cycles  Administration: 188 mg (6/29/2022), 188 mg (7/6/2022), 188 mg (7/12/2022), 188 mg (7/25/2022), 188 mg (8/1/2022), 188 mg (8/8/2022), 188 mg (8/22/2022)  gemcitabine (GEMZAR) infusion, 1,000 mg/m2 = 1,500 mg, Intravenous, Once, 3 of 6 cycles  Administration: 1,500 mg (6/29/2022), 1,500 mg (7/6/2022), 1,500 mg (7/12/2022), 1,500 mg (7/25/2022), 1,500 mg (8/1/2022), 1,500 mg (8/8/2022), 1,500 mg (8/22/2022) Past Medical History:   Past Medical History:   Diagnosis Date    Pancreatic cancer [facility])        Past Surgical History:   Procedure Laterality Date    CARDIAC CATHETERIZATION N/A 3/30/2022    Procedure: Cardiac catheterization;  Surgeon: Lavonne Powers DO;  Location: [facility] CARDIAC CATH LAB; Service: Cardiology    CARDIAC CATHETERIZATION N/A 3/30/2022    Procedure: Cardiac Coronary Angiogram;  Surgeon: Lavonne Powers DO;  Location: [facility] CARDIAC CATH LAB; Service: Cardiology    IR BIOPSY LIVER MASS  6/6/2022    IR PORT PLACEMENT  6/28/2022       Family History   Problem Relation Age of Onset    Coronary artery disease Father     Coronary artery disease Brother     Diabetes Brother     Kidney failure Brother     Uterine cancer Mother        Social History     Socioeconomic History    Marital status: /Civil Union     Spouse name: None    Number of children: None    Years of education: None    Highest education level: None   Occupational History    None   Tobacco Use    Smoking status: Never Smoker    Smokeless tobacco: Never Used   Vaping Use    Vaping Use: Never used   Substance and Sexual Activity    Alcohol use: Not Currently    Drug use: Never    Sexual activity: None   Other Topics Concern    None   Social History Narrative    None     Social Determinants of Health     Financial Resource Strain: Not on file   Food Insecurity: No Food Insecurity    Worried About Running Out of Food in the Last Year: Never true    Ihsan of Food in the Last Year: Never true   Transportation Needs: No Transportation Needs    Lack of Transportation (Medical): No    Lack of Transportation (Non-Medical):  No   Physical Activity: Not on file   Stress: Not on file   Social Connections: Not on file   Intimate Partner Violence: Not on file   Housing Stability: Low Risk     Unable to Pay for Housing in the Last Year: No    Number of Places Lived in the Last Year: 1    Unstable Housing in the Last Year: No         Current Facility-Administered Medications:     cefepime (MAXIPIME) 2,000 mg in dextrose 5 % 50 mL IVPB, 2,000 mg, Intravenous, Q12H, Josee Gonzales MD, Last Rate: 100 mL/hr at 08/24/22 2332, 2,000 mg at 08/24/22 2332    heparin (porcine) subcutaneous injection 5,000 Units, 5,000 Units, Subcutaneous, Q8H [facility], 5,000 Units at 08/25/22 0609 **AND** [COMPLETED] Platelet count, , , Once, Josee Gonzales MD    morphine injection 4 mg, 4 mg, Intravenous, Once, Josee Gonzales MD    ondansetron [facility]) injection 4 mg, 4 mg, Intravenous, Q6H PRN, Josee Gonzales MD    oxyCODONE (ROXICODONE) IR tablet 5 mg, 5 mg, Oral, Q6H PRN, Josee Gonzales MD    pancrelipase (Lip-Prot-Amyl) (CREON) delayed release capsule 24,000 Units, 24,000 Units, Oral, BID, Josee Gonzales MD, 24,000 Units at 08/25/22 0840    pantoprazole (PROTONIX) EC tablet 20 mg, 20 mg, Oral, Early Morning, Josee Gonzales MD, 20 mg at 08/25/22 0609    sodium chloride 0 9 % infusion, 75 mL/hr, Intravenous, Continuous, Josee Gonzales MD, Last Rate: 75 mL/hr at 08/25/22 0609, 75 mL/hr at 08/25/22 0609    Medications Prior to Admission   Medication    Cholecalciferol (Vitamin D3) 50 MCG (2000 UT) TABS    Creon 94510-08242 units    cyanocobalamin (VITAMIN B-12) 500 MCG tablet    ferrous sulfate 325 (65 Fe) mg tablet    lidocaine-prilocaine (EMLA) cream    ondansetron (Zofran ODT) 4 mg disintegrating tablet    oxyCODONE (ROXICODONE) 5 immediate release tablet    pantoprazole (PROTONIX) 20 mg tablet    Pyridoxine HCl (VITAMIN B-6 PO)    acetaminophen (TYLENOL) 500 mg tablet    COLLADO SEAL ROOT PO    Omega-3 Fatty Acids (OMEGA 3 PO)    potassium chloride (K-DUR,KLOR-CON) 20 mEq tablet    Vitamin E 400 units TABS       Allergies   Allergen Reactions    Bentyl [Dicyclomine] Throat Swelling    Codeine Other (See Comments)     Was told allergy    Elastic Bandages & [Zinc] Hives, Swelling and Rash         Physical Exam:     BP 95/53 (BP Location: Right arm)   Pulse 98   Temp (!) 97 3 °F (36 3 °C) (Temporal)   Resp 20   Ht 5' 4" (1 626 m)   Wt 50 8 kg (111 lb 15 9 oz)   SpO2 96%   BMI 19 22 kg/m²     Physical Exam  Constitutional:       Appearance: She is ill-appearing  HENT:      Head: Normocephalic and atraumatic  Mouth/Throat:      Mouth: Mucous membranes are dry  Eyes:      Conjunctiva/sclera: Conjunctivae normal    Cardiovascular:      Rate and Rhythm: Normal rate and regular rhythm  Pulmonary:      Comments: Dyspneic upon exertion, "catches breath" while speaking at times  Abdominal:      General: There is distension  Palpations: Abdomen is soft  Skin:     General: Skin is warm and dry  Coloration: Skin is pale  Neurological:      Mental Status: She is alert and oriented to person, place, and time  Motor: Weakness present  Psychiatric:         Mood and Affect: Mood normal          Behavior: Behavior normal          Thought Content: Thought content normal            Recent Results (from the past 48 hour(s))   ECG 12 lead    Collection Time: 08/24/22 12:04 PM   Result Value Ref Range    Ventricular Rate 117 BPM    Atrial Rate 117 BPM    MN Interval 134 ms    QRSD Interval 70 ms    QT Interval 306 ms    QTC Interval 426 ms    P Axis 60 degrees    QRS Axis 84 degrees    T Wave Axis 44 degrees   Blood culture #1    Collection Time: 08/24/22 12:25 PM    Specimen: Arm, Right; Blood   Result Value Ref Range    Blood Culture Received in Microbiology Lab  Culture in Progress  Blood culture #2    Collection Time: 08/24/22 12:25 PM    Specimen: Arm, Right; Blood   Result Value Ref Range    Blood Culture Received in Microbiology Lab  Culture in Progress      APTT    Collection Time: 08/24/22 12:25 PM   Result Value Ref Range    PTT 33 23 - 37 seconds   CBC and differential    Collection Time: 08/24/22 12:25 PM   Result Value Ref Range    WBC 23 82 (H) 4 31 - 10 16 Thousand/uL    RBC 3 14 (L) 3 81 - 5 12 Million/uL Hemoglobin 9 6 (L) 11 5 - 15 4 g/dL    Hematocrit 29 9 (L) 34 8 - 46 1 %    MCV 95 82 - 98 fL    MCH 30 6 26 8 - 34 3 pg    MCHC 32 1 31 4 - 37 4 g/dL    RDW 19 5 (H) 11 6 - 15 1 %    MPV 10 4 8 9 - 12 7 fL    Platelets 782 (H) 023 - 390 Thousands/uL   Comprehensive metabolic panel    Collection Time: 08/24/22 12:25 PM   Result Value Ref Range    Sodium 132 (L) 135 - 147 mmol/L    Potassium 4 1 3 5 - 5 3 mmol/L    Chloride 97 96 - 108 mmol/L    CO2 22 21 - 32 mmol/L    ANION GAP 13 4 - 13 mmol/L    BUN 24 5 - 25 mg/dL    Creatinine 0 77 0 60 - 1 30 mg/dL    Glucose 102 65 - 140 mg/dL    Calcium 8 2 (L) 8 3 - 10 1 mg/dL    Corrected Calcium 9 5 8 3 - 10 1 mg/dL     (H) 5 - 45 U/L     (H) 12 - 78 U/L    Alkaline Phosphatase 1,131 (H) 46 - 116 U/L    Total Protein 6 9 6 4 - 8 4 g/dL    Albumin 2 4 (L) 3 5 - 5 0 g/dL    Total Bilirubin 1 53 (H) 0 20 - 1 00 mg/dL    eGFR 75 ml/min/1 73sq m   Lactic acid    Collection Time: 08/24/22 12:25 PM   Result Value Ref Range    LACTIC ACID 2 3 (HH) 0 5 - 2 0 mmol/L   Procalcitonin    Collection Time: 08/24/22 12:25 PM   Result Value Ref Range    Procalcitonin 8 06 (H) <=0 25 ng/ml   Protime-INR    Collection Time: 08/24/22 12:25 PM   Result Value Ref Range    Protime 17 8 (H) 11 6 - 14 5 seconds    INR 1 47 (H) 0 84 - 1 19   Manual Differential(PHLEBS Do Not Order)    Collection Time: 08/24/22 12:25 PM   Result Value Ref Range    Segmented % 92 (H) 43 - 75 %    Bands % 5 0 - 8 %    Lymphocytes % 3 (L) 14 - 44 %    Monocytes % 0 (L) 4 - 12 %    Eosinophils, % 0 0 - 6 %    Basophils % 0 0 - 1 %    Absolute Neutrophils 23 32 (H) 1 85 - 7 62 Thousand/uL    Lymphocytes Absolute 0 72 0 60 - 4 47 Thousand/uL    Monocytes Absolute 0 00 0 00 - 1 22 Thousand/uL    Eosinophils Absolute 0 00 0 00 - 0 40 Thousand/uL    Basophils Absolute 0 00 0 00 - 0 10 Thousand/uL    Total Counted      Pelger Huet Cells Present     Anisocytosis Present     Marquita Cells Present     Platelet Estimate Increased (A) Adequate   Lactic acid 2 Hours    Collection Time: 08/24/22  2:22 PM   Result Value Ref Range    LACTIC ACID 3 0 (HH) 0 5 - 2 0 mmol/L   Urinalysis with microscopic    Collection Time: 08/24/22  3:22 PM   Result Value Ref Range    Color, UA Yellow     Clarity, UA Clear     Specific Gravity, UA <=1 005 1 003 - 1 030    pH, UA 6 0 4 5, 5 0, 5 5, 6 0, 6 5, 7 0, 7 5, 8 0    Leukocytes, UA Trace (A) Negative    Nitrite, UA Positive (A) Negative    Protein, UA Negative Negative mg/dl    Glucose, UA Negative Negative mg/dl    Ketones, UA Negative Negative mg/dl    Urobilinogen, UA 1 0 0 2, 1 0 E U /dl E U /dl    Bilirubin, UA Negative Negative    Occult Blood, UA Negative Negative    RBC, UA 0-1 (A) None Seen, 2-4 /hpf    WBC, UA 2-4 None Seen, 2-4, 5-60 /hpf    Epithelial Cells Occasional None Seen, Occasional /hpf    Bacteria, UA Moderate (A) None Seen, Occasional /hpf   Lactic acid, plasma    Collection Time: 08/24/22  5:49 PM   Result Value Ref Range    LACTIC ACID 2 1 (HH) 0 5 - 2 0 mmol/L   Platelet count    Collection Time: 08/24/22  8:48 PM   Result Value Ref Range    Platelets 238 (H) 201 - 390 Thousands/uL    MPV 10 3 8 9 - 12 7 fL   Lactic acid 2 Hours    Collection Time: 08/24/22  8:59 PM   Result Value Ref Range    LACTIC ACID 1 6 0 5 - 2 0 mmol/L       XR chest portable    Result Date: 8/24/2022  Narrative: CHEST INDICATION:   sepsis eval  COMPARISON:  5/29/2022 EXAM PERFORMED/VIEWS:  XR CHEST PORTABLE Single view FINDINGS: Right IJ CVP line has been placed terminating in the projection of the upper right atrium Cardiomediastinal silhouette appears unremarkable  The lungs are clear  No pneumothorax or pleural effusion  Osseous structures appear within normal limits for patient age  Impression: Typical right IJ CVP line No acute cardiopulmonary disease   Consistent with prior study except for CVP placement Workstation performed: [id number]     CT abdomen pelvis with contrast    Result Date: 8/24/2022  Narrative: CT ABDOMEN AND PELVIS WITH IV CONTRAST INDICATION:   Abdominal pain, acute, nonlocalized worsening pain, being treated pancreatic ca  COMPARISON: CT from, May 29, 2022 TECHNIQUE:  CT examination of the abdomen and pelvis was performed  Axial, sagittal, and coronal 2D reformatted images were created from the source data and submitted for interpretation  Radiation dose length product (DLP) for this visit:  658 mGy-cm   This examination, like all CT scans performed in the [facility], was performed utilizing techniques to minimize radiation dose exposure, including the use of iterative reconstruction and automated exposure control  IV Contrast:  70 mL of iohexol (OMNIPAQUE) Enteric Contrast:  Enteric contrast was not administered  FINDINGS: ABDOMEN LOWER CHEST:  Multiple lung nodules seen  A lingular region nodule seen measuring 1 cm, right lower lobe lung nodule seen measuring about 1 3 cm LIVER/BILIARY TREE:  Diffuse hepatic metastatic disease seen, progressed from the previous A segment 4 lesion seen measuring 4 3 cm, new and seen in image 24 series 2 segment 7 lesion seen measuring about 4 5 cm, new Portal vein is patent Hepatic artery patent GALLBLADDER:  No calcified gallstones  No pericholecystic inflammatory change  SPLEEN:  Unremarkable  PANCREAS:  Pancreatic atrophy seen Again noted is a partly calcified mass measuring 4 6 cm this indents the 2nd part of the duodenum   ADRENAL GLANDS:  Right adrenal gland appear unremarkable Left adrenal gland appear unremarkable KIDNEYS/URETERS:  The left kidney appear unremarkable There is anterior position of the right kidney STOMACH AND BOWEL:  Stomach appear unremarkable The 2nd part of the duodenum is compressed between the pancreatic mass and the enlarged liver related to metastatic disease Fluid-filled small bowel loops are seen in the lower abdomen in the pelvic cul-de-sac area APPENDIX:  No findings to suggest appendicitis  ABDOMINOPELVIC CAVITY:  Ascites seen with fluid in the pelvis  Perihepatic region Fluid is also seen in the retroperitoneum around the right adrenal gland  Paratracheal lymphadenopathy seen, larger from the previous study Aortocaval lymph node seen measuring about 1 6 cm  Retrocrural lymph nodes are seen measuring about 1 cm, larger from the previous study VESSELS:  Celiac trunk is patent SMA is patent RUBI is patent Iliac vessels are patent PELVIS REPRODUCTIVE ORGANS:  Unremarkable for patient's age  Multiple and enlarged pelvic venous collaterals noted with enlarged left lateral brain URINARY BLADDER:  Unremarkable  ABDOMINAL WALL/INGUINAL REGIONS:  Unremarkable  OSSEOUS STRUCTURES:  No acute compression collapse vertebra No gross lytic lesion Degenerative changes are seen within the lumbar spine Healed fracture of the right inferior pubic ramus Patient is known to have a foci of increased uptake in the bony pelvis based on the bone scan from July 7, 2022 these are occult on the current CT     Impression: Progression of disease with new pulmonary metastatic disease, enlargement of the hepatic metastatic disease and retroperitoneal lymphadenopathy  There is new ascites with fluid in the pelvic cul-de-sac and in the upper abdomen and in the retroperitoneum Pancreatic mass, remains unchanged No bowel obstruction No pneumatosis or free air The study was marked in EPIC for immediate notification  Workstation performed: Emilie Ty and pertinent reports reviewed  This note has been generated by voice recognition software system  Therefore, there may be spelling, grammar, and or syntax errors  Please contact if questions arise

## 2022-08-25 NOTE — PLAN OF CARE
Problem: MOBILITY - ADULT  Goal: Maintain or return to baseline ADL function  Description: INTERVENTIONS:  -  Assess patient's ability to carry out ADLs; assess patient's baseline for ADL function and identify physical deficits which impact ability to perform ADLs (bathing, care of mouth/teeth, toileting, grooming, dressing, etc )  - Assess/evaluate cause of self-care deficits   - Assess range of motion  - Assess patient's mobility; develop plan if impaired  - Assess patient's need for assistive devices and provide as appropriate  - Encourage maximum independence but intervene and supervise when necessary  - Involve family in performance of ADLs  - Assess for home care needs following discharge   - Consider OT consult to assist with ADL evaluation and planning for discharge  - Provide patient education as appropriate  Outcome: Progressing  Goal: Maintains/Returns to pre admission functional level  Description: INTERVENTIONS:  - Perform BMAT or MOVE assessment daily    - Set and communicate daily mobility goal to care team and patient/family/caregiver  - Collaborate with rehabilitation services on mobility goals if consulted  - Perform Range of Motion  times a day  - Reposition patient every  hours    - Dangle patient  times a day  - Stand patient  times a day  - Ambulate patient  times a day  - Out of bed to chair  times a day   - Out of bed for meals  times a day  - Out of bed for toileting  - Record patient progress and toleration of activity level   Outcome: Progressing     Problem: PAIN - ADULT  Goal: Verbalizes/displays adequate comfort level or baseline comfort level  Description: Interventions:  - Encourage patient to monitor pain and request assistance  - Assess pain using appropriate pain scale  - Administer analgesics based on type and severity of pain and evaluate response  - Implement non-pharmacological measures as appropriate and evaluate response  - Consider cultural and social influences on pain and pain management  - Notify physician/advanced practitioner if interventions unsuccessful or patient reports new pain  Outcome: Progressing     Problem: INFECTION - ADULT  Goal: Absence or prevention of progression during hospitalization  Description: INTERVENTIONS:  - Assess and monitor for signs and symptoms of infection  - Monitor lab/diagnostic results  - Monitor all insertion sites, i e  indwelling lines, tubes, and drains  - Monitor endotracheal if appropriate and nasal secretions for changes in amount and color  - [place] appropriate cooling/warming therapies per order  - Administer medications as ordered  - Instruct and encourage patient and family to use good hand hygiene technique  - Identify and instruct in appropriate isolation precautions for identified infection/condition  Outcome: Progressing  Goal: Absence of fever/infection during neutropenic period  Description: INTERVENTIONS:  - Monitor WBC    Outcome: Progressing     Problem: SAFETY ADULT  Goal: Maintain or return to baseline ADL function  Description: INTERVENTIONS:  -  Assess patient's ability to carry out ADLs; assess patient's baseline for ADL function and identify physical deficits which impact ability to perform ADLs (bathing, care of mouth/teeth, toileting, grooming, dressing, etc )  - Assess/evaluate cause of self-care deficits   - Assess range of motion  - Assess patient's mobility; develop plan if impaired  - Assess patient's need for assistive devices and provide as appropriate  - Encourage maximum independence but intervene and supervise when necessary  - Involve family in performance of ADLs  - Assess for home care needs following discharge   - Consider OT consult to assist with ADL evaluation and planning for discharge  - Provide patient education as appropriate  Outcome: Progressing  Goal: Maintains/Returns to pre admission functional level  Description: INTERVENTIONS:  - Perform BMAT or MOVE assessment daily    - Set and communicate daily mobility goal to care team and patient/family/caregiver  - Collaborate with rehabilitation services on mobility goals if consulted  - Perform Range of Motion  times a day  - Reposition patient every  hours    - Dangle patient  times a day  - Stand patient  times a day  - Ambulate patient  times a day  - Out of bed to chair  times a day   - Out of bed for meals  times a day  - Out of bed for toileting  - Record patient progress and toleration of activity level   Outcome: Progressing  Goal: Patient will remain free of falls  Description: INTERVENTIONS:  - Educate patient/family on patient safety including physical limitations  - Instruct patient to call for assistance with activity   - Consult OT/PT to assist with strengthening/mobility   - Keep Call bell within reach  - Keep bed low and locked with side rails adjusted as appropriate  - Keep care items and personal belongings within reach  - Initiate and maintain comfort rounds  - Make Fall Risk Sign visible to staff  - Offer Toileting every  Hours, in advance of need  - Initiate/Maintain alarm  - Obtain necessary fall risk management equipment:   - Apply yellow socks and bracelet for high fall risk patients  - Consider moving patient to room near nurses station  Outcome: Progressing     Problem: DISCHARGE PLANNING  Goal: Discharge to home or other facility with appropriate resources  Description: INTERVENTIONS:  - Identify barriers to discharge w/patient and caregiver  - Arrange for needed discharge resources and transportation as appropriate  - Identify discharge learning needs (meds, wound care, etc )  - Arrange for interpretive services to assist at discharge as needed  - Refer to Case Management Department for coordinating discharge planning if the patient needs post-hospital services based on physician/advanced practitioner order or complex needs related to functional status, cognitive ability, or social support system  Outcome: Progressing Problem: Nutrition/Hydration-ADULT  Goal: Nutrient/Hydration intake appropriate for improving, restoring or maintaining nutritional needs  Description: Monitor and assess patient's nutrition/hydration status for malnutrition  Collaborate with interdisciplinary team and initiate plan and interventions as ordered  Monitor patient's weight and dietary intake as ordered or per policy  Utilize nutrition screening tool and intervene as necessary  Determine patient's food preferences and provide high-protein, high-caloric foods as appropriate       INTERVENTIONS:  - Monitor oral intake, urinary output, labs, and treatment plans  - Assess nutrition and hydration status and recommend course of action  - Evaluate amount of meals eaten  - Assist patient with eating if necessary   - Allow adequate time for meals  - Recommend/ encourage appropriate diets, oral nutritional supplements, and vitamin/mineral supplements  - Order, calculate, and assess calorie counts as needed  - Recommend, monitor, and adjust tube feedings and TPN/PPN based on assessed needs  - Assess need for intravenous fluids  - Provide specific nutrition/hydration education as appropriate  - Include patient/family/caregiver in decisions related to nutrition  Outcome: Progressing     Problem: Potential for Falls  Goal: Patient will remain free of falls  Description: INTERVENTIONS:  - Educate patient/family on patient safety including physical limitations  - Instruct patient to call for assistance with activity   - Consult OT/PT to assist with strengthening/mobility   - Keep Call bell within reach  - Keep bed low and locked with side rails adjusted as appropriate  - Keep care items and personal belongings within reach  - Initiate and maintain comfort rounds  - Make Fall Risk Sign visible to staff  - Offer Toileting every  Hours, in advance of need  - Initiate/Maintain alarm  - Obtain necessary fall risk management equipment:   - Apply yellow socks and bracelet for high fall risk patients  - Consider moving patient to room near nurses station  Outcome: Progressing

## 2022-08-25 NOTE — ASSESSMENT & PLAN NOTE
· Malignant versus infectious  · Diagnostic/therapeutic paracentesis pending    · Ongoing empiric antibiotics given immunocompromised state and sepsis

## 2022-08-25 NOTE — ASSESSMENT & PLAN NOTE
· Metabolic encephalopathy secondary to sepsis/SIRS, recent chemotherapy, acute illness  · She does have metastatic disease on MRI but this is very small, 3 mm in the precentral gyrus and I do not think this caused her confusion

## 2022-08-26 ENCOUNTER — TELEPHONE (OUTPATIENT)
Dept: HEMATOLOGY ONCOLOGY | Facility: CLINIC | Age: 77
End: 2022-08-26

## 2022-08-26 ENCOUNTER — APPOINTMENT (INPATIENT)
Dept: RADIOLOGY | Facility: HOSPITAL | Age: 77
DRG: 435 | End: 2022-08-26
Attending: INTERNAL MEDICINE
Payer: COMMERCIAL

## 2022-08-26 PROBLEM — D75.839 THROMBOCYTOSIS: Status: ACTIVE | Noted: 2022-01-01

## 2022-08-26 LAB
ALBUMIN SERPL BCP-MCNC: 1.8 G/DL (ref 3.5–5)
ALP SERPL-CCNC: 798 U/L (ref 46–116)
ALT SERPL W P-5'-P-CCNC: 114 U/L (ref 12–78)
ANION GAP SERPL CALCULATED.3IONS-SCNC: 10 MMOL/L (ref 4–13)
AST SERPL W P-5'-P-CCNC: 211 U/L (ref 5–45)
BILIRUB SERPL-MCNC: 1.07 MG/DL (ref 0.2–1)
BUN SERPL-MCNC: 16 MG/DL (ref 5–25)
CALCIUM ALBUM COR SERPL-MCNC: 9.2 MG/DL (ref 8.3–10.1)
CALCIUM SERPL-MCNC: 7.4 MG/DL (ref 8.3–10.1)
CHLORIDE SERPL-SCNC: 102 MMOL/L (ref 96–108)
CO2 SERPL-SCNC: 22 MMOL/L (ref 21–32)
CREAT SERPL-MCNC: 0.73 MG/DL (ref 0.6–1.3)
ERYTHROCYTE [DISTWIDTH] IN BLOOD BY AUTOMATED COUNT: 19.7 % (ref 11.6–15.1)
GFR SERPL CREATININE-BSD FRML MDRD: 80 ML/MIN/1.73SQ M
GLUCOSE SERPL-MCNC: 125 MG/DL (ref 65–140)
HCT VFR BLD AUTO: 23.4 % (ref 34.8–46.1)
HGB BLD-MCNC: 7.4 G/DL (ref 11.5–15.4)
MCH RBC QN AUTO: 30.8 PG (ref 26.8–34.3)
MCHC RBC AUTO-ENTMCNC: 31.6 G/DL (ref 31.4–37.4)
MCV RBC AUTO: 98 FL (ref 82–98)
NRBC BLD AUTO-RTO: 0 /100 WBCS
PLATELET # BLD AUTO: 534 THOUSANDS/UL (ref 149–390)
PMV BLD AUTO: 11.1 FL (ref 8.9–12.7)
POTASSIUM SERPL-SCNC: 4.1 MMOL/L (ref 3.5–5.3)
PROT SERPL-MCNC: 5.5 G/DL (ref 6.4–8.4)
RBC # BLD AUTO: 2.4 MILLION/UL (ref 3.81–5.12)
SODIUM SERPL-SCNC: 134 MMOL/L (ref 135–147)
WBC # BLD AUTO: 13.73 THOUSAND/UL (ref 4.31–10.16)

## 2022-08-26 PROCEDURE — 99232 SBSQ HOSP IP/OBS MODERATE 35: CPT | Performed by: PHYSICIAN ASSISTANT

## 2022-08-26 PROCEDURE — 76705 ECHO EXAM OF ABDOMEN: CPT | Performed by: RADIOLOGY

## 2022-08-26 PROCEDURE — 80053 COMPREHEN METABOLIC PANEL: CPT | Performed by: INTERNAL MEDICINE

## 2022-08-26 PROCEDURE — 87086 URINE CULTURE/COLONY COUNT: CPT | Performed by: PHYSICIAN ASSISTANT

## 2022-08-26 PROCEDURE — 99222 1ST HOSP IP/OBS MODERATE 55: CPT | Performed by: INTERNAL MEDICINE

## 2022-08-26 PROCEDURE — 85027 COMPLETE CBC AUTOMATED: CPT | Performed by: INTERNAL MEDICINE

## 2022-08-26 RX ORDER — ACETAMINOPHEN 325 MG/1
650 TABLET ORAL EVERY 8 HOURS SCHEDULED
Status: DISCONTINUED | OUTPATIENT
Start: 2022-08-26 | End: 2022-08-28 | Stop reason: HOSPADM

## 2022-08-26 RX ORDER — HYDROCODONE BITARTRATE AND ACETAMINOPHEN 5; 325 MG/1; MG/1
0.5 TABLET ORAL EVERY 6 HOURS PRN
Status: DISCONTINUED | OUTPATIENT
Start: 2022-08-26 | End: 2022-08-28 | Stop reason: HOSPADM

## 2022-08-26 RX ORDER — SODIUM CHLORIDE 9 MG/ML
75 INJECTION, SOLUTION INTRAVENOUS CONTINUOUS
Status: DISPENSED | OUTPATIENT
Start: 2022-08-26 | End: 2022-08-26

## 2022-08-26 RX ADMIN — PANCRELIPASE 24000 UNITS: 24000; 76000; 120000 CAPSULE, DELAYED RELEASE PELLETS ORAL at 16:26

## 2022-08-26 RX ADMIN — PANCRELIPASE 24000 UNITS: 24000; 76000; 120000 CAPSULE, DELAYED RELEASE PELLETS ORAL at 07:50

## 2022-08-26 RX ADMIN — SODIUM CHLORIDE 75 ML/HR: 0.9 INJECTION, SOLUTION INTRAVENOUS at 10:18

## 2022-08-26 RX ADMIN — HEPARIN SODIUM 5000 UNITS: 5000 INJECTION INTRAVENOUS; SUBCUTANEOUS at 05:15

## 2022-08-26 RX ADMIN — CEFEPIME HYDROCHLORIDE 2000 MG: 2 INJECTION, POWDER, FOR SOLUTION INTRAVENOUS at 11:22

## 2022-08-26 RX ADMIN — HEPARIN SODIUM 5000 UNITS: 5000 INJECTION INTRAVENOUS; SUBCUTANEOUS at 22:12

## 2022-08-26 RX ADMIN — CEFEPIME HYDROCHLORIDE 2000 MG: 2 INJECTION, POWDER, FOR SOLUTION INTRAVENOUS at 00:07

## 2022-08-26 RX ADMIN — PANTOPRAZOLE SODIUM 20 MG: 20 TABLET, DELAYED RELEASE ORAL at 05:15

## 2022-08-26 RX ADMIN — HEPARIN SODIUM 5000 UNITS: 5000 INJECTION INTRAVENOUS; SUBCUTANEOUS at 15:32

## 2022-08-26 NOTE — ASSESSMENT & PLAN NOTE
· Continue oxycodone 5 mg q 6 hours p r n    · PDMP reviewed  · Seen by Palliative care on 8/18, will ask them to re-eval patient here given her progression of disease for patient and family support

## 2022-08-26 NOTE — CONSULTS
Consultation - Palliative & Supportive Care   Tiarra Beavers  78 y o   female  [address] [address] /E2 -*   MRN: [id number]  Encounter: [id number]    ASSESSMENT:    Patient Active Problem List   Diagnosis    UTI (urinary tract infection)    Chest pain    Elevated TSH    Elevated brain natriuretic peptide (BNP) level    Elevated d-dimer    Iron deficiency anemia    Hypokalemia    Chronic pain of left ankle    Weight loss    Stress at home   [facility] annual wellness visit, subsequent    Protein-calorie malnutrition, unspecified severity ([facility] [address] )    Pancreatic mass    Pericardial effusion    Anemia    Severe protein-calorie malnutrition (HCC)    Primary pancreatic adenocarcinoma (HCC)    Anxiety    Nausea    Cancer related pain    Counseling regarding advanced care planning and goals of care    Chemotherapy induced nausea and vomiting    Pancreatic insufficiency    Dysgeusia    Osseous metastasis (HCC)    Sepsis ([facility] [address] )    Transaminitis    Encephalopathy    Ascites    Thrombocytosis       Active problems addressed:  Metastatic pancreatic adenocarcinoma  Liver metastases  Lung metastases  Brain metastasis  Cancer related pain  Goals of care    PLAN:    1  Goals:    Palliative recommends comfort based approach on hospice at home given overall decline and progression of disease  Now also with ongoing cancer pain issues but with poor tolerance to opioid   However, patient and her daughter are considering further cancer-directed therapies after speaking with oncology yesterday   Follow up with Palliative and oncology as an OP    Code status: Level 1 - Full Code   Decisional apparatus:  Patient does have capacity to make medical decisions on my exam today  If such capacity is lost, patient's substitute decision maker would default to daughter by [place] 169  Advance Directive / Living Will / POLST:  None on file    2  Social Support:   Supportive listening provided     Time spent providing emotional support to [place] and [place]      3  Symptom management:   Stop Oxycodone  Not tolerating - increased drowsiness/confusion   Start hydrocodone 1/2 tab of 5-325mg PO q6H prn    Start 650mg tylenol PO q8H ATC  Total max tylenol with regimen still below 3000mg in 24H  No more additional tylenol  Trend LFTs   Senokot prn   SLIM to provide few tabs of hydrocodone if discharging over the weekend  Our office will then ensure a timely follow up outpatient   D/w GEORGINA/Magda Barreto PA-C    Controlled Substance Review    [place] or [place]  reviewed: No red flags were identified; safe to proceed with prescription  Cielo  06/02/2022  1   06/02/2022  Oxycodone Hcl (Ir) 5 MG Tablet    15 00  7 Elisa Us   [id number]   Jeanie [phone] MME  Private Pay   [place]       I have reviewed the patient's controlled substance dispensing history in the Prescription Drug Monitoring Program in compliance with the [facility] regulations before prescribing any controlled substances  We appreciate the opportunity to participate in this patient's care  We will continue to follow  Please do not hesitate to contact our on-call provider through our clinic answering service at [phone] should you have acute symptom control concerns  IDENTIFICATION:  Inpatient consult to Palliative Care  Consult performed by: Lito Dent MD  Consult ordered by: Abigail Abdullahi PA-C        Reason for Consult / Principal Problem: goals of care    HISTORY OF PRESENT ILLNESS:    Clarisse Oppenheim is a 78 y o  female with palliative diagnosis of metastatic pancreatic adenocarcinoma with liver, lung, bone, and retroperitoneal LN involvement  She is currently on gemcitabine+abraxane that was started on 6/29/2022  She is known to [facility] for supportive cares   She is admitted for worsening abdominal pain and found to have progression of disease with new pulmonary metastatic disease, enlargement of the hepatic metastatic disease and retroperitoneal lymphadenopathy  There is new ascites with fluid in the pelvic cul-de-sac and in the upper abdomen and in the retroperitoneum  MRI brain also show right precentral gyrus tiny (3 mm) enhancing cortical lesion concerning for metastasis  Oncology consulted and discussed end of life vs continued treatment  Patient undecided  Palliative for continued goals of care  Patient seen and examined at bedside  She requests I speak with daughter over the phone as well, placed on speaker  Patient continues to have intermittent abdominal pain which is effectively controlled with oxyIR 5mg  However, patient experiences excessive drowsiness and some confusion with it  We discussed complexity of situation given that she has progression of cancer which causes current/ongoing cancer related pain, with potential to get worse over time  We discussed that nonopioids may not be as effective as opioids  We discussed that all other opioids can potentially cause the same issues  At some point, if these continues, then we have to outweigh risk and benefit and determine which one is more important for her - awake with more pain or drowsy/confused but with better pain control  More importantly, if pain is escalating and poorly controlled, comfort-based approach on hospice is most beneficial and appropriate at this point in time  I discussed hospice as an option, but at this time, they are still considering further chemo with oncology, after they spoke yesterday  I recommend that hospice be considered given progressive cancer with declining clinical/functional status and worsening pain  I discussed regimen as above  They are agreeable to proceed  Interview and exam limited by: none    Review of Systems   Constitutional: Positive for activity change, appetite change and fatigue  HENT: Negative for trouble swallowing  Respiratory: Negative for shortness of breath  Cardiovascular: Negative for chest pain     Gastrointestinal: Positive for abdominal pain  Negative for constipation, diarrhea, nausea and vomiting  Musculoskeletal: Negative for back pain  Neurological: Positive for weakness  Psychiatric/Behavioral: Negative for sleep disturbance  All other systems reviewed and are negative  Past Medical History:   Diagnosis Date    Pancreatic cancer [facility])      Past Surgical History:   Procedure Laterality Date    CARDIAC CATHETERIZATION N/A 3/30/2022    Procedure: Cardiac catheterization;  Surgeon: Liz Sumner DO;  Location: [facility] CARDIAC CATH LAB; Service: Cardiology    CARDIAC CATHETERIZATION N/A 3/30/2022    Procedure: Cardiac Coronary Angiogram;  Surgeon: Liz Sumner DO;  Location: [facility] CARDIAC C[facility]; Service: Cardiology    IR BIOPSY LIVER MASS  6/6/2022    IR PORT PLACEMENT  6/28/2022     Social History     Socioeconomic History    Marital status: /Civil Union     Spouse name: Not on file    Number of children: Not on file    Years of education: Not on file    Highest education level: Not on file   Occupational History    Not on file   Tobacco Use    Smoking status: Never Smoker    Smokeless tobacco: Never Used   Vaping Use    Vaping Use: Never used   Substance and Sexual Activity    Alcohol use: Not Currently    Drug use: Never    Sexual activity: Not on file   Other Topics Concern    Not on file   Social History Narrative    Not on file     Social Determinants of Health     Financial Resource Strain: Not on file   Food Insecurity: No Food Insecurity    Worried About [address] in the Last Year: Never true    [address] in the Last Year: Never true   Transportation Needs: No Transportation Needs    Lack of Transportation (Medical): No    Lack of Transportation (Non-Medical):  No   Physical Activity: Not on file   Stress: Not on file   Social Connections: Not on file   Intimate Partner Violence: Not on file   Housing Stability: Low Risk     Unable to Pay for Housing in the Last Year: No    Number of Places Lived in the Last Year: 1    Unstable Housing in the Last Year: No     Family History   Problem Relation Age of Onset    Coronary artery disease Father     Coronary artery disease Brother     Diabetes Brother     Kidney failure Brother     Uterine cancer Mother        MEDICATIONS / ALLERGIES:  all current active meds have been reviewed    Allergies   Allergen Reactions    Bentyl [Dicyclomine] Throat Swelling    Codeine Other (See Comments)     Was told allergy    Elastic Bandages & [Zinc] Hives, Swelling and Rash       OBJECTIVE:  /69 (BP Location: Right arm)   Pulse 102   Temp (!) 97 2 °F (36 2 °C) (Temporal)   Resp 20   Ht 5' 4" (1 626 m)   Wt 50 8 kg (111 lb 15 9 oz)   SpO2 98%   BMI 19 22 kg/m²   Physical Exam:  Constitutional: Appears frail, cachectic  Chronically ill looking, but not toxic appearing  In no acute physical or emotional distress  Head: Normocephalic and atraumatic  Temporal, submaxillary and periorbital mm wasting  Eyes: EOM are normal  No ocular discharge  No scleral icterus  Neck: No visible adenopathy or masses  Respiratory: Effort normal  No stridor  No respiratory distress  Gastrointestinal: No abdominal distension  Musculoskeletal: No edema  Neurological: Alert, oriented and appropriately conversant  Skin: Dry, no diaphoresis  Pale   Psychiatric: Displays a normal mood and affect  Behavior, judgment and thought content appear normal      Lab Results: I have personally reviewed pertinent labs  Imaging Studies: I have personally reviewed pertinent reports  EKG, Pathology, and Other Studies: I have personally reviewed pertinent reports  Counseling / Coordination of Care:  Counseling / Coordination of Care  Total floor / unit time spent today 45 minutes  Greater than 50% of total time was spent with the patient and / or family counseling and / or coordination of care   A description of the counseling / coordination of care: provided medical updates, discussed palliative care, discussed hospice care, determined competency, determined goals of care, determined POA, determined social/family support, discussed plans of care, discussed symptom management, provided psychosocial support       Ana Chiu MD  [address] [id number] and Supportive Care  [phone]

## 2022-08-26 NOTE — CASE MANAGEMENT
Case Management Assessment & Discharge Planning Note    Patient name Clarisse Oppenheim  Location East 2 /E2 -* MRN [id number]  : 1945 Date 2022       Current Admission Date: 2022  Current Admission Diagnosis:Sepsis [facility])   Patient Active Problem List    Diagnosis Date Noted    Thrombocytosis 2022    Encephalopathy 2022    Ascites 2022    Sepsis ([address] ) 2022    Transaminitis 2022    Osseous metastasis ([facility] ) 2022    Pancreatic insufficiency 07/15/2022    Dysgeusia 07/15/2022    Chemotherapy induced nausea and vomiting 2022    Cancer related pain 2022    Counseling regarding advanced care planning and goals of care 2022    Anxiety 2022    Nausea 2022    Primary pancreatic adenocarcinoma ([facility] [address] [facility] ) 2022    Severe protein-calorie malnutrition ([facility] [address] ) 2022    Anemia 2022    Pancreatic mass 2022    Pericardial effusion 2022    Protein-calorie malnutrition, unspecified severity ([facility] [address] ) 2022    Weight loss 2022    Stress at home 2022    Medicare annual wellness visit, subsequent 2022    Hypokalemia 2022    Chronic pain of left ankle 2022    Iron deficiency anemia 2022    UTI (urinary tract infection) 2022    Chest pain 2022    Elevated TSH 2022    Elevated brain natriuretic peptide (BNP) level 2022    Elevated d-dimer 2022      LOS (days): 2  Geometric Mean LOS (GMLOS) (days): 3 50  Days to GMLOS:1 7     OBJECTIVE:    Risk of Unplanned Readmission Score: 25 48         Current admission status: Inpatient       Preferred Pharmacy:   [facility] [address]  Phone: [phone] Fax: [phone]    Primary Care Provider: Suzanne Laundry, DO    Primary Insurance: [facility] REP  Secondary Insurance:     ASSESSMENT:  Clay Hassan Proxies    There are no active Health Care Proxies on file  Readmission Root Cause  30 Day Readmission: No    Patient Information  Admitted from[de-identified] Home  Mental Status: Alert  During Assessment patient was accompanied by: Daughter, Other-Comment (Grandkids)  Assessment information provided by[de-identified] Daughter  Primary Caregiver: Family  Caregiver's Name[de-identified] Yoel Reaves (Daughter)  Caregiver's Relationship to Patient[de-identified] Family Member (Unpaid, but actively working on getting paid )  Caregiver's Telephone Number[de-identified] [phone] (M)  Support Systems: Self, Daughter, Family members, [facility] of Residence: [address] do you live in?: [address] entry access options   Select all that apply : Stairs  Number of steps to enter home : 4  Do the steps have railings?: Yes  Type of Current Residence: [facility]  In the last 12 months, was there a time when you were not able to pay the mortgage or rent on time?: Yes  In the last 12 months, how many places have you lived?: 1  In the last 12 months, was there a time when you did not have a steady place to sleep or slept in a shelter (including now)?: No  Homeless/housing insecurity resource given?: Yes (Emergency Rental Assistance program brochure printed by CM and handed to pt )  Living Arrangements: Lives w/ Daughter  Is patient a ?: No    Activities of Daily Living Prior to Admission  Functional Status: Assistance  Completes ADLs independently?: No  Level of ADL dependence: Assistance  Ambulates independently?: Yes  Does patient use assisted devices?: Yes  Does patient currently own DME?: Yes  What DME does the patient currently own?: Shower Chair, Tessie Socks, Rollator (Palliative care work OP for w/c)  Does patient have a history of Outpatient Therapy (PT/OT)?: No  Does the patient have a history of Short-Term Rehab?: No  Does patient have a history of HHC?: No         Patient Information Continued  Income Source: Pension/residential  Does patient have prescription coverage?: Yes  Within the past 12 months, you worried that your food would run out before you got the money to buy more : Never true  Within the past 12 months, the food you bought just didn't last and you didn't have money to get more : Never true  Food insecurity resource given?: No (Pt's daughter adding pt to daughter's current plan )  Does patient receive dialysis treatments?: No  Does patient have a history of substance abuse?: No  Does patient have a history of Mental Health Diagnosis?: No         Means of Transportation  Means of Transport to Appts[de-identified] Family transport (Daughter)  In the past 12 months, has lack of transportation kept you from medical appointments or from getting medications?: No  In the past 12 months, has lack of transportation kept you from meetings, work, or from getting things needed for daily living?: No  Was application for public transport provided?: N/A        DISCHARGE DETAILS:    Discharge planning discussed with[de-identified] Pt and pt's daughter        CM contacted family/caregiver?: Yes (At bedside)             Contacts  Patient Contacts: Kelsey Arana (Daughter)  Relationship to Patient[de-identified] Family  Contact Method:  In Person  Reason/Outcome: Continuity of Care, Discharge Planning                   Would you like to participate in our [address] service program?  : No - Declined

## 2022-08-26 NOTE — ASSESSMENT & PLAN NOTE
Lab Results   Component Value Date    HGB 7 4 (L) 08/26/2022    HGB 9 6 (L) 08/24/2022    HGB 9 4 (L) 08/20/2022    HGB 8 9 (L) 08/06/2022    HGB 9 4 (L) 07/30/2022     Transfuse as needed for hgb <7

## 2022-08-26 NOTE — PROGRESS NOTES
[facility]  Progress Note - Sarath Muñoz 1945, 78 y o  female MRN: [id number]  Unit/Bed#: E2 -31 Encounter: [id number]  Primary Care Provider: Rody Tuttle DO   Date and time admitted to hospital: 8/24/2022 11:51 AM    * Sepsis [facility])  Assessment & Plan  · Sepsis due to UTI versus SIRS from stage 4 cancer pancreatic cancer with new ascites and after effect of recent chemotherapy   Also with imaging revealing progression of disease   · Lactic acidosis on admission, resolved   · Continue IVF hydration   · Continue empiric IV cefepime, day 3   · Blood cultures negative x 24 hours   · Will add on urine culture   · IR consulted, Awaiting diagnostic and therapeutic paracentesis  · Ongoing goals of care discussion   · Was seen by Dr Taylor Moralez 8/18, given she is admitted with progression of disease will ask palliative to see her here     Thrombocytosis  Assessment & Plan  · Evaluated by HemOnc here   · Likely 2/2 GI malignancy     Ascites  Assessment & Plan  · Malignant versus infectious   · Diagnostic/therapeutic paracentesis pending   · Day 3 empiric IV cefepime       Encephalopathy  Assessment & Plan  · Metabolic encephalopathy POA, multifactorial secondary to progression of metastatic pancreatic adenocarcinoma,  sepsis/SIRS, recent chemotherapy  · She is asking for oxycodone to be discontinued as she states it makes her feel foggy    · She does have metastatic disease on MRI brain, likely not the cause of her encephalopathy given this is very small, 3 mm in the precentral gyrus   · Appears back to baseline today     Transaminitis  Assessment & Plan  · Secondary to known liver disease with CT showing enlargement of hepatic metastatic disease     Pancreatic insufficiency  Assessment & Plan  · Secondary to stage IV pancreatic cancer  · Continue Creon three times daily with meals     Cancer related pain  Assessment & Plan  · Was on oxycodone 5 mg q 6 hours p r n  --> she is asking for this to be d/c as she states it causes her to be foggy and confused   · PDMP reviewed  · Seen by Palliative care on 8/18, will ask them to re-eval patient here given her progression of disease for patient and family support     Primary pancreatic adenocarcinoma [facility])  Assessment & Plan  · Stage IV pancreatic cancer status post recent chemotherapy with Gemzar, Abraxane, Zometa, dexamethasone on 08/22  · Brain MRI now with metastatic disease  · CT chest abdomen and pelvis showed enlarging liver disease, new lung Mets, new ascites and retroperitoneal adenopathy  · appreciate HemOnc recommendations   · Palliative care involved   · She continues to be goal directed at this time , confirmed level 1 full code with patient and daughter   · Her primary Oncologist is Dr Elysia Hall Results   Component Value Date    HGB 7 4 (L) 08/26/2022    HGB 9 6 (L) 08/24/2022    HGB 9 4 (L) 08/20/2022    HGB 8 9 (L) 08/06/2022    HGB 9 4 (L) 07/30/2022     Transfuse as needed for hgb <7       VTE Pharmacologic Prophylaxis: VTE Score: 6 High Risk (Score >/= 5) - Pharmacological DVT Prophylaxis Ordered: heparin  Sequential Compression Devices Ordered  Patient Centered Rounds: I performed bedside rounds with nursing staff today  Discussions with Specialists or Other Care Team Provider:     Education and Discussions with Family / Patient: Updated  (daughter) at bedside  Time Spent for Care: 20 minutes  More than 50% of total time spent on counseling and coordination of care as described above  Current Length of Stay: 2 day(s)  Current Patient Status: Inpatient   Certification Statement: The patient will continue to require additional inpatient hospital stay due to progression of pancreatic cancer, ascites   Discharge Plan: Anticipate discharge in 24-48 hrs to home  Code Status: Level 1 - Full Code    Subjective:   Resting in bed   States she doesn't feel well but better than admission  She wants oxycodone to be discontinued as she states it makes her feel foggy and confused  She denies any significant pain at this time  She had a bowel movement this morning  She was originally ambivalent about going for paracentesis she states the oncology team told her she did not need to have this done but is agreeable for diagnostic and therapeutic reasons to see if fluid can be removed  She denies chest pain or shortness of breath at rest   She does states she gets quite short of breath easily and fatigued with minimal exertion  She denies any fevers  No urinary complaints the daughter at the bedside states that she has had more accidents at home recently  I spoke with both daughter and patient at bedside  They are appreciative of being seen by palliative care here  Patient continues to be treatment oriented and I confirm level 1 full code status with patient's daughter at the bedside today  Objective:     Vitals:   Temp (24hrs), Av °F (36 7 °C), Min:97 2 °F (36 2 °C), Max:98 6 °F (37 °C)    Temp:  [97 2 °F (36 2 °C)-98 6 °F (37 °C)] 97 2 °F (36 2 °C)  HR:  [] 102  Resp:  [18-20] 20  BP: (101-114)/(58-69) 109/69  SpO2:  [97 %-98 %] 98 %  Body mass index is 19 22 kg/m²  Input and Output Summary (last 24 hours): Intake/Output Summary (Last 24 hours) at 2022 0943  Last data filed at 2022 9480  Gross per 24 hour   Intake --   Output 200 ml   Net -200 ml       Physical Exam:   Physical Exam  Vitals and nursing note reviewed  Constitutional:       General: She is not in acute distress  Appearance: She is ill-appearing (Chronically ill-appearing, frail elderly woman)  HENT:      Head: Normocephalic  Cardiovascular:      Rate and Rhythm: Normal rate and regular rhythm  Pulmonary:      Effort: Pulmonary effort is normal       Breath sounds: Normal breath sounds        Comments: Decreased throughout, on room air  Abdominal:      General: Bowel sounds are normal  Distension: mild  Palpations: Abdomen is soft  Tenderness: There is abdominal tenderness (mild epigastric )  There is no guarding or rebound  Musculoskeletal:      Right lower leg: No edema  Left lower leg: No edema  Skin:     Coloration: Skin is pale  Neurological:      Mental Status: She is alert  Mental status is at baseline  Psychiatric:         Mood and Affect: Mood normal           Additional Data:     Labs:  Results from last 7 days   Lab Units 08/26/22  0423 08/24/22  2048 08/24/22  1225   WBC Thousand/uL 13 73*  --  23 82*   HEMOGLOBIN g/dL 7 4*  --  9 6*   HEMATOCRIT % 23 4*  --  29 9*   PLATELETS Thousands/uL 534*   < > 635*   BANDS PCT %  --   --  5   LYMPHO PCT %  --   --  3*   MONO PCT %  --   --  0*   EOS PCT %  --   --  0    < > = values in this interval not displayed       Results from last 7 days   Lab Units 08/26/22  0423   SODIUM mmol/L 134*   POTASSIUM mmol/L 4 1   CHLORIDE mmol/L 102   CO2 mmol/L 22   BUN mg/dL 16   CREATININE mg/dL 0 73   ANION GAP mmol/L 10   CALCIUM mg/dL 7 4*   ALBUMIN g/dL 1 8*   TOTAL BILIRUBIN mg/dL 1 07*   ALK PHOS U/L 798*   ALT U/L 114*   AST U/L 211*   GLUCOSE RANDOM mg/dL 125     Results from last 7 days   Lab Units 08/24/22  1225   INR  1 47*             Results from last 7 days   Lab Units 08/24/22  2059 08/24/22  1749 08/24/22  1422 08/24/22  1225   LACTIC ACID mmol/L 1 6 2 1* 3 0* 2 3*   PROCALCITONIN ng/ml  --   --   --  8 06*       Lines/Drains:  Invasive Devices  Report    Central Venous Catheter Line  Duration           Port A Cath 06/28/22 Right Chest 58 days          Peripheral Intravenous Line  Duration           Peripheral IV 08/24/22 Left Antecubital 1 day    Peripheral IV 08/24/22 Left Wrist 1 day                Central Line:  Goal for removal: chronic on chemo              Imaging: Reviewed radiology reports from this admission including: abdominal/pelvic CT and MRI brain    Recent Cultures (last 7 days):   Results from last 7 days   Lab Units 08/24/22  1225   BLOOD CULTURE  No Growth at 24 hrs  No Growth at 24 hrs  Last 24 Hours Medication List:   Current Facility-Administered Medications   Medication Dose Route Frequency Provider Last Rate    cefepime  2,000 mg Intravenous Q12H Juli Lino MD 2,000 mg (08/26/22 0007)    heparin (porcine)  5,000 Units Subcutaneous Q8H [facility] Juli Lino MD      ondansetron  4 mg Intravenous Q6H PRN Juli Lino MD      pancrelipase (Lip-Prot-Amyl)  24,000 Units Oral BID Juli Lino MD      pantoprazole  20 mg Oral Early Morning Juli Lino MD      sodium chloride  75 mL/hr Intravenous Continuous Prudence Newman PA-C          Today, Patient Was Seen By: Prudence Newman PA-C    **Please Note: This note may have been constructed using a voice recognition system  **

## 2022-08-26 NOTE — ASSESSMENT & PLAN NOTE
· Sepsis due to UTI versus SIRS from stage 4 cancer pancreatic cancer with new ascites and after effect of recent chemotherapy   Also with imaging revealing progression of disease   · Lactic acidosis on admission, resolved   · Continue IVF hydration   · Continue empiric IV cefepime, day 3   · Blood cultures negative x 24 hours   · Will add on urine culture   · IR consulted, Awaiting diagnostic and therapeutic paracentesis  · Ongoing goals of care discussion   · Was seen by Dr Jim Alford 8/18, given she is admitted with progression of disease will ask palliative to see her here

## 2022-08-26 NOTE — TELEPHONE ENCOUNTER
Please cancel appointment for infusion treatment scheduled for 8/29/22  Patient is hospitalized at this time  Please keep appointment with Dr Jody Robert

## 2022-08-26 NOTE — ASSESSMENT & PLAN NOTE
· Malignant versus infectious   · Diagnostic/therapeutic paracentesis pending   · Day 3 empiric IV cefepime

## 2022-08-26 NOTE — PLAN OF CARE
Problem: MOBILITY - ADULT  Goal: Maintain or return to baseline ADL function  Description: INTERVENTIONS:  -  Assess patient's ability to carry out ADLs; assess patient's baseline for ADL function and identify physical deficits which impact ability to perform ADLs (bathing, care of mouth/teeth, toileting, grooming, dressing, etc )  - Assess/evaluate cause of self-care deficits   - Assess range of motion  - Assess patient's mobility; develop plan if impaired  - Assess patient's need for assistive devices and provide as appropriate  - Encourage maximum independence but intervene and supervise when necessary  - Involve family in performance of ADLs  - Assess for home care needs following discharge   - Consider OT consult to assist with ADL evaluation and planning for discharge  - Provide patient education as appropriate  Outcome: Progressing  Goal: Maintains/Returns to pre admission functional level  Description: INTERVENTIONS:  - Perform BMAT or MOVE assessment daily    - Set and communicate daily mobility goal to care team and patient/family/caregiver  - Collaborate with rehabilitation services on mobility goals if consulted  - Perform Range of Motion  times a day  - Reposition patient every  hours    - Dangle patient  times a day  - Stand patient  times a day  - Ambulate patient  times a day  - Out of bed to chair  times a day   - Out of bed for meals  times a day  - Out of bed for toileting  - Record patient progress and toleration of activity level   Outcome: Progressing     Problem: PAIN - ADULT  Goal: Verbalizes/displays adequate comfort level or baseline comfort level  Description: Interventions:  - Encourage patient to monitor pain and request assistance  - Assess pain using appropriate pain scale  - Administer analgesics based on type and severity of pain and evaluate response  - Implement non-pharmacological measures as appropriate and evaluate response  - Consider cultural and social influences on pain and pain management  - Notify physician/advanced practitioner if interventions unsuccessful or patient reports new pain  Outcome: Progressing     Problem: INFECTION - ADULT  Goal: Absence or prevention of progression during hospitalization  Description: INTERVENTIONS:  - Assess and monitor for signs and symptoms of infection  - Monitor lab/diagnostic results  - Monitor all insertion sites, i e  indwelling lines, tubes, and drains  - Monitor endotracheal if appropriate and nasal secretions for changes in amount and color  - [place] appropriate cooling/warming therapies per order  - Administer medications as ordered  - Instruct and encourage patient and family to use good hand hygiene technique  - Identify and instruct in appropriate isolation precautions for identified infection/condition  Outcome: Progressing  Goal: Absence of fever/infection during neutropenic period  Description: INTERVENTIONS:  - Monitor WBC    Outcome: Progressing     Problem: SAFETY ADULT  Goal: Maintain or return to baseline ADL function  Description: INTERVENTIONS:  -  Assess patient's ability to carry out ADLs; assess patient's baseline for ADL function and identify physical deficits which impact ability to perform ADLs (bathing, care of mouth/teeth, toileting, grooming, dressing, etc )  - Assess/evaluate cause of self-care deficits   - Assess range of motion  - Assess patient's mobility; develop plan if impaired  - Assess patient's need for assistive devices and provide as appropriate  - Encourage maximum independence but intervene and supervise when necessary  - Involve family in performance of ADLs  - Assess for home care needs following discharge   - Consider OT consult to assist with ADL evaluation and planning for discharge  - Provide patient education as appropriate  Outcome: Progressing  Goal: Maintains/Returns to pre admission functional level  Description: INTERVENTIONS:  - Perform BMAT or MOVE assessment daily    - Set and communicate daily mobility goal to care team and patient/family/caregiver  - Collaborate with rehabilitation services on mobility goals if consulted  - Perform Range of Motion  times a day  - Reposition patient every  hours    - Dangle patient  times a day  - Stand patient  times a day  - Ambulate patient  times a day  - Out of bed to chair  times a day   - Out of bed for meals  times a day  - Out of bed for toileting  - Record patient progress and toleration of activity level   Outcome: Progressing  Goal: Patient will remain free of falls  Description: INTERVENTIONS:  - Educate patient/family on patient safety including physical limitations  - Instruct patient to call for assistance with activity   - Consult OT/PT to assist with strengthening/mobility   - Keep Call bell within reach  - Keep bed low and locked with side rails adjusted as appropriate  - Keep care items and personal belongings within reach  - Initiate and maintain comfort rounds  - Make Fall Risk Sign visible to staff  - Offer Toileting every  Hours, in advance of need  - Initiate/Maintain alarm  - Obtain necessary fall risk management equipment:  - Apply yellow socks and bracelet for high fall risk patients  - Consider moving patient to room near nurses station  Outcome: Progressing     Problem: DISCHARGE PLANNING  Goal: Discharge to home or other facility with appropriate resources  Description: INTERVENTIONS:  - Identify barriers to discharge w/patient and caregiver  - Arrange for needed discharge resources and transportation as appropriate  - Identify discharge learning needs (meds, wound care, etc )  - Arrange for interpretive services to assist at discharge as needed  - Refer to Case Management Department for coordinating discharge planning if the patient needs post-hospital services based on physician/advanced practitioner order or complex needs related to functional status, cognitive ability, or social support system  Outcome: Progressing

## 2022-08-26 NOTE — PLAN OF CARE
Problem: PAIN - ADULT  Goal: Verbalizes/displays adequate comfort level or baseline comfort level  Description: Interventions:  - Encourage patient to monitor pain and request assistance  - Assess pain using appropriate pain scale  - Administer analgesics based on type and severity of pain and evaluate response  - Implement non-pharmacological measures as appropriate and evaluate response  - Consider cultural and social influences on pain and pain management  - Notify physician/advanced practitioner if interventions unsuccessful or patient reports new pain  Outcome: Progressing     Problem: COPING  Goal: Pt/Family able to verbalize concerns and demonstrate effective coping strategies  Description: INTERVENTIONS:  - Assist patient/family to identify coping skills, available support systems and cultural and spiritual values  - Provide emotional support, including active listening and acknowledgement of concerns of patient and caregivers  - Reduce environmental stimuli, as able  - Provide patient education  - Assess for spiritual pain/suffering and initiate spiritual care, including notification of Pastoral Care or giovany based community as needed  - Assess effectiveness of coping strategies  Outcome: Progressing  Goal: Will report anxiety at manageable levels  Description: INTERVENTIONS:  - Administer medication as ordered  - Teach and encourage coping skills  - Provide emotional support  - Assess patient/family for anxiety and ability to cope  Outcome: Progressing     Problem: DECISION MAKING  Goal: Pt/Family able to effectively weigh alternatives and participate in decision making related to treatment and care  Description: INTERVENTIONS:  - Identify decision maker  - Determine when there are differences among patient's view, family's view, and healthcare provider's view of patient condition and care goals  - Facilitate patient/family articulation of goals for care  - Help patient/family identify pros/cons of alternative solutions  - Provide information as requested by patient/family  - Respect patient/family rights related to privacy and sharing information   - Serve as a liaison between patient, family and health care team  - Initiate consults as appropriate (Ethics Team, Palliative Care, Family Care Conference, etc )  Outcome: Progressing     Problem: DISCHARGE PLANNING  Goal: Discharge to home or other facility with appropriate resources  Description: INTERVENTIONS:  - Identify barriers to discharge w/patient and caregiver  - Arrange for needed discharge resources and transportation as appropriate  - Identify discharge learning needs (meds, wound care, etc )  - Arrange for interpretive services to assist at discharge as needed  - Refer to Case Management Department for coordinating discharge planning if the patient needs post-hospital services based on physician/advanced practitioner order or complex needs related to functional status, cognitive ability, or social support system  8/26/2022 0759 by Alda Mendez RN  Outcome: Progressing  8/26/2022 0759 by Alda Mendez, RN  Outcome: Progressing

## 2022-08-26 NOTE — ASSESSMENT & PLAN NOTE
· Metabolic encephalopathy POA, multifactorial secondary to progression of metastatic pancreatic adenocarcinoma,  sepsis/SIRS, recent chemotherapy   · She does have metastatic disease on MRI brain, likely not the cause of her encephalopathy given this is very small, 3 mm in the precentral gyrus

## 2022-08-27 LAB
ALBUMIN SERPL BCP-MCNC: 1.9 G/DL (ref 3.5–5)
ALP SERPL-CCNC: 865 U/L (ref 46–116)
ALT SERPL W P-5'-P-CCNC: 110 U/L (ref 12–78)
ANION GAP SERPL CALCULATED.3IONS-SCNC: 12 MMOL/L (ref 4–13)
AST SERPL W P-5'-P-CCNC: 180 U/L (ref 5–45)
BACTERIA UR CULT: NORMAL
BASOPHILS # BLD AUTO: 0.05 THOUSANDS/ΜL (ref 0–0.1)
BASOPHILS NFR BLD AUTO: 1 % (ref 0–1)
BILIRUB SERPL-MCNC: 0.9 MG/DL (ref 0.2–1)
BUN SERPL-MCNC: 16 MG/DL (ref 5–25)
CALCIUM ALBUM COR SERPL-MCNC: 9.2 MG/DL (ref 8.3–10.1)
CALCIUM SERPL-MCNC: 7.5 MG/DL (ref 8.3–10.1)
CHLORIDE SERPL-SCNC: 102 MMOL/L (ref 96–108)
CO2 SERPL-SCNC: 20 MMOL/L (ref 21–32)
CREAT SERPL-MCNC: 0.69 MG/DL (ref 0.6–1.3)
EOSINOPHIL # BLD AUTO: 0.19 THOUSAND/ΜL (ref 0–0.61)
EOSINOPHIL NFR BLD AUTO: 2 % (ref 0–6)
ERYTHROCYTE [DISTWIDTH] IN BLOOD BY AUTOMATED COUNT: 19.4 % (ref 11.6–15.1)
GFR SERPL CREATININE-BSD FRML MDRD: 85 ML/MIN/1.73SQ M
GLUCOSE SERPL-MCNC: 108 MG/DL (ref 65–140)
HCT VFR BLD AUTO: 26.1 % (ref 34.8–46.1)
HGB BLD-MCNC: 8.6 G/DL (ref 11.5–15.4)
IMM GRANULOCYTES # BLD AUTO: 0.13 THOUSAND/UL (ref 0–0.2)
IMM GRANULOCYTES NFR BLD AUTO: 2 % (ref 0–2)
LYMPHOCYTES # BLD AUTO: 0.68 THOUSANDS/ΜL (ref 0.6–4.47)
LYMPHOCYTES NFR BLD AUTO: 8 % (ref 14–44)
MCH RBC QN AUTO: 31.4 PG (ref 26.8–34.3)
MCHC RBC AUTO-ENTMCNC: 33 G/DL (ref 31.4–37.4)
MCV RBC AUTO: 95 FL (ref 82–98)
MONOCYTES # BLD AUTO: 0.12 THOUSAND/ΜL (ref 0.17–1.22)
MONOCYTES NFR BLD AUTO: 2 % (ref 4–12)
NEUTROPHILS # BLD AUTO: 6.94 THOUSANDS/ΜL (ref 1.85–7.62)
NEUTS SEG NFR BLD AUTO: 85 % (ref 43–75)
NRBC BLD AUTO-RTO: 0 /100 WBCS
PLATELET # BLD AUTO: 508 THOUSANDS/UL (ref 149–390)
PMV BLD AUTO: 10.2 FL (ref 8.9–12.7)
POTASSIUM SERPL-SCNC: 4 MMOL/L (ref 3.5–5.3)
PROCALCITONIN SERPL-MCNC: 4.94 NG/ML
PROT SERPL-MCNC: 6 G/DL (ref 6.4–8.4)
RBC # BLD AUTO: 2.74 MILLION/UL (ref 3.81–5.12)
SODIUM SERPL-SCNC: 134 MMOL/L (ref 135–147)
WBC # BLD AUTO: 8.11 THOUSAND/UL (ref 4.31–10.16)

## 2022-08-27 PROCEDURE — 84145 PROCALCITONIN (PCT): CPT | Performed by: PHYSICIAN ASSISTANT

## 2022-08-27 PROCEDURE — 99232 SBSQ HOSP IP/OBS MODERATE 35: CPT | Performed by: PHYSICIAN ASSISTANT

## 2022-08-27 PROCEDURE — 80053 COMPREHEN METABOLIC PANEL: CPT | Performed by: PHYSICIAN ASSISTANT

## 2022-08-27 PROCEDURE — 85025 COMPLETE CBC W/AUTO DIFF WBC: CPT | Performed by: PHYSICIAN ASSISTANT

## 2022-08-27 RX ORDER — GUAIFENESIN 600 MG/1
600 TABLET, EXTENDED RELEASE ORAL EVERY 12 HOURS SCHEDULED
Status: DISCONTINUED | OUTPATIENT
Start: 2022-08-27 | End: 2022-08-28 | Stop reason: HOSPADM

## 2022-08-27 RX ORDER — FLUTICASONE PROPIONATE 50 MCG
1 SPRAY, SUSPENSION (ML) NASAL DAILY
Status: DISCONTINUED | OUTPATIENT
Start: 2022-08-27 | End: 2022-08-28 | Stop reason: HOSPADM

## 2022-08-27 RX ADMIN — GUAIFENESIN 600 MG: 600 TABLET, EXTENDED RELEASE ORAL at 11:05

## 2022-08-27 RX ADMIN — PANCRELIPASE 24000 UNITS: 24000; 76000; 120000 CAPSULE, DELAYED RELEASE PELLETS ORAL at 11:05

## 2022-08-27 RX ADMIN — CEFEPIME HYDROCHLORIDE 2000 MG: 2 INJECTION, POWDER, FOR SOLUTION INTRAVENOUS at 00:14

## 2022-08-27 RX ADMIN — GUAIFENESIN 600 MG: 600 TABLET, EXTENDED RELEASE ORAL at 21:06

## 2022-08-27 NOTE — ASSESSMENT & PLAN NOTE
Lab Results   Component Value Date    HGB 8 6 (L) 08/27/2022    HGB 7 4 (L) 08/26/2022    HGB 9 6 (L) 08/24/2022    HGB 9 4 (L) 08/20/2022    HGB 8 9 (L) 08/06/2022     Transfuse as needed for hgb <7

## 2022-08-27 NOTE — ASSESSMENT & PLAN NOTE
· Sepsis due to possible UTI versus SIRS from stage 4 cancer pancreatic cancer with new ascites and after effect of recent chemotherapy   Also with imaging revealing progression of disease   · Lactic acidosis on admission, resolved   · Will monitor off further antibiotics today   · Blood cultures negative x 48hours   · Ongoing goals of care discussion in the outpatient setting with Dr Олег Randhawa and Dr Claire Moncada   · She is not ready for hospice at this time   · Asking to work with PT today   · Hopefully home later today versus tomorrow

## 2022-08-27 NOTE — ASSESSMENT & PLAN NOTE
· Prior to admission she was on oxycodone 5 mg q 6 hours p r n    · Patient requested this to be discontinued as she felt it was contributing to her drowsiness and fogginess  · She was seen in consultation with palliative Care during her hospitalization  · She continues to be treatment oriented, confirm level 1 code status  · Patient and daughter are not interested in hospice at this time  · Patient continues to states she does not want any narcotics for pain

## 2022-08-27 NOTE — PLAN OF CARE
Problem: MOBILITY - ADULT  Goal: Maintain or return to baseline ADL function  Description: INTERVENTIONS:  -  Assess patient's ability to carry out ADLs; assess patient's baseline for ADL function and identify physical deficits which impact ability to perform ADLs (bathing, care of mouth/teeth, toileting, grooming, dressing, etc )  - Assess/evaluate cause of self-care deficits   - Assess range of motion  - Assess patient's mobility; develop plan if impaired  - Assess patient's need for assistive devices and provide as appropriate  - Encourage maximum independence but intervene and supervise when necessary  - Involve family in performance of ADLs  - Assess for home care needs following discharge   - Consider OT consult to assist with ADL evaluation and planning for discharge  - Provide patient education as appropriate  Outcome: Progressing  Goal: Maintains/Returns to pre admission functional level  Description: INTERVENTIONS:  - Perform BMAT or MOVE assessment daily    - Set and communicate daily mobility goal to care team and patient/family/caregiver  - Collaborate with rehabilitation services on mobility goals if consulted  - Perform Range of Motion 2 times a day  - Reposition patient every 2 hours    - Dangle patient 2 times a day  - Stand patient 2 times a day  - Ambulate patient 2 times a day  - Out of bed to chair 2 times a day   - Out of bed for meals 2 times a day  - Out of bed for toileting  - Record patient progress and toleration of activity level   Outcome: Progressing     Problem: PAIN - ADULT  Goal: Verbalizes/displays adequate comfort level or baseline comfort level  Description: Interventions:  - Encourage patient to monitor pain and request assistance  - Assess pain using appropriate pain scale  - Administer analgesics based on type and severity of pain and evaluate response  - Implement non-pharmacological measures as appropriate and evaluate response  - Consider cultural and social influences on pain and pain management  - Notify physician/advanced practitioner if interventions unsuccessful or patient reports new pain  Outcome: Progressing     Problem: INFECTION - ADULT  Goal: Absence or prevention of progression during hospitalization  Description: INTERVENTIONS:  - Assess and monitor for signs and symptoms of infection  - Monitor lab/diagnostic results  - Monitor all insertion sites, i e  indwelling lines, tubes, and drains  - Monitor endotracheal if appropriate and nasal secretions for changes in amount and color  - [place] appropriate cooling/warming therapies per order  - Administer medications as ordered  - Instruct and encourage patient and family to use good hand hygiene technique  - Identify and instruct in appropriate isolation precautions for identified infection/condition  Outcome: Progressing  Goal: Absence of fever/infection during neutropenic period  Description: INTERVENTIONS:  - Monitor WBC    Outcome: Progressing     Problem: SAFETY ADULT  Goal: Maintain or return to baseline ADL function  Description: INTERVENTIONS:  -  Assess patient's ability to carry out ADLs; assess patient's baseline for ADL function and identify physical deficits which impact ability to perform ADLs (bathing, care of mouth/teeth, toileting, grooming, dressing, etc )  - Assess/evaluate cause of self-care deficits   - Assess range of motion  - Assess patient's mobility; develop plan if impaired  - Assess patient's need for assistive devices and provide as appropriate  - Encourage maximum independence but intervene and supervise when necessary  - Involve family in performance of ADLs  - Assess for home care needs following discharge   - Consider OT consult to assist with ADL evaluation and planning for discharge  - Provide patient education as appropriate  Outcome: Progressing  Goal: Maintains/Returns to pre admission functional level  Description: INTERVENTIONS:  - Perform BMAT or MOVE assessment daily    - Set and communicate daily mobility goal to care team and patient/family/caregiver  - Collaborate with rehabilitation services on mobility goals if consulted  - Perform Range of Motion 2 times a day  - Reposition patient every 2 hours    - Dangle patient 2 times a day  - Stand patient 2 times a day  - Ambulate patient 2 times a day  - Out of bed to chair 2 times a day   - Out of bed for meals 2 times a day  - Out of bed for toileting  - Record patient progress and toleration of activity level   Outcome: Progressing  Goal: Patient will remain free of falls  Description: INTERVENTIONS:  - Educate patient/family on patient safety including physical limitations  - Instruct patient to call for assistance with activity   - Consult OT/PT to assist with strengthening/mobility   - Keep Call bell within reach  - Keep bed low and locked with side rails adjusted as appropriate  - Keep care items and personal belongings within reach  - Initiate and maintain comfort rounds  - Make Fall Risk Sign visible to staff  - Offer Toileting every 2 Hours, in advance of need  - Initiate/Maintain bed alarm  - Obtain necessary fall risk management equipment  - Apply yellow socks and bracelet for high fall risk patients  - Consider moving patient to room near nurses station  Outcome: Progressing     Problem: DISCHARGE PLANNING  Goal: Discharge to home or other facility with appropriate resources  Description: INTERVENTIONS:  - Identify barriers to discharge w/patient and caregiver  - Arrange for needed discharge resources and transportation as appropriate  - Identify discharge learning needs (meds, wound care, etc )  - Arrange for interpretive services to assist at discharge as needed  - Refer to Case Management Department for coordinating discharge planning if the patient needs post-hospital services based on physician/advanced practitioner order or complex needs related to functional status, cognitive ability, or social support system  Outcome: Progressing     Problem: Nutrition/Hydration-ADULT  Goal: Nutrient/Hydration intake appropriate for improving, restoring or maintaining nutritional needs  Description: Monitor and assess patient's nutrition/hydration status for malnutrition  Collaborate with interdisciplinary team and initiate plan and interventions as ordered  Monitor patient's weight and dietary intake as ordered or per policy  Utilize nutrition screening tool and intervene as necessary  Determine patient's food preferences and provide high-protein, high-caloric foods as appropriate       INTERVENTIONS:  - Monitor oral intake, urinary output, labs, and treatment plans  - Assess nutrition and hydration status and recommend course of action  - Evaluate amount of meals eaten  - Assist patient with eating if necessary   - Allow adequate time for meals  - Recommend/ encourage appropriate diets, oral nutritional supplements, and vitamin/mineral supplements  - Order, calculate, and assess calorie counts as needed  - Recommend, monitor, and adjust tube feedings and TPN/PPN based on assessed needs  - Assess need for intravenous fluids  - Provide specific nutrition/hydration education as appropriate  - Include patient/family/caregiver in decisions related to nutrition  Outcome: Progressing     Problem: Potential for Falls  Goal: Patient will remain free of falls  Description: INTERVENTIONS:  - Educate patient/family on patient safety including physical limitations  - Instruct patient to call for assistance with activity   - Consult OT/PT to assist with strengthening/mobility   - Keep Call bell within reach  - Keep bed low and locked with side rails adjusted as appropriate  - Keep care items and personal belongings within reach  - Initiate and maintain comfort rounds  - Make Fall Risk Sign visible to staff  - Offer Toileting every 2 Hours, in advance of need  - Initiate/Maintain bed alarm  - Obtain necessary fall risk management equipment  - Apply yellow socks and bracelet for high fall risk patients  - Consider moving patient to room near nurses station  Outcome: Progressing     Problem: Prexisting or High Potential for Compromised Skin Integrity  Goal: Skin integrity is maintained or improved  Description: INTERVENTIONS:  - Identify patients at risk for skin breakdown  - Assess and monitor skin integrity  - Assess and monitor nutrition and hydration status  - Monitor labs   - Assess for incontinence   - Turn and reposition patient  - Assist with mobility/ambulation  - Relieve pressure over bony prominences  - Avoid friction and shearing  - Provide appropriate hygiene as needed including keeping skin clean and dry  - Evaluate need for skin moisturizer/barrier cream  - Collaborate with interdisciplinary team   - Patient/family teaching  - Consider wound care consult   Outcome: Progressing     Problem: COPING  Goal: Pt/Family able to verbalize concerns and demonstrate effective coping strategies  Description: INTERVENTIONS:  - Assist patient/family to identify coping skills, available support systems and cultural and spiritual values  - Provide emotional support, including active listening and acknowledgement of concerns of patient and caregivers  - Reduce environmental stimuli, as able  - Provide patient education  - Assess for spiritual pain/suffering and initiate spiritual care, including notification of Pastoral Care or giovany based community as needed  - Assess effectiveness of coping strategies  Outcome: Progressing  Goal: Will report anxiety at manageable levels  Description: INTERVENTIONS:  - Administer medication as ordered  - Teach and encourage coping skills  - Provide emotional support  - Assess patient/family for anxiety and ability to cope  Outcome: Progressing     Problem: DECISION MAKING  Goal: Pt/Family able to effectively weigh alternatives and participate in decision making related to treatment and care  Description: INTERVENTIONS:  - Identify decision maker  - Determine when there are differences among patient's view, family's view, and healthcare provider's view of patient condition and care goals  - Facilitate patient/family articulation of goals for care  - Help patient/family identify pros/cons of alternative solutions  - Provide information as requested by patient/family  - Respect patient/family rights related to privacy and sharing information   - Serve as a liaison between patient, family and health care team  - Initiate consults as appropriate (Ethics Team, Palliative Care, [address], etc )  Outcome: Progressing

## 2022-08-27 NOTE — PROGRESS NOTES
[address] [facility]  Progress Note - Maribel Jordan 1945, 78 y o  female MRN: [id number]  Unit/Bed#: E2 -58 Encounter: [id number]  Primary Care Provider: Kirby Collins DO   Date and time admitted to hospital: 8/24/2022 11:51 AM    * Sepsis [facility])  Assessment & Plan  · Sepsis due to possible UTI versus SIRS from stage 4 cancer pancreatic cancer with new ascites and after effect of recent chemotherapy   Also with imaging revealing progression of disease   · Lactic acidosis on admission, resolved   · Will monitor off further antibiotics today   · Blood cultures negative x 48hours   · Ongoing goals of care discussion in the outpatient setting with Dr Kranthi Mohan and Dr Myron Baumgarten   · She is not ready for hospice at this time   · Asking to work with PT today   · Hopefully home later today versus tomorrow     Thrombocytosis  Assessment & Plan  · Evaluated by HemOnc here   · Likely 2/2 GI malignancy     Ascites  Assessment & Plan  · IR was consulted, did not perform paracentesis given not enough fluid to tap    · Will monitor off further antibiotics     Encephalopathy  Assessment & Plan  · Metabolic encephalopathy POA, multifactorial secondary to progression of metastatic pancreatic adenocarcinoma,  sepsis/SIRS, recent chemotherapy   · She does have metastatic disease on MRI brain, likely not the cause of her encephalopathy given this is very small, 3 mm in the precentral gyrus   · Will monitor off cefepime today   · She appears back to baseline mentation  · She states she continues to feel very weak and fatigued with come fogginess     Transaminitis  Assessment & Plan  · Secondary to known liver disease with CT showing enlargement of hepatic metastatic disease     Pancreatic insufficiency  Assessment & Plan  · Secondary to stage IV pancreatic cancer  · Continue Creon three times daily with meals     Cancer related pain  Assessment & Plan  · Prior to admission she was on oxycodone 5 mg q 6 hours p r n  · Patient requested this to be discontinued as she felt it was contributing to her drowsiness and fogginess  · She was seen in consultation with palliative Care during her hospitalization  · She continues to be treatment oriented, confirm level 1 code status  · Patient and daughter are not interested in hospice at this time  · Patient continues to states she does not want any narcotics for pain      Primary pancreatic adenocarcinoma [facility])  Assessment & Plan  · Stage IV pancreatic cancer her status post recent chemotherapy with Gemzar, Abraxane, Zometa, dexamethasone on 08/22  · Brain MRI now with metastatic disease  · CT chest abdomen and pelvis showed enlarging liver disease, new lung Mets, new ascites and retroperitoneal adenopathy  · Seen by oncology and palliative care here   · Is not ready for hospice at this time   · Plan is to follow up with her primary Oncologist on Tuesday, Dr Haydee Perez Results   Component Value Date    HGB 8 6 (L) 08/27/2022    HGB 7 4 (L) 08/26/2022    HGB 9 6 (L) 08/24/2022    HGB 9 4 (L) 08/20/2022    HGB 8 9 (L) 08/06/2022     Transfuse as needed for hgb <7         VTE Pharmacologic Prophylaxis: VTE Score: 6 High Risk (Score >/= 5) - Pharmacological DVT Prophylaxis Ordered: heparin  Sequential Compression Devices Ordered  Patient Centered Rounds: I performed bedside rounds with nursing staff today  Discussions with Specialists or Other Care Team Provider:     Education and Discussions with Family / Patient: Updated  (daughter) via phone  Time Spent for Care: 20 minutes  More than 50% of total time spent on counseling and coordination of care as described above      Current Length of Stay: 3 day(s)  Current Patient Status: Inpatient   Certification Statement: The patient will continue to require additional inpatient hospital stay due to metastatic pancreatic cancer  Discharge Plan: Anticipate discharge tomorrow to home     Code Status: Level 1 - Full Code    Subjective:   Patient states she continues to have fatigue and confusion with head fogginess  She denies CP or SOB  No abdo pain  She is not eating much and states she is not hungry for breakfast  She denies fevers, chills  Objective:     Vitals:   Temp (24hrs), Av 1 °F (36 7 °C), Min:97 1 °F (36 2 °C), Max:98 8 °F (37 1 °C)    Temp:  [97 1 °F (36 2 °C)-98 8 °F (37 1 °C)] 98 5 °F (36 9 °C)  HR:  [] 97  Resp:  [16-18] 18  BP: (102-109)/(54-69) 107/69  SpO2:  [94 %-97 %] 97 %  Body mass index is 19 22 kg/m²  Input and Output Summary (last 24 hours): Intake/Output Summary (Last 24 hours) at 2022 0858  Last data filed at 2022 0636  Gross per 24 hour   Intake 3386 25 ml   Output 1050 ml   Net 2336 25 ml       Physical Exam:   Physical Exam  Vitals and nursing note reviewed  Constitutional:       Appearance: She is ill-appearing  She is not toxic-appearing  HENT:      Head: Normocephalic  Cardiovascular:      Rate and Rhythm: Normal rate and regular rhythm  Pulmonary:      Effort: Pulmonary effort is normal  No respiratory distress  Breath sounds: Normal breath sounds  No wheezing or rales  Abdominal:      General: Bowel sounds are normal       Palpations: Abdomen is soft  Tenderness: There is no abdominal tenderness  There is no guarding or rebound  Musculoskeletal:      Right lower leg: No edema  Left lower leg: No edema  Skin:     Coloration: Skin is pale  Neurological:      General: No focal deficit present  Mental Status: She is alert and oriented to person, place, and time     Psychiatric:         Mood and Affect: Mood normal           Additional Data:     Labs:  Results from last 7 days   Lab Units 22  0641 22  2048 22  1225   WBC Thousand/uL 8 11   < > 23 82*   HEMOGLOBIN g/dL 8 6*   < > 9 6*   HEMATOCRIT % 26 1*   < > 29 9*   PLATELETS Thousands/uL 508*   < > 635*   BANDS PCT %  -- --  5   NEUTROS PCT % 85*  --   --    LYMPHS PCT % 8*  --   --    LYMPHO PCT %  --   --  3*   MONOS PCT % 2*  --   --    MONO PCT %  --   --  0*   EOS PCT % 2  --  0    < > = values in this interval not displayed  Results from last 7 days   Lab Units 08/27/22  0641   SODIUM mmol/L 134*   POTASSIUM mmol/L 4 0   CHLORIDE mmol/L 102   CO2 mmol/L 20*   BUN mg/dL 16   CREATININE mg/dL 0 69   ANION GAP mmol/L 12   CALCIUM mg/dL 7 5*   ALBUMIN g/dL 1 9*   TOTAL BILIRUBIN mg/dL 0 90   ALK PHOS U/L 865*   ALT U/L 110*   AST U/L 180*   GLUCOSE RANDOM mg/dL 108     Results from last 7 days   Lab Units 08/24/22  1225   INR  1 47*             Results from last 7 days   Lab Units 08/27/22  0641 08/24/22  2059 08/24/22  1749 08/24/22  1422 08/24/22  1225   LACTIC ACID mmol/L  --  1 6 2 1* 3 0* 2 3*   PROCALCITONIN ng/ml 4 94*  --   --   --  8 06*       Lines/Drains:  Invasive Devices  Report    Central Venous Catheter Line  Duration           Port A Cath 06/28/22 Right Chest 59 days          Peripheral Intravenous Line  Duration           Peripheral IV 08/24/22 Left Antecubital 2 days    Peripheral IV 08/24/22 Left Wrist 2 days                Central Line:  Goal for removal: chronic                Recent Cultures (last 7 days):   Results from last 7 days   Lab Units 08/24/22  1225   BLOOD CULTURE  No Growth at 48 hrs  No Growth at 48 hrs         Last 24 Hours Medication List:   Current Facility-Administered Medications   Medication Dose Route Frequency Provider Last Rate    acetaminophen  650 mg Oral Q8H Greg Espinal MD      fluticasone  1 spray Each Nare Daily Magda Jones PA-C      guaiFENesin  600 mg Oral Q12H [facility] Magda Jones PA-C      heparin (porcine)  5,000 Units Subcutaneous [facility] Rosita Ingram MD      HYDROcodone-acetaminophen  0 5 tablet Oral Q6H PRN Peter Clemons MD      ondansetron  4 mg Intravenous Q6H PRN Rosita Ingram MD      pancrelipase (Lip-Prot-Amyl)  24,000 Units Oral BID Ni Chand MD      pantoprazole  20 mg Oral Early Morning Ni Chand MD          Today, Patient Was Seen By: Bret Gonzalez PA-C    **Please Note: This note may have been constructed using a voice recognition system  **

## 2022-08-27 NOTE — ASSESSMENT & PLAN NOTE
· Metabolic encephalopathy POA, multifactorial secondary to progression of metastatic pancreatic adenocarcinoma,  sepsis/SIRS, recent chemotherapy   · She does have metastatic disease on MRI brain, likely not the cause of her encephalopathy given this is very small, 3 mm in the precentral gyrus   · Will monitor off cefepime today   · She appears back to baseline mentation  · She states she continues to feel very weak and fatigued with come fogginess

## 2022-08-27 NOTE — ASSESSMENT & PLAN NOTE
· IR was consulted, did not perform paracentesis given not enough fluid to tap    · Will monitor off further antibiotics

## 2022-08-27 NOTE — ASSESSMENT & PLAN NOTE
· Stage IV pancreatic cancer her status post recent chemotherapy with Gemzar, Abraxane, Zometa, dexamethasone on 08/22  · Brain MRI now with metastatic disease  · CT chest abdomen and pelvis showed enlarging liver disease, new lung Mets, new ascites and retroperitoneal adenopathy  · Seen by oncology and palliative care here   · Is not ready for hospice at this time   · Plan is to follow up with her primary Oncologist on Tuesday, Dr Edith Nelson

## 2022-08-28 VITALS
DIASTOLIC BLOOD PRESSURE: 65 MMHG | BODY MASS INDEX: 19.12 KG/M2 | HEIGHT: 64 IN | WEIGHT: 111.99 LBS | SYSTOLIC BLOOD PRESSURE: 111 MMHG | HEART RATE: 104 BPM | TEMPERATURE: 98.5 F | RESPIRATION RATE: 18 BRPM | OXYGEN SATURATION: 96 %

## 2022-08-28 PROBLEM — G93.40 ENCEPHALOPATHY: Status: RESOLVED | Noted: 2022-08-25 | Resolved: 2022-08-28

## 2022-08-28 PROBLEM — R65.10 SIRS (SYSTEMIC INFLAMMATORY RESPONSE SYNDROME) (HCC): Status: ACTIVE | Noted: 2022-08-24

## 2022-08-28 PROCEDURE — 97163 PT EVAL HIGH COMPLEX 45 MIN: CPT

## 2022-08-28 PROCEDURE — 99239 HOSP IP/OBS DSCHRG MGMT >30: CPT | Performed by: INTERNAL MEDICINE

## 2022-08-28 PROCEDURE — 97116 GAIT TRAINING THERAPY: CPT

## 2022-08-28 RX ORDER — HYDROCODONE BITARTRATE AND ACETAMINOPHEN 5; 325 MG/1; MG/1
0.5 TABLET ORAL EVERY 6 HOURS PRN
Qty: 6 TABLET | Refills: 0 | Status: SHIPPED | OUTPATIENT
Start: 2022-08-28 | End: 2022-09-01

## 2022-08-28 RX ADMIN — PANCRELIPASE 24000 UNITS: 24000; 76000; 120000 CAPSULE, DELAYED RELEASE PELLETS ORAL at 08:00

## 2022-08-28 RX ADMIN — GUAIFENESIN 600 MG: 600 TABLET, EXTENDED RELEASE ORAL at 09:21

## 2022-08-28 NOTE — PLAN OF CARE
Problem: PAIN - ADULT  Goal: Verbalizes/displays adequate comfort level or baseline comfort level  Description: Interventions:  - Encourage patient to monitor pain and request assistance  - Assess pain using appropriate pain scale  - Administer analgesics based on type and severity of pain and evaluate response  - Implement non-pharmacological measures as appropriate and evaluate response  - Consider cultural and social influences on pain and pain management  - Notify physician/advanced practitioner if interventions unsuccessful or patient reports new pain  Outcome: Progressing     Problem: INFECTION - ADULT  Goal: Absence or prevention of progression during hospitalization  Description: INTERVENTIONS:  - Assess and monitor for signs and symptoms of infection  - Monitor lab/diagnostic results  - Monitor all insertion sites, i e  indwelling lines, tubes, and drains  - Monitor endotracheal if appropriate and nasal secretions for changes in amount and color  - [place] appropriate cooling/warming therapies per order  - Administer medications as ordered  - Instruct and encourage patient and family to use good hand hygiene technique  - Identify and instruct in appropriate isolation precautions for identified infection/condition  Outcome: Progressing     Problem: Nutrition/Hydration-ADULT  Goal: Nutrient/Hydration intake appropriate for improving, restoring or maintaining nutritional needs  Description: Monitor and assess patient's nutrition/hydration status for malnutrition  Collaborate with interdisciplinary team and initiate plan and interventions as ordered  Monitor patient's weight and dietary intake as ordered or per policy  Utilize nutrition screening tool and intervene as necessary  Determine patient's food preferences and provide high-protein, high-caloric foods as appropriate       INTERVENTIONS:  - Monitor oral intake, urinary output, labs, and treatment plans  - Assess nutrition and hydration status and recommend course of action  - Evaluate amount of meals eaten  - Assist patient with eating if necessary   - Allow adequate time for meals  - Recommend/ encourage appropriate diets, oral nutritional supplements, and vitamin/mineral supplements  - Order, calculate, and assess calorie counts as needed  - Recommend, monitor, and adjust tube feedings and TPN/PPN based on assessed needs  - Assess need for intravenous fluids  - Provide specific nutrition/hydration education as appropriate  - Include patient/family/caregiver in decisions related to nutrition  Outcome: Not Progressing     Problem: Prexisting or High Potential for Compromised Skin Integrity  Goal: Skin integrity is maintained or improved  Description: INTERVENTIONS:  - Identify patients at risk for skin breakdown  - Assess and monitor skin integrity  - Assess and monitor nutrition and hydration status  - Monitor labs   - Assess for incontinence   - Turn and reposition patient  - Assist with mobility/ambulation  - Relieve pressure over bony prominences  - Avoid friction and shearing  - Provide appropriate hygiene as needed including keeping skin clean and dry  - Evaluate need for skin moisturizer/barrier cream  - Collaborate with interdisciplinary team   - Patient/family teaching  - Consider wound care consult   Outcome: Progressing     Problem: COPING  Goal: Pt/Family able to verbalize concerns and demonstrate effective coping strategies  Description: INTERVENTIONS:  - Assist patient/family to identify coping skills, available support systems and cultural and spiritual values  - Provide emotional support, including active listening and acknowledgement of concerns of patient and caregivers  - Reduce environmental stimuli, as able  - Provide patient education  - Assess for spiritual pain/suffering and initiate spiritual care, including notification of Pastoral Care or giovany based community as needed  - Assess effectiveness of coping strategies  Outcome: Not Progressing     Problem: DECISION MAKING  Goal: Pt/Family able to effectively weigh alternatives and participate in decision making related to treatment and care  Description: INTERVENTIONS:  - Identify decision maker  - Determine when there are differences among patient's view, family's view, and healthcare provider's view of patient condition and care goals  - Facilitate patient/family articulation of goals for care  - Help patient/family identify pros/cons of alternative solutions  - Provide information as requested by patient/family  - Respect patient/family rights related to privacy and sharing information   - Serve as a liaison between patient, family and health care team  - Initiate consults as appropriate (Ethics Team, Palliative Care, [address], etc )  Outcome: Not Progressing

## 2022-08-28 NOTE — NURSING NOTE
AVS reviewed with patient and daughter  No questions at this time  Patient discharged via wheelchair accompanied by daughter and PCA with all belongings

## 2022-08-28 NOTE — PLAN OF CARE
Problem: MOBILITY - ADULT  Goal: Maintain or return to baseline ADL function  Description: INTERVENTIONS:  -  Assess patient's ability to carry out ADLs; assess patient's baseline for ADL function and identify physical deficits which impact ability to perform ADLs (bathing, care of mouth/teeth, toileting, grooming, dressing, etc )  - Assess/evaluate cause of self-care deficits   - Assess range of motion  - Assess patient's mobility; develop plan if impaired  - Assess patient's need for assistive devices and provide as appropriate  - Encourage maximum independence but intervene and supervise when necessary  - Involve family in performance of ADLs  - Assess for home care needs following discharge   - Consider OT consult to assist with ADL evaluation and planning for discharge  - Provide patient education as appropriate  Outcome: Progressing  Goal: Maintains/Returns to pre admission functional level  Description: INTERVENTIONS:  - Perform BMAT or MOVE assessment daily    - Set and communicate daily mobility goal to care team and patient/family/caregiver  - Collaborate with rehabilitation services on mobility goals if consulted  - Perform Range of Motion 2 times a day  - Reposition patient every 2 hours    - Dangle patient 2 times a day  - Stand patient 2 times a day  - Ambulate patient 2 times a day  - Out of bed to chair 2 times a day   - Out of bed for meals 2 times a day  - Out of bed for toileting  - Record patient progress and toleration of activity level   Outcome: Progressing     Problem: PAIN - ADULT  Goal: Verbalizes/displays adequate comfort level or baseline comfort level  Description: Interventions:  - Encourage patient to monitor pain and request assistance  - Assess pain using appropriate pain scale  - Administer analgesics based on type and severity of pain and evaluate response  - Implement non-pharmacological measures as appropriate and evaluate response  - Consider cultural and social influences on pain and pain management  - Notify physician/advanced practitioner if interventions unsuccessful or patient reports new pain  Outcome: Progressing     Problem: INFECTION - ADULT  Goal: Absence or prevention of progression during hospitalization  Description: INTERVENTIONS:  - Assess and monitor for signs and symptoms of infection  - Monitor lab/diagnostic results  - Monitor all insertion sites, i e  indwelling lines, tubes, and drains  - Monitor endotracheal if appropriate and nasal secretions for changes in amount and color  - [place] appropriate cooling/warming therapies per order  - Administer medications as ordered  - Instruct and encourage patient and family to use good hand hygiene technique  - Identify and instruct in appropriate isolation precautions for identified infection/condition  Outcome: Progressing  Goal: Absence of fever/infection during neutropenic period  Description: INTERVENTIONS:  - Monitor WBC    Outcome: Progressing     Problem: SAFETY ADULT  Goal: Maintain or return to baseline ADL function  Description: INTERVENTIONS:  -  Assess patient's ability to carry out ADLs; assess patient's baseline for ADL function and identify physical deficits which impact ability to perform ADLs (bathing, care of mouth/teeth, toileting, grooming, dressing, etc )  - Assess/evaluate cause of self-care deficits   - Assess range of motion  - Assess patient's mobility; develop plan if impaired  - Assess patient's need for assistive devices and provide as appropriate  - Encourage maximum independence but intervene and supervise when necessary  - Involve family in performance of ADLs  - Assess for home care needs following discharge   - Consider OT consult to assist with ADL evaluation and planning for discharge  - Provide patient education as appropriate  Outcome: Progressing  Goal: Maintains/Returns to pre admission functional level  Description: INTERVENTIONS:  - Perform BMAT or MOVE assessment daily    - Set and communicate daily mobility goal to care team and patient/family/caregiver  - Collaborate with rehabilitation services on mobility goals if consulted  - Perform Range of Motion 2 times a day  - Reposition patient every 2 hours    - Dangle patient 2 times a day  - Stand patient 2 times a day  - Ambulate patient 2 times a day  - Out of bed to chair 2 times a day   - Out of bed for meals 2 times a day  - Out of bed for toileting  - Record patient progress and toleration of activity level   Outcome: Progressing  Goal: Patient will remain free of falls  Description: INTERVENTIONS:  - Educate patient/family on patient safety including physical limitations  - Instruct patient to call for assistance with activity   - Consult OT/PT to assist with strengthening/mobility   - Keep Call bell within reach  - Keep bed low and locked with side rails adjusted as appropriate  - Keep care items and personal belongings within reach  - Initiate and maintain comfort rounds  - Make Fall Risk Sign visible to staff  - Offer Toileting every 2 Hours, in advance of need  - Initiate/Maintain bed alarm  - Obtain necessary fall risk management equipment  - Apply yellow socks and bracelet for high fall risk patients  - Consider moving patient to room near nurses station  Outcome: Progressing     Problem: DISCHARGE PLANNING  Goal: Discharge to home or other facility with appropriate resources  Description: INTERVENTIONS:  - Identify barriers to discharge w/patient and caregiver  - Arrange for needed discharge resources and transportation as appropriate  - Identify discharge learning needs (meds, wound care, etc )  - Arrange for interpretive services to assist at discharge as needed  - Refer to Case Management Department for coordinating discharge planning if the patient needs post-hospital services based on physician/advanced practitioner order or complex needs related to functional status, cognitive ability, or social support system  Outcome: Progressing     Problem: Nutrition/Hydration-ADULT  Goal: Nutrient/Hydration intake appropriate for improving, restoring or maintaining nutritional needs  Description: Monitor and assess patient's nutrition/hydration status for malnutrition  Collaborate with interdisciplinary team and initiate plan and interventions as ordered  Monitor patient's weight and dietary intake as ordered or per policy  Utilize nutrition screening tool and intervene as necessary  Determine patient's food preferences and provide high-protein, high-caloric foods as appropriate       INTERVENTIONS:  - Monitor oral intake, urinary output, labs, and treatment plans  - Assess nutrition and hydration status and recommend course of action  - Evaluate amount of meals eaten  - Assist patient with eating if necessary   - Allow adequate time for meals  - Recommend/ encourage appropriate diets, oral nutritional supplements, and vitamin/mineral supplements  - Order, calculate, and assess calorie counts as needed  - Recommend, monitor, and adjust tube feedings and TPN/PPN based on assessed needs  - Assess need for intravenous fluids  - Provide specific nutrition/hydration education as appropriate  - Include patient/family/caregiver in decisions related to nutrition  Outcome: Progressing     Problem: Potential for Falls  Goal: Patient will remain free of falls  Description: INTERVENTIONS:  - Educate patient/family on patient safety including physical limitations  - Instruct patient to call for assistance with activity   - Consult OT/PT to assist with strengthening/mobility   - Keep Call bell within reach  - Keep bed low and locked with side rails adjusted as appropriate  - Keep care items and personal belongings within reach  - Initiate and maintain comfort rounds  - Make Fall Risk Sign visible to staff  - Offer Toileting every 2 Hours, in advance of need  - Initiate/Maintain bed alarm  - Obtain necessary fall risk management equipment  - Apply yellow socks and bracelet for high fall risk patients  - Consider moving patient to room near nurses station  Outcome: Progressing     Problem: Prexisting or High Potential for Compromised Skin Integrity  Goal: Skin integrity is maintained or improved  Description: INTERVENTIONS:  - Identify patients at risk for skin breakdown  - Assess and monitor skin integrity  - Assess and monitor nutrition and hydration status  - Monitor labs   - Assess for incontinence   - Turn and reposition patient  - Assist with mobility/ambulation  - Relieve pressure over bony prominences  - Avoid friction and shearing  - Provide appropriate hygiene as needed including keeping skin clean and dry  - Evaluate need for skin moisturizer/barrier cream  - Collaborate with interdisciplinary team   - Patient/family teaching  - Consider wound care consult   Outcome: Progressing     Problem: COPING  Goal: Pt/Family able to verbalize concerns and demonstrate effective coping strategies  Description: INTERVENTIONS:  - Assist patient/family to identify coping skills, available support systems and cultural and spiritual values  - Provide emotional support, including active listening and acknowledgement of concerns of patient and caregivers  - Reduce environmental stimuli, as able  - Provide patient education  - Assess for spiritual pain/suffering and initiate spiritual care, including notification of Pastoral Care or giovany based community as needed  - Assess effectiveness of coping strategies  Outcome: Progressing  Goal: Will report anxiety at manageable levels  Description: INTERVENTIONS:  - Administer medication as ordered  - Teach and encourage coping skills  - Provide emotional support  - Assess patient/family for anxiety and ability to cope  Outcome: Progressing     Problem: DECISION MAKING  Goal: Pt/Family able to effectively weigh alternatives and participate in decision making related to treatment and care  Description: INTERVENTIONS:  - Identify decision maker  - Determine when there are differences among patient's view, family's view, and healthcare provider's view of patient condition and care goals  - Facilitate patient/family articulation of goals for care  - Help patient/family identify pros/cons of alternative solutions  - Provide information as requested by patient/family  - Respect patient/family rights related to privacy and sharing information   - Serve as a liaison between patient, family and health care team  - Initiate consults as appropriate (Ethics Team, Palliative Care, [address], etc )  Outcome: Progressing

## 2022-08-28 NOTE — PLAN OF CARE
Problem: PHYSICAL THERAPY ADULT  Goal: Performs mobility at highest level of function for planned discharge setting  See evaluation for individualized goals  Description: Treatment/Interventions: Gait training, Spoke to nursing, Patient/family training  Equipment Recommended: Evin Roberson (RW - owns)       See flowsheet documentation for full assessment, interventions and recommendations  Outcome: Adequate for Discharge  Note: Prognosis: Fair  Problem List: Decreased strength, Decreased endurance, Impaired balance  Assessment: Pt is a 78 y o  female seen for PT evaluation s/p admit to [facility]  on 8/24/2022 w/ SIRS (systemic inflammatory response syndrome) ([facility] [address] )  Karis Velasquez also with palliative diagnosis of metastatic pancreatic adenocarcinoma with liver, lung, bone, and retroperitoneal LN involvement  Order placed for PT  Prior to admission, pt was independent w/ all functional mobility w/ no device, ambulated household distances, lived in one floor environment, and lived with daughter and family in trailer with 4 BISI with rail   Upon evaluation: Pt requires  no assistance for bed mobility and minimum to supervision of 1 assistance for transfers and ambulation with rolling walker 5'   Pt's clinical presentation is currently unstable/unpredictable given the functional mobility deficits above, especially weakness, decreased endurance, gait deviations, and decreased activity tolerance, coupled with fall risks including impaired balance, and combined with medical complications of abnormal H&H, abnormal sodium values, and abnormal CO2 values  Pt to benefit from one additional skilled PT tx while in hospital and upon DC to address deficits as defined above and maximize level of functional mobility  The patient's AM-PAC Basic Mobility Inpatient Short Form Raw Score is 20  A Raw score of greater than 16 suggests the patient may benefit from discharge to home   Please also refer to the recommendation of the Physical Therapist for safe discharge planning  From PT/mobility standpoint, recommendation at time of d/c would be Home PT, home with family support, and with rolling walker pending progress in order to maximize pt's functional independence and consistency w/ mobility in order to facilitate return to PLOF  Recommend trial with walker next 1-2 sessions and bedside commode for home use   Barriers to Discharge: None  Barriers to Discharge Comments: Daughter able to provide support needed for safe return to home  PT Discharge Recommendation: Home with home health rehabilitation    See flowsheet documentation for full assessment

## 2022-08-28 NOTE — ASSESSMENT & PLAN NOTE
· Likely malignant due to stage IV pancreatic cancer  · IR was consulted for possible paracentesis  · This was not performed since the amount was minimal

## 2022-08-28 NOTE — PHYSICAL THERAPY NOTE
PHYSICAL THERAPY EVALUATION  NAME:  Darlin Gilbert: 08/28/22    AGE:   78 y o  Mrn:   [id number]  ADMIT DX:  Altered mental status [R41 82]  Transaminitis [R74 01]  Severe sepsis ([facility] [address] ) [A41 9, R65 20]  Primary pancreatic cancer with metastasis to other site [facility]) [C25 9]    Past Medical History:   Diagnosis Date    Pancreatic cancer ([facility] [address] )      Length Of Stay: 4  Performed at least 2 patient identifiers during session: Name and Birthday    PHYSICAL THERAPY EVALUATION :    08/28/22 0826   PT Last Visit   PT Visit Date 08/28/22   Note Type   Note type Evaluation   Pain Assessment   Pain Assessment Tool 0-10   Pain Score No Pain   Restrictions/Precautions   Weight Bearing Precautions Per Order No   Other Precautions Pain; Fall Risk   Home Living   Type of [address] [place] [address] One level   Bathroom Shower/Tub Walk-in shower  (Currently sponge bathing with assist of daughter)   Bathroom Toilet Standard   Bathroom Equipment Grab bars in shower; Shower chair  (Planning on installing rails around toilet)   [address]; Other (Comment)  (rollator)   Additional Comments Rollator with broken brake and plan to use RW   Prior Function   Level of [place] Independent with ADLs and functional mobility   Lives With Daughter   Receives Help From Family   ADL Assistance Independent   IADLs Needs assistance   Falls in the last 6 months 0   Vocational Retired   General   Additional Pertinent History Boo Sims is a 78 y o  female with palliative diagnosis of metastatic pancreatic adenocarcinoma with liver, lung, bone, and retroperitoneal LN involvement  She is currently on gemcitabine+abraxane that was started on 6/29/2022  She presented to ER 8/24/22 for worsening abdominal pain and found to have progression of disease with new pulmonary metastatic disease, enlargement of the hepatic metastatic disease and retroperitoneal lymphadenopathy   There is new ascites with fluid in the pelvic cul-de-sac and in the upper abdomen and in the retroperitoneum  MRI brain also show right precentral gyrus tiny (3 mm) enhancing cortical lesion concerning for metastasis  Oncology consulted and discussed end of life vs continued treatment  Patient undecided  Palliative for continued goals of care  Family/Caregiver Present Yes  (Daughter present on the phone throughout the session)   Cognition   Overall Cognitive Status WFL   Arousal/Participation Alert   Orientation Level Oriented X4   Following Commands Follows multistep commands with increased time or repetition   Subjective   Subjective I am very tired  RUE Assessment   RUE Assessment WFL   LUE Assessment   LUE Assessment WFL   RLE Assessment   RLE Assessment X   Strength RLE   RLE Overall Strength 4/5   LLE Assessment   LLE Assessment X   Strength LLE   LLE Overall Strength 4/5   Vision-Basic Assessment   Current Vision Wears glasses all the time   Coordination   Movements are Fluid and Coordinated 1   Light Touch   RLE Light Touch Grossly intact   LLE Light Touch Grossly intact   Bed Mobility   Rolling R 7  Independent   Additional items Increased time required   Rolling L 7  Independent   Additional items Increased time required   Supine to Sit 4  Minimal assistance   Additional items Assist x 1; Increased time required   Sit to Supine 4  Minimal assistance   Additional items Assist x 1; Increased time required   Transfers   Sit to Stand 6  Modified independent   Additional items Assist x 1; Increased time required   Stand to Sit 5  Supervision   Additional items Increased time required   Ambulation/Elevation   Gait pattern Decreased foot clearance; Short stride; Excessively slow   Gait Assistance 4  Minimal assist   Additional items Assist x 1;Verbal cues  (Cuesm for management of RW)   Assistive Device Rolling walker   Distance 10'   Stair Management Assistance Not tested   Balance   Static Sitting Good   Dynamic Sitting Good   Static Standing Fair   Dynamic Standing Fair -   Ambulatory Fair -   Endurance Deficit   Endurance Deficit Yes   Endurance Deficit Description Resting BP 98/62  SpO2 on RA 96% post walk BP 93/60  SpO2 on %   Activity Tolerance   Activity Tolerance Patient limited by fatigue   Nurse Made Aware Spoke with Amna Fierro RN for clrearance and for update post eval   Assessment   Prognosis Fair   Problem List Decreased strength;Decreased endurance; Impaired balance   Barriers to Discharge None   Barriers to Discharge Comments Daughter able to provide support needed for safe return to home  Goals   Patient Goals To go home   STG Expiration Date 08/28/22   Short Term Goal #1 Goals to be met in next session days; pt will be able to: 1) inc amb w/ RW approx  >10' w/ supervision for pt to ambulate as tolerated w/o any % of the time, to dec caregiver burden & safely function at home; 2) pt/caregiver ed   PT Treatment Day 1   Plan   Treatment/Interventions Gait training;Spoke to nursing; Patient/family training   PT Frequency Other (Comment)  (One follow up PT session )   Recommendation   PT Discharge Recommendation Home with home health rehabilitation   [address] [facility] [address]  (RW - owns)   [place] 8 in Bed Without Bedrails 4   Lying on Back to Sitting on Edge of Flat Bed 4   Moving Bed to Chair 3   Standing Up From Chair 3   Walk in Room 3   Climb 3-5 Stairs 3   Basic Mobility Inpatient Raw Score 20   Basic Mobility Standardized Score 43 99   Highest Level Of Mobility   JH-HLM Goal 6: Walk 10 steps or more   JH-HLM Achieved 6: Walk 10 steps or more   Additional Treatment Session   Start Time 0837   End Time 0853   Treatment Assessment Patient seen for therapy session post evaluation to provide mobility instructions   Patient received in bed, HR elevated but stable with all activity during treatment session for mobility and monitoring 103 at rest with 113 post activity, SpO2 stable 96% on RA, BP 98/62  Patient education completed on use of RW to improve gait safety and energy conservation  Family walked through fitting of home RW for patient in preparation for return home today  Family educated on use of bedside commode as well with commode requested from case management at [facility], but family will purchase if needed to allow ease of care and greater independence for [place]  Patient at end of session voiced appreciation for recommendations made to help improve her function  Inpt PT services discontinued at this time  Recommended f/u with home PT for environmental assessment as well as home exercise program    Equipment Use Instructed on fitting and management of RW  Exercises   Balance training  Gait with RW 10' with supervision  End of Consult   Patient Position at End of Consult Supine; All needs within reach   End of Consult Comments Patient in stable condition upon completion of session with daughter on the phone  (Please find full objective findings from PT assessment regarding body systems outlined above)  Assessment: Pt is a 78 y o  female seen for PT evaluation s/p admit to [facility] on 8/24/2022 w/ SIRS (systemic inflammatory response syndrome) ([facility] [address] )  [place] also with palliative diagnosis of metastatic pancreatic adenocarcinoma with liver, lung, bone, and retroperitoneal LN involvement  Order placed for PT  Prior to admission, pt was independent w/ all functional mobility w/ no device, ambulated household distances, lived in one floor environment, and lived with daughter and family in trailer with 4 BISI with rail   Upon evaluation: Pt requires  no assistance for bed mobility and minimum to supervision of 1 assistance for transfers and ambulation with rolling walker 5'     Pt's clinical presentation is currently unstable/unpredictable given the functional mobility deficits above, especially weakness, decreased endurance, gait deviations, and decreased activity tolerance, coupled with fall risks including impaired balance, and combined with medical complications of abnormal H&H, abnormal sodium values, and abnormal CO2 values  Pt to benefit from one additional skilled PT tx while in hospital and upon DC to address deficits as defined above and maximize level of functional mobility  The patient's AM-PAC Basic Mobility Inpatient Short Form Raw Score is 20  A Raw score of greater than 16 suggests the patient may benefit from discharge to home  Please also refer to the recommendation of the Physical Therapist for safe discharge planning  From PT/mobility standpoint, recommendation at time of d/c would be Home PT, home with family support, and with rolling walker pending progress in order to maximize pt's functional independence and consistency w/ mobility in order to facilitate return to PLOF  Recommend trial with walker next 1-2 sessions and bedside commode for home use   The following objective measures were performed on IE: AM-PAC 6-Clicks: 97/29  Comorbidities affecting pt's physical performance at time of assessment include: cancer history and/or treatment  Personal factors affecting pt at time of IE include: steps to enter environment, inability to perform IADLs, inability to perform ADLs, and inability to navigate community distances       Rose Barber, PT, DPT, GCS

## 2022-08-28 NOTE — ASSESSMENT & PLAN NOTE
Lab Results   Component Value Date    HGB 8 6 (L) 08/27/2022    HGB 7 4 (L) 08/26/2022    HGB 9 6 (L) 08/24/2022    HGB 9 4 (L) 08/20/2022    HGB 8 9 (L) 08/06/2022   Due to cancer and chronic inflammation  No need for transfusion at this time

## 2022-08-28 NOTE — DISCHARGE SUMMARY
[address] [facility]  Discharge- Liya Olivarez 1945, 78 y o  female MRN: [id number]  Unit/Bed#: E2 -37 Encounter: [id number]  Primary Care Provider: Sirisha Riddle DO   Date and time admitted to hospital: 8/24/2022 11:51 AM    * SIRS (systemic inflammatory response syndrome) ([facility] [address] )  Assessment & Plan  · SIRS secondary to stage IV pancreatic cancer with progression of disease   · Blood cultures negative  · Urine cultures negative  · Lactic acidosis on admission, resolved   · She did complete 4 days of IV antibiotics for possible UTI  Antibiotics discontinued after cultures came back negative    Primary pancreatic adenocarcinoma [facility])  Assessment & Plan  · Stage IV pancreatic cancer her status post recent chemotherapy with Gemzar, Abraxane, Zometa, dexamethasone on 08/22  · With evidence of worsening disease in the brain chest and abdomen  · She was evaluated by oncology and palliative care in the hospital  · She and her family are not ready to pursue hospice yet  · She will follow-up with her primary oncologist in 2 days to discuss further treatment options    Ascites  Assessment & Plan  · Likely malignant due to stage IV pancreatic cancer  · IR was consulted for possible paracentesis  · This was not performed since the amount was minimal     Cancer related pain  Assessment & Plan  Follow-up with palliative care    Continue cautious use of narcotics    Encephalopathy-resolved as of 8/28/2022  Assessment & Plan  · Metabolic encephalopathy POA, multifactorial secondary to progression of metastatic pancreatic adenocarcinoma, SIRS, recent chemotherapy   · She does have metastatic disease on MRI brain, likely not the cause of her encephalopathy given this is very small, 3 mm in the precentral gyrus   Resolved    Transaminitis  Assessment & Plan  · Secondary to known liver disease with CT showing enlargement of hepatic metastatic disease     Pancreatic insufficiency  Assessment & Plan  · Secondary to stage IV pancreatic cancer  · Continue Creon three times daily with meals     Anemia  Assessment & Plan  Lab Results   Component Value Date    HGB 8 6 (L) 08/27/2022    HGB 7 4 (L) 08/26/2022    HGB 9 6 (L) 08/24/2022    HGB 9 4 (L) 08/20/2022    HGB 8 9 (L) 08/06/2022   Due to cancer and chronic inflammation  No need for transfusion at this time      Discharging Physician / Practitioner: Janette Vail MD  PCP: Izzy Brody DO  Admission Date:   Admission Orders (From admission, onward)     Ordered        08/24/22 1542  [facility] [facility] [address]  Once                      Discharge Date: 08/28/22    Medical Problems             Resolved Problems  Date Reviewed: 8/27/2022          Resolved    Encephalopathy 8/28/2022     Resolved by  Janette Vail MD                Consultations During Hospital Stay:  · Oncology  · Palliative care  · IR    Procedures Performed:   · None    Significant Findings / Test Results:   · MRI brain-3 mm right precentral gyrus cortical lesion concerning for metastases  No associated edema  · CT abdomen pelvis-progression of disease with new pulmonary metastasis, enlargement of the hepatic metastatic disease and retroperitoneal lymphadenopathy    Incidental Findings:   · New ascites in the pelvic cul-de-sac and in the upper abdomen and retroperitoneum     Test Results Pending at Discharge (will require follow up): · None     Outpatient Tests Requested:  · None    Complications:  None    Reason for Admission:  Confusion, weakness, abdominal pain    Hospital Course:     Prudence Elías is a 78 y o  female patient who originally presented to the hospital on 8/24/2022 due to confusion, weakness and abdominal pain  She has known history of stage IV pancreatic cancer and had chemotherapy a few days prior to admission  She met SIRS criteria on admission and was treated for possible UTI    She completed 4 days of antibiotics and these were eventually discontinued after cultures came back negative  CT of the abdomen showed progression of disease  MRI of the brain was performed due to her confusion  A small lesion was seen in the right precentral gyrus concerning for metastases  She was evaluated by Oncology and palliative care while in the hospital   Overall she and the family were not ready to pursue hospice even with evidence of disease progression  They plan to see her primary oncologist on Tuesday 08/30/2022 to discuss further treatment options  IR was consulted for possible paracentesis of the new ascites  However the amount was minimal and they could not perform the procedure  Hospital her pain medication was switch from oxycodone to Jacy Slain for better tolerance  Please see above list of diagnoses and related plan for additional information  Condition at Discharge: good     Discharge Day Visit / Exam:     Subjective:  "I feel quite good" and ready to go home  Plans to follow-up with her oncologist as scheduled  Vitals: Blood Pressure: 111/65 (08/28/22 0740)  Pulse: 104 (08/28/22 0740)  Temperature: 98 5 °F (36 9 °C) (08/28/22 0740)  Temp Source: Temporal (08/28/22 0740)  Respirations: 18 (08/28/22 0740)  Height: 5' 4" (162 6 cm) (08/25/22 0827)  Weight - Scale: 50 8 kg (111 lb 15 9 oz) (08/24/22 1647)  SpO2: 96 % (08/28/22 0740)  Exam:   Physical Exam  Constitutional:       Appearance: She is ill-appearing  HENT:      Head: Normocephalic and atraumatic  Nose: No rhinorrhea  Eyes:      General: No scleral icterus  Cardiovascular:      Rate and Rhythm: Normal rate and regular rhythm  Heart sounds: No murmur heard  No gallop  Pulmonary:      Effort: Pulmonary effort is normal  No respiratory distress  Breath sounds: No wheezing or rales  Abdominal:      General: Abdomen is flat  There is no distension  Palpations: Abdomen is soft  Tenderness: There is no abdominal tenderness  Musculoskeletal:      Cervical back: Neck supple  Right lower leg: No edema  Left lower leg: No edema  Skin:     General: Skin is warm and dry  Coloration: Skin is not jaundiced  Neurological:      Mental Status: She is alert and oriented to person, place, and time  Psychiatric:         Mood and Affect: Mood normal          Behavior: Behavior normal        Discussion with Family:  Spoke with daughter Divine Eastman and gave update    Discharge instructions/Information to patient and family:   See after visit summary for information provided to patient and family  Provisions for Follow-Up Care:  See after visit summary for information related to follow-up care and any pertinent home health orders  Disposition:     Home    Planned Readmission: no     Discharge Statement:  I spent >30 minutes discharging the patient  This time was spent on the day of discharge  I had direct contact with the patient on the day of discharge  Greater than 50% of the total time was spent examining patient, answering all patient questions, arranging and discussing plan of care with patient as well as directly providing post-discharge instructions  Additional time then spent on discharge activities  Discharge Medications:  See after visit summary for reconciled discharge medications provided to patient and family        ** Please Note: This note has been constructed using a voice recognition system **

## 2022-08-28 NOTE — ASSESSMENT & PLAN NOTE
· Stage IV pancreatic cancer her status post recent chemotherapy with Gemzar, Abraxane, Zometa, dexamethasone on 08/22  · With evidence of worsening disease in the brain chest and abdomen  · She was evaluated by oncology and palliative care in the hospital  · She and her family are not ready to pursue hospice yet    · She will follow-up with her primary oncologist in 2 days to discuss further treatment options

## 2022-08-28 NOTE — ASSESSMENT & PLAN NOTE
· SIRS secondary to stage IV pancreatic cancer with progression of disease   · Blood cultures negative  · Urine cultures negative  · Lactic acidosis on admission, resolved   · She did complete 4 days of IV antibiotics for possible UTI    Antibiotics discontinued after cultures came back negative

## 2022-08-28 NOTE — ASSESSMENT & PLAN NOTE
· Metabolic encephalopathy POA, multifactorial secondary to progression of metastatic pancreatic adenocarcinoma, SIRS, recent chemotherapy   · She does have metastatic disease on MRI brain, likely not the cause of her encephalopathy given this is very small, 3 mm in the precentral gyrus   Resolved

## 2022-08-29 ENCOUNTER — TELEPHONE (OUTPATIENT)
Dept: HEMATOLOGY ONCOLOGY | Facility: CLINIC | Age: 77
End: 2022-08-29

## 2022-08-29 ENCOUNTER — HOSPITAL ENCOUNTER (INPATIENT)
Facility: HOSPITAL | Age: 77
LOS: 1 days | Discharge: NURSING, REHAB OR LONG-TERM CARE | DRG: 948 | End: 2022-09-01
Attending: EMERGENCY MEDICINE | Admitting: FAMILY MEDICINE
Payer: COMMERCIAL

## 2022-08-29 ENCOUNTER — HOSPITAL ENCOUNTER (OUTPATIENT)
Dept: INFUSION CENTER | Facility: CLINIC | Age: 77
End: 2022-08-29

## 2022-08-29 ENCOUNTER — APPOINTMENT (EMERGENCY)
Dept: CT IMAGING | Facility: HOSPITAL | Age: 77
DRG: 948 | End: 2022-08-29
Payer: COMMERCIAL

## 2022-08-29 DIAGNOSIS — A41.9 SEPSIS (HCC): ICD-10-CM

## 2022-08-29 DIAGNOSIS — R10.84 GENERALIZED ABDOMINAL PAIN: Primary | ICD-10-CM

## 2022-08-29 DIAGNOSIS — C25.9 PANCREATIC CANCER METASTASIZED TO LIVER (HCC): ICD-10-CM

## 2022-08-29 DIAGNOSIS — C78.7 PANCREATIC CANCER METASTASIZED TO LIVER (HCC): ICD-10-CM

## 2022-08-29 LAB
ALBUMIN SERPL BCP-MCNC: 2.1 G/DL (ref 3.5–5)
ALP SERPL-CCNC: 1203 U/L (ref 46–116)
ALT SERPL W P-5'-P-CCNC: 128 U/L (ref 12–78)
ANION GAP SERPL CALCULATED.3IONS-SCNC: 13 MMOL/L (ref 4–13)
APTT PPP: 33 SECONDS (ref 23–37)
AST SERPL W P-5'-P-CCNC: 207 U/L (ref 5–45)
BACTERIA BLD CULT: NORMAL
BACTERIA BLD CULT: NORMAL
BASOPHILS # BLD MANUAL: 0 THOUSAND/UL (ref 0–0.1)
BASOPHILS NFR MAR MANUAL: 0 % (ref 0–1)
BILIRUB SERPL-MCNC: 0.87 MG/DL (ref 0.2–1)
BUN SERPL-MCNC: 19 MG/DL (ref 5–25)
CALCIUM ALBUM COR SERPL-MCNC: 9.3 MG/DL (ref 8.3–10.1)
CALCIUM SERPL-MCNC: 7.8 MG/DL (ref 8.3–10.1)
CHLORIDE SERPL-SCNC: 100 MMOL/L (ref 96–108)
CO2 SERPL-SCNC: 21 MMOL/L (ref 21–32)
CREAT SERPL-MCNC: 0.64 MG/DL (ref 0.6–1.3)
EOSINOPHIL # BLD MANUAL: 0.34 THOUSAND/UL (ref 0–0.4)
EOSINOPHIL NFR BLD MANUAL: 2 % (ref 0–6)
ERYTHROCYTE [DISTWIDTH] IN BLOOD BY AUTOMATED COUNT: 20.6 % (ref 11.6–15.1)
GFR SERPL CREATININE-BSD FRML MDRD: 87 ML/MIN/1.73SQ M
GLUCOSE SERPL-MCNC: 112 MG/DL (ref 65–140)
HCT VFR BLD AUTO: 26.8 % (ref 34.8–46.1)
HELMET CELLS BLD QL SMEAR: PRESENT
HGB BLD-MCNC: 8.9 G/DL (ref 11.5–15.4)
INR PPP: 1.37 (ref 0.84–1.19)
LACTATE SERPL-SCNC: 2.4 MMOL/L (ref 0.5–2)
LIPASE SERPL-CCNC: 46 U/L (ref 73–393)
LYMPHOCYTES # BLD AUTO: 0.52 THOUSAND/UL (ref 0.6–4.47)
LYMPHOCYTES # BLD AUTO: 3 % (ref 14–44)
MACROCYTES BLD QL AUTO: PRESENT
MCH RBC QN AUTO: 31.8 PG (ref 26.8–34.3)
MCHC RBC AUTO-ENTMCNC: 33.2 G/DL (ref 31.4–37.4)
MCV RBC AUTO: 96 FL (ref 82–98)
MONOCYTES # BLD AUTO: 0.34 THOUSAND/UL (ref 0–1.22)
MONOCYTES NFR BLD: 2 % (ref 4–12)
NEUTROPHILS # BLD MANUAL: 16.01 THOUSAND/UL (ref 1.85–7.62)
NEUTS BAND NFR BLD MANUAL: 9 % (ref 0–8)
NEUTS SEG NFR BLD AUTO: 84 % (ref 43–75)
PLATELET # BLD AUTO: 373 THOUSANDS/UL (ref 149–390)
PLATELET BLD QL SMEAR: ADEQUATE
PMV BLD AUTO: 10.9 FL (ref 8.9–12.7)
POLYCHROMASIA BLD QL SMEAR: PRESENT
POTASSIUM SERPL-SCNC: 3.9 MMOL/L (ref 3.5–5.3)
PROCALCITONIN SERPL-MCNC: 5.28 NG/ML
PROT SERPL-MCNC: 6.1 G/DL (ref 6.4–8.4)
PROTHROMBIN TIME: 16.9 SECONDS (ref 11.6–14.5)
RBC # BLD AUTO: 2.8 MILLION/UL (ref 3.81–5.12)
SODIUM SERPL-SCNC: 134 MMOL/L (ref 135–147)
WBC # BLD AUTO: 17.21 THOUSAND/UL (ref 4.31–10.16)

## 2022-08-29 PROCEDURE — 74177 CT ABD & PELVIS W/CONTRAST: CPT

## 2022-08-29 PROCEDURE — 85730 THROMBOPLASTIN TIME PARTIAL: CPT | Performed by: STUDENT IN AN ORGANIZED HEALTH CARE EDUCATION/TRAINING PROGRAM

## 2022-08-29 PROCEDURE — 96365 THER/PROPH/DIAG IV INF INIT: CPT

## 2022-08-29 PROCEDURE — 83690 ASSAY OF LIPASE: CPT | Performed by: STUDENT IN AN ORGANIZED HEALTH CARE EDUCATION/TRAINING PROGRAM

## 2022-08-29 PROCEDURE — 84145 PROCALCITONIN (PCT): CPT | Performed by: STUDENT IN AN ORGANIZED HEALTH CARE EDUCATION/TRAINING PROGRAM

## 2022-08-29 PROCEDURE — 99285 EMERGENCY DEPT VISIT HI MDM: CPT | Performed by: EMERGENCY MEDICINE

## 2022-08-29 PROCEDURE — 99285 EMERGENCY DEPT VISIT HI MDM: CPT

## 2022-08-29 PROCEDURE — G1004 CDSM NDSC: HCPCS

## 2022-08-29 PROCEDURE — 71275 CT ANGIOGRAPHY CHEST: CPT

## 2022-08-29 PROCEDURE — 80053 COMPREHEN METABOLIC PANEL: CPT | Performed by: STUDENT IN AN ORGANIZED HEALTH CARE EDUCATION/TRAINING PROGRAM

## 2022-08-29 PROCEDURE — 85027 COMPLETE CBC AUTOMATED: CPT | Performed by: STUDENT IN AN ORGANIZED HEALTH CARE EDUCATION/TRAINING PROGRAM

## 2022-08-29 PROCEDURE — 85610 PROTHROMBIN TIME: CPT | Performed by: STUDENT IN AN ORGANIZED HEALTH CARE EDUCATION/TRAINING PROGRAM

## 2022-08-29 PROCEDURE — 87040 BLOOD CULTURE FOR BACTERIA: CPT | Performed by: STUDENT IN AN ORGANIZED HEALTH CARE EDUCATION/TRAINING PROGRAM

## 2022-08-29 PROCEDURE — 85025 COMPLETE CBC W/AUTO DIFF WBC: CPT | Performed by: STUDENT IN AN ORGANIZED HEALTH CARE EDUCATION/TRAINING PROGRAM

## 2022-08-29 PROCEDURE — 93005 ELECTROCARDIOGRAM TRACING: CPT

## 2022-08-29 PROCEDURE — 83605 ASSAY OF LACTIC ACID: CPT | Performed by: STUDENT IN AN ORGANIZED HEALTH CARE EDUCATION/TRAINING PROGRAM

## 2022-08-29 PROCEDURE — 36415 COLL VENOUS BLD VENIPUNCTURE: CPT | Performed by: STUDENT IN AN ORGANIZED HEALTH CARE EDUCATION/TRAINING PROGRAM

## 2022-08-29 PROCEDURE — 85007 BL SMEAR W/DIFF WBC COUNT: CPT | Performed by: STUDENT IN AN ORGANIZED HEALTH CARE EDUCATION/TRAINING PROGRAM

## 2022-08-29 RX ORDER — HYDROMORPHONE HCL/PF 1 MG/ML
0.5 SYRINGE (ML) INJECTION ONCE
Status: DISCONTINUED | OUTPATIENT
Start: 2022-08-29 | End: 2022-08-29

## 2022-08-29 RX ORDER — VANCOMYCIN HYDROCHLORIDE 1 G/200ML
20 INJECTION, SOLUTION INTRAVENOUS ONCE
Status: COMPLETED | OUTPATIENT
Start: 2022-08-29 | End: 2022-08-30

## 2022-08-29 RX ORDER — SODIUM CHLORIDE, SODIUM GLUCONATE, SODIUM ACETATE, POTASSIUM CHLORIDE, MAGNESIUM CHLORIDE, SODIUM PHOSPHATE, DIBASIC, AND POTASSIUM PHOSPHATE .53; .5; .37; .037; .03; .012; .00082 G/100ML; G/100ML; G/100ML; G/100ML; G/100ML; G/100ML; G/100ML
1000 INJECTION, SOLUTION INTRAVENOUS ONCE
Status: COMPLETED | OUTPATIENT
Start: 2022-08-29 | End: 2022-08-30

## 2022-08-29 RX ADMIN — SODIUM CHLORIDE, SODIUM GLUCONATE, SODIUM ACETATE, POTASSIUM CHLORIDE, MAGNESIUM CHLORIDE, SODIUM PHOSPHATE, DIBASIC, AND POTASSIUM PHOSPHATE 1000 ML: .53; .5; .37; .037; .03; .012; .00082 INJECTION, SOLUTION INTRAVENOUS at 23:53

## 2022-08-29 RX ADMIN — CEFEPIME HYDROCHLORIDE 2000 MG: 2 INJECTION, POWDER, FOR SOLUTION INTRAVENOUS at 23:49

## 2022-08-29 RX ADMIN — IOHEXOL 100 ML: 350 INJECTION, SOLUTION INTRAVENOUS at 23:37

## 2022-08-29 NOTE — TELEPHONE ENCOUNTER
Reviewed with Dr Tara Whiteside  Patient was hospitalized with infection  Patient to be deferred 2 weeks from 8/29/22  Cycle 3 to be week of September 12, 2022

## 2022-08-29 NOTE — PROGRESS NOTES
Reviewed with Dr Sherice Clark  Patient was hospitalized with infection  Patient to be deferred 2 weeks from 8/29/22  Cycle 3 to be week of September 12, 2022  Message sent to Infusion   Call out to patient daughter, Nick Ying in regard to above  Agreeable to plan   Reviewed patient has an appointment with Dr Sherice Clark on 8/30/22

## 2022-08-29 NOTE — TELEPHONE ENCOUNTER
Good morning, patient is currently hospitalized  Patients treatment is being deferred by 2 weeks from 8/29  Cycle 3 to start the week of September 12th  Thanks!

## 2022-08-29 NOTE — UTILIZATION REVIEW
Notification of Discharge   This is a Notification of Discharge from our facility [address]  Please be advised that this patient has been discharge from our facility  Below you will find the admission and discharge date and time including the patients disposition  UTILIZATION REVIEW CONTACT:  Yaya Serna  Utilization   Network Utilization Review Department  Phone: [phone] x carefully listen to the prompts  All voicemails are confidential   Email: [email or web address]     PHYSICIAN ADVISORY SERVICES:  FOR SZRP-MW-KEJW REVIEW - MEDICAL NECESSITY DENIAL  Phone: [phone]  Fax: [phone]  Email: [email or web address]     PRESENTATION DATE: 8/24/2022 11:51 AM    INPATIENT ADMISSION DATE: 8/24/22  3:42 PM   DISCHARGE DATE: 8/28/2022  1:37 PM  DISPOSITION: Home/Self Care Home/Self Care      IMPORTANT INFORMATION:  Send all requests for admission clinical reviews, approved or denied determinations and any other requests to dedicated fax number below belonging to the campus where the patient is receiving treatment   List of dedicated fax numbers:  [address] DENIALS (Administrative/Medical Necessity) [phone]   [address] (Maternity/NICU/Pediatrics) [phone]   Neisha Lancaster [phone]   [address] [place] [address] [phone]   [address] [place] [address] [phone]   [address] [phone]   [facility]  [phone]   [address] [place] [address], [facility]  [address] [phone]

## 2022-08-30 ENCOUNTER — TELEPHONE (OUTPATIENT)
Dept: HEMATOLOGY ONCOLOGY | Facility: CLINIC | Age: 77
End: 2022-08-30

## 2022-08-30 PROBLEM — C78.02 MALIGNANT NEOPLASM METASTATIC TO BOTH LUNGS (HCC): Status: ACTIVE | Noted: 2022-01-01

## 2022-08-30 PROBLEM — C78.01 MALIGNANT NEOPLASM METASTATIC TO BOTH LUNGS (HCC): Status: ACTIVE | Noted: 2022-01-01

## 2022-08-30 PROBLEM — R10.84 GENERALIZED ABDOMINAL PAIN: Status: ACTIVE | Noted: 2022-01-01

## 2022-08-30 PROBLEM — E87.1 HYPONATREMIA: Status: ACTIVE | Noted: 2022-01-01

## 2022-08-30 LAB
ATRIAL RATE: 114 BPM
BACTERIA UR QL AUTO: ABNORMAL /HPF
BILIRUB UR QL STRIP: NEGATIVE
CLARITY UR: CLEAR
COLOR UR: YELLOW
GLUCOSE UR STRIP-MCNC: NEGATIVE MG/DL
HGB UR QL STRIP.AUTO: NEGATIVE
KETONES UR STRIP-MCNC: NEGATIVE MG/DL
LACTATE SERPL-SCNC: 2 MMOL/L (ref 0.5–2)
LACTATE SERPL-SCNC: 2.1 MMOL/L (ref 0.5–2)
LEUKOCYTE ESTERASE UR QL STRIP: NEGATIVE
NITRITE UR QL STRIP: NEGATIVE
NON-SQ EPI CELLS URNS QL MICRO: ABNORMAL /HPF
P AXIS: 43 DEGREES
PH UR STRIP.AUTO: 6.5 [PH]
PLATELET # BLD AUTO: 305 THOUSANDS/UL (ref 149–390)
PMV BLD AUTO: 10.2 FL (ref 8.9–12.7)
PR INTERVAL: 132 MS
PROT UR STRIP-MCNC: ABNORMAL MG/DL
QRS AXIS: 32 DEGREES
QRSD INTERVAL: 82 MS
QT INTERVAL: 322 MS
QTC INTERVAL: 443 MS
RBC #/AREA URNS AUTO: ABNORMAL /HPF
SP GR UR STRIP.AUTO: 1.01 (ref 1–1.03)
T WAVE AXIS: 50 DEGREES
UROBILINOGEN UR QL STRIP.AUTO: 0.2 E.U./DL
VENTRICULAR RATE: 114 BPM
WBC #/AREA URNS AUTO: ABNORMAL /HPF

## 2022-08-30 PROCEDURE — 85049 AUTOMATED PLATELET COUNT: CPT | Performed by: FAMILY MEDICINE

## 2022-08-30 PROCEDURE — 93010 ELECTROCARDIOGRAM REPORT: CPT | Performed by: INTERNAL MEDICINE

## 2022-08-30 PROCEDURE — 83605 ASSAY OF LACTIC ACID: CPT | Performed by: STUDENT IN AN ORGANIZED HEALTH CARE EDUCATION/TRAINING PROGRAM

## 2022-08-30 PROCEDURE — 99220 PR INITIAL OBSERVATION CARE/DAY 70 MINUTES: CPT | Performed by: FAMILY MEDICINE

## 2022-08-30 PROCEDURE — 81001 URINALYSIS AUTO W/SCOPE: CPT | Performed by: STUDENT IN AN ORGANIZED HEALTH CARE EDUCATION/TRAINING PROGRAM

## 2022-08-30 PROCEDURE — 96367 TX/PROPH/DG ADDL SEQ IV INF: CPT

## 2022-08-30 PROCEDURE — 83605 ASSAY OF LACTIC ACID: CPT | Performed by: FAMILY MEDICINE

## 2022-08-30 PROCEDURE — 96368 THER/DIAG CONCURRENT INF: CPT

## 2022-08-30 PROCEDURE — 36415 COLL VENOUS BLD VENIPUNCTURE: CPT | Performed by: STUDENT IN AN ORGANIZED HEALTH CARE EDUCATION/TRAINING PROGRAM

## 2022-08-30 RX ORDER — ACETAMINOPHEN 325 MG/1
650 TABLET ORAL EVERY 6 HOURS PRN
Status: DISCONTINUED | OUTPATIENT
Start: 2022-08-30 | End: 2022-09-01 | Stop reason: HOSPADM

## 2022-08-30 RX ORDER — ACETAMINOPHEN 325 MG/1
650 TABLET ORAL ONCE
Status: COMPLETED | OUTPATIENT
Start: 2022-08-30 | End: 2022-08-30

## 2022-08-30 RX ORDER — PANTOPRAZOLE SODIUM 20 MG/1
20 TABLET, DELAYED RELEASE ORAL
Status: DISCONTINUED | OUTPATIENT
Start: 2022-08-30 | End: 2022-09-01 | Stop reason: HOSPADM

## 2022-08-30 RX ORDER — ONDANSETRON 2 MG/ML
4 INJECTION INTRAMUSCULAR; INTRAVENOUS EVERY 6 HOURS PRN
Status: DISCONTINUED | OUTPATIENT
Start: 2022-08-30 | End: 2022-09-01 | Stop reason: HOSPADM

## 2022-08-30 RX ORDER — ENOXAPARIN SODIUM 100 MG/ML
40 INJECTION SUBCUTANEOUS
Status: DISCONTINUED | OUTPATIENT
Start: 2022-08-31 | End: 2022-09-01 | Stop reason: HOSPADM

## 2022-08-30 RX ORDER — OXYCODONE HYDROCHLORIDE 10 MG/1
10 TABLET ORAL EVERY 4 HOURS PRN
Status: DISCONTINUED | OUTPATIENT
Start: 2022-08-30 | End: 2022-09-01 | Stop reason: HOSPADM

## 2022-08-30 RX ORDER — FERROUS SULFATE 325(65) MG
325 TABLET ORAL
Status: DISCONTINUED | OUTPATIENT
Start: 2022-08-31 | End: 2022-09-01 | Stop reason: HOSPADM

## 2022-08-30 RX ORDER — SODIUM CHLORIDE 9 MG/ML
100 INJECTION, SOLUTION INTRAVENOUS CONTINUOUS
Status: DISCONTINUED | OUTPATIENT
Start: 2022-08-30 | End: 2022-08-31

## 2022-08-30 RX ORDER — HEPARIN SODIUM 5000 [USP'U]/ML
5000 INJECTION, SOLUTION INTRAVENOUS; SUBCUTANEOUS EVERY 8 HOURS SCHEDULED
Status: DISCONTINUED | OUTPATIENT
Start: 2022-08-30 | End: 2022-08-30

## 2022-08-30 RX ORDER — HYDROMORPHONE HCL/PF 1 MG/ML
0.5 SYRINGE (ML) INJECTION EVERY 4 HOURS PRN
Status: DISCONTINUED | OUTPATIENT
Start: 2022-08-30 | End: 2022-09-01 | Stop reason: HOSPADM

## 2022-08-30 RX ORDER — OXYCODONE HYDROCHLORIDE 5 MG/1
5 TABLET ORAL EVERY 4 HOURS PRN
Status: DISCONTINUED | OUTPATIENT
Start: 2022-08-30 | End: 2022-09-01 | Stop reason: HOSPADM

## 2022-08-30 RX ADMIN — SODIUM CHLORIDE 100 ML/HR: 0.9 INJECTION, SOLUTION INTRAVENOUS at 13:38

## 2022-08-30 RX ADMIN — HEPARIN SODIUM 5000 UNITS: 5000 INJECTION INTRAVENOUS; SUBCUTANEOUS at 13:39

## 2022-08-30 RX ADMIN — CYANOCOBALAMIN TAB 500 MCG 500 MCG: 500 TAB at 11:24

## 2022-08-30 RX ADMIN — ACETAMINOPHEN 650 MG: 325 TABLET ORAL at 07:40

## 2022-08-30 RX ADMIN — VANCOMYCIN HYDROCHLORIDE 1000 MG: 1 INJECTION, SOLUTION INTRAVENOUS at 01:30

## 2022-08-30 RX ADMIN — PANTOPRAZOLE SODIUM 20 MG: 20 TABLET, DELAYED RELEASE ORAL at 11:24

## 2022-08-30 NOTE — TELEPHONE ENCOUNTER
Call out to patient daughter in regards to questions and concerns  Reviewed with Dr Jody Robert that patient will have a hospital follow up appointment once patient is discharged to review recent scans, blood work and treatment plan going forward  Left call back number and hours of operation to further discuss

## 2022-08-30 NOTE — ASSESSMENT & PLAN NOTE
Malnutrition Findings:                                 BMI Findings: Body mass index is 19 22 kg/m²     Nutrition consult  Nutritional supplements

## 2022-08-30 NOTE — H&P
[facility] 1945, 78 y [facility]  female MRN: [id number]  Unit/Bed#: E2 -01 Encounter: [id number]  Primary Care Provider: Severo Pleasure, DO   Date and time admitted to hospital: 8/29/2022  9:09 PM    * Generalized abdominal pain  Assessment & Plan  43-year-old female patient with stage IV metastatic pancreatic cancer who presented with an episode of generalized abdominal pain with distention and an elevated temperature for her baseline without fever per se    Med SIRS criteria with leukocytosis and tachycardia as well as lactic acidosis  · The patient received cefepime and vancomycin x1 - no acute infectious process was identified  · She received oral Tylenol  · Currently reports improvement  · CTA of the chest and abdomen revealed extensive hepatic metastatic disease without significant changes from prior  · Continue serial exams and supportive management for now  · Undergoing infectious workup as below, recent infectious workup was unrevealing  · Unfortunately noted for overall poor prognosis, undergoing chemotherapy for her pancreatic cancer      SIRS (systemic inflammatory response syndrome) (HCC)  Assessment & Plan  · Criteria met with leukocytosis, tachycardia, also elevated lactic acid  · No obvious source of infection, UA negative for UTI, CT a chest CT abdomen/pelvis with contrast no evidence of acute infection, evidence metastatic disease similar to prior imaging  · Patient was just admitted to [facility] and discharged 08/28/2022, at that time also met sirs criteria and was treated for a possible UTI although urine cultures were negative  · Patient did receive cefepime and vancomycin in the ER, for now will continue to observe off antibiotics  · Procalcitonin elevated, however could be related to her cancer rather than infectious process, continue to monitor  · Proceed with IVF, monitor lactic acid level    Hyponatremia  Assessment & Plan  Mild, proceed with gentle IVF with NS     Malignant neoplasm metastatic to both lungs [facility])  Assessment & Plan  Per current CT of the chest she did progression of bilateral metastatic disease  Continue supplemental oxygen as needed to keep oxygen saturations above 90%    Cancer related pain  Assessment & Plan  Patient presents with abdominal pain now improved  Continue supportive management    Primary pancreatic adenocarcinoma [facility])  Assessment & Plan  Stage IV metastatic pancreatic cancer, does not considered curable  Undergoing palliative chemotherapy every 28 days  Follows with Dr Bassem Castro of care have been discussed by palliative care with patient and family, for now despite poor prognosis family is electing to seek treatment and for the patient to remain full code    Chronic anemia  Assessment & Plan  Hemoglobin at baseline, continue to monitor    Protein-calorie malnutrition, unspecified severity ([facility] [address] )  Assessment & Plan  Malnutrition Findings:                                 BMI Findings: Body mass index is 19 22 kg/m²  Nutrition consult  Nutritional supplements      VTE Pharmacologic Prophylaxis: VTE Score: 7 High Risk (Score >/= 5) - Pharmacological DVT Prophylaxis Ordered: enoxaparin (Lovenox)  Sequential Compression Devices Ordered  Code Status: Level 1 - Full Code   Discussion with family: Updated  (son and daughter) at bedside  Anticipated Length of Stay: Patient will be admitted on an observation basis with an anticipated length of stay of less than 2 midnights secondary to Supportive management  Total Time for Visit, including Counseling / Coordination of Care: 60 minutes Greater than 50% of this total time spent on direct patient counseling and coordination of care      Chief Complaint: abdominal pain     History of Present Illness:  Sarath Muñoz is a 78 y o  female with a PMH of advanced pancreatic cancer with Mets to liver and lungs, recent admission to [address] meeting SIRS criteria with a possible UTI who presents with generalized abdominal pain, weakness, elevated temperature and confusion  The patient received 1 dose of cefepime and vancomycin in the emergency department as well as Tylenol and a bolus IV fluids  She currently reports improvement in her abdominal pain and family notes improvement in her mental status  The patient has been maintaining a fair appetite up until last night  And the time of her described episode her temperature was 99° with her baseline temperature is reported around 96-97 F  The patient has not been experiencing nausea or vomiting and had a normal soft bowel movement yesterday morning  She denies dysuria  She does report chronic pain related to her pancreatic cancer  Review of Systems:  Review of Systems   Constitutional: Positive for appetite change and fatigue  HENT: Negative for trouble swallowing  Eyes: Negative for visual disturbance  Cardiovascular: Negative for chest pain  Gastrointestinal: Positive for abdominal distention  Negative for constipation, diarrhea and nausea  Endocrine: Negative for polydipsia and polyuria  Genitourinary: Negative for dysuria  Musculoskeletal: Positive for gait problem  Skin: Negative for rash  Neurological: Positive for weakness  Hematological: Negative for adenopathy  Psychiatric/Behavioral: Positive for confusion  Past Medical and Surgical History:   Past Medical History:   Diagnosis Date    Pancreatic cancer [facility])        Past Surgical History:   Procedure Laterality Date    CARDIAC CATHETERIZATION N/A 3/30/2022    Procedure: Cardiac catheterization;  Surgeon: Lavonne Powers DO;  Location: [facility] CARDIAC CATH LAB; Service: Cardiology    CARDIAC CATHETERIZATION N/A 3/30/2022    Procedure: Cardiac Coronary Angiogram;  Surgeon: Lavonne Powers DO;  Location: [facility];   Service: Cardiology    IR BIOPSY LIVER MASS 6/6/2022    IR PORT PLACEMENT  6/28/2022       Meds/Allergies:  Prior to Admission medications    Medication Sig Start Date End Date Taking? Authorizing Provider   Cholecalciferol (Vitamin D3) 50 MCG (2000 UT) TABS Take by mouth     Yes Historical ProviderMD Espinosa [id number] units TAKE 1 CAPSULE BY MOUTH THREE TIMES DAILY WITH  MEALS  Patient taking differently: 2 (two) times a day 8/23/22  Yes Lito Dent MD   cyanocobalamin (VITAMIN B-12) 500 MCG tablet Take 500 mcg by mouth daily   Yes Historical Provider, MD   ferrous sulfate 325 (65 Fe) mg tablet Take 325 mg by mouth daily with breakfast   Yes Historical Provider, MD   lidocaine-prilocaine (EMLA) cream Apply topically as needed for mild pain 7/5/22  Yes Riesa Angelucci, MD   ondansetron (Zofran ODT) 4 mg disintegrating tablet Take 1 tablet (4 mg total) by mouth every 8 (eight) hours as needed for nausea or vomiting 6/13/22  Yes Suzanne Hernandez DO   pantoprazole (PROTONIX) 20 mg tablet Take 1 tablet (20 mg total) by mouth daily 7/29/22  Yes Lito Dent MD   acetaminophen (TYLENOL) 500 mg tablet Take 500 mg by mouth every 6 (six) hours as needed for mild pain  Patient not taking: No sig reported    Historical Provider, MD   HYDROcodone-acetaminophen (NORCO) 5-325 mg per tablet Take 0 5 tablets by mouth every 6 (six) hours as needed for pain for up to 3 days Max Daily Amount: 2 tablets  Patient not taking: Reported on 8/30/2022 8/28/22 8/31/22  Wan Lincoln MD   Pyridoxine HCl (VITAMIN B-6 PO) Take 1 tablet by mouth    Historical Provider, MD     I have reviewed home medications with a medical source (PCP, Pharmacy, other)  Allergies:    Allergies   Allergen Reactions    Bentyl [Dicyclomine] Throat Swelling    Codeine Other (See Comments)     Was told allergy    Elastic Bandages & [Zinc] Hives, Swelling and Rash       Social History:  Marital Status: /Civil Union   Occupation: retired  Patient Pre-hospital Living Situation: Home  Patient Pre-hospital Level of Mobility: walks with walker  Patient Pre-hospital Diet Restrictions: none   Substance Use History:   Social History     Substance and Sexual Activity   Alcohol Use Not Currently     Social History     Tobacco Use   Smoking Status Never Smoker   Smokeless Tobacco Never Used     Social History     Substance and Sexual Activity   Drug Use Never       Family History:  Family History   Problem Relation Age of Onset    Coronary artery disease Father     Coronary artery disease Brother     Diabetes Brother     Kidney failure Brother     Uterine cancer Mother        Physical Exam:     Vitals:   Blood Pressure: 112/61 (08/30/22 1151)  Pulse: 96 (08/30/22 1151)  Temperature: 97 7 °F (36 5 °C) (08/30/22 1151)  Temp Source: Temporal (08/30/22 1151)  Respirations: 22 (08/30/22 1151)  Height: 5' 4" (162 6 cm) (08/30/22 0900)  Weight - Scale: 50 8 kg (111 lb 15 9 oz) (08/30/22 0900)  SpO2: 100 % (08/30/22 1151)    Physical Exam     Additional Data:     Lab Results:  Results from last 7 days   Lab Units 08/30/22  1129 08/29/22 2227 08/27/22  0641   WBC Thousand/uL  --  17 21* 8 11   HEMOGLOBIN g/dL  --  8 9* 8 6*   HEMATOCRIT %  --  26 8* 26 1*   PLATELETS Thousands/uL 305 373 508*   BANDS PCT %  --  9*  --    NEUTROS PCT %  --   --  85*   LYMPHS PCT %  --   --  8*   LYMPHO PCT %  --  3*  --    MONOS PCT %  --   --  2*   MONO PCT %  --  2*  --    EOS PCT %  --  2 2     Results from last 7 days   Lab Units 08/29/22 2227   SODIUM mmol/L 134*   POTASSIUM mmol/L 3 9   CHLORIDE mmol/L 100   CO2 mmol/L 21   BUN mg/dL 19   CREATININE mg/dL 0 64   ANION GAP mmol/L 13   CALCIUM mg/dL 7 8*   ALBUMIN g/dL 2 1*   TOTAL BILIRUBIN mg/dL 0 87   ALK PHOS U/L 1,203*   ALT U/L 128*   AST U/L 207*   GLUCOSE RANDOM mg/dL 112     Results from last 7 days   Lab Units 08/29/22 2227   INR  1 37*             Results from last 7 days   Lab Units 08/30/22  0043 08/29/22 2227 08/27/22  6016 08/24/22 2059 08/24/22  1749 08/24/22  1422 08/24/22  1225   LACTIC ACID mmol/L 2 1* 2 4*  --  1 6 2 1*   < > 2 3*   PROCALCITONIN ng/ml  --  5 28* 4 94*  --   --   --  8 06*    < > = values in this interval not displayed  Imaging: Reviewed radiology reports from this admission including: chest CT scan and abdominal/pelvic CT and Personally reviewed the following imaging: chest CT scan and abdominal/pelvic CT  CT pe study w abdomen pelvis w contrast   ED Interpretation by Cole Malone DO (08/30 0910)   CT PULMONARY ANGIOGRAM OF THE CHEST AND CT ABDOMEN AND PELVIS WITH INTRAVENOUS CONTRAST     INDICATION:   SOB, abomdinal pain, hx of cancer      COMPARISON:  CT chest 6/18/2022, CT abdomen pelvis 8/24/2022     TECHNIQUE:  CT examination of the chest, abdomen and pelvis was performed  Thin section CT angiographic technique was used in the chest in order to evaluate for pulmonary embolus and coronal 3D MIP postprocessing was performed on the acquisition   scanner  Axial, sagittal, and coronal 2D reformatted images were created from the source data and submitted for interpretation      Radiation dose length product (DLP) for this visit:  712 mGy-cm   This examination, like all CT scans performed in the [place], was performed utilizing techniques to minimize radiation dose exposure, including the use of iterative   reconstruction and automated exposure control      IV Contrast:  100 mL of iohexol (OMNIPAQUE)  Enteric Contrast:  Enteric contrast was not administered         FINDINGS:     CHEST     PULMONARY ARTERIAL TREE:  No pulmonary embolus is seen      LUNGS:  Interval progression of pulmonary metastatic disease is identified with innumerable nodules measuring to 2 1 cm     There is no tracheal or endobronchial lesion      PLEURA:  Unremarkable      HEART/AORTA:  Small pericardial effusion is present    No thoracic aortic aneurysm      MEDIASTINUM AND ZACK:  Unremarkable      CHEST WALL AND LOWER NECK:  Right chest wall port catheter is present      ABDOMEN     LIVER/BILIARY TREE:  Extensive widespread hepatic metastatic disease is identified with lesions measuring up to 4 9 cm  Overall unchanged from May 24, 2022      GALLBLADDER:  No calcified gallstones  No pericholecystic inflammatory change      SPLEEN:  Unremarkable      PANCREAS:  Stable large 4 4 x 4 6 cm pancreatic body mass lesion is identified      ADRENAL GLANDS:  Unremarkable      KIDNEYS/URETERS:  Anteroinferior positioning of the right kidney again identified  Left kidney is unrema   rkable      STOMACH AND BOWEL:  Unremarkable      APPENDIX:  No findings to suggest appendicitis      ABDOMINOPELVIC CAVITY:  Confluent retroperitoneal lymphadenopathy is again identified      VESSELS:  Unremarkable for patient's age      PELVIS     REPRODUCTIVE ORGANS:  Unremarkable for patient's age      URINARY BLADDER:  Unremarkable      ABDOMINAL WALL/INGUINAL REGIONS:  Unremarkable      OSSEOUS STRUCTURES:  No acute fracture or destructive osseous lesion      IMPRESSION:     No evidence of pulmonary embolus      No progression of widespread pulmonary metastatic disease      Widespread hepatic metastatic disease again seen  Stable pancreatic mass lesion  Confluent retroperitoneal lymphadenopathy      Small pericardial effusion  Final Result by Nano Moraes MD (31/78 [id number])      No evidence of pulmonary embolus  No progression of widespread pulmonary metastatic disease  Widespread hepatic metastatic disease again seen  Stable pancreatic mass lesion  Confluent retroperitoneal lymphadenopathy  Small pericardial effusion  Workstation performed: [id number]             EKG and Other Studies Reviewed on Admission:   · EKG: Sinus Tachycardia       ** Please Note: This note has been constructed using a voice recognition system   **

## 2022-08-30 NOTE — ASSESSMENT & PLAN NOTE
Stage IV metastatic pancreatic cancer, does not considered curable  Undergoing palliative chemotherapy every 28 days  Follows with Dr Deborah Salvador of care have been discussed by palliative care with patient and family, for now despite poor prognosis family is electing to seek treatment and for the patient to remain full code

## 2022-08-30 NOTE — ASSESSMENT & PLAN NOTE
30-year-old female patient with stage IV metastatic pancreatic cancer who presented with an episode of generalized abdominal pain with distention and an elevated temperature for her baseline without fever per se    Med SIRS criteria with leukocytosis and tachycardia as well as lactic acidosis  · The patient received cefepime and vancomycin x1 - no acute infectious process was identified  · She received oral Tylenol  · Currently reports improvement  · CTA of the chest and abdomen revealed extensive hepatic metastatic disease without significant changes from prior  · Continue serial exams and supportive management for now  · Undergoing infectious workup as below, recent infectious workup was unrevealing  · Unfortunately noted for overall poor prognosis, undergoing chemotherapy for her pancreatic cancer

## 2022-08-30 NOTE — TELEPHONE ENCOUNTER
Should see her after he visit to discuss different chemo versus goals of care discussion per Dr Elly Sheets

## 2022-08-30 NOTE — PLAN OF CARE
Problem: Potential for Falls  Goal: Patient will remain free of falls  Description: INTERVENTIONS:  - Educate patient/family on patient safety including physical limitations  - Instruct patient to call for assistance with activity   - Consult OT/PT to assist with strengthening/mobility   - Keep Call bell within reach  - Keep bed low and locked with side rails adjusted as appropriate  - Keep care items and personal belongings within reach  - Initiate and maintain comfort rounds  - Make Fall Risk Sign visible to staff  - Offer Toileting every 2 Hours, in advance of need  - Initiate/Maintain bed alarm  - Obtain necessary fall risk management equipment: skid socks, call bell within reach, bed alarm  - Apply yellow socks and bracelet for high fall risk patients  - Consider moving patient to room near nurses station  Outcome: Progressing     Problem: MOBILITY - ADULT  Goal: Maintain or return to baseline ADL function  Description: INTERVENTIONS:  -  Assess patient's ability to carry out ADLs; assess patient's baseline for ADL function and identify physical deficits which impact ability to perform ADLs (bathing, care of mouth/teeth, toileting, grooming, dressing, etc )  - Assess/evaluate cause of self-care deficits   - Assess range of motion  - Assess patient's mobility; develop plan if impaired  - Assess patient's need for assistive devices and provide as appropriate  - Encourage maximum independence but intervene and supervise when necessary  - Involve family in performance of ADLs  - Assess for home care needs following discharge   - Consider OT consult to assist with ADL evaluation and planning for discharge  - Provide patient education as appropriate  Outcome: Progressing     Problem: MOBILITY - ADULT  Goal: Maintains/Returns to pre admission functional level  Description: INTERVENTIONS:  - Perform BMAT or MOVE assessment daily    - Set and communicate daily mobility goal to care team and patient/family/caregiver  - Collaborate with rehabilitation services on mobility goals if consulted  - Perform Range of Motion 2 times a day  - Reposition patient every 2 hours    - Dangle patient 2 times a day  - Stand patient 2 times a day  - Ambulate patient 2 times a day  - Out of bed to chair 2 times a day   - Out of bed for meals 3 times a day  - Out of bed for toileting  - Record patient progress and toleration of activity level   Outcome: Progressing     Problem: PAIN - ADULT  Goal: Verbalizes/displays adequate comfort level or baseline comfort level  Description: Interventions:  - Encourage patient to monitor pain and request assistance  - Assess pain using appropriate pain scale  - Administer analgesics based on type and severity of pain and evaluate response  - Implement non-pharmacological measures as appropriate and evaluate response  - Consider cultural and social influences on pain and pain management  - Notify physician/advanced practitioner if interventions unsuccessful or patient reports new pain  Outcome: Progressing     Problem: INFECTION - ADULT  Goal: Absence or prevention of progression during hospitalization  Description: INTERVENTIONS:  - Assess and monitor for signs and symptoms of infection  - Monitor lab/diagnostic results  - Monitor all insertion sites, i e  indwelling lines, tubes, and drains  - Monitor endotracheal if appropriate and nasal secretions for changes in amount and color  - [place] appropriate cooling/warming therapies per order  - Administer medications as ordered  - Instruct and encourage patient and family to use good hand hygiene technique  - Identify and instruct in appropriate isolation precautions for identified infection/condition  Outcome: Progressing

## 2022-08-30 NOTE — TELEPHONE ENCOUNTER
Patient family stopped into office to ask questions reviewed that we will gladly give them a call today to review all questions and concerns   Patient is in hospital with Abdominal pain, will review with Dr Young Ink

## 2022-08-30 NOTE — CASE MANAGEMENT
Case Management Assessment & Discharge Planning Note    Patient name Kvng Fair  Location ED 10/ED 10 MRN [id number]  : 1945 Date 2022       Current Admission Date: 2022  Current Admission Diagnosis:Abdominal pain   Patient Active Problem List    Diagnosis Date Noted    Thrombocytosis 2022    Ascites 2022    SIRS (systemic inflammatory response syndrome) ([facility] [address] ) 2022    Transaminitis 2022    Osseous metastasis ([facility] [address] ) 2022    Pancreatic insufficiency 07/15/2022    Dysgeusia 07/15/2022    Chemotherapy induced nausea and vomiting 2022    Cancer related pain 2022    Counseling regarding advanced care planning and goals of care 2022    Anxiety 2022    Nausea 2022    Primary pancreatic adenocarcinoma ([facility] [address] ) 2022    Severe protein-calorie malnutrition ([facility] [address] ) 2022    Anemia 2022    Pancreatic mass 2022    Pericardial effusion 2022    Protein-calorie malnutrition, unspecified severity ([facility] [address] ) 2022    Weight loss 2022    Stress at home 2022    Medicare annual wellness visit, subsequent 2022    Hypokalemia 2022    Chronic pain of left ankle 2022    Iron deficiency anemia 2022    UTI (urinary tract infection) 2022    Chest pain 2022    Elevated TSH 2022    Elevated brain natriuretic peptide (BNP) level 2022    Elevated d-dimer 2022      LOS (days): 0  Geometric Mean LOS (GMLOS) (days): 2 60  Days to GMLOS:2 5     OBJECTIVE:  PATIENT READMITTED TO HOSPITAL            Current admission status: Inpatient       Preferred Pharmacy:   [facility] [address]  Phone: [phone] Fax: [phone]    Primary Care Provider: Miles Reynolds DO    Primary Insurance: [facility]  Secondary Insurance:     ASSESSMENT:  [place] [address] Proxies    There are no active Health Care Proxies on file  Patient Information  Admitted from[de-identified] Home  Mental Status: Alert  During Assessment patient was accompanied by: Son, Daughter  Assessment information provided by[de-identified] Patient, Son, Daughter  Primary Caregiver: Child  Support Systems: Self, Son, Daughter, Family members  [place] of Residence: [address] [place] [address] do you live in?: [address] [place] [address] entry access options   Select all that apply : Stairs  Number of steps to enter home : 5  Do the steps have railings?: Yes  Type of Current Residence: [address] [facility], [address]  Is patient a ?: No    Activities of Daily Living Prior to Admission  Functional Status: Assistance  Completes ADLs independently?: No  Level of ADL dependence: Assistance  Ambulates independently?: No  Level of ambulatory dependence: Assistance  Does patient use assisted devices?: Yes  Assisted Devices (DME) used: Bedside Commode, Wheelchair, Jasmin Varsha  Does patient currently own DME?: Yes  What DME does the patient currently own?: Bedside Commode, Wheelchair, Jasmin Varsha  Does the patient have a history of Short-Term Rehab?: No  Does patient have a history of HHC?: Yes  Does patient currently have [address]?: Yes    Current Home Health Care  Type of Current Home Care Services: Home PT  [address][de-identified] [address] [facility] Provider[de-identified] PCP    Patient Information Continued  Income Source: Pension/[employer]  Does patient have prescription coverage?: Yes  Within the past 12 months, you worried that your food would run out before you got the money to buy more : Never true  Food insecurity resource given?: No  Does patient receive dialysis treatments?: No  Does patient have a history of substance abuse?: No  Does patient have a history of Mental Health Diagnosis?: No         Means of Transportation  In the past 12 months, has lack of transportation kept you from medical appointments or from getting medications?: No  In the past 12 months, has lack of transportation kept you from meetings, work, or from getting things needed for daily living?: No        DISCHARGE DETAILS:    Discharge planning discussed with[de-identified] patient and patient's family  [place] of Choice: Yes

## 2022-08-30 NOTE — ASSESSMENT & PLAN NOTE
Per current CT of the chest she did progression of bilateral metastatic disease  Continue supplemental oxygen as needed to keep oxygen saturations above 90%

## 2022-08-30 NOTE — ASSESSMENT & PLAN NOTE
· Criteria met with leukocytosis, tachycardia, also elevated lactic acid  · No obvious source of infection, UA negative for UTI, CT a chest CT abdomen/pelvis with contrast no evidence of acute infection, evidence metastatic disease similar to prior imaging  · Patient was just admitted to [address] [address] and discharged 08/28/2022, at that time also met sirs criteria and was treated for a possible UTI although urine cultures were negative  · Patient did receive cefepime and vancomycin in the ER, for now will continue to observe off antibiotics  · Procalcitonin elevated, however could be related to her cancer rather than infectious process, continue to monitor  · Proceed with IVF, monitor lactic acid level

## 2022-08-30 NOTE — SEPSIS NOTE
Sepsis Note   Gagandeep Beth 78 y o  female MRN: [id number]  Unit/Bed#: ED 10 Encounter: [id number]       qSOFA     [address] Name 08/29/22 2114                Altered mental status GCS < 15 --        Respiratory Rate > / =53 0        Systolic BP < / =763 0        Q Sofa Score 0                   Initial Sepsis Screening     Row Name 08/29/22 6634                Is the patient's history suggestive of a new or worsening infection? --        Suspected source of infection suspect infection, source unknown  -SC        Are two or more of the following signs & symptoms of infection both present and new to the patient? Yes (Proceed)  -SC        Indicate SIRS criteria Tachycardia > 90 bpm;Leukocytosis (WBC > [address] IJL)  -SC        If the answer is yes to both questions, suspicion of sepsis is present --        If severe sepsis is present AND tissue hypoperfusion perists in the hour after fluid resuscitation or lactate > 4, the patient meets criteria for SEPTIC SHOCK --        Are any of the following organ dysfunction criteria present within 6 hours of suspected infection and SIRS criteria that are NOT considered to be chronic conditions?  Yes  -SC        Organ dysfunction Lactate > 2 0 mmol/L  -SC        Date of presentation of severe sepsis --        Time of presentation of severe sepsis --        Tissue hypoperfusion persists in the hour after crystalloid fluid administration, evidenced, by either: --        Was hypotension present within one hour of the conclusion of crystalloid fluid administration? --        Date of presentation of septic shock --        Time of presentation of septic shock --              User Key  (r) = Recorded By, (t) = Taken By, (c) = Cosigned By    [address] Name Provider Type    [address], DO Physician

## 2022-08-30 NOTE — ED PROVIDER NOTES
History  Chief Complaint   Patient presents with    Abdominal Pain     Per EMS pt was released from admission due to sepsis  Pt's daughter called EMS due to pt c/o left sided upper abd pain  Pt has hx of pancreatic cancer  HPI  31-year-old female past medical history of pancreatic cancer with metastasis who was recently discharged from the hospital on 08/24 for suspected sirs due to stage IV pancreatic cancer UTI less likely sepsis due to negative blood cultures, who presents today with a complaint of abdominal pain  Patient states that the abdominal pain began last night and is located in the epigastric and left upper quadrants radiate to the back  Patient denies any nausea, vomiting, chest pain  Patient endorses shortness of breath when ambulating or moving, she states that the pain also gets worse when she moves and better when she lays down flat  She denies cough, lateralizing weakness, constipation, diarrhea, dysuria  Prior to Admission Medications   Prescriptions Last Dose Informant Patient Reported? Taking?    Cholecalciferol (Vitamin D3) 50 MCG (2000 UT) TABS 8/29/2022 at Unknown time  Yes Yes   Sig: Take by mouth     Creon [id number] units 8/29/2022 at Unknown time  No Yes   Sig: TAKE 1 CAPSULE BY MOUTH THREE TIMES DAILY WITH  MEALS   Patient taking differently: 2 (two) times a day   HYDROcodone-acetaminophen (NORCO) 5-325 mg per tablet Not Taking at Unknown time  No No   Sig: Take 0 5 tablets by mouth every 6 (six) hours as needed for pain for up to 3 days Max Daily Amount: 2 tablets   Patient not taking: Reported on 8/30/2022   Pyridoxine HCl (VITAMIN B-6 PO) Unknown at Unknown time Self Yes No   Sig: Take 1 tablet by mouth   acetaminophen (TYLENOL) 500 mg tablet Not Taking at Unknown time  Yes No   Sig: Take 500 mg by mouth every 6 (six) hours as needed for mild pain   Patient not taking: No sig reported   cyanocobalamin (VITAMIN B-12) 500 MCG tablet 8/29/2022 at Unknown time  Yes Yes   Sig: Take 500 mcg by mouth daily   ferrous sulfate 325 (65 Fe) mg tablet 8/29/2022 at Unknown time  Yes Yes   Sig: Take 325 mg by mouth daily with breakfast   lidocaine-prilocaine (EMLA) cream   No Yes   Sig: Apply topically as needed for mild pain   ondansetron (Zofran ODT) 4 mg disintegrating tablet   No Yes   Sig: Take 1 tablet (4 mg total) by mouth every 8 (eight) hours as needed for nausea or vomiting   pantoprazole (PROTONIX) 20 mg tablet 8/29/2022 at Unknown time  No Yes   Sig: Take 1 tablet (20 mg total) by mouth daily      Facility-Administered Medications: None       Past Medical History:   Diagnosis Date    Pancreatic cancer [facility])        Past Surgical History:   Procedure Laterality Date    CARDIAC CATHETERIZATION N/A 3/30/2022    Procedure: Cardiac catheterization;  Surgeon: Nikolay Smith DO;  Location: [facility] CARDIAC CATH LAB; Service: Cardiology    CARDIAC CATHETERIZATION N/A 3/30/2022    Procedure: Cardiac Coronary Angiogram;  Surgeon: Nikolay Smith DO;  Location: [facility] CARDI[facility]; Service: Cardiology    IR BIOPSY LIVER MASS  6/6/2022    IR PORT PLACEMENT  6/28/2022       Family History   Problem Relation Age of Onset    Coronary artery disease Father     Coronary artery disease Brother     Diabetes Brother     Kidney failure Brother     Uterine cancer Mother      I have reviewed and agree with the history as documented  E-Cigarette/Vaping    E-Cigarette Use Never User      E-Cigarette/Vaping Substances     Social History     Tobacco Use    Smoking status: Never Smoker    Smokeless tobacco: Never Used   Vaping Use    Vaping Use: Never used   Substance Use Topics    Alcohol use: Not Currently    Drug use: Never        Review of Systems   Constitutional: Negative for chills and fever  HENT: Negative for congestion, ear pain, rhinorrhea and sore throat  Eyes: Negative for pain  Respiratory: Positive for shortness of breath   Negative for apnea, cough, choking, chest tightness, wheezing and stridor  Cardiovascular: Negative for chest pain, palpitations and leg swelling  Gastrointestinal: Positive for abdominal pain  Negative for constipation, diarrhea, nausea and vomiting  Genitourinary: Negative for hematuria  Musculoskeletal: Negative for arthralgias and back pain  Skin: Negative for rash and wound  Neurological: Negative for dizziness  Psychiatric/Behavioral: Negative for agitation and hallucinations  All other systems reviewed and are negative  Physical Exam  ED Triage Vitals   Temperature Pulse Respirations Blood Pressure SpO2   08/29/22 2131 08/29/22 2114 08/29/22 2114 08/29/22 2114 08/29/22 2114   98 7 °F (37 1 °C) (!) 122 20 115/63 96 %      Temp Source Heart Rate Source Patient Position - Orthostatic VS BP Location FiO2 (%)   08/29/22 2131 08/29/22 2114 08/29/22 2114 08/29/22 2114 --   Oral Monitor Lying Right arm       Pain Score       08/29/22 2114       3             Orthostatic Vital Signs  Vitals:    08/30/22 0800 08/30/22 0900 08/30/22 1100 08/30/22 1151   BP: 112/70 110/63 108/68 112/61   Pulse: 98 94 96 96   Patient Position - Orthostatic VS:  Lying  Lying       Physical Exam  Vitals reviewed  Constitutional:       General: She is not in acute distress  HENT:      Head: Normocephalic and atraumatic  Right Ear: External ear normal       Left Ear: External ear normal       Nose: Nose normal  No congestion or rhinorrhea  Mouth/Throat:      Mouth: Mucous membranes are moist       Pharynx: No oropharyngeal exudate or posterior oropharyngeal erythema  Eyes:      General:         Right eye: No discharge  Left eye: No discharge  Extraocular Movements: Extraocular movements intact  Conjunctiva/sclera: Conjunctivae normal       Pupils: Pupils are equal, round, and reactive to light  Cardiovascular:      Rate and Rhythm: Normal rate and regular rhythm  Pulses: Normal pulses        Heart sounds: Normal heart sounds  Pulmonary:      Effort: Pulmonary effort is normal  No respiratory distress  Breath sounds: Normal breath sounds  No wheezing or rales  Abdominal:      Palpations: Abdomen is soft  There is mass  Tenderness: There is abdominal tenderness in the epigastric area and left upper quadrant  Musculoskeletal:         General: No tenderness  Normal range of motion  Cervical back: Normal range of motion and neck supple  No rigidity  Skin:     General: Skin is warm  Findings: No erythema or rash  Neurological:      General: No focal deficit present  Mental Status: She is alert and oriented to person, place, and time  Cranial Nerves: No cranial nerve deficit  Sensory: No sensory deficit  Motor: No weakness        Coordination: Coordination normal    Psychiatric:         Mood and Affect: Mood normal          ED Medications  Medications   cyanocobalamin (VITAMIN B-12) tablet 500 mcg (500 mcg Oral Given 8/30/22 1124)   ferrous sulfate tablet 325 mg (has no administration in time range)   pantoprazole (PROTONIX) EC tablet 20 mg (20 mg Oral Given 8/30/22 1124)   acetaminophen (TYLENOL) tablet 650 mg (has no administration in time range)   ondansetron (ZOFRAN) injection 4 mg (has no administration in time range)   sodium chloride 0 9 % infusion (100 mL/hr Intravenous New Bag 8/30/22 1338)   oxyCODONE (ROXICODONE) IR tablet 5 mg (has no administration in time range)     Or   oxyCODONE (ROXICODONE) immediate release tablet 10 mg (has no administration in time range)   HYDROmorphone (DILAUDID) injection 0 5 mg (has no administration in time range)   enoxaparin (LOVENOX) subcutaneous injection 40 mg (has no administration in time range)   cefepime (MAXIPIME) 2 g/50 mL dextrose IVPB (0 mg Intravenous Stopped 8/30/22 0035)   vancomycin (VANCOCIN) IVPB (premix in dextrose) 1,000 mg 200 mL (0 mg/kg × 50 8 kg Intravenous Stopped 8/30/22 0230)   multi-electrolyte (ISOLYTE-S PH 7 4) bolus 1,000 mL (0 mL Intravenous Stopped 8/30/22 0204)   iohexol (OMNIPAQUE) 350 MG/ML injection (SINGLE-DOSE) 100 mL (100 mL Intravenous Given 8/29/22 2337)   acetaminophen (TYLENOL) tablet 650 mg (650 mg Oral Given 8/30/22 0740)       Diagnostic Studies  Results Reviewed     Procedure Component Value Units Date/Time    Platelet count [[id number]]  (Normal) Collected: 08/30/22 1129    Lab Status: Final result Specimen: Blood from Arm, Right Updated: 08/30/22 1135     Platelets 157 Thousands/uL      MPV 10 2 fL     Blood culture #1 [[id number]] Collected: 08/29/22 2227    Lab Status: Preliminary result Specimen: Blood from Arm, Left Updated: 08/30/22 0803     Blood Culture Received in Microbiology Lab  Culture in Progress  Blood culture #2 [[id number]] Collected: 08/29/22 2227    Lab Status: Preliminary result Specimen: Blood from Arm, Right Updated: 08/30/22 0803     Blood Culture Received in Microbiology Lab  Culture in Progress      Urine Microscopic [[id number]]  (Abnormal) Collected: 08/30/22 0309    Lab Status: Final result Specimen: Urine, Clean Catch Updated: 08/30/22 0328     RBC, UA 0-1 /hpf      WBC, UA 0-1 /hpf      Epithelial Cells Occasional /hpf      Bacteria, UA None Seen /hpf     UA w Reflex to Microscopic w Reflex to Culture [[id number]]  (Abnormal) Collected: 08/30/22 0309    Lab Status: Final result Specimen: Urine, Clean Catch Updated: 08/30/22 0316     Color, UA Yellow     Clarity, UA Clear     Specific Gravity, UA 1 010     pH, UA 6 5     Leukocytes, UA Negative     Nitrite, UA Negative     Protein, UA Trace mg/dl      Glucose, UA Negative mg/dl      Ketones, UA Negative mg/dl      Urobilinogen, UA 0 2 E U /dl      Bilirubin, UA Negative     Occult Blood, UA Negative    Lactic acid 2 Hours [[id number]]  (Abnormal) Collected: 08/30/22 0043    Lab Status: Final result Specimen: Blood from Arm, Right Updated: 08/30/22 0130     LACTIC ACID 2 1 mmol/L     Narrative:      Result may be elevated if tourniquet was used during collection      APTT [[id number]]  (Normal) Collected: 08/29/22 2227    Lab Status: Final result Specimen: Blood from Arm, Right Updated: 08/29/22 2305     PTT 33 seconds     Protime-INR [[id number]]  (Abnormal) Collected: 08/29/22 2227    Lab Status: Final result Specimen: Blood from Arm, Right Updated: 08/29/22 2305     Protime 16 9 seconds      INR 1 37    Comprehensive metabolic panel [[id number]]  (Abnormal) Collected: 08/29/22 2227    Lab Status: Final result Specimen: Blood from Arm, Right Updated: 08/29/22 2301     Sodium 134 mmol/L      Potassium 3 9 mmol/L      Chloride 100 mmol/L      CO2 21 mmol/L      ANION GAP 13 mmol/L      BUN 19 mg/dL      Creatinine 0 64 mg/dL      Glucose 112 mg/dL      Calcium 7 8 mg/dL      Corrected Calcium 9 3 mg/dL       U/L       U/L      Alkaline Phosphatase 1,203 U/L      Total Protein 6 1 g/dL      Albumin 2 1 g/dL      Total Bilirubin 0 87 mg/dL      eGFR 87 ml/min/1 73sq m     Narrative:      [facility] guidelines for Chronic Kidney Disease (CKD):     Stage 1 with normal or high GFR (GFR > 90 mL/min/1 73 square meters)    Stage 2 Mild CKD (GFR = 60-89 mL/min/1 73 square meters)    Stage 3A Moderate CKD (GFR = 45-59 mL/min/1 73 square meters)    Stage 3B Moderate CKD (GFR = 30-44 mL/min/1 73 square meters)    Stage 4 Severe CKD (GFR = 15-29 mL/min/1 73 square meters)    Stage 5 End Stage CKD (GFR <15 mL/min/1 73 square meters)  Note: GFR calculation is accurate only with a steady state creatinine    Procalcitonin [[id number]]  (Abnormal) Collected: 08/29/22 2227    Lab Status: Final result Specimen: Blood from Arm, Right Updated: 08/29/22 2301     Procalcitonin 5 28 ng/ml     Manual Differential(PHLEBS Do Not Order) [[id number]]  (Abnormal) Collected: 08/29/22 2227    Lab Status: Final result Specimen: Blood from Arm, Right Updated: 08/29/22 2257     Segmented % 84 %      Bands % 9 %      Lymphocytes % 3 % Monocytes % 2 %      Eosinophils, % 2 %      Basophils % 0 %      Absolute Neutrophils 16 01 Thousand/uL      Lymphocytes Absolute 0 52 Thousand/uL      Monocytes Absolute 0 34 Thousand/uL      Eosinophils Absolute 0 34 Thousand/uL      Basophils Absolute 0 00 Thousand/uL      Total Counted --     Helmet Cells Present     Macrocytes Present     Polychromasia Present     Platelet Estimate Adequate    Lactic acid [[id number]]  (Abnormal) Collected: 08/29/22 2227    Lab Status: Final result Specimen: Blood from Arm, Right Updated: 08/29/22 2256     LACTIC ACID 2 4 mmol/L     Narrative:      Result may be elevated if tourniquet was used during collection  Lipase [[id number]]  (Abnormal) Collected: 08/29/22 2227    Lab Status: Final result Specimen: Blood from Arm, Right Updated: 08/29/22 2245     Lipase 46 u/L     CBC and differential [[id number]]  (Abnormal) Collected: 08/29/22 2227    Lab Status: Final result Specimen: Blood from Arm, Right Updated: 08/29/22 2239     WBC 17 21 Thousand/uL      RBC 2 80 Million/uL      Hemoglobin 8 9 g/dL      Hematocrit 26 8 %      MCV 96 fL      MCH 31 8 pg      MCHC 33 2 g/dL      RDW 20 6 %      MPV 10 9 fL      Platelets 544 Thousands/uL     Narrative: This is an appended report  These results have been appended to a previously verified report  CT pe study w abdomen pelvis w contrast   ED Interpretation by Cole Malone DO (08/30 0910)   CT PULMONARY ANGIOGRAM OF THE CHEST AND CT ABDOMEN AND PELVIS WITH INTRAVENOUS CONTRAST     INDICATION:   SOB, abomdinal pain, hx of cancer      COMPARISON:  CT chest 6/18/2022, CT abdomen pelvis 8/24/2022     TECHNIQUE:  CT examination of the chest, abdomen and pelvis was performed  Thin section CT angiographic technique was used in the chest in order to evaluate for pulmonary embolus and coronal 3D MIP postprocessing was performed on the acquisition   scanner   Axial, sagittal, and coronal 2D reformatted images were created from the source data and submitted for interpretation      Radiation dose length product (DLP) for this visit:  35 20 43 mGy-cm   This examination, like all CT scans performed in the [facility], was performed utilizing techniques to minimize radiation dose exposure, including the use of iterative   reconstruction and automated exposure control      IV Contrast:  100 mL of iohexol (OMNIPAQUE)  Enteric Contrast:  Enteric contrast was not administered         FINDINGS:     CHEST     PULMONARY ARTERIAL TREE:  No pulmonary embolus is seen      LUNGS:  Interval progression of pulmonary metastatic disease is identified with innumerable nodules measuring to 2 1 cm     There is no tracheal or endobronchial lesion      PLEURA:  Unremarkable      HEART/AORTA:  Small pericardial effusion is present  No thoracic aortic aneurysm      MEDIASTINUM AND ZACK:  Unremarkable      CHEST WALL AND LOWER NECK:  Right chest wall port catheter is present      ABDOMEN     LIVER/BILIARY TREE:  Extensive widespread hepatic metastatic disease is identified with lesions measuring up to 4 9 cm  Overall unchanged from May 24, 2022      GALLBLADDER:  No calcified gallstones  No pericholecystic inflammatory change      SPLEEN:  Unremarkable      PANCREAS:  Stable large 4 4 x 4 6 cm pancreatic body mass lesion is identified      ADRENAL GLANDS:  Unremarkable      KIDNEYS/URETERS:  Anteroinferior positioning of the right kidney again identified    Left kidney is unrema   rkable      STOMACH AND BOWEL:  Unremarkable      APPENDIX:  No findings to suggest appendicitis      ABDOMINOPELVIC CAVITY:  Confluent retroperitoneal lymphadenopathy is again identified      VESSELS:  Unremarkable for patient's age      PELVIS     REPRODUCTIVE ORGANS:  Unremarkable for patient's age      URINARY BLADDER:  Unremarkable      ABDOMINAL WALL/INGUINAL REGIONS:  Unremarkable      OSSEOUS STRUCTURES:  No acute fracture or destructive osseous lesion      IMPRESSION:     No evidence of pulmonary embolus      No progression of widespread pulmonary metastatic disease      Widespread hepatic metastatic disease again seen  Stable pancreatic mass lesion  Confluent retroperitoneal lymphadenopathy      Small pericardial effusion  Final Result by aYmila Verduzco MD ([id number])      No evidence of pulmonary embolus  No progression of widespread pulmonary metastatic disease  Widespread hepatic metastatic disease again seen  Stable pancreatic mass lesion  Confluent retroperitoneal lymphadenopathy  Small pericardial effusion  Workstation performed: [id number]               Procedures  ECG 12 Lead Documentation Only    Date/Time: 8/29/2022 11:09 PM  Performed by: Sunil Hankins DO  Authorized by: Sunil Hankins DO     Indications / Diagnosis:  Abdominal pain  ECG reviewed by me, the ED Provider: yes    Patient location:  ED  Interpretation:     Interpretation: normal    Rate:     ECG rate:  114    ECG rate assessment: tachycardic    Rhythm:     Rhythm: sinus rhythm    Ectopy:     Ectopy: none    QRS:     QRS axis:  Normal  Conduction:     Conduction: normal    ST segments:     ST segments:  Normal  T waves:     T waves: normal            ED Course  ED Course as of 08/30/22 1609   Mon Aug 29, 2022   2316 POCT INR(!): 1 37                          Initial Sepsis Screening     Row Name 08/29/22 2305                Is the patient's history suggestive of a new or worsening infection? --        Suspected source of infection suspect infection, source unknown  -SC        Are two or more of the following signs & symptoms of infection both present and new to the patient?  Yes (Proceed)  -SC        Indicate SIRS criteria Tachycardia > 90 bpm;Leukocytosis (WBC > 28215 IJL)  -SC        If the answer is yes to both questions, suspicion of sepsis is present --        If severe sepsis is present AND tissue hypoperfusion perists in the hour after fluid resuscitation or lactate > 4, the patient meets criteria for SEPTIC SHOCK --        Are any of the following organ dysfunction criteria present within 6 hours of suspected infection and SIRS criteria that are NOT considered to be chronic conditions? Yes  -SC        Organ dysfunction Lactate > 2 0 mmol/L  -SC        Date of presentation of severe sepsis --        Time of presentation of severe sepsis --        Tissue hypoperfusion persists in the hour after crystalloid fluid administration, evidenced, by either: --        Was hypotension present within one hour of the conclusion of crystalloid fluid administration? --        Date of presentation of septic shock --        Time of presentation of septic shock --              User Key  (r) = Recorded By, (t) = Taken By, (c) = Cosigned By    [address] Name Provider Type    [address], DO Physician                SBIRT 22yo+    [address] Most Recent Value   SBIRT (25 yo +)    In order to provide better care to our patients, we are screening all of our patients for alcohol and drug use  Would it be okay to ask you these screening questions? No Filed at: 08/29/2022 2132                MDM  Number of Diagnoses or Management Options  Generalized abdominal pain: established and worsening  Pancreatic cancer metastasized to liver [facility]): established and worsening  Sepsis [facility]): new and requires workup  Diagnosis management comments: 44-year-old female past medical history of pancreatic cancer with metastasis who was recently discharged from the hospital on 08/24 for suspected sirs due to stage IV pancreatic cancer UTI less likely sepsis due to negative blood cultures, who presents today with a complaint of abdominal pain  Patient previously admitted for similar presentation    Has elevated white count lactic acid signs of sepsis without clear source of infection patient started on prophylactic antibiotics however this could be SIRS from worsening of her cancer disease process  CT PE study and abdomen pelvis is ordered to investigate signs of PE or acute complication of intra-abdominal cancer  Patient does not have any pain at rest plan for admission  Patient is signed out to night team pending CT scan results  Amount and/or Complexity of Data Reviewed  Clinical lab tests: reviewed and ordered  Tests in the radiology section of CPT®: ordered  Discussion of test results with the performing providers: yes  Obtain history from someone other than the patient: yes  Review and summarize past medical records: yes  Discuss the patient with other providers: yes    Risk of Complications, Morbidity, and/or Mortality  Presenting problems: high  Diagnostic procedures: moderate  Management options: moderate        Disposition  Final diagnoses:   Generalized abdominal pain   Sepsis ([facility] 75 )   Pancreatic cancer metastasized to liver [facility])     Time reflects when diagnosis was documented in both MDM as applicable and the Disposition within this note     Time User Action Codes Description Comment    8/30/2022  9:33 AM Charisse Ramirez [R10 [id number]] Generalized abdominal pain     8/30/2022  9:33 AM Charisse Ramirez [A41 9] Sepsis ([facility] )     8/30/2022  9:34 AM Charisse Ramirez [C25 9,  C78 7] Pancreatic cancer metastasized to [facility])       ED Disposition     ED Disposition   Admit    Condition   Stable    Date/Time   Tue Aug 30, 2022  9:35 AM    Comment   D/W SLIM Dr Josiah Cortez, Pt to be admitted inpatient to the service of Dr Ramesh Marie    None         Current Discharge Medication List      CONTINUE these medications which have NOT CHANGED    Details   Cholecalciferol (Vitamin D3) 50 MCG (2000 UT) TABS Take by mouth        Creon [id number] units TAKE 1 CAPSULE BY MOUTH THREE TIMES DAILY WITH  MEALS  Qty: 90 capsule, Refills: 0    Associated Diagnoses: [facility] ); Pancreatic insufficiency;  Chronic diarrhea cyanocobalamin (VITAMIN B-12) 500 MCG tablet Take 500 mcg by mouth daily      ferrous sulfate 325 (65 Fe) mg tablet Take 325 mg by mouth daily with breakfast      lidocaine-prilocaine (EMLA) cream Apply topically as needed for mild pain  Qty: 30 g, Refills: 0    Associated Diagnoses: Port-A-Cath in place      ondansetron (Zofran ODT) 4 mg disintegrating tablet Take 1 tablet (4 mg total) by mouth every 8 (eight) hours as needed for nausea or vomiting  Qty: 20 tablet, Refills: 0    Associated Diagnoses: Nausea      pantoprazole (PROTONIX) 20 mg tablet Take 1 tablet (20 mg total) by mouth daily  Qty: 60 tablet, Refills: 3    Associated Diagnoses: Gastric reflux      acetaminophen (TYLENOL) 500 mg tablet Take 500 mg by mouth every 6 (six) hours as needed for mild pain      HYDROcodone-acetaminophen (NORCO) 5-325 mg per tablet Take 0 5 tablets by mouth every 6 (six) hours as needed for pain for up to 3 days Max Daily Amount: 2 tablets  Qty: 6 tablet, Refills: 0    Associated Diagnoses: Cancer related pain      Pyridoxine HCl (VITAMIN B-6 PO) Take 1 tablet by mouth           No discharge procedures on file  PDMP Review       Value Time User    PDMP Reviewed  Yes 8/26/2022 12:21 PM Scott Rae MD           ED Provider  Attending physically available and evaluated Rachelle Osorio I managed the patient along with the ED Attending      Electronically Signed by         Madelin Ro DO  08/30/22 6817

## 2022-08-31 ENCOUNTER — TELEPHONE (OUTPATIENT)
Dept: HEMATOLOGY ONCOLOGY | Facility: CLINIC | Age: 77
End: 2022-08-31

## 2022-08-31 LAB
ALBUMIN SERPL BCP-MCNC: 1.9 G/DL (ref 3.5–5)
ALP SERPL-CCNC: 1064 U/L (ref 46–116)
ALT SERPL W P-5'-P-CCNC: 120 U/L (ref 12–78)
ANION GAP SERPL CALCULATED.3IONS-SCNC: 12 MMOL/L (ref 4–13)
AST SERPL W P-5'-P-CCNC: 227 U/L (ref 5–45)
BILIRUB SERPL-MCNC: 1.15 MG/DL (ref 0.2–1)
BUN SERPL-MCNC: 12 MG/DL (ref 5–25)
CALCIUM ALBUM COR SERPL-MCNC: 8.9 MG/DL (ref 8.3–10.1)
CALCIUM SERPL-MCNC: 7.2 MG/DL (ref 8.3–10.1)
CHLORIDE SERPL-SCNC: 103 MMOL/L (ref 96–108)
CO2 SERPL-SCNC: 21 MMOL/L (ref 21–32)
CREAT SERPL-MCNC: 0.55 MG/DL (ref 0.6–1.3)
ERYTHROCYTE [DISTWIDTH] IN BLOOD BY AUTOMATED COUNT: 21.7 % (ref 11.6–15.1)
GFR SERPL CREATININE-BSD FRML MDRD: 92 ML/MIN/1.73SQ M
GLUCOSE P FAST SERPL-MCNC: 82 MG/DL (ref 65–99)
GLUCOSE SERPL-MCNC: 82 MG/DL (ref 65–140)
HCT VFR BLD AUTO: 25.4 % (ref 34.8–46.1)
HGB BLD-MCNC: 8 G/DL (ref 11.5–15.4)
INR PPP: 1.53 (ref 0.84–1.19)
MAGNESIUM SERPL-MCNC: 2.1 MG/DL (ref 1.6–2.6)
MCH RBC QN AUTO: 31.4 PG (ref 26.8–34.3)
MCHC RBC AUTO-ENTMCNC: 31.5 G/DL (ref 31.4–37.4)
MCV RBC AUTO: 100 FL (ref 82–98)
NRBC BLD AUTO-RTO: 1 /100 WBCS
PLATELET # BLD AUTO: 309 THOUSANDS/UL (ref 149–390)
PMV BLD AUTO: 10.2 FL (ref 8.9–12.7)
POTASSIUM SERPL-SCNC: 3.6 MMOL/L (ref 3.5–5.3)
PROCALCITONIN SERPL-MCNC: 5.62 NG/ML
PROT SERPL-MCNC: 5.4 G/DL (ref 6.4–8.4)
PROTHROMBIN TIME: 18.3 SECONDS (ref 11.6–14.5)
RBC # BLD AUTO: 2.55 MILLION/UL (ref 3.81–5.12)
SODIUM SERPL-SCNC: 136 MMOL/L (ref 135–147)
WBC # BLD AUTO: 10.99 THOUSAND/UL (ref 4.31–10.16)

## 2022-08-31 PROCEDURE — 84145 PROCALCITONIN (PCT): CPT | Performed by: FAMILY MEDICINE

## 2022-08-31 PROCEDURE — 97167 OT EVAL HIGH COMPLEX 60 MIN: CPT

## 2022-08-31 PROCEDURE — 97163 PT EVAL HIGH COMPLEX 45 MIN: CPT

## 2022-08-31 PROCEDURE — 99232 SBSQ HOSP IP/OBS MODERATE 35: CPT | Performed by: FAMILY MEDICINE

## 2022-08-31 PROCEDURE — 80053 COMPREHEN METABOLIC PANEL: CPT | Performed by: FAMILY MEDICINE

## 2022-08-31 PROCEDURE — 83735 ASSAY OF MAGNESIUM: CPT | Performed by: FAMILY MEDICINE

## 2022-08-31 PROCEDURE — 85027 COMPLETE CBC AUTOMATED: CPT | Performed by: FAMILY MEDICINE

## 2022-08-31 PROCEDURE — 85610 PROTHROMBIN TIME: CPT | Performed by: FAMILY MEDICINE

## 2022-08-31 RX ADMIN — SODIUM CHLORIDE 100 ML/HR: 0.9 INJECTION, SOLUTION INTRAVENOUS at 00:00

## 2022-08-31 RX ADMIN — PANCRELIPASE 24000 UNITS: 24000; 76000; 120000 CAPSULE, DELAYED RELEASE PELLETS ORAL at 12:50

## 2022-08-31 RX ADMIN — PANCRELIPASE 24000 UNITS: 24000; 76000; 120000 CAPSULE, DELAYED RELEASE PELLETS ORAL at 21:27

## 2022-08-31 RX ADMIN — Medication 325 MG: at 08:04

## 2022-08-31 RX ADMIN — SODIUM CHLORIDE 100 ML/HR: 0.9 INJECTION, SOLUTION INTRAVENOUS at 09:05

## 2022-08-31 RX ADMIN — CYANOCOBALAMIN TAB 500 MCG 500 MCG: 500 TAB at 08:04

## 2022-08-31 RX ADMIN — PANTOPRAZOLE SODIUM 20 MG: 20 TABLET, DELAYED RELEASE ORAL at 05:57

## 2022-08-31 RX ADMIN — ENOXAPARIN SODIUM 40 MG: 40 INJECTION SUBCUTANEOUS at 08:04

## 2022-08-31 NOTE — PLAN OF CARE
Problem: MOBILITY - ADULT  Goal: Maintain or return to baseline ADL function  Description: INTERVENTIONS:  - Educate patient/family on patient safety including physical limitations  - Instruct patient to call for assistance with activity   - Consult OT/PT to assist with strengthening/mobility   - Keep Call bell within reach  - Keep bed low and locked with side rails adjusted as appropriate  - Keep care items and personal belongings within reach  - Initiate and maintain comfort rounds  - Make Fall Risk Sign visible to staff  - Offer Toileting every 2 Hours, in advance of need  - Initiate/Maintain bed alarm  - Obtain necessary fall risk management equipment: call bell, bedside commode, yellow socks  - Apply yellow socks and bracelet for high fall risk patients  - Consider moving patient to room near nurses station  Outcome: Progressing  Goal: Maintains/Returns to pre admission functional level  Description: INTERVENTIONS:  - Perform BMAT or MOVE assessment daily    - Set and communicate daily mobility goal to care team and patient/family/caregiver  - Collaborate with rehabilitation services on mobility goals if consulted  - Perform Range of Motion  times a day  - Reposition patient every  hours    - Dangle patient  times a day  - Stand patient  times a day  - Ambulate patient  times a day  - Out of bed to chair  times a day   - Out of bed for meals  times a day  - Out of bed for toileting  - Record patient progress and toleration of activity level   Outcome: Progressing     Problem: PAIN - ADULT  Goal: Verbalizes/displays adequate comfort level or baseline comfort level  Description: Interventions:  - Encourage patient to monitor pain and request assistance  - Assess pain using appropriate pain scale  - Administer analgesics based on type and severity of pain and evaluate response  - Implement non-pharmacological measures as appropriate and evaluate response  - Consider cultural and social influences on pain and pain management  - Notify physician/advanced practitioner if interventions unsuccessful or patient reports new pain  Outcome: Progressing     Problem: INFECTION - ADULT  Goal: Absence or prevention of progression during hospitalization  Description: INTERVENTIONS:  - Assess and monitor for signs and symptoms of infection  - Monitor lab/diagnostic results  - Monitor all insertion sites, i e  indwelling lines, tubes, and drains  - Monitor endotracheal if appropriate and nasal secretions for changes in amount and color  - [place] appropriate cooling/warming therapies per order  - Administer medications as ordered  - Instruct and encourage patient and family to use good hand hygiene technique  - Identify and instruct in appropriate isolation precautions for identified infection/condition  Outcome: Progressing     Problem: SAFETY ADULT  Goal: Maintain or return to baseline ADL function  Description: INTERVENTIONS:  - Educate patient/family on patient safety including physical limitations  - Instruct patient to call for assistance with activity   - Consult OT/PT to assist with strengthening/mobility   - Keep Call bell within reach  - Keep bed low and locked with side rails adjusted as appropriate  - Keep care items and personal belongings within reach  - Initiate and maintain comfort rounds  - Make Fall Risk Sign visible to staff  - Offer Toileting every 2 Hours, in advance of need  - Initiate/Maintain bed alarm  - Obtain necessary fall risk management equipment: call bell, bedside commode, yellow socks  - Apply yellow socks and bracelet for high fall risk patients  - Consider moving patient to room near nurses station  Outcome: Progressing  Goal: Maintains/Returns to pre admission functional level  Description: INTERVENTIONS:  - Perform BMAT or MOVE assessment daily    - Set and communicate daily mobility goal to care team and patient/family/caregiver     - Collaborate with rehabilitation services on mobility goals if consulted  - Perform Range of Motion  times a day  - Reposition patient every  hours  - Dangle patient  times a day  - Stand patient  times a day  - Ambulate patient  times a day  - Out of bed to chair  times a day   - Out of bed for meals  times a day  - Out of bed for toileting  - Record patient progress and toleration of activity level   Outcome: Progressing     Problem: DISCHARGE PLANNING  Goal: Discharge to home or other facility with appropriate resources  Description: INTERVENTIONS:  - Identify barriers to discharge w/patient and caregiver  - Arrange for needed discharge resources and transportation as appropriate  - Identify discharge learning needs (meds, wound care, etc )  - Refer to Case Management Department for coordinating discharge planning if the patient needs post-hospital services based on physician/advanced practitioner order or complex needs related to functional status, cognitive ability, or social support system  Outcome: Progressing     Problem: Nutrition/Hydration-ADULT  Goal: Nutrient/Hydration intake appropriate for improving, restoring or maintaining nutritional needs  Description: Monitor and assess patient's nutrition/hydration status for malnutrition  Collaborate with interdisciplinary team and initiate plan and interventions as ordered  Monitor patient's weight and dietary intake as ordered or per policy  Utilize nutrition screening tool and intervene as necessary  Determine patient's food preferences and provide high-protein, high-caloric foods as appropriate       INTERVENTIONS:  - Monitor oral intake, urinary output, labs, and treatment plans  - Assess nutrition and hydration status and recommend course of action  - Evaluate amount of meals eaten  - Allow adequate time for meals  - Recommend/ encourage appropriate diets, oral nutritional supplements, and vitamin/mineral supplements  - Order, calculate, and assess calorie counts as needed  - Assess need for intravenous fluids  - Provide specific nutrition/hydration education as appropriate  - Include patient/family/caregiver in decisions related to nutrition  Outcome: Progressing     Problem: COPING  Goal: Pt/Family able to verbalize concerns and demonstrate effective coping strategies  Description: INTERVENTIONS:  - Assist patient/family to identify coping skills, available support systems and cultural and spiritual values  - Provide emotional support, including active listening and acknowledgement of concerns of patient and caregivers  - Reduce environmental stimuli, as able  - Provide patient education  - Assess for spiritual pain/suffering and initiate spiritual care, including notification of Pastoral Care or giovany based community as needed  - Assess effectiveness of coping strategies  Outcome: Progressing  Goal: Will report anxiety at manageable levels  Description: INTERVENTIONS:  - Administer medication as ordered  - Teach and encourage coping skills  - Provide emotional support  - Assess patient/family for anxiety and ability to cope  Outcome: Progressing     Problem: Depression - IP adult  Goal: Effects of depression will be minimized  Description: INTERVENTIONS:  - Assess impact of patient's symptoms on level of functioning, self-care needs and offer support as indicated  - Assess patient/family knowledge of depression, impact on illness and need for teaching  - Provide emotional support, presence and reassurance  - Initiate consults and referrals as appropriate (a mental health professional, Spiritual Care)  Outcome: Progressing     Problem: Prexisting or High Potential for Compromised Skin Integrity  Goal: Skin integrity is maintained or improved  Description: INTERVENTIONS:  - Identify patients at risk for skin breakdown  - Assess and monitor skin integrity  - Assess and monitor nutrition and hydration status  - Monitor labs   - Assess for incontinence   - Turn and reposition patient  - Assist with mobility/ambulation  - Relieve pressure over bony prominences  - Avoid friction and shearing  - Provide appropriate hygiene as needed including keeping skin clean and dry  - Evaluate need for skin moisturizer/barrier cream  - Collaborate with interdisciplinary team   - Patient/family teaching  - Consider wound care consult   Outcome: Progressing     Problem: GASTROINTESTINAL - ADULT  Goal: Maintains adequate nutritional intake  Description: INTERVENTIONS:  - Monitor percentage of each meal consumed  - Identify factors contributing to decreased intake, treat as appropriate  - Assist with meals as needed  - Monitor I&O, weight, and lab values if indicated  - Obtain nutrition services referral as needed  Outcome: Progressing     Problem: SKIN/TISSUE INTEGRITY - ADULT  Goal: Skin Integrity remains intact(Skin Breakdown Prevention)  Description: Assess:  -Perform Darrick assessment every   -Clean and moisturize skin every   -Inspect skin when repositioning, toileting, and assisting with ADLS  -Assess under medical devices such as  every   -Assess extremities for adequate circulation and sensation     Bed Management:  -Have minimal linens on bed & keep smooth, unwrinkled  -Change linens as needed when moist or perspiring  -Avoid sitting or lying in one position for more than  hours while in bed  -Keep HOB at degrees     Toileting:  -Offer bedside commode  -Assess for incontinence every   -Use incontinent care products after each incontinent episode such as     Activity:  -Mobilize patient  times a day  -Encourage activity and walks on unit  -Encourage or provide ROM exercises   -Turn and reposition patient every  Hours  -Use appropriate equipment to lift or move patient in bed  -Instruct/ Assist with weight shifting every  when out of bed in chair  -Consider limitation of chair time  hour intervals    Skin Care:  -Avoid use of baby powder, tape, friction and shearing, hot water or constrictive clothing  -Relieve pressure over bony prominences using   -Do not massage red bony areas    Next Steps:  -Teach patient strategies to minimize risks such as    -Consider consults to  interdisciplinary teams such as   Outcome: Progressing     Problem: HEMATOLOGIC - ADULT  Goal: Maintains hematologic stability  Description: INTERVENTIONS  - Assess for signs and symptoms of bleeding or hemorrhage  - Monitor labs  - Administer supportive blood products/factors as ordered and appropriate  Outcome: Progressing

## 2022-08-31 NOTE — PLAN OF CARE
Problem: Nutrition/Hydration-ADULT  Goal: Nutrient/Hydration intake appropriate for improving, restoring or maintaining nutritional needs  Description: Monitor and assess patient's nutrition/hydration status for malnutrition  Collaborate with interdisciplinary team and initiate plan and interventions as ordered  Monitor patient's weight and dietary intake as ordered or per policy  Utilize nutrition screening tool and intervene as necessary  Determine patient's food preferences and provide high-protein, high-caloric foods as appropriate       INTERVENTIONS:  - Monitor oral intake, urinary output, labs, and treatment plans  - Assess nutrition and hydration status and recommend course of action  - Evaluate amount of meals eaten  - Allow adequate time for meals  - Recommend/ encourage appropriate diets, oral nutritional supplements, and vitamin/mineral supplements  - Order, calculate, and assess calorie counts as needed  - Assess need for intravenous fluids  - Provide specific nutrition/hydration education as appropriate  - Include patient/family/caregiver in decisions related to nutrition  Outcome: Progressing

## 2022-08-31 NOTE — ASSESSMENT & PLAN NOTE
Stage IV metastatic pancreatic cancer, does not considered curable  Undergoing palliative chemotherapy every 28 days - next treatment tentatively scheduled for September 12th  Follows with Dr Peggy Fleischer of care have been discussed by palliative care with patient and family, for now despite poor prognosis family is electing to seek treatment and for the patient to remain full code

## 2022-08-31 NOTE — OCCUPATIONAL THERAPY NOTE
Occupational Therapy Evaluation(time=0925-0945)     Patient Name: Reynaldo CR Date: 8/31/2022  Problem List  Principal Problem:    Generalized abdominal pain  Active Problems:    Protein-calorie malnutrition, unspecified severity ([facility] )    Chronic anemia    Primary pancreatic adenocarcinoma ([facility] )    Cancer related pain    SIRS (systemic inflammatory response syndrome) ([facility] )    Malignant neoplasm metastatic to both lungs ([facility])    Hyponatremia    Past Medical History  Past Medical History:   Diagnosis Date    Pancreatic cancer [facility])      Past Surgical History  Past Surgical History:   Procedure Laterality Date    CARDIAC CATHETERIZATION N/A 3/30/2022    Procedure: Cardiac catheterization;  Surgeon: Liz Sumner DO;  Location: [facility] CARDIAC CATH LAB; Service: Cardiology    CARDIAC CATHETERIZATION N/A 3/30/2022    Procedure: Cardiac Coronary Angiogram;  Surgeon: Liz Sumner DO;  Location: [facility]ARDIAC [facility]; Service: Cardiology    IR BIOPSY LIVER MASS  6/6/2022    IR PORT PLACEMENT  6/28/2022 08/31/22 0925   Note Type   Note type Evaluation   Restrictions/Precautions   Weight Bearing Precautions Per Order No   Other Precautions Chair Alarm; Bed Alarm; Fall Risk;Multiple lines   Pain Assessment   Pain Assessment Tool 0-10   Pain Score No Pain   Home Living   Type of [facility] One level  (4 [address])   Bathroom Shower/Tub Walk-in shower   Bathroom Toilet Standard   Bathroom Equipment Commode; [address]   Prior Function   Lives With Daughter   ADL Assistance Independent   Falls in the last 6 months 0   Lifestyle   Autonomy PTA pt states independence with her ADLs, transfers, ambulation--w/o device; neg falls, neg , neg home alone   Reciprocal Relationships 2 children   Service to Others worked for a [employer]   Intrinsic Gratification owns 3 cats   Psychosocial   Psychosocial (WDL) X   Patient Behaviors/Mood Flat affect; Cooperative Subjective   Subjective "I don't use oxygen at home "   ADL   Where Assessed Edge of bed   Eating Assistance 5  Supervision/Setup   Grooming Assistance 6  [address] 5  Supervision/Setup    [address] 5  Supervision/Setup   LB Dressing Assistance 4  Minimal Assistance   Bed Mobility   Rolling R 5  Supervision   Rolling L 5  Supervision   Supine to Sit 5  Supervision   Additional items Increased time required;Verbal cues   Transfers   Sit to Stand 4  Minimal assistance   Additional items Assist x 1; Increased time required;Verbal cues   Stand to Sit 4  Minimal assistance   Additional items Assist x 1;Verbal cues   Additional Comments bp's=114/62(EOB); SPO2=91-96% on RA--nsg made aware   Functional Mobility   Functional Mobility 4  Minimal assistance   Additional Comments x1   Additional items Rolling walker   Balance   Static Sitting Fair +   Dynamic Sitting Fair   Static Standing Fair -   Dynamic Standing Poor +   Activity Tolerance   Activity Tolerance Patient limited by fatigue   Medical Staff Made Aware nsg, P T, CM   RUE Assessment   RUE Assessment WFL   RUE Strength   RUE Overall Strength Within Functional Limits - able to perform ADL tasks with strength  (4/5 throughout)   LUE Assessment   LUE Assessment WFL   LUE Strength   LUE Overall Strength Within Functional Limits - able to perform ADL tasks with strength  (4/5 throughout)   Hand Function   Gross Motor Coordination Functional   Fine Motor Coordination Functional   Sensation   Light Touch No apparent deficits   Proprioception   Proprioception No apparent deficits   Vision-Basic Assessment   Current Vision   (glasses)   Vision - Complex Assessment   Acuity Able to read clock/calendar on wall without difficulty   Perception   Inattention/Neglect Appears intact   Cognition   Overall Cognitive Status WFL   Arousal/Participation Alert   Attention Within functional limits   Orientation Level Oriented X4   Memory Within functional limits   Following Commands Follows one step commands without difficulty   Assessment   Limitation Decreased ADL status; Decreased UE strength;Decreased Safe judgement during ADL;Decreased endurance;Decreased high-level ADLs   Prognosis Fair   Assessment Pt is a 76y/o female admitted to the hospital 2* symptoms of abdominal pain, weakness, elevated temperatures, and confusion  Pt noted with SIRS and hyponatremia  Pt with PMH pancreatic cancer with mets(liver, lungs, brain), anemia, and recent(earlier this month) hospitalization 2* SIRS  PTA pt states independence with her ADLs, transfers, ambulation--w/o device; neg falls, neg , neg home alone  During initial eval, pt demonstrated deficits with her functional balance, functional mobility, ADL status, transfer safety, b/l UE strength, and activity tolerance(currently fair=15-20mins)  Pt would benefit from continued OT tx for the above deficits  3-5xwk/1-2wks  Goals   Patient Goals "to get stronger"   STG Time Frame   (1-7 days)   Short Term Goal #1 Pt will demonstrate improved activity tolerance to good(20-30mins) and standing tolerance to 3-5mins to assist with ADLs  Short Term Goal #2 Pt will demonstrate proper walker/transfer safety 100% of the time  Short Term Goal  Pt will independently demonstrate knowledge and application of proper energy conservation techniques 100% of the time  LTG Time Frame   (7-14 days)   Long Term Goal #1 Pt will demonstrate g/g- balance with all functional activities  Long Term Goal #2 Pt will demonstrate mod I with their UE and LE bathing/dresssing  Long Term Goal Pt will demonstrate mod I with their sit-stand transfers to assist with completion of their LE dressing  Plan   Treatment Interventions ADL retraining;Functional transfer training; Endurance training;Patient/family training; Compensatory technique education   Goal Expiration Date 09/15/22   OT Treatment Day 0   OT Frequency 3-5x/wk   Recommendation   OT Discharge Recommendation Post acute rehabilitation services   AM-PAC Daily Activity Inpatient   Lower Body Dressing 3   Bathing 3   Toileting 3   Upper Body Dressing 4   Grooming 4   Eating 4   Daily Activity Raw Score 21   Daily Activity Standardized Score (Calc for Raw Score >=11) 44 27   AM-PAC Applied Cognition Inpatient   Following a Speech/Presentation 4   Understanding Ordinary Conversation 4   Taking Medications 3   Remembering Where Things Are Placed or Put Away 3   Remembering List of 4-5 Errands 3   Taking Care of Complicated Tasks 3   Applied Cognition Raw Score 20   Applied Cognition Standardized Score 41 76   Mei Im, OT

## 2022-08-31 NOTE — PLAN OF CARE
Problem: PHYSICAL THERAPY ADULT  Goal: Performs mobility at highest level of function for planned discharge setting  See evaluation for individualized goals  Description: Treatment/Interventions: Functional transfer training, LE strengthening/ROM, Elevations, Therapeutic exercise, Endurance training, Patient/family training, Bed mobility, Gait training, Spoke to nursing, OT  Equipment Recommended: Juan Chapa (pt has rollator)       See flowsheet documentation for full assessment, interventions and recommendations  Note: Prognosis: Fair  Problem List: Decreased strength, Decreased endurance, Impaired balance, Decreased mobility  Assessment: Pt  77 y  o female w/ known advanced pancreatic CA w/ mets to the liver & lungs, presented w/ c/o abdominal pain, weakness, elevated temperature & confusion  Pt admitted for SIRS & hyponatremia  Pt referred to PT for mobility assessment & D/C planning  Please see above for information re: home set-up & PLOF as well as objective findings during PT assessment  PTA, pt reports being fairly I w/o AD  On eval, pt functioning below baseline hence will continue skilled PT to improve function & safety  Pt require S for bed mobility however require minAx1 for transfers & amb w/ RW + cues for techniques & safety  Gait deviations as above, slow & unsteady w/ dec foot clearance but no gross LOB noted  Dec amb tolerance 2* to weakness & fatigue  The patient's AM-PAC Basic Mobility Inpatient Short Form Raw Score is 16  A Raw score of greater than 16 suggests the patient may benefit from discharge to post-acute rehabilitation services  Please also refer to the recommendation of the Physical Therapist for safe discharge planning  From PT standpoint, D/C rec: STR vs HHPT pending progress  (+) SOB during mobility but relieved w/ rest  SpO2 94-96% at RA  /62  No dizziness reported t/o session   Nsg staff most recent vital signs as follows: /61 (BP Location: Left arm)   Pulse 98   Temp 98 5 °F (36 9 °C) (Temporal)   Resp 18   Ht 5' 4" (1 626 m)   Wt 51 2 kg (112 lb 14 oz)   SpO2 96%   BMI 19 38 kg/m²   At end of session, pt OOB in chair in stable condition, call bell & phone in reach, chair alarm activated, all lines intact  Fall precautions reinforced w/ good understanding  CM to follow  Nsg staff to continue to mobilized pt (OOB in chair for all meals & ambulate in room/unit) as tolerated to prevent further decline in function  Nsg notified  Co-eval was necessary to complete this PT eval for the pt's best interest given pt's medical acuity & complexity  PT Discharge Recommendation: Post acute rehabilitation services (STR vs HHPT pending progress)    See flowsheet documentation for full assessment

## 2022-08-31 NOTE — MALNUTRITION/BMI
This medical record reflects one or more clinical indicators suggestive of malnutrition  Malnutrition Findings:   Adult Malnutrition type: Chronic illness  Adult Degree of Malnutrition: Malnutrition of moderate degree  Malnutrition Characteristics: Muscle loss, Fat loss, Inadequate energy                  360 Statement: Moderate degree of malnutrition in setting of metastatic cancer, <75% energy intake compared to estimated energy needs for > 1 mo, fat loss/ orbitals dark circles/hollowing, muscle loss/depressions/temple region, protruding clavicles, treated with liberal diet, supplements and in between meal snacks    BMI Findings: Body mass index is 19 38 kg/m²  See Nutrition note dated 8/31/22 for additional details  Completed nutrition assessment is viewable in the nutrition documentation

## 2022-08-31 NOTE — PROGRESS NOTES
[address] [facility]  Progress Note - Shaina Sanford 1945, 78 y o  female MRN: [id number]  Unit/Bed#: E2 -01 Encounter: [id number]  Primary Care Provider: Dewey Byrne DO   Date and time admitted to hospital: 8/29/2022  9:09 PM    * Generalized abdominal pain  Assessment & Plan  82-year-old female patient with stage IV metastatic pancreatic cancer who presented with an episode of generalized abdominal pain with distention and an elevated temperature for her baseline without fever per se    Med SIRS criteria with leukocytosis and tachycardia as well as lactic acidosis  · The patient received cefepime and vancomycin x1 - no acute infectious process was identified  · Currently reports improvement  · CTA of the chest and abdomen revealed extensive hepatic metastatic disease without significant changes from prior  · Continue serial exams and supportive management   · Undergoing infectious workup as below, recent infectious workup was unrevealing  · Unfortunately noted for overall poor prognosis, undergoing chemotherapy for her pancreatic cancer      SIRS (systemic inflammatory response syndrome) (HCC)  Assessment & Plan  · Criteria met with leukocytosis, tachycardia, also lactic acidosis now resolved  · No obvious source of infection, UA negative for UTI, CT a chest CT abdomen/pelvis with contrast no evidence of acute infection, evidence metastatic disease similar to prior imaging  · Patient was just admitted to [facility] and discharged 08/28/2022, at that time also met sirs criteria and was treated for a possible UTI although urine cultures were negative  · Patient did receive cefepime and vancomycin in the ER, for now will continue to observe off antibiotics  · Procalcitonin elevated, however more likelhy related to her cancer rather than infectious process, continue to monitor  · Blood cultures no growth at 24 hours, follow-up result  · Discontinue IV fluids and monitor labs, CBC    Hyponatremia  Assessment & Plan  Was mild and resolved with normal saline, discontinue IV fluids    Malignant neoplasm metastatic to both lungs [facility])  Assessment & Plan  Per current CT of the chest she did progression of bilateral metastatic disease  Continue supplemental oxygen as needed to keep oxygen saturations above 90%    Cancer related pain  Assessment & Plan  Patient presents with abdominal pain now improved  Continue supportive management    Primary pancreatic adenocarcinoma [facility])  Assessment & Plan  Stage IV metastatic pancreatic cancer, does not considered curable  Undergoing palliative chemotherapy every 28 days - next treatment tentatively scheduled for September 12th  Follows with Dr Merlin Ricker of care have been discussed by palliative care with patient and family, for now despite poor prognosis family is electing to seek treatment and for the patient to remain full code    Chronic anemia  Assessment & Plan  Hemoglobin at baseline, continue to monitor    Protein-calorie malnutrition, unspecified severity ([facility] [address] )  Assessment & Plan  Malnutrition Findings:                                 BMI Findings: Body mass index is 19 38 kg/m²  Nutrition consult  Nutritional supplements          VTE Pharmacologic Prophylaxis: VTE Score: 7 High Risk (Score >/= 5) - Pharmacological DVT Prophylaxis Ordered: enoxaparin (Lovenox)  Sequential Compression Devices Ordered  Patient Centered Rounds: I performed bedside rounds with nursing staff today  Discussions with Specialists or Other Care Team Provider:  Case management    Education and Discussions with Family / Patient: Updated  (daughter) via phone  Time Spent for Care: 30 minutes  More than 50% of total time spent on counseling and coordination of care as described above      Current Length of Stay: 1 day(s)  Current Patient Status: Observation   Certification Statement: The patient, admitted on an observation basis, will now require > 2 midnight hospital stay due to close monitoring, need for safe discharge arrangements   Discharge Plan: Anticipate discharge in 24-48 hrs to rehab facility  Code Status: Level 1 - Full Code    Subjective:   Patient seen and examined sitting up in chair  She would like lie back down as she reports abdominal discomfort when sitting up, otherwise denies abdominal pain  Does not look in distress  Does report a bowel movement earlier today  Objective:     Vitals:   Temp (24hrs), Av 2 °F (36 8 °C), Min:97 8 °F (36 6 °C), Max:98 5 °F (36 9 °C)    Temp:  [97 8 °F (36 6 °C)-98 5 °F (36 9 °C)] 98 5 °F (36 9 °C)  HR:  [] 98  Resp:  [18-22] 18  BP: (101-115)/(60-61) 109/61  SpO2:  [96 %-100 %] 96 %  Body mass index is 19 38 kg/m²  Input and Output Summary (last 24 hours): Intake/Output Summary (Last 24 hours) at 2022 1411  Last data filed at 2022 0905  Gross per 24 hour   Intake 1200 ml   Output --   Net 1200 ml       Physical Exam:   Physical Exam  Constitutional:       General: She is not in acute distress  HENT:      Head: Normocephalic and atraumatic  Eyes:      Conjunctiva/sclera: Conjunctivae normal    Cardiovascular:      Rate and Rhythm: Normal rate and regular rhythm  Heart sounds: No murmur heard  Pulmonary:      Effort: No respiratory distress  Breath sounds: No wheezing or rales  Musculoskeletal:      Right lower leg: No edema  Left lower leg: No edema  Skin:     General: Skin is warm and dry  Neurological:      Mental Status: Mental status is at baseline     Psychiatric:         Mood and Affect: Mood normal           Additional Data:     Labs:  Results from last 7 days   Lab Units 22  0521 22  1129 22  2227 22  0641   WBC Thousand/uL 10 99*  --  17 21* 8 11   HEMOGLOBIN g/dL 8 0*  --  8 9* 8 6*   HEMATOCRIT % 25 4*  --  26 8* 26 1*   PLATELETS Thousands/uL 309   < > 373 508*   BANDS PCT %  --   --  9*  -- NEUTROS PCT %  --   --   --  85*   LYMPHS PCT %  --   --   --  8*   LYMPHO PCT %  --   --  3*  --    MONOS PCT %  --   --   --  2*   MONO PCT %  --   --  2*  --    EOS PCT %  --   --  2 2    < > = values in this interval not displayed  Results from last 7 days   Lab Units 08/31/22  0521   SODIUM mmol/L 136   POTASSIUM mmol/L 3 6   CHLORIDE mmol/L 103   CO2 mmol/L 21   BUN mg/dL 12   CREATININE mg/dL 0 55*   ANION GAP mmol/L 12   CALCIUM mg/dL 7 2*   ALBUMIN g/dL 1 9*   TOTAL BILIRUBIN mg/dL 1 15*   ALK PHOS U/L 1,064*   ALT U/L 120*   AST U/L 227*   GLUCOSE RANDOM mg/dL 82     Results from last 7 days   Lab Units 08/31/22  0521   INR  1 53*             Results from last 7 days   Lab Units 08/31/22  0521 08/30/22  1349 08/30/22  0043 08/29/22  2227 08/27/22  0641 08/24/22  2059   LACTIC ACID mmol/L  --  2 0 2 1* 2 4*  --  1 6   PROCALCITONIN ng/ml 5 62*  --   --  5 28* 4 94*  --        Lines/Drains:  Invasive Devices  Report    Central Venous Catheter Line  Duration           Port A Cath 06/28/22 Right Chest 64 days          Peripheral Intravenous Line  Duration           Peripheral IV 08/29/22 Right Antecubital 1 day    Peripheral IV 08/29/22 Right Hand 1 day                Central Line:  Goal for removal:  Port-A-Cath indwelling             Imaging: no new     Recent Cultures (last 7 days):   Results from last 7 days   Lab Units 08/29/22 2227 08/26/22  1815   BLOOD CULTURE  No Growth at 24 hrs    No Growth at 24 hrs   --    URINE CULTURE   --  No Growth <1000 cfu/mL       Last 24 Hours Medication List:   Current Facility-Administered Medications   Medication Dose Route Frequency Provider Last Rate    acetaminophen  650 mg Oral Q6H PRN Catherine Badillo MD      cyanocobalamin  500 mcg Oral Daily Catherine Badillo MD      enoxaparin  40 mg Subcutaneous Q24H [address] Catherine Badillo MD      ferrous sulfate  325 mg Oral Daily With Jovany Morgan MD      HYDROmorphone  0 5 mg Intravenous Q4H PRN Catherine Badillo MD      ondansetron  4 mg Intravenous Q6H PRN Catherine Badillo MD      oxyCODONE  5 mg Oral Q4H PRN Catherine Badillo MD      Or    oxyCODONE  10 mg Oral Q4H PRN Catherine Badillo MD      pancrelipase (Lip-Prot-Amyl)  24,000 Units Oral BID Pedro Hudson MD      pantoprazole  20 mg Oral Early Morning Pedro Hudson MD          Today, Patient Was Seen By: Perdo Hudson MD    **Please Note: This note may have been constructed using a voice recognition system  **

## 2022-08-31 NOTE — UTILIZATION REVIEW
Initial Clinical Review    Admission: Date/Time/Statement:   Admission Orders (From admission, onward)     Ordered        08/30/22 1132  Place in Observation  Once            08/30/22 0935  INPATIENT ADMISSION  Once                      08/31/22 1413  Inpatient Admission  Once        Transfer Service: Hospitalist       Question Answer Comment   Level of Care Med Surg    Estimated length of stay More than 2 Midnights    Certification I certify that inpatient services are medically necessary for this patient for a duration of greater than two midnights  See H&P and MD Progress Notes for additional information about the patient's course of treatment  08/31/22 1414   OBSERVATION  8/30 @  0935 CHANGED TO IP ADMISSION 8/31 @  1413   The patient, admitted on an observation basis, will now require > 2 midnight hospital stay due to close monitoring, need for safe discharge arrangements     ED Arrival Information     Expected   -    Arrival   8/29/2022 21:09    Acuity   Urgent            Means of arrival   Ambulance    Escorted by   [facility]   Hospitalist    Admission type   Urgent            Arrival complaint   ab pain           Chief Complaint   Patient presents with    Abdominal Pain     Per EMS pt was released from admission due to sepsis  Pt's daughter called EMS due to pt c/o left sided upper abd pain  Pt has hx of pancreatic cancer  Initial Presentation: 78 y o  female presents to ED via  EMS from home with weakness, generalized abdominal pain, fever and confusion  Received  JOHNNIE, tylenol and IVF in ED with improvement in symptoms  PMH is recent admission for SIRS, possible  UTI,  D/C  8/28/22,  and advanced pancreatic cancer with mets  Meets  SIRS  Criteria with leukocytosis, tachycardia  And elevated lactic acid  CTA  Chest/abd  Shows extensive hepatic  Metastatic disease     Admit  Observation with Generalized abdominal pain,  SIRS, hyponatremia  And plan is  Monitor labs, IVF, hold additional antibiotics,  And supportive care  9/1  Continue PT/OT  States feels similar to time of admission  Continue to monitor labs  Blood cultures  NG thus far,  Wait final cultures  Family  Currently choosing  Full treatment  Continue serial abdominal exams  Continue supportive care           ED Triage Vitals   Temperature Pulse Respirations Blood Pressure SpO2   08/29/22 2131 08/29/22 2114 08/29/22 2114 08/29/22 2114 08/29/22 2114   98 7 °F (37 1 °C) (!) 122 20 115/63 96 %      Temp Source Heart Rate Source Patient Position - Orthostatic VS BP Location FiO2 (%)   08/29/22 2131 08/29/22 2114 08/29/22 2114 08/29/22 2114 --   Oral Monitor Lying Right arm       Pain Score       08/29/22 2114       3          Wt Readings from Last 1 Encounters:   08/31/22 51 2 kg (112 lb 14 oz)     Additional Vital Signs:   98 5 °F (36 9 °C) 98 18 109/61 72 96 % 28 2 L/min Nasal cannula Sitting    08/30/22 2205 97 8 °F (36 6 °C) 80 20 101/60 -- 97 % -- -- Nasal cannula Lying   08/30/22 1617 -- 103 22 115/61 -- 100 % -- -- Nasal cannula Lying   08/30/22 1151 97 7 °F (36 5 °C) 96 22 112/61 -- 100 % -- -- Nasal cannula Lying   08/30/22 1100 -- 96 22 108/68 80 98 % -- -- -- --   08/30/22 0900 -- 94 22 110/63 74 99 % 28 2 L/min Nasal cannula Lying   08/30/22 0800 -- 98 22 112/70 83 99 % 28 2 L/min Nasal cannula --   08/30/22 0600 -- 96 19 107/61 72 98 % 28 2 L/min Nasal cannula Lying   08/30/22 0500 -- 102 20 110/67 80 99 % 28 2 L/min Nasal cannula Lying   08/30/22 0400 -- 98 19 110/63 74 99 % 28 2 L/min Nasal cannula Lying   08/30/22 0200 -- 88 20 113/65 80 97 % 28 2 L/min Nasal cannula Lying   08/30/22 0108 -- -- -- -- -- -- -- -- Nasal cannula --   08/29/22 2357 -- 103 20 111/65 -- 98 % 28 2 L/min Nasal cannula Lying   08/29/22 2131 98 7 °F (37 1 °C) -- -- -- -- -- -- -- -- --   08/29/22 2114 -- 122 Abnormal  20 115/63 -- 96 % -- -- None (Room air) Lying       Pertinent Labs/Diagnostic Test Results:   CT pe study w abdomen pelvis w contrast   ED Interpretation by Mary Solo DO (08/30 0509)   CT PULMONARY ANGIOGRAM OF THE CHEST AND CT ABDOMEN AND PELVIS WITH INTRAVENOUS CONTRAST     INDICATION:   SOB, abomdinal pain, hx of cancer      COMPARISON:  CT chest 6/18/2022, CT abdomen pelvis 8/24/2022     TECHNIQUE:  CT examination of the chest, abdomen and pelvis was performed  Thin section CT angiographic technique was used in the chest in order to evaluate for pulmonary embolus and coronal 3D MIP postprocessing was performed on the acquisition   scanner  Axial, sagittal, and coronal 2D reformatted images were created from the source data and submitted for interpretation      Radiation dose length product (DLP) for this visit:  712 mGy-cm   This examination, like all CT scans performed in the [facility], was performed utilizing techniques to minimize radiation dose exposure, including the use of iterative   reconstruction and automated exposure control      IV Contrast:  100 mL of iohexol (OMNIPAQUE)  Enteric Contrast:  Enteric contrast was not administered         FINDINGS:     CHEST     PULMONARY ARTERIAL TREE:  No pulmonary embolus is seen      LUNGS:  Interval progression of pulmonary metastatic disease is identified with innumerable nodules measuring to 2 1 cm     There is no tracheal or endobronchial lesion      PLEURA:  Unremarkable      HEART/AORTA:  Small pericardial effusion is present  No thoracic aortic aneurysm      MEDIASTINUM AND ZACK:  Unremarkable      CHEST WALL AND LOWER NECK:  Right chest wall port catheter is present      ABDOMEN     LIVER/BILIARY TREE:  Extensive widespread hepatic metastatic disease is identified with lesions measuring up to 4 9 cm  Overall unchanged from May 24, 2022      GALLBLADDER:  No calcified gallstones   No pericholecystic inflammatory change      SPLEEN:  Unremarkable      PANCREAS:  Stable large 4 4 x 4 6 cm pancreatic body mass lesion is identified      ADRENAL GLANDS:  Unremarkable      KIDNEYS/URETERS:  Anteroinferior positioning of the right kidney again identified  Left kidney is unrema   rkable      STOMACH AND BOWEL:  Unremarkable      APPENDIX:  No findings to suggest appendicitis      ABDOMINOPELVIC CAVITY:  Confluent retroperitoneal lymphadenopathy is again identified      VESSELS:  Unremarkable for patient's age      PELVIS     REPRODUCTIVE ORGANS:  Unremarkable for patient's age      URINARY BLADDER:  Unremarkable      ABDOMINAL WALL/INGUINAL REGIONS:  Unremarkable      OSSEOUS STRUCTURES:  No acute fracture or destructive osseous lesion      IMPRESSION:     No evidence of pulmonary embolus      No progression of widespread pulmonary metastatic disease      Widespread hepatic metastatic disease again seen  Stable pancreatic mass lesion  Confluent retroperitoneal lymphadenopathy      Small pericardial effusion  Final Result by Nas Restrepo MD (38/50 [id number])      No evidence of pulmonary embolus  No progression of widespread pulmonary metastatic disease  Widespread hepatic metastatic disease again seen  Stable pancreatic mass lesion  Confluent retroperitoneal lymphadenopathy  Small pericardial effusion  Workstation performed: [id number]               Results from last 7 days   Lab Units 08/31/22  0521 08/30/22  1129 08/29/22 2227 08/27/22  0641 08/26/22  0423 08/24/22 2048 08/24/22  1225   WBC Thousand/uL 10 99*  --  17 21* 8 11 13 73*  --  23 82*   HEMOGLOBIN g/dL 8 0*  --  8 9* 8 6* 7 4*  --  9 6*   HEMATOCRIT % 25 4*  --  26 8* 26 1* 23 4*  --  29 9*   PLATELETS Thousands/uL 309 305 373 508* 534*   < > 635*   NEUTROS ABS Thousands/µL  --   --   --  6 94  --   --   --    BANDS PCT %  --   --  9*  --   --   --  5    < > = values in this interval not displayed           Results from last 7 days   Lab Units 08/31/22  0521 08/29/22 2227 08/27/22  0641 08/26/22  0423 08/24/22  1225   SODIUM mmol/L 136 134* 134* 134* 132*   POTASSIUM mmol/L 3 6 3 9 4 0 4 1 4 1   CHLORIDE mmol/L 103 100 102 102 97   CO2 mmol/L 21 21 20* 22 22   ANION GAP mmol/L 12 13 12 10 13   BUN mg/dL 12 19 16 16 24   CREATININE mg/dL 0 55* 0 64 0 69 0 73 0 77   EGFR ml/min/1 73sq m 92 87 85 80 75   CALCIUM mg/dL 7 2* 7 8* 7 5* 7 4* 8 2*   MAGNESIUM mg/dL 2 1  --   --   --   --      Results from last 7 days   Lab Units 08/31/22  0521 08/29/22 2227 08/27/22  0641 08/26/22  0423 08/24/22  1225   AST U/L 227* 207* 180* 211* 323*   ALT U/L 120* 128* 110* 114* 172*   ALK PHOS U/L 1,064* 1,203* 865* 798* 1,131*   TOTAL PROTEIN g/dL 5 4* 6 1* 6 0* 5 5* 6 9   ALBUMIN g/dL 1 9* 2 1* 1 9* 1 8* 2 4*   TOTAL BILIRUBIN mg/dL 1 15* 0 87 0 90 1 07* 1 53*         Results from last 7 days   Lab Units 08/31/22  0521 08/29/22 2227 08/27/22  0641 08/26/22  0423 08/24/22  1225   GLUCOSE RANDOM mg/dL 82 112 108 125 102           Results from last 7 days   Lab Units 08/31/22  0521 08/29/22 2227 08/24/22  1225   PROTIME seconds 18 3* 16 9* 17 8*   INR  1 53* 1 37* 1 47*   PTT seconds  --  33 33         Results from last 7 days   Lab Units 08/31/22  0521 08/29/22  2227 08/27/22  0641 08/24/22  1225   PROCALCITONIN ng/ml 5 62* 5 28* 4 94* 8 06*     Results from last 7 days   Lab Units 08/30/22  1349 08/30/22  0043 08/29/22  2227 08/24/22  2059 08/24/22  1749 08/24/22  1422 08/24/22  1225   LACTIC ACID mmol/L 2 0 2 1* 2 4* 1 6 2 1* 3 0* 2 3*                         Results from last 7 days   Lab Units 08/29/22  2227   LIPASE u/L 46*                 Results from last 7 days   Lab Units 08/30/22  0309 08/24/22  1522   CLARITY UA  Clear Clear   COLOR UA  Yellow Yellow   SPEC GRAV UA  1 010 <=1 005   PH UA  6 5 6 0   GLUCOSE UA mg/dl Negative Negative   KETONES UA mg/dl Negative Negative   BLOOD UA  Negative Negative   PROTEIN UA mg/dl Trace* Negative   NITRITE UA  Negative Positive*   BILIRUBIN UA  Negative Negative   UROBILINOGEN UA E U /dl 0 2 1 0   LEUKOCYTES UA Negative Trace*   WBC UA /hpf 0-1* 2-4   RBC UA /hpf 0-1* 0-1*   BACTERIA UA /hpf None Seen Moderate*   EPITHELIAL CELLS WET PREP /hpf Occasional Occasional                                 Results from last 7 days   Lab Units 08/29/22  2227 08/26/22  1815 08/24/22  1225   BLOOD CULTURE  No Growth at 24 hrs  No Growth at 24 hrs   --  No Growth After 5 Days  No Growth After 5 Days     URINE CULTURE   --  No Growth <1000 cfu/mL  --                ED Treatment:   Medication Administration from 08/29/2022 2109 to 08/30/2022 1142       Date/Time Order Dose Route Action Comments     08/30/2022 0035 cefepime (MAXIPIME) 2 g/50 mL dextrose IVPB 0 mg Intravenous Stopped      08/29/2022 2349 cefepime (MAXIPIME) 2 g/50 mL dextrose IVPB 2,000 mg Intravenous New Bag      08/30/2022 0230 vancomycin (VANCOCIN) IVPB (premix in dextrose) 1,000 mg 200 mL 0 mg/kg Intravenous Stopped      08/30/2022 0130 vancomycin (VANCOCIN) IVPB (premix in dextrose) 1,000 mg 200 mL 1,000 mg Intravenous New Bag Pt and fmaily wanted to hold off and talk toprovider about treatment     08/30/2022 0204 multi-electrolyte (ISOLYTE-S PH 7 4) bolus 1,000 mL 0 mL Intravenous Stopped      08/29/2022 2353 multi-electrolyte (ISOLYTE-S PH 7 4) bolus 1,000 mL 1,000 mL Intravenous New Bag      08/29/2022 2337 iohexol (OMNIPAQUE) 350 MG/ML injection (SINGLE-DOSE) 100 mL 100 mL Intravenous Given      08/30/2022 0740 acetaminophen (TYLENOL) tablet 650 mg 650 mg Oral Given      08/30/2022 1124 cyanocobalamin (VITAMIN B-12) tablet 500 mcg 500 mcg Oral Given      08/30/2022 1124 pantoprazole (PROTONIX) EC tablet 20 mg 20 mg Oral Given         Present on Admission:   SIRS (systemic inflammatory response syndrome) (HCC)   Protein-calorie malnutrition, unspecified severity ([facility] [address] )   Primary pancreatic adenocarcinoma ([facility] [address] )   Cancer related pain      Admitting Diagnosis: Abdominal pain [R10 9]  Generalized abdominal pain [R10 84]  Pancreatic cancer metastasized to liver ([facility] ) [C25 9, C78 7]  Sepsis ([place] [address] ) [A41 9]  Age/Sex: 78 y o  female  Admission Orders:  Scheduled Medications:  cyanocobalamin, 500 mcg, Oral, Daily  enoxaparin, 40 mg, Subcutaneous, Q24H [address]  ferrous sulfate, 325 mg, Oral, Daily With Breakfast  pantoprazole, 20 mg, Oral, Early Morning      Continuous IV Infusions:  sodium chloride, 100 mL/hr, Intravenous, Continuous      PRN Meds:  acetaminophen, 650 mg, Oral, Q6H PRN  HYDROmorphone, 0 5 mg, Intravenous, Q4H PRN  ondansetron, 4 mg, Intravenous, Q6H PRN  oxyCODONE, 5 mg, Oral, Q4H PRN   Or  oxyCODONE, 10 mg, Oral, Q4H PRN        IP CONSULT TO NUTRITION SERVICES    Network Utilization Review Department  ATTENTION: Please call with any questions or concerns to [phone] and carefully listen to the prompts so that you are directed to the right person  All voicemails are confidential   Zo Snyder all requests for admission clinical reviews, approved or denied determinations and any other requests to dedicated fax number below belonging to the campus where the patient is receiving treatment   List of dedicated fax numbers for the Facilities:  [address] DENIALS (Administrative/Medical Necessity) [phone]   [address] (Maternity/NICU/Pediatrics) [phone]   [address]  [address] [facility] [place] [id number] [address] [facility] [phone]

## 2022-08-31 NOTE — ASSESSMENT & PLAN NOTE
Malnutrition Findings:                                 BMI Findings: Body mass index is 19 38 kg/m²     Nutrition consult  Nutritional supplements

## 2022-08-31 NOTE — ASSESSMENT & PLAN NOTE
· Criteria met with leukocytosis, tachycardia, also lactic acidosis now resolved  · No obvious source of infection, UA negative for UTI, CT a chest CT abdomen/pelvis with contrast no evidence of acute infection, evidence metastatic disease similar to prior imaging  · Patient was just admitted to [facility] and discharged 08/28/2022, at that time also met sirs criteria and was treated for a possible UTI although urine cultures were negative  · Patient did receive cefepime and vancomycin in the ER, for now will continue to observe off antibiotics  · Procalcitonin elevated, however more likelhy related to her cancer rather than infectious process, continue to monitor  · Blood cultures no growth at 24 hours, follow-up result  · Discontinue IV fluids and monitor labs, CBC

## 2022-08-31 NOTE — PLAN OF CARE
Problem: PAIN - ADULT  Goal: Verbalizes/displays adequate comfort level or baseline comfort level  Description: Interventions:  - Encourage patient to monitor pain and request assistance  - Assess pain using appropriate pain scale  - Administer analgesics based on type and severity of pain and evaluate response  - Implement non-pharmacological measures as appropriate and evaluate response  - Consider cultural and social influences on pain and pain management  - Notify physician/advanced practitioner if interventions unsuccessful or patient reports new pain  Outcome: Progressing     Problem: INFECTION - ADULT  Goal: Absence or prevention of progression during hospitalization  Description: INTERVENTIONS:  - Assess and monitor for signs and symptoms of infection  - Monitor lab/diagnostic results  - Monitor all insertion sites, i e  indwelling lines, tubes, and drains  - Monitor endotracheal if appropriate and nasal secretions for changes in amount and color  - [place] appropriate cooling/warming therapies per order  - Administer medications as ordered  - Instruct and encourage patient and family to use good hand hygiene technique  - Identify and instruct in appropriate isolation precautions for identified infection/condition  Outcome: Progressing     Problem: Nutrition/Hydration-ADULT  Goal: Nutrient/Hydration intake appropriate for improving, restoring or maintaining nutritional needs  Description: Monitor and assess patient's nutrition/hydration status for malnutrition  Collaborate with interdisciplinary team and initiate plan and interventions as ordered  Monitor patient's weight and dietary intake as ordered or per policy  Utilize nutrition screening tool and intervene as necessary  Determine patient's food preferences and provide high-protein, high-caloric foods as appropriate       INTERVENTIONS:  - Monitor oral intake, urinary output, labs, and treatment plans  - Assess nutrition and hydration status and recommend course of action  - Evaluate amount of meals eaten  - Allow adequate time for meals  - Recommend/ encourage appropriate diets, oral nutritional supplements, and vitamin/mineral supplements  - Order, calculate, and assess calorie counts as needed  - Assess need for intravenous fluids  - Provide specific nutrition/hydration education as appropriate  - Include patient/family/caregiver in decisions related to nutrition  Outcome: Progressing     Problem: COPING  Goal: Pt/Family able to verbalize concerns and demonstrate effective coping strategies  Description: INTERVENTIONS:  - Assist patient/family to identify coping skills, available support systems and cultural and spiritual values  - Provide emotional support, including active listening and acknowledgement of concerns of patient and caregivers  - Reduce environmental stimuli, as able  - Provide patient education  - Assess for spiritual pain/suffering and initiate spiritual care, including notification of Pastoral Care or giovany based community as needed  - Assess effectiveness of coping strategies  Outcome: Progressing  Goal: Will report anxiety at manageable levels  Description: INTERVENTIONS:  - Administer medication as ordered  - Teach and encourage coping skills  - Provide emotional support  - Assess patient/family for anxiety and ability to cope  Outcome: Progressing     Problem: Depression - IP adult  Goal: Effects of depression will be minimized  Description: INTERVENTIONS:  - Assess impact of patient's symptoms on level of functioning, self-care needs and offer support as indicated  - Assess patient/family knowledge of depression, impact on illness and need for teaching  - Provide emotional support, presence and reassurance  - Initiate consults and referrals as appropriate (a mental health professional, Spiritual Care)  Outcome: Progressing

## 2022-08-31 NOTE — CASE MANAGEMENT
Case Management Discharge Planning Note    Patient name Kvng Fair  [facility]  /E2 -* MRN [id number]  : 1945 Date 2022       Current Admission Date: 2022  Current Admission Diagnosis:Generalized abdominal pain   Patient Active Problem List    Diagnosis Date Noted    Malignant neoplasm metastatic to both lungs [facility] ) 2022    Generalized abdominal pain 2022    Hyponatremia 2022    Thrombocytosis 2022    Ascites 2022    SIRS (systemic inflammatory response syndrome) ([facility] ) 2022    Transaminitis 2022    Osseous metastasis ([facility] ) 2022    Pancreatic insufficiency 07/15/2022    Dysgeusia 07/15/2022    Chemotherapy induced nausea and vomiting 2022    Cancer related pain 2022    Counseling regarding advanced care planning and goals of care 2022    Anxiety 2022    Nausea 2022    Primary pancreatic adenocarcinoma ([facility] ) 2022    Severe protein-calorie malnutrition ([facility] ) 2022    Chronic anemia 2022    Pancreatic mass 2022    Pericardial effusion 2022    Protein-calorie malnutrition, unspecified severity ([facility] ) 2022    Weight loss 2022    Stress at home 2022    Medicare annual wellness visit, subsequent 2022    Hypokalemia 2022    Chronic pain of left ankle 2022    Iron deficiency anemia 2022    UTI (urinary tract infection) 2022    Chest pain 2022    Elevated TSH 2022    Elevated brain natriuretic peptide (BNP) level 2022    Elevated d-dimer 2022      LOS (days): 1  Geometric Mean LOS (GMLOS) (days): 2 60  Days to GMLOS:2 5     OBJECTIVE:            Current admission status: Inpatient   Preferred Pharmacy:   [facility] [address]  Phone: [phone] Fax: [phone]    Primary Care Provider: Yaquelin Ch Renette Dubin, DO    [facility]  Secondary Insurance:     DISCHARGE DETAILS:    Discharge planning discussed with[de-identified] Patient and daughter  Freedom of Choice: Yes  Comments - Freedom of Choice: Pt would like to go to STR  Agreeable to blanket referral being placed  Awaiting accepting facilities  CM contacted family/caregiver?: Yes Kourtney Womack)  Were Treatment Team discharge recommendations reviewed with patient/caregiver?: Yes          Contacts  Patient Contacts: Enrique Wolfe (Daughter)  Relationship to Patient[de-identified] Family  Contact Method: Phone  Phone Number: [phone]  Reason/Outcome: Continuity of Care, Discharge [address]         Is the patient interested in [facility] at discharge?: No    DME Referral Provided  Referral made for DME?: No    Other Referral/Resources/Interventions Provided:  Interventions: Short Term Rehab         Treatment Team Recommendation: Short Term Rehab                                            Additional Comments: CM met with pt at the bedside and daughter, Anthony Madden, was present on the phone  Pt and daughter would both like for STR placement  Pt is not vaccinated and is also currently receiving chemo treatments  This CM did explain that beds are harder to find when someone is unvaccinated and both pt and daughter expressed understanding  Also discussed that typically when in rehab, chemo treatments are put on hold due to them increase pt's fatigue  Both parties reported understanding  CM placed blanket referral for STR and currently awaiting an accepting facility  CM will continue to follow

## 2022-08-31 NOTE — PLAN OF CARE
Problem: PAIN - ADULT  Goal: Verbalizes/displays adequate comfort level or baseline comfort level  Description: Interventions:  - Encourage patient to monitor pain and request assistance  - Assess pain using appropriate pain scale  - Administer analgesics based on type and severity of pain and evaluate response  - Implement non-pharmacological measures as appropriate and evaluate response  - Consider cultural and social influences on pain and pain management  - Notify physician/advanced practitioner if interventions unsuccessful or patient reports new pain  Outcome: Progressing     Problem: SAFETY ADULT  Goal: Maintain or return to baseline ADL function  Description: INTERVENTIONS:  - Educate patient/family on patient safety including physical limitations  - Instruct patient to call for assistance with activity   - Consult OT/PT to assist with strengthening/mobility   - Keep Call bell within reach  - Keep bed low and locked with side rails adjusted as appropriate  - Keep care items and personal belongings within reach  - Initiate and maintain comfort rounds  - Make Fall Risk Sign visible to staff  - Offer Toileting every 2 Hours, in advance of need  - Initiate/Maintain bed alarm  - Obtain necessary fall risk management equipment: call bell, bedside commode, yellow socks  - Apply yellow socks and bracelet for high fall risk patients  - Consider moving patient to room near nurses station  Outcome: Progressing     Problem: COPING  Goal: Pt/Family able to verbalize concerns and demonstrate effective coping strategies  Description: INTERVENTIONS:  - Assist patient/family to identify coping skills, available support systems and cultural and spiritual values  - Provide emotional support, including active listening and acknowledgement of concerns of patient and caregivers  - Reduce environmental stimuli, as able  - Provide patient education  - Assess for spiritual pain/suffering and initiate spiritual care, including notification of Pastoral Care or giovany based community as needed  - Assess effectiveness of coping strategies  Outcome: Progressing     Problem: Prexisting or High Potential for Compromised Skin Integrity  Goal: Skin integrity is maintained or improved  Description: INTERVENTIONS:  - Identify patients at risk for skin breakdown  - Assess and monitor skin integrity  - Assess and monitor nutrition and hydration status  - Monitor labs   - Assess for incontinence   - Turn and reposition patient  - Assist with mobility/ambulation  - Relieve pressure over bony prominences  - Avoid friction and shearing  - Provide appropriate hygiene as needed including keeping skin clean and dry  - Evaluate need for skin moisturizer/barrier cream  - Collaborate with interdisciplinary team   - Patient/family teaching  - Consider wound care consult   Outcome: Progressing     Problem: GASTROINTESTINAL - ADULT  Goal: Maintains adequate nutritional intake  Description: INTERVENTIONS:  - Monitor percentage of each meal consumed  - Identify factors contributing to decreased intake, treat as appropriate  - Assist with meals as needed  - Monitor I&O, weight, and lab values if indicated  - Obtain nutrition services referral as needed  Outcome: Progressing       Problem: HEMATOLOGIC - ADULT  Goal: Maintains hematologic stability  Description: INTERVENTIONS  - Assess for signs and symptoms of bleeding or hemorrhage  - Monitor labs  - Administer supportive blood products/factors as ordered and appropriate  Outcome: Progressing

## 2022-08-31 NOTE — PHYSICAL THERAPY NOTE
PT EVALUATION    Pt  Name: Aditay Abel  Pt  Age: 78 y o  MRN: [id number]  LENGTH OF STAY: 1      Admitting Diagnoses:   Abdominal pain [R10 9]  Generalized abdominal pain [R10 84]  Pancreatic cancer metastasized to liver ([facility] ) [C25 9, C78 7]  Sepsis ([facility] ) [A41 9]    Past Medical History:   Diagnosis Date    Pancreatic cancer [facility])        Past Surgical History:   Procedure Laterality Date    CARDIAC CATHETERIZATION N/A 3/30/2022    Procedure: Cardiac catheterization;  Surgeon: Bianca Mistry DO;  Location: [facility] CARDIAC CATH LAB; Service: Cardiology    CARDIAC CATHETERIZATION N/A 3/30/2022    Procedure: Cardiac Coronary Angiogram;  Surgeon: Bianca Mistry DO;  Location: [facility] CARDIAC CATH LAB; Service: Cardiology    IR BIOPSY LIVER MASS  6/6/2022    IR PORT PLACEMENT  6/28/2022       Imaging Studies:  CT pe study w abdomen pelvis w contrast   ED Interpretation by Tawanna Gauthier DO (08/30 0910)   CT PULMONARY ANGIOGRAM OF THE CHEST AND CT ABDOMEN AND PELVIS WITH INTRAVENOUS CONTRAST     INDICATION:   SOB, abomdinal pain, hx of cancer  COMPARISON:  CT chest 6/18/2022, CT abdomen pelvis 8/24/2022     TECHNIQUE:  CT examination of the chest, abdomen and pelvis was performed  Thin section CT angiographic technique was used in the chest in order to evaluate for pulmonary embolus and coronal 3D MIP postprocessing was performed on the acquisition   scanner  Axial, sagittal, and coronal 2D reformatted images were created from the source data and submitted for interpretation  Radiation dose length product (DLP) for this visit:  712 mGy-cm   This examination, like all CT scans performed in the [facility], was performed utilizing techniques to minimize radiation dose exposure, including the use of iterative   reconstruction and automated exposure control  IV Contrast:  100 mL of iohexol (OMNIPAQUE)  Enteric Contrast:  Enteric contrast was not administered          FINDINGS: CHEST     PULMONARY ARTERIAL TREE:  No pulmonary embolus is seen  LUNGS:  Interval progression of pulmonary metastatic disease is identified with innumerable nodules measuring to 2 1 cm     There is no tracheal or endobronchial lesion  PLEURA:  Unremarkable  HEART/AORTA:  Small pericardial effusion is present  No thoracic aortic aneurysm  MEDIASTINUM AND ZACK:  Unremarkable  CHEST WALL AND LOWER NECK:  Right chest wall port catheter is present  ABDOMEN     LIVER/BILIARY TREE:  Extensive widespread hepatic metastatic disease is identified with lesions measuring up to 4 9 cm  Overall unchanged from May 24, 2022  GALLBLADDER:  No calcified gallstones  No pericholecystic inflammatory change  SPLEEN:  Unremarkable  PANCREAS:  Stable large 4 4 x 4 6 cm pancreatic body mass lesion is identified  ADRENAL GLANDS:  Unremarkable  KIDNEYS/URETERS:  Anteroinferior positioning of the right kidney again identified  Left kidney is unrema   rkable  STOMACH AND BOWEL:  Unremarkable  APPENDIX:  No findings to suggest appendicitis  ABDOMINOPELVIC CAVITY:  Confluent retroperitoneal lymphadenopathy is again identified  VESSELS:  Unremarkable for patient's age  PELVIS     REPRODUCTIVE ORGANS:  Unremarkable for patient's age  URINARY BLADDER:  Unremarkable  ABDOMINAL WALL/INGUINAL REGIONS:  Unremarkable  OSSEOUS STRUCTURES:  No acute fracture or destructive osseous lesion  IMPRESSION:     No evidence of pulmonary embolus  No progression of widespread pulmonary metastatic disease  Widespread hepatic metastatic disease again seen  Stable pancreatic mass lesion  Confluent retroperitoneal lymphadenopathy  Small pericardial effusion  Final Result by Mackenzie Herrera MD (46/43 [id number])      No evidence of pulmonary embolus  No progression of widespread pulmonary metastatic disease  Widespread hepatic metastatic disease again seen  Stable pancreatic mass lesion  Confluent retroperitoneal lymphadenopathy  Small pericardial effusion  Workstation performed: [id number]               08/31/22 0945   PT Last Visit   PT Visit Date 08/31/22   Note Type   Note type Evaluation   Pain Assessment   Pain Score No Pain   Restrictions/Precautions   Weight Bearing Precautions Per Order No   Other Precautions Chair Alarm; Bed Alarm;Multiple lines; Fall Risk   Home Living   Type of [address] [place] [address] One level;Stairs to enter with rails  (4STE)   Bathroom Shower/Tub Walk-in shower   Bathroom Toilet Standard   Bathroom Equipment Grab bars in shower; Shower chair; Other (Comment); Commode  (in the process of installing grab bars by the toilet)   [address] [facility]  (rollator)   Prior Function   Level of [place] Independent with ADLs and functional mobility  (w/o AD)   Lives With Daughter   Receives Help From   [address] in the last 6 months 0   Vocational Retired   Comments (-) ; pt not home alone between daughter & grandson   General   Additional Pertinent History Recent admission in [facility] from 8/24-8/28/22   Family/Caregiver Present No   Cognition   Overall Cognitive Status WFL   Arousal/Participation Alert   Orientation Level Oriented X4   Following Commands Follows one step commands without difficulty   Comments pt pleasant & cooperative   Subjective   Subjective Pt agreeable to PT/OT evals  RUE Assessment   RUE Assessment   (refer to OT)   LUE Assessment   LUE Assessment   (refer to OT)   RLE Assessment   RLE Assessment WFL  (4-/5 grossly except hip 3+/5)   LLE Assessment   LLE Assessment WFL  (4-/5 grossly except hip 3+/5)   Bed Mobility   Supine to Sit 5  Supervision   Additional items HOB elevated; Bedrails; Increased time required;Verbal cues   Additional Comments cues for techniques & safety   Transfers   Sit to Stand 4  Minimal assistance   Additional items Assist x 1; Increased time required;Verbal cues   Stand to Sit 4  Minimal assistance   Additional items Assist x 1; Increased time required;Verbal cues   Additional Comments cues for hand placement   Ambulation/Elevation   Gait pattern Narrow JESSE; Decreased foot clearance;Shuffling; Short stride; Excessively slow   Gait Assistance 4  Minimal assist   Additional items Assist x 1;Verbal cues; Tactile cues   Assistive Device Rolling walker   Distance 8'x1 (5' forward & 3' back)   Ambulation/Elevation Additional Comments unsteady gait but no gross LOB noted; dec amb tolerance   Balance   Static Sitting Fair +   Dynamic Sitting Fair   Static Standing Fair -   Dynamic Standing Poor +   Ambulatory Poor +   Endurance Deficit   Endurance Deficit Yes   Endurance Deficit Description weakness; fatigue   Activity Tolerance   Activity Tolerance Patient limited by fatigue;Treatment limited secondary to medical complications (Comment)   Medical Staff Made Aware OTR Armando   Nurse Made Aware RN Savage   Assessment   Prognosis Fair   Problem List Decreased strength;Decreased endurance; Impaired balance;Decreased mobility   Assessment Pt  77 y  o female w/ known advanced pancreatic CA w/ mets to the liver & lungs, presented w/ c/o abdominal pain, weakness, elevated temperature & confusion  Pt admitted for SIRS & hyponatremia  Pt referred to PT for mobility assessment & D/C planning  Please see above for information re: home set-up & PLOF as well as objective findings during PT assessment  PTA, pt reports being fairly I w/o AD  On eval, pt functioning below baseline hence will continue skilled PT to improve function & safety  Pt require S for bed mobility however require minAx1 for transfers & amb w/ RW + cues for techniques & safety  Gait deviations as above, slow & unsteady w/ dec foot clearance but no gross LOB noted  Dec amb tolerance 2* to weakness & fatigue  The patient's AM-PAC Basic Mobility Inpatient Short Form Raw Score is 16   A Raw score of greater than 16 suggests the patient may benefit from discharge to post-acute rehabilitation services  Please also refer to the recommendation of the Physical Therapist for safe discharge planning  From PT standpoint, D/C rec: STR vs HHPT pending progress  (+) SOB during mobility but relieved w/ rest  SpO2 94-96% at RA  /62  No dizziness reported t/o session  Nsg staff most recent vital signs as follows: /61 (BP Location: Left arm)   Pulse 98   Temp 98 5 °F (36 9 °C) (Temporal)   Resp 18   Ht 5' 4" (1 626 m)   Wt 51 2 kg (112 lb 14 oz)   SpO2 96%   BMI 19 38 kg/m²   At end of session, pt OOB in chair in stable condition, call bell & phone in reach, chair alarm activated, all lines intact  Fall precautions reinforced w/ good understanding  CM to follow  Nsg staff to continue to mobilized pt (OOB in chair for all meals & ambulate in room/unit) as tolerated to prevent further decline in function  Nsg notified  Co-eval was necessary to complete this PT eval for the pt's best interest given pt's medical acuity & complexity  Goals   Patient Goals to go home   STG Expiration Date 09/10/22   Short Term Goal #1 Goals to be met in 10 days; pt will be able to: 1) inc strength & balance by 1/2 grade to improve overall functional mobility & dec fall risk; 2) inc bed mobility to modified I for pt to be able to get in/OOB safely w/ proper techniques 100% of the time, to dec caregiver burden & safely function at home; 3) inc transfers to modified I for pt to transition safely from one surface to another w/o % of the time, to dec caregiver burden & safely function at home; 4) inc amb w/ RW approx   >80' w/ S for pt to ambulate household distances w/o any % of the time, to dec caregiver burden & safely function at home; 5) negotiate stairs w/ S for inc safety during stair mgt inside/outside of home & dec caregiver burden; 6) pt/caregiver ed   PT Treatment Day 0   Plan   Treatment/Interventions Functional transfer training;LE strengthening/ROM; Elevations; Therapeutic exercise; Endurance training;Patient/family training;Bed mobility;Gait training;Spoke to nursing;OT   PT Frequency 3-5x/wk   Recommendation   PT Discharge Recommendation Post acute rehabilitation services  (STR vs HHPT pending progress)   Equipment Recommended Walker  (pt has rollator)   AM-PAC Basic Mobility Inpatient   Turning in Bed Without Bedrails 3   Lying on Back to Sitting on Edge of Flat Bed 3   Moving Bed to Chair 3   Standing Up From Chair 3   Walk in Room 2   Climb 3-5 Stairs 2   Basic Mobility Inpatient Raw Score 16   Basic Mobility Standardized Score 38 32   Highest Level Of Mobility   -HLM Goal 5: Stand one or more mins   -HLM Achieved 5: Stand (1 or more minutes)   End of Consult   Patient Position at End of Consult Bedside chair;Bed/Chair alarm activated; All needs within reach   End of Consult Comments Pt in stable condition  All lines intact  All needs in reach  Chair alarm activated     Hx/personal factors: co-morbidities, inaccessible home, advanced age, mutliple lines, use of AD, fall risk, and assist w/ ADL's  Examination: dec mobility, dec balance, dec endurance, dec amb, risk for falls  Clinical: unpredictable (ongoing medical status, abnormal lab values, and risk for falls)  Complexity: high    Watt Pih, PT

## 2022-08-31 NOTE — ASSESSMENT & PLAN NOTE
68-year-old female patient with stage IV metastatic pancreatic cancer who presented with an episode of generalized abdominal pain with distention and an elevated temperature for her baseline without fever per se    Med SIRS criteria with leukocytosis and tachycardia as well as lactic acidosis  · The patient received cefepime and vancomycin x1 - no acute infectious process was identified  · Currently reports improvement  · CTA of the chest and abdomen revealed extensive hepatic metastatic disease without significant changes from prior  · Continue serial exams and supportive management   · Undergoing infectious workup as below, recent infectious workup was unrevealing  · Unfortunately noted for overall poor prognosis, undergoing chemotherapy for her pancreatic cancer

## 2022-08-31 NOTE — NUTRITION
08/31/22 1042   Biochemical Data,Medical Tests, and Procedures   Biochemical Data/Medical Tests/Procedures Lab values reviewed; Meds reviewed   Labs (Comment) labs: 8/31 , , alk phos 1064, H&H 8 0/25 4   Meds (Comment) cyanocobalamin, ferrous sulfate, ondansetron, pantoprazole, sodium chloride   Nutrition-Focused Physical Exam   Nutrition-Focused Physical Exam Findings RN skin assessment reviewed; No edema documented   Nutrition-Focused Physical Exam Findings redness/sacrum   Medical-Related Concerns pancreatic cancer   Adequacy of Intake   Nutrition Modality PO   Feeding Route   PO Independent   Current PO Intake   Current Diet Order Surgical Soft/lite meal   Current Meal Intake 25-50%   Estimated Calorie Intake 25-50%   Estimated Protein Intake  25-50%   Estimated Fluid Intake 25-50%   Estimated calorie intake compared to estimated need PT is eating 50% or less of meals, states she cannot eat too much at one time due to early satiety, po intake will be supplemented by adding supplements and in between meal snacks   PES Statement   Problem Clinical   Weight (3) Unintended weight loss NC-3 2   Related to Decreased appetite;Early satiety; Fatigue/lethargy   As evidenced by: Per patient/family interview; Intake < estimated needs   Recommendations/Interventions   Malnutrition/BMI Present Yes   Adult Malnutrition type Chronic illness   Adult Degree of Malnutrition Malnutrition of moderate degree   Malnutrition Characteristics Muscle loss; Fat loss; Inadequate energy   360 Statement Moderate degree of malnutrition in setting of metastatic cancer, <75% energy intake compared to estimated energy needs for > 1 mo, fat loss/ orbitals dark circles/hollowing, muscle loss/depressions/temple region, protruding clavicles, treated with liberal diet, supplements and in between meal snacks   Summary Estimate PT is meeting ~50% or < of needs w/meal intake, agreeing with snacks and supplements to include in her daily intake  Currently PT is waiting for a bed/transfer to SNF for rehab   Interventions/Recommendations Adjust diet order;Supplement initiate   Intervention Comments Suggest diet change to regular as menu selections with current diet order restrict PT's choices/preferences, discussion w/ Dr Donna mendez to change diet  Also provide Ensure HP TID/chocolate  Snacks will also be provided as follows: blueberry yogurt am snack, strawberry yogurt afternoon snack and cottage cheese and peaches at HS  Recommendations to Provider Suggest diet change to regular, Ensure HP TID/chocolate, in between meal snacks: blueberry yogurt/AM, strawberry yogurt/afternoon, cottage cheese and peaches/HS, also have suggested PT mix peaches with vanilla ice cream for cool lite optional snack, PT agreeing   Education Assessment   Education Patient declined nutrition education   Education Notes PT has had previous dietary counseling for eating w/cancer via outpatient MNT  PT is very aware of her nutritional needs, and appears to be compliant with suggestions/diet hx  Patient Nutrition Goals   Goal Increase kcal/PRO intake; Wt maintained   Goal Status Initiated   Timeframe to complete goal by next f/u   Nutrition Complexity Risk   Nutrition complexity level Moderate risk   Follow up date 09/07/22

## 2022-08-31 NOTE — PLAN OF CARE
Problem: OCCUPATIONAL THERAPY ADULT  Goal: Performs self-care activities at highest level of function for planned discharge setting  See evaluation for individualized goals  Description: Treatment Interventions: ADL retraining, Functional transfer training, Endurance training, Patient/family training, Compensatory technique education          See flowsheet documentation for full assessment, interventions and recommendations  Note: Limitation: Decreased ADL status, Decreased UE strength, Decreased Safe judgement during ADL, Decreased endurance, Decreased high-level ADLs  Prognosis: Fair  Assessment: Pt is a 76y/o female admitted to the hospital 2* symptoms of abdominal pain, weakness, elevated temperatures, and confusion  Pt noted with SIRS and hyponatremia  Pt with PMH pancreatic cancer with mets(liver, lungs, brain), anemia, and recent(earlier this month) hospitalization 2* SIRS  PTA pt states independence with her ADLs, transfers, ambulation--w/o device; neg falls, neg , neg home alone  During initial eval, pt demonstrated deficits with her functional balance, functional mobility, ADL status, transfer safety, b/l UE strength, and activity tolerance(currently fair=15-20mins)  Pt would benefit from continued OT tx for the above deficits  3-5xwk/1-2wks       OT Discharge Recommendation: Post acute rehabilitation services

## 2022-09-01 VITALS
HEART RATE: 106 BPM | SYSTOLIC BLOOD PRESSURE: 111 MMHG | RESPIRATION RATE: 18 BRPM | TEMPERATURE: 97.3 F | OXYGEN SATURATION: 98 % | HEIGHT: 64 IN | DIASTOLIC BLOOD PRESSURE: 66 MMHG | WEIGHT: 117.28 LBS | BODY MASS INDEX: 20.02 KG/M2

## 2022-09-01 PROBLEM — E44.0 MODERATE PROTEIN-CALORIE MALNUTRITION (HCC): Status: ACTIVE | Noted: 2022-05-23

## 2022-09-01 LAB
ANION GAP SERPL CALCULATED.3IONS-SCNC: 11 MMOL/L (ref 4–13)
ANISOCYTOSIS BLD QL SMEAR: PRESENT
BASOPHILS # BLD MANUAL: 0.18 THOUSAND/UL (ref 0–0.1)
BASOPHILS NFR MAR MANUAL: 2 % (ref 0–1)
BUN SERPL-MCNC: 12 MG/DL (ref 5–25)
CALCIUM SERPL-MCNC: 7.3 MG/DL (ref 8.3–10.1)
CHLORIDE SERPL-SCNC: 101 MMOL/L (ref 96–108)
CO2 SERPL-SCNC: 21 MMOL/L (ref 21–32)
CREAT SERPL-MCNC: 0.58 MG/DL (ref 0.6–1.3)
EOSINOPHIL # BLD MANUAL: 0.37 THOUSAND/UL (ref 0–0.4)
EOSINOPHIL NFR BLD MANUAL: 4 % (ref 0–6)
ERYTHROCYTE [DISTWIDTH] IN BLOOD BY AUTOMATED COUNT: 22.2 % (ref 11.6–15.1)
FLUAV RNA RESP QL NAA+PROBE: NEGATIVE
FLUBV RNA RESP QL NAA+PROBE: NEGATIVE
GFR SERPL CREATININE-BSD FRML MDRD: 90 ML/MIN/1.73SQ M
GLUCOSE SERPL-MCNC: 89 MG/DL (ref 65–140)
HCT VFR BLD AUTO: 28.4 % (ref 34.8–46.1)
HGB BLD-MCNC: 8.9 G/DL (ref 11.5–15.4)
LYMPHOCYTES # BLD AUTO: 1 THOUSAND/UL (ref 0.6–4.47)
LYMPHOCYTES # BLD AUTO: 11 % (ref 14–44)
MCH RBC QN AUTO: 31.2 PG (ref 26.8–34.3)
MCHC RBC AUTO-ENTMCNC: 31.3 G/DL (ref 31.4–37.4)
MCV RBC AUTO: 100 FL (ref 82–98)
METAMYELOCYTES NFR BLD MANUAL: 1 % (ref 0–1)
MONOCYTES # BLD AUTO: 0.55 THOUSAND/UL (ref 0–1.22)
MONOCYTES NFR BLD: 6 % (ref 4–12)
MYELOCYTES NFR BLD MANUAL: 1 % (ref 0–1)
NEUTROPHILS # BLD MANUAL: 6.85 THOUSAND/UL (ref 1.85–7.62)
NEUTS SEG NFR BLD AUTO: 75 % (ref 43–75)
NRBC BLD AUTO-RTO: 1 /100 WBC (ref 0–2)
PLATELET # BLD AUTO: 328 THOUSANDS/UL (ref 149–390)
PLATELET BLD QL SMEAR: ADEQUATE
PMV BLD AUTO: 10.2 FL (ref 8.9–12.7)
POLYCHROMASIA BLD QL SMEAR: PRESENT
POTASSIUM SERPL-SCNC: 3.6 MMOL/L (ref 3.5–5.3)
RBC # BLD AUTO: 2.85 MILLION/UL (ref 3.81–5.12)
RSV RNA RESP QL NAA+PROBE: NEGATIVE
SARS-COV-2 RNA RESP QL NAA+PROBE: NEGATIVE
SODIUM SERPL-SCNC: 133 MMOL/L (ref 135–147)
WBC # BLD AUTO: 9.13 THOUSAND/UL (ref 4.31–10.16)

## 2022-09-01 PROCEDURE — 99239 HOSP IP/OBS DSCHRG MGMT >30: CPT | Performed by: FAMILY MEDICINE

## 2022-09-01 PROCEDURE — 85007 BL SMEAR W/DIFF WBC COUNT: CPT | Performed by: FAMILY MEDICINE

## 2022-09-01 PROCEDURE — 97535 SELF CARE MNGMENT TRAINING: CPT

## 2022-09-01 PROCEDURE — 97530 THERAPEUTIC ACTIVITIES: CPT

## 2022-09-01 PROCEDURE — RECHECK: Performed by: FAMILY MEDICINE

## 2022-09-01 PROCEDURE — 97110 THERAPEUTIC EXERCISES: CPT

## 2022-09-01 PROCEDURE — 85027 COMPLETE CBC AUTOMATED: CPT | Performed by: FAMILY MEDICINE

## 2022-09-01 PROCEDURE — 0241U HB NFCT DS VIR RESP RNA 4 TRGT: CPT | Performed by: FAMILY MEDICINE

## 2022-09-01 PROCEDURE — 80048 BASIC METABOLIC PNL TOTAL CA: CPT | Performed by: FAMILY MEDICINE

## 2022-09-01 RX ADMIN — CYANOCOBALAMIN TAB 500 MCG 500 MCG: 500 TAB at 08:05

## 2022-09-01 RX ADMIN — ENOXAPARIN SODIUM 40 MG: 40 INJECTION SUBCUTANEOUS at 08:05

## 2022-09-01 RX ADMIN — PANTOPRAZOLE SODIUM 20 MG: 20 TABLET, DELAYED RELEASE ORAL at 05:58

## 2022-09-01 RX ADMIN — PANCRELIPASE 24000 UNITS: 24000; 76000; 120000 CAPSULE, DELAYED RELEASE PELLETS ORAL at 08:05

## 2022-09-01 RX ADMIN — Medication 325 MG: at 08:05

## 2022-09-01 NOTE — ASSESSMENT & PLAN NOTE
· Stage IV metastatic pancreatic cancer, does not considered curable  · Undergoing palliative chemotherapy every 28 days - next treatment tentatively scheduled for September 12th  · Follows with Dr Alejandro Shea  · Goals of care have been discussed by palliative care with patient and family, for now despite poor prognosis family is electing to seek treatment and for the patient to remain full code  · CM to inquire if chemo treatments can be postponed while patient is at rehab facility

## 2022-09-01 NOTE — ASSESSMENT & PLAN NOTE
· Criteria met with leukocytosis, tachycardia, also lactic acidosis now resolved  · No obvious source of infection, UA negative for UTI, CT a chest CT abdomen/pelvis with contrast no evidence of acute infection, evidence metastatic disease similar to prior imaging  · Patient was just admitted to [facility] and discharged 08/28/2022, at that time also met sirs criteria and was treated for a possible UTI although urine cultures were negative  · Patient did receive cefepime and vancomycin in the ER, for now will continue to observe off antibiotics  · Procalcitonin elevated, however more likelhy related to her cancer rather than infectious process, continue to monitor  · Blood cultures no growth at 48 hours, follow-up final results upon discharge

## 2022-09-01 NOTE — ASSESSMENT & PLAN NOTE
Per current CT of the chest she did progression of bilateral metastatic disease  Currently remain on room air

## 2022-09-01 NOTE — CASE MANAGEMENT
Case Management Discharge Planning Note    Patient name Stanislav Hernandez  [facility] /[facility] -* MRN [id number]  : 1945 Date 2022       Current Admission Date: 2022  Current Admission Diagnosis:Generalized abdominal pain   Patient Active Problem List    Diagnosis Date Noted    Malignant neoplasm metastatic to both lungs ([facility] ) 2022    Generalized abdominal pain 2022    Hyponatremia 2022    Thrombocytosis 2022    Ascites 2022    SIRS (systemic inflammatory response syndrome) ([facility] ) 2022    Transaminitis 2022    Osseous metastasis ([facility] ) 2022    Pancreatic insufficiency 07/15/2022    Dysgeusia 07/15/2022    Chemotherapy induced nausea and vomiting 2022    Cancer related pain 2022    Counseling regarding advanced care planning and goals of care 2022    Anxiety 2022    Nausea 2022    Primary pancreatic adenocarcinoma ([facility] ) 2022    Severe protein-calorie malnutrition ([facility] ) 2022    Chronic anemia 2022    Pancreatic mass 2022    Pericardial effusion 2022    Moderate protein-calorie malnutrition ([facility] ) 2022    Weight loss 2022    Stress at home 2022    Medicare annual wellness visit, subsequent 2022    Hypokalemia 2022    Chronic pain of left ankle 2022    Iron deficiency anemia 2022    UTI (urinary tract infection) 2022    Chest pain 2022    Elevated TSH 2022    Elevated brain natriuretic peptide (BNP) level 2022    Elevated d-dimer 2022      LOS (days): 2  Geometric Mean LOS (GMLOS) (days): 2 60  Days to GMLOS:1 5     OBJECTIVE:  Risk of Unplanned Readmission Score: 28 61         Current admission status: Inpatient   Preferred Pharmacy:   [facility] [address]  Phone: [phone] Fax: [phone] primary Care Provider: Melvin Tamayo DO    Primary Insurance: [facility]  Secondary Insurance:     DISCHARGE DETAILS:    Discharge planning discussed with[de-identified] Patient and daughter  Freedom of Choice: Yes  Comments - Freedom of Choice: Pt would like to go to [facility]  Agreeable to blanket referral being placed  Awaiting accepting facilities  Pt and family agreeable to [address] contacted family/caregiver?: Yes Earnestine German)  Were Treatment Team discharge recommendations reviewed with patient/caregiver?: Yes  Did patient/caregiver verbalize understanding of patient care needs?: N/A- going to facility  Were patient/caregiver advised of the risks associated with not following Treatment Team discharge recommendations?: Yes    Contacts  Patient Contacts: Mary Block (Daughter)  Relationship to Patient[de-identified] Family  Contact Method: Phone  Phone Number: [phone]  Reason/Outcome: Continuity of Care, Discharge [address]         Is the patient interested in [facility] at discharge?: No    DME Referral Provided  Referral made for DME?: No    Other Referral/Resources/Interventions Provided:  Interventions: Short Term Rehab         Treatment Team Recommendation: Short Term Rehab  Discharge Destination Plan[de-identified] Short Term Rehab  Transport at Discharge : [facility]                    Transfer Mode: Wheelchair  Accompanied by: EMS personnel              Additional Comments: CM spoke with pt's daughter over the phone  She was in agreement with a discharge today to [address] to begin the pt's rehab  CM also spoke with pt who was agreeable to a discharge today to [facility] as well  This CM discussed the OOP expense for a WCV, both parties reported understanding and were okay with the pt going by [facility]  [facility]CV arranged with [facility] w/  time of 1830  CM will continue to follow

## 2022-09-01 NOTE — PLAN OF CARE
Problem: OCCUPATIONAL THERAPY ADULT  Goal: Performs self-care activities at highest level of function for planned discharge setting  See evaluation for individualized goals  Description: Treatment Interventions: ADL retraining, Functional transfer training, UE strengthening/ROM, Endurance training, Cognitive reorientation, Patient/family training, Equipment evaluation/education, Compensatory technique education, Energy conservation          See flowsheet documentation for full assessment, interventions and recommendations  Note: Limitation: Decreased ADL status, Decreased UE strength, Decreased Safe judgement during ADL, Decreased endurance, Decreased high-level ADLs  Prognosis: Fair  Assessment: Pt seen for 38min tx session with focus on functional balance, functional mobility, ADL status, transfer after, and cognition  Pt able to tolerate OOB mobility; sitting balance=f+/f, standing balance=f/f-  Pt required verbal/physical cues to maintain transfer safety  Pt required assistance with UE ADLs  Therapist reviewed energy conservation techniques with good carryover noted  Pt able to demonstrate good cognition(i e orientation)  Pt fatigues quickly, limiting tolerance for functional activities  Pt continues to demonstrate the need for inpt rehab to improve her overall level of independence  Will continue       OT Discharge Recommendation: Post acute rehabilitation services

## 2022-09-01 NOTE — ASSESSMENT & PLAN NOTE
Malnutrition Findings:   Adult Malnutrition type: Chronic illness  Adult Degree of Malnutrition: Malnutrition of moderate degree  Malnutrition Characteristics: Muscle loss, Fat loss, Inadequate energy                  360 Statement: Moderate degree of malnutrition in setting of metastatic cancer, <75% energy intake compared to estimated energy needs for > 1 mo, fat loss/ orbitals dark circles/hollowing, muscle loss/depressions/temple region, protruding clavicles, treated with liberal diet, supplements and in between meal snacks    BMI Findings: Body mass index is 20 13 kg/m²     Nutrition consult  Nutritional supplements

## 2022-09-01 NOTE — ACP (ADVANCE CARE PLANNING)
Serious Illness Conversation    1  What is your understanding now of where you are with your illness? Prognostic Understanding: appropriate understanding of prognosis     2  How much information about what is likely to be ahead with your illness would you like to have? Information: patient wants to be fully informed     3  What did you (clinician) communicate to the patient? Prognostic Communication: Uncertain - It can be difficult to predict what will happen with your illness  I hope you will continue to live well for a long time but Im worried that you could get sick quickly, and I think it is important to prepare for that possibility  4  If your health situation worsens, what are your most important goals? Goals: be physically comfortable     5  What are the biggest fears and worries about the future and your health? 6  What abilities are so critical to your life that you cannot imagine living without them? Unacceptable Function: being in pain or very uncomfortable     7  What gives you strength as you think about the future with your illness? 8  If you become sicker, how much are you willing to go through for the possibility of gaining more time? Be in the hospital: Yes Have a feeding tube: No   Be in the ICU: Yes    Be on a ventilator: Yes       9  How much does your proxy and family know about your priorities and wishes? Discussion Discussion: wants clinician to talk with family     Theron heard you say that receiving chemo is really important to you  Keeping that in mind, and what we know about your illness, I recommend that we refer you to inpatient rehab in attempt to get stronger for your treatments  This will help us make sure that your treatment plans reflect whats important to you  How does this plan sound to you? I will do everything I can to help you through this       I have spent 30 minutes speaking with my patient on advanced care planning today or during this visit Advanced directives  Five Wishes: Patient has Five WIshes in chart

## 2022-09-01 NOTE — ASSESSMENT & PLAN NOTE
68-year-old female patient with stage IV metastatic pancreatic cancer who presented with an episode of generalized abdominal pain with distention and an elevated temperature for her baseline without fever per se    Med SIRS criteria with leukocytosis and tachycardia as well as lactic acidosis  · Appears related to metastatic disease - no acute infectious process was identified on imaging  · The patient received cefepime and vancomycin in ED - has been observed off antibiotics   · CTA of the chest and abdomen revealed extensive hepatic metastatic disease without significant changes from prior  · Continue serial exams and supportive management   · Undergoing infectious workup as below, recent infectious workup was unrevealing  · Unfortunately noted for overall poor prognosis, undergoing chemotherapy for her pancreatic cancer

## 2022-09-01 NOTE — PLAN OF CARE
Problem: MOBILITY - ADULT  Goal: Maintain or return to baseline ADL function  Description: INTERVENTIONS:  - Educate patient/family on patient safety including physical limitations  - Instruct patient to call for assistance with activity   - Consult OT/PT to assist with strengthening/mobility   - Keep Call bell within reach  - Keep bed low and locked with side rails adjusted as appropriate  - Keep care items and personal belongings within reach  - Initiate and maintain comfort rounds  - Make Fall Risk Sign visible to staff  - Offer Toileting every 2 Hours, in advance of need  - Initiate/Maintain bed alarm  - Obtain necessary fall risk management equipment: call bell, bedside commode, yellow socks  - Apply yellow socks and bracelet for high fall risk patients  - Consider moving patient to room near nurses station  Outcome: Progressing  Goal: Maintains/Returns to pre admission functional level  Description: INTERVENTIONS:  - Perform BMAT or MOVE assessment daily    - Set and communicate daily mobility goal to care team and patient/family/caregiver  - Collaborate with rehabilitation services on mobility goals if consulted  - Perform Range of Motion  times a day  - Reposition patient every  hours    - Dangle patient  times a day  - Stand patient  times a day  - Ambulate patient  times a day  - Out of bed to chair  times a day   - Out of bed for meals  times a day  - Out of bed for toileting  - Record patient progress and toleration of activity level   Outcome: Progressing     Problem: PAIN - ADULT  Goal: Verbalizes/displays adequate comfort level or baseline comfort level  Description: Interventions:  - Encourage patient to monitor pain and request assistance  - Assess pain using appropriate pain scale  - Administer analgesics based on type and severity of pain and evaluate response  - Implement non-pharmacological measures as appropriate and evaluate response  - Consider cultural and social influences on pain and pain management  - Notify physician/advanced practitioner if interventions unsuccessful or patient reports new pain  Outcome: Progressing     Problem: INFECTION - ADULT  Goal: Absence or prevention of progression during hospitalization  Description: INTERVENTIONS:  - Assess and monitor for signs and symptoms of infection  - Monitor lab/diagnostic results  - Monitor all insertion sites, i e  indwelling lines, tubes, and drains  - Monitor endotracheal if appropriate and nasal secretions for changes in amount and color  - [place] appropriate cooling/warming therapies per order  - Administer medications as ordered  - Instruct and encourage patient and family to use good hand hygiene technique  - Identify and instruct in appropriate isolation precautions for identified infection/condition  Outcome: Progressing     Problem: SAFETY ADULT  Goal: Maintain or return to baseline ADL function  Description: INTERVENTIONS:  - Educate patient/family on patient safety including physical limitations  - Instruct patient to call for assistance with activity   - Consult OT/PT to assist with strengthening/mobility   - Keep Call bell within reach  - Keep bed low and locked with side rails adjusted as appropriate  - Keep care items and personal belongings within reach  - Initiate and maintain comfort rounds  - Make Fall Risk Sign visible to staff  - Offer Toileting every 2 Hours, in advance of need  - Initiate/Maintain bed alarm  - Obtain necessary fall risk management equipment: call bell, bedside commode, yellow socks  - Apply yellow socks and bracelet for high fall risk patients  - Consider moving patient to room near nurses station  Outcome: Progressing  Goal: Maintains/Returns to pre admission functional level  Description: INTERVENTIONS:  - Perform BMAT or MOVE assessment daily    - Set and communicate daily mobility goal to care team and patient/family/caregiver     - Collaborate with rehabilitation services on mobility goals if consulted  - Perform Range of Motion times a day  - Reposition patient every  hours  - Dangle patient  times a day  - Stand patient  times a day  - Ambulate patient  times a day  - Out of bed to chair  times a day   - Out of bed for meals  times a day  - Out of bed for toileting  - Record patient progress and toleration of activity level   Outcome: Progressing     Problem: DISCHARGE PLANNING  Goal: Discharge to home or other facility with appropriate resources  Description: INTERVENTIONS:  - Identify barriers to discharge w/patient and caregiver  - Arrange for needed discharge resources and transportation as appropriate  - Identify discharge learning needs (meds, wound care, etc )  - Refer to Case Management Department for coordinating discharge planning if the patient needs post-hospital services based on physician/advanced practitioner order or complex needs related to functional status, cognitive ability, or social support system  Outcome: Progressing     Problem: Nutrition/Hydration-ADULT  Goal: Nutrient/Hydration intake appropriate for improving, restoring or maintaining nutritional needs  Description: Monitor and assess patient's nutrition/hydration status for malnutrition  Collaborate with interdisciplinary team and initiate plan and interventions as ordered  Monitor patient's weight and dietary intake as ordered or per policy  Utilize nutrition screening tool and intervene as necessary  Determine patient's food preferences and provide high-protein, high-caloric foods as appropriate       INTERVENTIONS:  - Monitor oral intake, urinary output, labs, and treatment plans  - Assess nutrition and hydration status and recommend course of action  - Evaluate amount of meals eaten  - Allow adequate time for meals  - Recommend/ encourage appropriate diets, oral nutritional supplements, and vitamin/mineral supplements  - Order, calculate, and assess calorie counts as needed  - Assess need for intravenous fluids  - Provide specific nutrition/hydration education as appropriate  - Include patient/family/caregiver in decisions related to nutrition  Outcome: Progressing     Problem: COPING  Goal: Pt/Family able to verbalize concerns and demonstrate effective coping strategies  Description: INTERVENTIONS:  - Assist patient/family to identify coping skills, available support systems and cultural and spiritual values  - Provide emotional support, including active listening and acknowledgement of concerns of patient and caregivers  - Reduce environmental stimuli, as able  - Provide patient education  - Assess for spiritual pain/suffering and initiate spiritual care, including notification of Pastoral Care or giovany based community as needed  - Assess effectiveness of coping strategies  Outcome: Progressing  Goal: Will report anxiety at manageable levels  Description: INTERVENTIONS:  - Administer medication as ordered  - Teach and encourage coping skills  - Provide emotional support  - Assess patient/family for anxiety and ability to cope  Outcome: Progressing     Problem: Depression - IP adult  Goal: Effects of depression will be minimized  Description: INTERVENTIONS:  - Assess impact of patient's symptoms on level of functioning, self-care needs and offer support as indicated  - Assess patient/family knowledge of depression, impact on illness and need for teaching  - Provide emotional support, presence and reassurance  - Initiate consults and referrals as appropriate (a mental health professional, Spiritual Care)  Outcome: Progressing     Problem: Prexisting or High Potential for Compromised Skin Integrity  Goal: Skin integrity is maintained or improved  Description: INTERVENTIONS:  - Identify patients at risk for skin breakdown  - Assess and monitor skin integrity  - Assess and monitor nutrition and hydration status  - Monitor labs   - Assess for incontinence   - Turn and reposition patient  - Assist with mobility/ambulation  - Relieve pressure over bony prominences  - Avoid friction and shearing  - Provide appropriate hygiene as needed including keeping skin clean and dry  - Evaluate need for skin moisturizer/barrier cream  - Collaborate with interdisciplinary team   - Patient/family teaching  - Consider wound care consult   Outcome: Progressing     Problem: GASTROINTESTINAL - ADULT  Goal: Maintains adequate nutritional intake  Description: INTERVENTIONS:  - Monitor percentage of each meal consumed  - Identify factors contributing to decreased intake, treat as appropriate  - Assist with meals as needed  - Monitor I&O, weight, and lab values if indicated  - Obtain nutrition services referral as needed  Outcome: Progressing     Problem: SKIN/TISSUE INTEGRITY - ADULT  Goal: Skin Integrity remains intact(Skin Breakdown Prevention)  Description: Assess:  -Perform Darrick assessment every   -Clean and moisturize skin every   -Inspect skin when repositioning, toileting, and assisting with ADLS  -Assess under medical devices such as  every   -Assess extremities for adequate circulation and sensation     Bed Management:  -Have minimal linens on bed & keep smooth, unwrinkled  -Change linens as needed when moist or perspiring  -Avoid sitting or lying in one position for more than  hours while in bed  -Keep HOB at degrees     Toileting:  -Offer bedside commode  -Assess for incontinence every   -Use incontinent care products after each incontinent episode such as     Activity:  -Mobilize patient  times a day  -Encourage activity and walks on unit  -Encourage or provide ROM exercises   -Turn and reposition patient every  Hours  -Use appropriate equipment to lift or move patient in bed  -Instruct/ Assist with weight shifting every  when out of bed in chair  -Consider limitation of chair time  hour intervals    Skin Care:  -Avoid use of baby powder, tape, friction and shearing, hot water or constrictive clothing  -Relieve pressure over bony prominences using   -Do not massage red bony areas    Next Steps:  -Teach patient strategies to minimize risks such as    -Consider consults to  interdisciplinary teams such as  Outcome: Progressing     Problem: HEMATOLOGIC - ADULT  Goal: Maintains hematologic stability  Description: INTERVENTIONS  - Assess for signs and symptoms of bleeding or hemorrhage  - Monitor labs  - Administer supportive blood products/factors as ordered and appropriate  Outcome: Progressing

## 2022-09-01 NOTE — ASSESSMENT & PLAN NOTE
· Stage IV metastatic pancreatic cancer, does not considered curable  · Undergoing palliative chemotherapy every 28 days - next treatment tentatively scheduled for September 12th  · Follows with Dr Sivan Mcclellan  · Goals of care have been discussed by palliative care with patient and family, for now despite poor prognosis family is electing to seek treatment and for the patient to remain full code  · Chemo will be on hold while patient was at rehab, resume follow-up with Heme-Onc for determination when chemotherapy can be resumed

## 2022-09-01 NOTE — ASSESSMENT & PLAN NOTE
Per current CT of the chest she did progression of bilateral metastatic disease  Currently remains on room air

## 2022-09-01 NOTE — OCCUPATIONAL THERAPY NOTE
Occupational Therapy Progress Note(time=2340-0334)     Patient Name: Neto PICKARD Date: 9/1/2022  Problem List  Principal Problem:    Generalized abdominal pain  Active Problems: Moderate protein-calorie malnutrition (HCC)    Chronic anemia    Primary pancreatic adenocarcinoma (HCC)    Cancer related pain    SIRS (systemic inflammatory response syndrome) (HCC)    Malignant neoplasm metastatic to both lungs [facility])    Hyponatremia            09/01/22 1347   Note Type   Note Type Treatment   Restrictions/Precautions   Weight Bearing Precautions Per Order No   Other Precautions Fall Risk;Pain;Cognitive; Chair Alarm; Bed Alarm   Pain Assessment   Pain Assessment Tool FLACC   Pain Location/Orientation Location: Abdomen   Pain Rating: FLACC (Rest) - Face 0   Pain Rating: FLACC (Rest) - Legs 0   Pain Rating: FLACC (Rest) - Activity 0   Pain Rating: FLACC (Rest) - Cry 1   Pain Rating: FLACC (Rest) - Consolability 0   Score: FLACC (Rest) 1   ADL   Where Assessed Edge of bed   UB Dressing Assistance 2  Maximal Assistance   UB Dressing Deficit Thread RUE; Thread LUE; Fasteners   Functional Standing Tolerance   Time 1-2mins   Bed Mobility   Rolling R 5  Supervision   Rolling L 5  Supervision   Supine to Sit 4  Minimal assistance   Additional items Assist x 1; Increased time required;Verbal cues;LE management   Transfers   Sit to Stand 4  Minimal assistance   Additional items Assist x 1; Increased time required;Verbal cues   Stand to Sit 4  Minimal assistance   Additional items Assist x 1; Increased time required;Verbal cues   Additional Comments bp's=93/55(sitting after ambulation), 118/70(return to supine), SPO2=96% on RA--nsg made aware   Functional Mobility   Functional Mobility 4  Minimal assistance   Additional Comments x1   Additional items Rolling walker   Cognition   Overall Cognitive Status [facility]   Arousal/Participation Alert   Attention Attends with cues to redirect   Following Commands Follows one step commands without difficulty   Activity Tolerance   Activity Tolerance Patient limited by fatigue   Medical Staff Made Aware nsg, P T  Assessment   Assessment Pt seen for 38min tx session with focus on functional balance, functional mobility, ADL status, transfer after, and cognition  Pt able to tolerate OOB mobility; sitting balance=f+/f, standing balance=f/f-  Pt required verbal/physical cues to maintain transfer safety  Pt required assistance with UE ADLs  Therapist reviewed energy conservation techniques with good carryover noted  Pt able to demonstrate good cognition(i e orientation)  Pt fatigues quickly, limiting tolerance for functional activities  Pt continues to demonstrate the need for inpt rehab to improve her overall level of independence  Will continue  Plan   Treatment Interventions ADL retraining;Functional transfer training;UE strengthening/ROM; Endurance training;Cognitive reorientation;Patient/family training;Equipment evaluation/education; Compensatory technique education; Energy conservation   Goal Expiration Date 09/15/22   OT Treatment Day 1   OT Frequency 3-5x/wk   Recommendation   OT Discharge Recommendation Post acute rehabilitation services   AM-PAC Daily Activity Inpatient   Lower Body Dressing 2   Bathing 2   Toileting 2   Upper Body Dressing 2   Grooming 3   Eating 4   Daily Activity Raw Score 15   Daily Activity Standardized Score (Calc for Raw Score >=11) 34 69   AM-PAC Applied Cognition Inpatient   Following a Speech/Presentation 4   Understanding Ordinary Conversation 4   Taking Medications 3   Remembering Where Things Are Placed or Put Away 3   Remembering List of 4-5 Errands 3   Taking Care of Complicated Tasks 3   Applied Cognition Raw Score 20   Applied Cognition Standardized Score 41 76   Eliz Zuniga, OT

## 2022-09-01 NOTE — ASSESSMENT & PLAN NOTE
· Criteria met with leukocytosis, tachycardia, also lactic acidosis now resolved  · No obvious source of infection, UA negative for UTI, CT a chest CT abdomen/pelvis with contrast no evidence of acute infection, evidence metastatic disease similar to prior imaging  · Patient was just admitted to [facility] and discharged 08/28/2022, at that time also met sirs criteria and was treated for a possible UTI although urine cultures were negative  · Patient did receive cefepime and vancomycin in the ER, for now will continue to observe off antibiotics  · Procalcitonin elevated, however more likelhy related to her cancer rather than infectious process, continue to monitor  · Blood cultures no growth at 48 hours, follow-up final results  · Monitor CBC, VS

## 2022-09-01 NOTE — PROGRESS NOTES
[facility]  Progress Note - Andre Carter 1945, 78 y o  female MRN: [id number]  Unit/Bed#: E2 -01 Encounter: [id number]  Primary Care Provider: Do Miller DO   Date and time admitted to hospital: 8/29/2022  9:09 PM    * Generalized abdominal pain  Assessment & Plan  29-year-old female patient with stage IV metastatic pancreatic cancer who presented with an episode of generalized abdominal pain with distention and an elevated temperature for her baseline without fever per se    Med SIRS criteria with leukocytosis and tachycardia as well as lactic acidosis  · Appears related to metastatic disease - no acute infectious process was identified on imaging  · The patient received cefepime and vancomycin in ED - has been observed off antibiotics   · CTA of the chest and abdomen revealed extensive hepatic metastatic disease without significant changes from prior  · Continue serial exams and supportive management   · Undergoing infectious workup as below, recent infectious workup was unrevealing  · Unfortunately noted for overall poor prognosis, undergoing chemotherapy for her pancreatic cancer      SIRS (systemic inflammatory response syndrome) (HCC)  Assessment & Plan  · Criteria met with leukocytosis, tachycardia, also lactic acidosis now resolved  · No obvious source of infection, UA negative for UTI, CT a chest CT abdomen/pelvis with contrast no evidence of acute infection, evidence metastatic disease similar to prior imaging  · Patient was just admitted to [address] [address] and discharged 08/28/2022, at that time also met sirs criteria and was treated for a possible UTI although urine cultures were negative  · Patient did receive cefepime and vancomycin in the ER, for now will continue to observe off antibiotics  · Procalcitonin elevated, however more likelhy related to her cancer rather than infectious process, continue to monitor  · Blood cultures no growth at 48 hours, follow-up final results  · Monitor CBC, VS    Hyponatremia  Assessment & Plan  Mild - initially resolved with NS  Monitor BMP, oral intake    Malignant neoplasm metastatic to both lungs [facility])  Assessment & Plan  Per current CT of the chest she did progression of bilateral metastatic disease  Currently remain on room air     Cancer related pain  Assessment & Plan  Patient presents with abdominal pain now improved  Continue supportive management    Primary pancreatic adenocarcinoma [facility])  Assessment & Plan  · Stage IV metastatic pancreatic cancer, does not considered curable  · Undergoing palliative chemotherapy every 28 days - next treatment tentatively scheduled for September 12th  · Follows with Dr Thony Howard  · Goals of care have been discussed by palliative care with patient and family, for now despite poor prognosis family is electing to seek treatment and for the patient to remain full code  · CM to inquire if chemo treatments can be postponed while patient is at rehab facility     Chronic anemia  Assessment & Plan  Hemoglobin at baseline, continue to monitor    Moderate protein-calorie malnutrition ([facility] [address] )  Assessment & Plan  Malnutrition Findings:   Adult Malnutrition type: Chronic illness  Adult Degree of Malnutrition: Malnutrition of moderate degree  Malnutrition Characteristics: Muscle loss, Fat loss, Inadequate energy                  360 Statement: Moderate degree of malnutrition in setting of metastatic cancer, <75% energy intake compared to estimated energy needs for > 1 mo, fat loss/ orbitals dark circles/hollowing, muscle loss/depressions/temple region, protruding clavicles, treated with liberal diet, supplements and in between meal snacks    BMI Findings: Body mass index is 20 13 kg/m²  Nutrition consult  Nutritional supplements          VTE Pharmacologic Prophylaxis: VTE Score: 7 High Risk (Score >/= 5) - Pharmacological DVT Prophylaxis Ordered: enoxaparin (Lovenox)   Sequential Compression Devices Ordered  Patient Centered Rounds: I performed bedside rounds with nursing staff today  Discussions with Specialists or Other Care Team Provider: CM    Education and Discussions with Family / Patient: Updated  (daughter) via phone  Time Spent for Care: 30 minutes  More than 50% of total time spent on counseling and coordination of care as described above  Current Length of Stay: 2 day(s)  Current Patient Status: Inpatient   Certification Statement: The patient will continue to require additional inpatient hospital stay due to close monitoring, safe discharge planning   Discharge Plan: Anticipate discharge in 24-48 hrs to rehab facility  Code Status: Level 1 - Full Code    Subjective:   Patient seen and examined  Reports feeling similar to day before, no acute complaints  Objective:     Vitals:   Temp (24hrs), Av 1 °F (36 7 °C), Min:97 6 °F (36 4 °C), Max:98 4 °F (36 9 °C)    Temp:  [97 6 °F (36 4 °C)-98 4 °F (36 9 °C)] 97 6 °F (36 4 °C)  HR:  [95-98] 98  Resp:  [18] 18  BP: (105-117)/(57-65) 105/57  SpO2:  [95 %-98 %] 98 %  Body mass index is 20 13 kg/m²  Input and Output Summary (last 24 hours): Intake/Output Summary (Last 24 hours) at 2022 1123  Last data filed at 2022 1413  Gross per 24 hour   Intake 500 ml   Output --   Net 500 ml       Physical Exam:   Physical Exam  Constitutional:       Appearance: She is cachectic  She is ill-appearing  HENT:      Head: Normocephalic and atraumatic  Mouth/Throat:      Pharynx: Oropharynx is clear  Eyes:      Conjunctiva/sclera: Conjunctivae normal    Cardiovascular:      Rate and Rhythm: Normal rate and regular rhythm  Heart sounds: No murmur heard  Pulmonary:      Effort: No respiratory distress  Breath sounds: No wheezing or rales  Abdominal:      General: There is no distension  Tenderness: There is no abdominal tenderness  There is no guarding     Musculoskeletal:      Right lower leg: No edema  Left lower leg: No edema  Skin:     General: Skin is warm and dry  Neurological:      Mental Status: Mental status is at baseline  Psychiatric:         Behavior: Behavior is slowed and withdrawn  Additional Data:     Labs:  Results from last 7 days   Lab Units 09/01/22  0450 08/30/22  1129 08/29/22 2227 08/27/22  0641 08/27/22  0641   WBC Thousand/uL 9 13   < > 17 21*  --  8 11   HEMOGLOBIN g/dL 8 9*   < > 8 9*  --  8 6*   HEMATOCRIT % 28 4*   < > 26 8*  --  26 1*   PLATELETS Thousands/uL 328   < > 373  --  508*   BANDS PCT %  --   --  9*  --   --    NEUTROS PCT %  --   --   --   --  85*   LYMPHS PCT %  --   --   --   --  8*   LYMPHO PCT % 11*  --  3*   < >  --    MONOS PCT %  --   --   --   --  2*   MONO PCT % 6  --  2*   < >  --    EOS PCT % 4  --  2   < > 2    < > = values in this interval not displayed       Results from last 7 days   Lab Units 09/01/22  0450 08/31/22  0521   SODIUM mmol/L 133* 136   POTASSIUM mmol/L 3 6 3 6   CHLORIDE mmol/L 101 103   CO2 mmol/L 21 21   BUN mg/dL 12 12   CREATININE mg/dL 0 58* 0 55*   ANION GAP mmol/L 11 12   CALCIUM mg/dL 7 3* 7 2*   ALBUMIN g/dL  --  1 9*   TOTAL BILIRUBIN mg/dL  --  1 15*   ALK PHOS U/L  --  1,064*   ALT U/L  --  120*   AST U/L  --  227*   GLUCOSE RANDOM mg/dL 89 82     Results from last 7 days   Lab Units 08/31/22  0521   INR  1 53*             Results from last 7 days   Lab Units 08/31/22  0521 08/30/22  1349 08/30/22  0043 08/29/22 2227 08/27/22  0641   LACTIC ACID mmol/L  --  2 0 2 1* 2 4*  --    PROCALCITONIN ng/ml 5 62*  --   --  5 28* 4 94*       Lines/Drains:  Invasive Devices  Report    Central Venous Catheter Line  Duration           Port A Cath 06/28/22 Right Chest 64 days          Peripheral Intravenous Line  Duration           Peripheral IV 08/29/22 Right Antecubital 2 days    Peripheral IV 08/29/22 Right Hand 2 days                Central Line:  Goal for removal: chronic port              Imaging: no new     Recent Cultures (last 7 days):   Results from last 7 days   Lab Units 08/29/22  2227 08/26/22  1815   BLOOD CULTURE  No Growth at 48 hrs  No Growth at 48 hrs  --    URINE CULTURE   --  No Growth <1000 cfu/mL       Last 24 Hours Medication List:   Current Facility-Administered Medications   Medication Dose Route Frequency Provider Last Rate    acetaminophen  650 mg Oral Q6H PRN Catherine Badillo MD      cyanocobalamin  500 mcg Oral Daily Catherine Badillo MD      enoxaparin  40 mg Subcutaneous Q24H [address] Catherine Badillo MD      ferrous sulfate  325 mg Oral Daily With Breakfast Catherine Guzmán MD      HYDROmorphone  0 5 mg Intravenous Q4H PRN Catherine Badillo MD      ondansetron  4 mg Intravenous Q6H PRN Catherine Badillo MD      oxyCODONE  5 mg Oral Q4H PRN Catherine Badillo MD      Or    oxyCODONE  10 mg Oral Q4H PRN Catherine Badillo MD      pancrelipase (Lip-Prot-Amyl)  24,000 Units Oral BID Catherine Badillo MD      pantoprazole  20 mg Oral Early Morning Tianna Fortune MD          Today, Patient Was Seen By: Tianna Fortune MD    **Please Note: This note may have been constructed using a voice recognition system  **

## 2022-09-01 NOTE — ASSESSMENT & PLAN NOTE
21-year-old female patient with stage IV metastatic pancreatic cancer who presented with an episode of generalized abdominal pain with distention and an elevated temperature for her baseline without fever per se    Med SIRS criteria with leukocytosis and tachycardia as well as lactic acidosis  · Appears related to metastatic disease - no acute infectious process was identified on imaging  · The patient received cefepime and vancomycin in ED - has been observed off antibiotics   · CTA of the chest and abdomen revealed extensive hepatic metastatic disease without significant changes from prior  · Continue serial exams and supportive management   · Infectious workup was unrevealing  · Unfortunately noted for overall poor prognosis, undergoing chemotherapy for her pancreatic cancer - resume treatment at discretion of patient's oncologist

## 2022-09-01 NOTE — PHYSICAL THERAPY NOTE
PT PROGRESS NOTE    Name: Sherice Abreu  AGE: 78 y o  MRN: [id number]  LENGTH OF STAY: 2    Admitting Dx:  Abdominal pain [R10 9]  Generalized abdominal pain [R10 84]  Pancreatic cancer metastasized to liver ([facility] [address] ) [C25 9, C78 7]  Sepsis ([facility] [address] ) [A41 9]      Patient Active Problem List   Diagnosis    UTI (urinary tract infection)    Chest pain    Elevated TSH    Elevated brain natriuretic peptide (BNP) level    Elevated d-dimer    Iron deficiency anemia    Hypokalemia    Chronic pain of left ankle    Weight loss    Stress at home    Medicare annual wellness visit, subsequent    Moderate protein-calorie malnutrition (HCC)    Pancreatic mass    Pericardial effusion    Chronic anemia    Severe protein-calorie malnutrition (HCC)    Primary pancreatic adenocarcinoma (HCC)    Anxiety    Nausea    Cancer related pain    Counseling regarding advanced care planning and goals of care    Chemotherapy induced nausea and vomiting    Pancreatic insufficiency    Dysgeusia    Osseous metastasis (HCC)    SIRS (systemic inflammatory response syndrome) (HCC)    Transaminitis    Ascites    Thrombocytosis    Malignant neoplasm metastatic to both lungs [facility])    Generalized abdominal pain    Hyponatremia               09/01/22 1446   PT Last Visit   PT Visit Date 09/01/22   Note Type   Note Type Treatment   Pain Assessment   Pain Assessment Tool FLACC   Pain Location/Orientation Location: Abdomen   Hospital Pain Intervention(s) Repositioned; Ambulation/increased activity; Emotional support; Rest   Pain Rating: FLACC (Rest) - Face 0   Pain Rating: FLACC (Rest) - Legs 0   Pain Rating: FLACC (Rest) - Activity 0   Pain Rating: FLACC (Rest) - Cry 1   Pain Rating: FLACC (Rest) - Consolability 0   Score: FLACC (Rest) 1   Pain Rating: FLACC (Activity) - Face 1   Pain Rating: FLACC (Activity) - Legs 1   Pain Rating: FLACC (Activity) - Activity 0   Pain Rating: FLACC (Activity) - Cry 1   Pain Rating: FLACC (Activity) - Consolability 0   Score: FLACC (Activity) 3   Restrictions/Precautions   Weight Bearing Precautions Per Order No   Other Precautions Chair Alarm; Bed Alarm; Fall Risk;Pain  (hypotension)   General   Chart Reviewed Yes   Response to Previous Treatment Patient reporting fatigue but able to participate  Family/Caregiver Present No   Cognition   Overall Cognitive Status WFL   Arousal/Participation Alert   Attention Attends with cues to redirect   Orientation Level Oriented to person;Oriented to place;Oriented to time   Following Commands Follows all commands and directions without difficulty   Comments flat affect; cooperative   Subjective   Subjective Pt agreeable to therapy  Bed Mobility   Supine to Sit Unable to assess  (pt seated EOB w/ OT upon my arrival)   Sit to Supine 4  Minimal assistance   Additional items Assist x 1;HOB elevated; Bedrails; Increased time required;LE management;Verbal cues   Additional Comments cues for techniques & safety   Transfers   Sit to Stand 4  Minimal assistance   Additional items Assist x 1; Increased time required;Verbal cues   Stand to Sit 4  Minimal assistance   Additional items Assist x 1;Verbal cues; Increased time required   Additional Comments cues for techniques & safety   Ambulation/Elevation   Gait pattern Narrow JESSE; Decreased foot clearance;Shuffling; Short stride; Step to;Excessively slow   Gait Assistance 4  Minimal assist   Additional items Assist x 1;Verbal cues; Tactile cues   Assistive Device Rolling walker   Distance 5'x1   Ambulation/Elevation Additional Comments unsteady gait; (+) dizziness w/ BP 93/55 taken after amb   Balance   Static Sitting Fair +   Dynamic Sitting Fair   Static Standing Fair -   Dynamic Standing Poor +   Ambulatory Poor +   Endurance Deficit   Endurance Deficit Yes   Endurance Deficit Description weakness; fatigue; pain   Activity Tolerance   Activity Tolerance Patient limited by fatigue;Patient limited by pain;Treatment limited secondary to medical complications (Comment) Medical Staff Made Aware OTR Armando   Nurse Made Aware ANSELMO Foster   Exercises   Quad Sets Supine;10 reps;AROM; Bilateral   Heelslides Supine;10 reps;AAROM; Bilateral   Hip Flexion Supine;10 reps;AAROM; Bilateral  (SLR)   Hip Abduction Supine;10 reps;AAROM; Bilateral   Hip Adduction Supine;10 reps;AAROM; Bilateral   Ankle Pumps Supine;10 reps;AAROM; Bilateral   Assessment   Prognosis Fair   Problem List Decreased strength;Decreased endurance; Impaired balance;Decreased mobility;Pain   Assessment Pt seen for PT per POC  Pt continue to require minAx1 for most functional mobility + cues for techniques & safety  Gait deviations as above, slow & unsteady w/ dec foot clearance & strides but no gross LOB noted  Pt continue to demonstrate dec amb tolerance 2* to dizziness, weakness, pain & fatigue  Pt noted to be hypotensive during amb w/ BP of 93/55, symptomatic  Symptoms not relieved in seated position hence pt requested to get back in bed  Improved BP to 118/70 once back in supine  Pt also c/o SOB during mobility w/ SpO2 at 96% at RA  Pt tolerated above mentioned thera  ex well + rest periods in between exercises 2* to fatigue  Pt require cues for proper exercise techniques and performance  Pt require rest periods t/o session 2* to SOB & fatigue  Please see flowsheet above for objective findings & levels of assistance required for all functional tasks during this tx session  Nsg staff most recent vital signs as follows: /66 (BP Location: Right arm)   Pulse (!) 106   Temp (!) 97 3 °F (36 3 °C) (Temporal)   Resp 18   Ht 5' 4" (1 626 m)   Wt 53 2 kg (117 lb 4 6 oz)   SpO2 98%   BMI 20 13 kg/m²   Will continue PT per POC  At end of session, pt back in bed in stable condition, call bell & phone in reach, bed alarm activated  Fall precautions reinforced w/ good understanding  The patient's AM-PAC Basic Mobility Inpatient Short Form Raw Score is 15   A Raw score of less than or equal to 16 suggests the patient may benefit from discharge to post-acute rehabilitation services  Please also refer to the recommendation of the Physical Therapist for safe discharge planning  From PT standpoint, will continue to recommend inpt rehab at D/C  CM to follow  Ns staff to continue to mobilized pt (OOB in chair for all meals) as tolerated to prevent decline in function  Nsg notified  Goals   Patient Goals to get better   STG Expiration Date 09/10/22   PT Treatment Day 1   Plan   Treatment/Interventions LE strengthening/ROM; Functional transfer training; Therapeutic exercise; Endurance training;Patient/family training;Bed mobility;Gait training;Elevations; Spoke to nursing;OT   Progress Slow progress, medical status limitations   PT Frequency 3-5x/wk   Recommendation   PT Discharge Recommendation Post acute rehabilitation services   Equipment Recommended Walker  (pr has rollator)   [address]   Turning in Bed Without Bedrails 3   Lying on Back to Sitting on Edge of Flat Bed 3   Moving Bed to Chair 3   Standing Up From Chair 3   Walk in Room 2   Climb 3-5 Stairs 1   Basic Mobility Inpatient Raw Score 15   Basic Mobility Standardized Score 36 97   Highest Level Of Mobility   -[facility] Goal 4: Move to chair/commode   -HL Achieved 5: Stand (1 or more minutes)   Education   Education Provided Mobility training;Home exercise program;Assistive device   Patient Demonstrates acceptance/verbal understanding;Reinforcement needed   End of Consult   Patient Position at End of Consult Supine;Bed/Chair alarm activated; All needs within reach   End of Consult Comments Pt in stable condition at end of session  All needs in reach  Bed alarm activated     Bossman Amado, PT

## 2022-09-01 NOTE — PLAN OF CARE
Problem: PHYSICAL THERAPY ADULT  Goal: Performs mobility at highest level of function for planned discharge setting  See evaluation for individualized goals  Description: Treatment/Interventions: Functional transfer training, LE strengthening/ROM, Elevations, Therapeutic exercise, Endurance training, Patient/family training, Bed mobility, Gait training, Spoke to nursing, OT  Equipment Recommended: Bryn Zaragoza (pt has rollator)       See flowsheet documentation for full assessment, interventions and recommendations  Note: Prognosis: Fair  Problem List: Decreased strength, Decreased endurance, Impaired balance, Decreased mobility, Pain  Assessment: Pt seen for PT per POC  Pt continue to require minAx1 for most functional mobility + cues for techniques & safety  Gait deviations as above, slow & unsteady w/ dec foot clearance & strides but no gross LOB noted  Pt continue to demonstrate dec amb tolerance 2* to dizziness, weakness, pain & fatigue  Pt noted to be hypotensive during amb w/ BP of 93/55, symptomatic  Symptoms not relieved in seated position hence pt requested to get back in bed  Improved BP to 118/70 once back in supine  Pt also c/o SOB during mobility w/ SpO2 at 96% at RA  Pt tolerated above mentioned thera  ex well + rest periods in between exercises 2* to fatigue  Pt require cues for proper exercise techniques and performance  Pt require rest periods t/o session 2* to SOB & fatigue  Please see flowsheet above for objective findings & levels of assistance required for all functional tasks during this tx session  Nsg staff most recent vital signs as follows: /66 (BP Location: Right arm)   Pulse (!) 106   Temp (!) 97 3 °F (36 3 °C) (Temporal)   Resp 18   Ht 5' 4" (1 626 m)   Wt 53 2 kg (117 lb 4 6 oz)   SpO2 98%   BMI 20 13 kg/m²   Will continue PT per POC  At end of session, pt back in bed in stable condition, call bell & phone in reach, bed alarm activated   Fall precautions reinforced w/ good understanding  The patient's AM-PAC Basic Mobility Inpatient Short Form Raw Score is 15  A Raw score of less than or equal to 16 suggests the patient may benefit from discharge to post-acute rehabilitation services  Please also refer to the recommendation of the Physical Therapist for safe discharge planning  From PT standpoint, will continue to recommend inpt rehab at D/C  CM to follow  Nsg staff to continue to mobilized pt (OOB in chair for all meals) as tolerated to prevent decline in function  Nsg notified  PT Discharge Recommendation: Post acute rehabilitation services    See flowsheet documentation for full assessment

## 2022-09-01 NOTE — DISCHARGE SUMMARY
[address] [facility]  Discharge- Cassandra Carter 1945, 78 y o  female MRN: [id number]  Unit/Bed#: E2 -01 Encounter: [id number]  Primary Care Provider: Shaista Mehta DO   Date and time admitted to hospital: 8/29/2022  9:09 PM    * Generalized abdominal pain  Assessment & Plan  62-year-old female patient with stage IV metastatic pancreatic cancer who presented with an episode of generalized abdominal pain with distention and an elevated temperature for her baseline without fever per se    Med SIRS criteria with leukocytosis and tachycardia as well as lactic acidosis  · Appears related to metastatic disease - no acute infectious process was identified on imaging  · The patient received cefepime and vancomycin in ED - has been observed off antibiotics   · CTA of the chest and abdomen revealed extensive hepatic metastatic disease without significant changes from prior  · Continue serial exams and supportive management   · Infectious workup was unrevealing  · Unfortunately noted for overall poor prognosis, undergoing chemotherapy for her pancreatic cancer - resume treatment at discretion of patient's oncologist      SIRS (systemic inflammatory response syndrome) ([facility] [address] )  Assessment & Plan  · Criteria met with leukocytosis, tachycardia, also lactic acidosis now resolved  · No obvious source of infection, UA negative for UTI, CT a chest CT abdomen/pelvis with contrast no evidence of acute infection, evidence metastatic disease similar to prior imaging  · Patient was just admitted to [facility] and discharged 08/28/2022, at that time also met sirs criteria and was treated for a possible UTI although urine cultures were negative  · Patient did receive cefepime and vancomycin in the ER, for now will continue to observe off antibiotics  · Procalcitonin elevated, however more likelhy related to her cancer rather than infectious process, continue to monitor  · Blood cultures no growth at 48 hours, follow-up final results upon discharge      Hyponatremia  Assessment & Plan  Mild - initially resolved with NS  Monitor BMP outpatient    Malignant neoplasm metastatic to both lungs [facility])  Assessment & Plan  Per current CT of the chest she did progression of bilateral metastatic disease  Currently remains on room air     Cancer related pain  Assessment & Plan  Patient presents with abdominal pain now improved  Has not been utilizing narcotic pain medications    Primary pancreatic adenocarcinoma [facility])  Assessment & Plan  · Stage IV metastatic pancreatic cancer, does not considered curable  · Undergoing palliative chemotherapy every 28 days - next treatment tentatively scheduled for September 12th  · Follows with Dr Narendra Arriola  · Goals of care have been discussed by palliative care with patient and family, for now despite poor prognosis family is electing to seek treatment and for the patient to remain full code  · Chemo will be on hold while patient was at rehab, resume follow-up with Heme-Onc for determination when chemotherapy can be resumed    Chronic anemia  Assessment & Plan  Hemoglobin at baseline, continue to monitor    Moderate protein-calorie malnutrition ([facility] [address] )  Assessment & Plan  Malnutrition Findings:   Adult Malnutrition type: Chronic illness  Adult Degree of Malnutrition: Malnutrition of moderate degree  Malnutrition Characteristics: Muscle loss, Fat loss, Inadequate energy                  360 Statement: Moderate degree of malnutrition in setting of metastatic cancer, <75% energy intake compared to estimated energy needs for > 1 mo, fat loss/ orbitals dark circles/hollowing, muscle loss/depressions/temple region, protruding clavicles, treated with liberal diet, supplements and in between meal snacks    BMI Findings: Body mass index is 20 13 kg/m²     Nutrition consult  Nutritional supplements      Medical Problems             Resolved Problems  Date Reviewed: 9/1/2022   None Discharging Physician / Practitioner: Dom Barlow MD  PCP: Elder Oden DO  Admission Date:   Admission Orders (From admission, onward)     Ordered        08/31/22 1414  Inpatient Admission  Once            08/30/22 1132  Place in Observation  Once            08/30/22 0935  INPATIENT ADMISSION  Once                      Discharge Date: 09/01/22    Consultations During Hospital Stay:  · PT, OT    Procedures Performed:   CT pe study w abdomen pelvis w contrast   ED Interpretation by Tee Dickerson DO (08/30 0910)   CT PULMONARY ANGIOGRAM OF THE CHEST AND CT ABDOMEN AND PELVIS WITH INTRAVENOUS CONTRAST     INDICATION:   SOB, abomdinal pain, hx of cancer      COMPARISON:  CT chest 6/18/2022, CT abdomen pelvis 8/24/2022     TECHNIQUE:  CT examination of the chest, abdomen and pelvis was performed  Thin section CT angiographic technique was used in the chest in order to evaluate for pulmonary embolus and coronal 3D MIP postprocessing was performed on the acquisition   scanner  Axial, sagittal, and coronal 2D reformatted images were created from the source data and submitted for interpretation      Radiation dose length product (DLP) for this visit:  712 mGy-cm   This examination, like all CT scans performed in the [facility], was performed utilizing techniques to minimize radiation dose exposure, including the use of iterative   reconstruction and automated exposure control      IV Contrast:  100 mL of iohexol (OMNIPAQUE)  Enteric Contrast:  Enteric contrast was not administered         FINDINGS:     CHEST     PULMONARY ARTERIAL TREE:  No pulmonary embolus is seen      LUNGS:  Interval progression of pulmonary metastatic disease is identified with innumerable nodules measuring to 2 1 cm     There is no tracheal or endobronchial lesion      PLEURA:  Unremarkable      HEART/AORTA:  Small pericardial effusion is present    No thoracic aortic aneurysm      MEDIASTINUM AND ZACK: Unremarkable      CHEST WALL AND LOWER NECK:  Right chest wall port catheter is present      ABDOMEN     LIVER/BILIARY TREE:  Extensive widespread hepatic metastatic disease is identified with lesions measuring up to 4 9 cm  Overall unchanged from May 24, 2022      GALLBLADDER:  No calcified gallstones  No pericholecystic inflammatory change      SPLEEN:  Unremarkable      PANCREAS:  Stable large 4 4 x 4 6 cm pancreatic body mass lesion is identified      ADRENAL GLANDS:  Unremarkable      KIDNEYS/URETERS:  Anteroinferior positioning of the right kidney again identified  Left kidney is unrema   rkable      STOMACH AND BOWEL:  Unremarkable      APPENDIX:  No findings to suggest appendicitis      ABDOMINOPELVIC CAVITY:  Confluent retroperitoneal lymphadenopathy is again identified      VESSELS:  Unremarkable for patient's age      PELVIS     REPRODUCTIVE ORGANS:  Unremarkable for patient's age      URINARY BLADDER:  Unremarkable      ABDOMINAL WALL/INGUINAL REGIONS:  Unremarkable      OSSEOUS STRUCTURES:  No acute fracture or destructive osseous lesion      IMPRESSION:     No evidence of pulmonary embolus      No progression of widespread pulmonary metastatic disease      Widespread hepatic metastatic disease again seen  Stable pancreatic mass lesion  Confluent retroperitoneal lymphadenopathy      Small pericardial effusion  Final Result by Nano Moraes MD ([id number])      No evidence of pulmonary embolus  No progression of widespread pulmonary metastatic disease  Widespread hepatic metastatic disease again seen  Stable pancreatic mass lesion  Confluent retroperitoneal lymphadenopathy  Small pericardial effusion           Workstation performed: [id number]               Significant Findings / Test Results:   Results from last 7 days   Lab Units 09/01/22  0450   WBC Thousand/uL 9 13   HEMOGLOBIN g/dL 8 9*   HEMATOCRIT % 28 4*   PLATELETS Thousands/uL 328     Results from last 7 days Lab Units 09/01/22  0450   SODIUM mmol/L 133*   POTASSIUM mmol/L 3 6   CHLORIDE mmol/L 101   CO2 mmol/L 21   BUN mg/dL 12   CREATININE mg/dL 0 58*   CALCIUM mg/dL 7 3*         Incidental Findings:   · None      Test Results Pending at Discharge (will require follow up): · Final blood culture results      Outpatient Tests Requested:  · None     Complications:  None    Reason for Admission: abdominal pain     Hospital Course:   Lebron Clement is a 78 y o  female with a PMH of advanced pancreatic cancer with Mets to liver and lungs, recent admission to [place] [address] meeting SIRS criteria with a possible UTI who presents with generalized abdominal pain, weakness, elevated temperature and confusion  Infectious workup remains unrevealing  Patient's symptoms also improved she was able to tolerate oral intake, and remained afebrile  She was by PT and OT and discharge to rehab  [facility]  Patient was discharged and a stable condition  She will resumed her regular medical care with PCP and Oncology upon discharge  Please see above list of diagnoses and related plan for additional information  Condition at Discharge: stable    Discharge Day Visit / Exam:   Subjective:  Patient seen and examined  She reports feeling stable  She is awaiting discharge to rehab  She is tolerating diet  Remains on room air  Vitals: Blood Pressure: 111/66 (09/01/22 1537)  Pulse: (!) 106 (09/01/22 1537)  Temperature: (!) 97 3 °F (36 3 °C) (09/01/22 1537)  Temp Source: Temporal (09/01/22 1537)  Respirations: 18 (09/01/22 1537)  Height: 5' 4" (162 6 cm) (08/30/22 0900)  Weight - Scale: 53 2 kg (117 lb 4 6 oz) (09/01/22 0600)  SpO2: 98 % (09/01/22 1537)  Exam:   Physical Exam  Constitutional:       Appearance: She is ill-appearing  HENT:      Head: Normocephalic and atraumatic  Nose: No congestion     Eyes:      Conjunctiva/sclera: Conjunctivae normal  Cardiovascular:      Rate and Rhythm: Regular rhythm  Tachycardia present  Heart sounds: No murmur heard  Pulmonary:      Effort: No respiratory distress  Breath sounds: No wheezing or rales  Abdominal:      General: There is no distension  Tenderness: There is no abdominal tenderness  There is no guarding  Musculoskeletal:      Right lower leg: No edema  Left lower leg: No edema  Skin:     General: Skin is warm and dry  Coloration: Skin is pale  Neurological:      Mental Status: Mental status is at baseline  Psychiatric:         Mood and Affect: Mood normal           Discussion with Family: Updated  (daughter) via phone  Discharge instructions/Information to patient and family:   See after visit summary for information provided to patient and family  Provisions for Follow-Up Care:  See after visit summary for information related to follow-up care and any pertinent home health orders  Disposition:   [facility] Readmission: No     Discharge Statement:  I spent 35 minutes discharging the patient  This time was spent on the day of discharge  I had direct contact with the patient on the day of discharge  Greater than 50% of the total time was spent examining patient, answering all patient questions, arranging and discussing plan of care with patient as well as directly providing post-discharge instructions  Additional time then spent on discharge activities  Discharge Medications:  See after visit summary for reconciled discharge medications provided to patient and/or family        **Please Note: This note may have been constructed using a voice recognition system**

## 2022-09-02 ENCOUNTER — TRANSITIONAL CARE MANAGEMENT (OUTPATIENT)
Dept: FAMILY MEDICINE CLINIC | Facility: CLINIC | Age: 77
End: 2022-09-02

## 2022-09-04 LAB
BACTERIA BLD CULT: NORMAL
BACTERIA BLD CULT: NORMAL

## 2022-09-06 ENCOUNTER — TELEPHONE (OUTPATIENT)
Dept: NUTRITION | Facility: CLINIC | Age: 77
End: 2022-09-06

## 2022-09-06 ENCOUNTER — TELEPHONE (OUTPATIENT)
Dept: HEMATOLOGY ONCOLOGY | Facility: CLINIC | Age: 77
End: 2022-09-06

## 2022-09-06 NOTE — TELEPHONE ENCOUNTER
Gauri Puckett today to touch base  She was scheduled for an RD follow up appt today during her infusion but her infusion was cancelled d/t recent hospitalization and currently admitted to a rehab facility  Spoke with daughter  She reports [facility] will likely be at the facility for a few weeks  She also states that [facility] Charles has been working with an RD there  Offered to call back in about 3 weeks to touch base again and reschedule RD follow up appointment if desired  Daughter was appreciative of this

## 2022-09-06 NOTE — TELEPHONE ENCOUNTER
Call out to patient daughter in regards to CT scan needing to be rescheduled  Patient daughter has no time or day restrictions  Would like to keep ct scan at [place] location  Reviewed will reschedule on 9/7/22 and return telephone call to patient daughter, Ryley Irene  Daughter appreciative of call and agreeable of plan  Reviewed call back number and hours of operation to further discuss and questions and concerns

## 2022-09-06 NOTE — TELEPHONE ENCOUNTER
vm received from patient Daughter, Portia Jordan in regards to CT scan was done inpatient on 8/29/22 and reviewed with Dr Oxana Pinedo that patient had scan done and was scheduled as well for 9/9/22 for CT scan  Per Dr Oxana Pinedo, ok for CT scan to be rescheduled 3 months from original date  **Needs to be rescheduled

## 2022-09-07 NOTE — TELEPHONE ENCOUNTER
CT scan orginally scheduled for 9/9/22 was rescheduled for 12/7/22 at 1230p at [address] [facility] location  Call out to patient daughter  Reviewed day and time and was appreciative of call  Reviewed call back number and hours of operation to further discuss

## 2022-09-08 ENCOUNTER — TELEPHONE (OUTPATIENT)
Dept: HEMATOLOGY ONCOLOGY | Facility: CLINIC | Age: 77
End: 2022-09-08

## 2022-09-08 NOTE — TELEPHONE ENCOUNTER
Reviewed with Dr George Flores that patient infusion chairs to be cancelled due to POD  Please cancel all infusion chairs at this time for treatment  Patient will see Dr George Flores on 9/19/22 prior to treatment

## 2022-09-11 ENCOUNTER — TELEPHONE (OUTPATIENT)
Dept: OTHER | Facility: OTHER | Age: 77
End: 2022-09-11

## 2022-09-11 ENCOUNTER — TELEPHONE (OUTPATIENT)
Dept: OTHER | Facility: HOSPITAL | Age: 77
End: 2022-09-11

## 2022-09-11 NOTE — TELEPHONE ENCOUNTER
Patient is in rehab facility for physical therapy, but daughter is concerned because she is out of it and in pain  Paged on call provider

## 2022-09-11 NOTE — TELEPHONE ENCOUNTER
Patient's son and Elliot Dewey called me to say that the patient had decreased responsiveness at [facility]  They wanted to know if they should have her transferred back to the hospital   She is a full code, but they were starting to think about hospice  I explained what it would mean to put her through another trip to the hospital   Not matter what could be fixed temporarily, it doesn't change the fact that she has incurable pancreatic cancer  I told them it would be very reasonable to start working with sam to put her on comfort measures  They will take some time to talk about it and will let sam know what they would like to do

## 2022-09-12 ENCOUNTER — HOSPITAL ENCOUNTER (OUTPATIENT)
Dept: INFUSION CENTER | Facility: CLINIC | Age: 77
End: 2022-09-12

## 2022-09-16 ENCOUNTER — TELEPHONE (OUTPATIENT)
Dept: HEMATOLOGY ONCOLOGY | Facility: CLINIC | Age: 77
End: 2022-09-16

## 2022-09-16 ENCOUNTER — PATIENT OUTREACH (OUTPATIENT)
Dept: CASE MANAGEMENT | Facility: OTHER | Age: 77
End: 2022-09-16

## 2022-09-16 NOTE — PROGRESS NOTES
OSW placed outreach to pts daughter, Mela Paget, as per chart notes pt was unresponsive while at Son  Jane Bustosmushtaq states that she passed away yesterday  Pt passed in her sleep, she had been in a great deal of pain and pts son informed nursing, who then got an order for morphine  Jane Guerrero reports she was comfortable at that time, but couldn't really respond  Jane Guerrero was able to visit and bring her children in to see her before she passed away  Jane Guerrero states she and her brother are honoring their mothers wishes and cremating her  OSW expressed my condolences and encouraged her to reach out for support   She was appreciative of the TC

## 2022-09-16 NOTE — TELEPHONE ENCOUNTER
[facility]  In regards to patient  Stated spoke with patient daughter, Earl Birmingham and verbalized that patient had passed away  All appointments with Dr uQinton Petersen cancelled  Call out to patient daughter, Earl Birmingham, confirmed patient passing

## 2022-09-27 ENCOUNTER — TELEPHONE (OUTPATIENT)
Dept: GASTROENTEROLOGY | Facility: CLINIC | Age: 77
End: 2022-09-27

## 2022-09-27 NOTE — TELEPHONE ENCOUNTER
Spoke to daughter Frank Knox to confirm upcoming appt with Dr Sumit Dhillon for 03/10/2022 - daughter stated she passed away on 09/15/2022  Cancelled appt

## (undated) DEVICE — DGW .035 FC J3MM 260CM TEF: Brand: EMERALD

## (undated) DEVICE — RADIFOCUS OPTITORQUE ANGIOGRAPHIC CATHETER: Brand: OPTITORQUE

## (undated) DEVICE — GLIDESHEATH SLENDER STAINLESS STEEL KIT: Brand: GLIDESHEATH SLENDER

## (undated) DEVICE — GUIDEWIRE WHOLEY HI TORQUE INTERM MOD J .035 145CM

## (undated) DEVICE — TR BAND RADIAL ARTERY COMPRESSION DEVICE: Brand: TR BAND